# Patient Record
Sex: FEMALE | Race: BLACK OR AFRICAN AMERICAN | NOT HISPANIC OR LATINO | Employment: PART TIME | URBAN - METROPOLITAN AREA
[De-identification: names, ages, dates, MRNs, and addresses within clinical notes are randomized per-mention and may not be internally consistent; named-entity substitution may affect disease eponyms.]

---

## 2017-01-13 ENCOUNTER — HOSPITAL ENCOUNTER (EMERGENCY)
Facility: HOSPITAL | Age: 37
Discharge: HOME | End: 2017-01-13
Attending: EMERGENCY MEDICINE
Payer: MEDICAID

## 2017-01-13 VITALS
WEIGHT: 218 LBS | OXYGEN SATURATION: 98 % | RESPIRATION RATE: 16 BRPM | HEIGHT: 62 IN | HEART RATE: 78 BPM | DIASTOLIC BLOOD PRESSURE: 73 MMHG | BODY MASS INDEX: 40.12 KG/M2 | TEMPERATURE: 99 F | SYSTOLIC BLOOD PRESSURE: 115 MMHG

## 2017-01-13 DIAGNOSIS — S39.012A LUMBAR STRAIN, INITIAL ENCOUNTER: ICD-10-CM
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
## 2017-01-13 LAB
B-HCG UR QL: NEGATIVE
CTP QC/QA: YES

## 2017-01-13 PROCEDURE — 99283 EMERGENCY DEPT VISIT LOW MDM: CPT | Mod: 25

## 2017-01-13 PROCEDURE — 96372 THER/PROPH/DIAG INJ SC/IM: CPT

## 2017-01-13 PROCEDURE — 63600175 PHARM REV CODE 636 W HCPCS: Performed by: PHYSICIAN ASSISTANT

## 2017-01-13 RX ORDER — NAPROXEN 500 MG/1
500 TABLET ORAL 2 TIMES DAILY WITH MEALS
Qty: 30 TABLET | Refills: 0 | Status: SHIPPED | OUTPATIENT
Start: 2017-01-13 | End: 2017-03-04

## 2017-01-13 RX ORDER — KETOROLAC TROMETHAMINE 30 MG/ML
15 INJECTION, SOLUTION INTRAMUSCULAR; INTRAVENOUS
Status: COMPLETED | OUTPATIENT
Start: 2017-01-13 | End: 2017-01-13

## 2017-01-13 RX ORDER — METHOCARBAMOL 750 MG/1
1500 TABLET, FILM COATED ORAL 3 TIMES DAILY
Qty: 30 TABLET | Refills: 0 | Status: SHIPPED | OUTPATIENT
Start: 2017-01-13 | End: 2017-01-18

## 2017-01-13 RX ADMIN — KETOROLAC TROMETHAMINE 15 MG: 30 INJECTION, SOLUTION INTRAMUSCULAR at 02:01
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
## 2017-03-04 ENCOUNTER — HOSPITAL ENCOUNTER (EMERGENCY)
Facility: HOSPITAL | Age: 37
Discharge: HOME | End: 2017-03-04
Attending: EMERGENCY MEDICINE
Payer: MEDICAID

## 2017-03-04 VITALS
OXYGEN SATURATION: 99 % | RESPIRATION RATE: 18 BRPM | WEIGHT: 228 LBS | BODY MASS INDEX: 40.4 KG/M2 | HEIGHT: 63 IN | TEMPERATURE: 98 F | SYSTOLIC BLOOD PRESSURE: 123 MMHG | HEART RATE: 76 BPM | DIASTOLIC BLOOD PRESSURE: 79 MMHG

## 2017-03-04 DIAGNOSIS — M62.830 MUSCLE SPASM OF BACK: Primary | ICD-10-CM

## 2017-03-04 LAB
B-HCG UR QL: NEGATIVE
BILIRUB UR QL STRIP: NEGATIVE
CLARITY UR: CLEAR
COLOR UR: YELLOW
CTP QC/QA: YES
GLUCOSE UR QL STRIP: NEGATIVE
HGB UR QL STRIP: NEGATIVE
KETONES UR QL STRIP: NEGATIVE
LEUKOCYTE ESTERASE UR QL STRIP: NEGATIVE
NITRITE UR QL STRIP: NEGATIVE
PH UR STRIP: 7 [PH] (ref 5–8)
PROT UR QL STRIP: NEGATIVE
SP GR UR STRIP: 1.01 (ref 1–1.03)
URN SPEC COLLECT METH UR: NORMAL
UROBILINOGEN UR STRIP-ACNC: 1 EU/DL

## 2017-03-04 PROCEDURE — 96372 THER/PROPH/DIAG INJ SC/IM: CPT

## 2017-03-04 PROCEDURE — 81003 URINALYSIS AUTO W/O SCOPE: CPT

## 2017-03-04 PROCEDURE — 99283 EMERGENCY DEPT VISIT LOW MDM: CPT | Mod: 25

## 2017-03-04 PROCEDURE — 63600175 PHARM REV CODE 636 W HCPCS: Performed by: PHYSICIAN ASSISTANT

## 2017-03-04 PROCEDURE — 81025 URINE PREGNANCY TEST: CPT | Performed by: PHYSICIAN ASSISTANT

## 2017-03-04 RX ORDER — NAPROXEN 500 MG/1
500 TABLET ORAL 2 TIMES DAILY WITH MEALS
Qty: 20 TABLET | Refills: 0 | Status: ON HOLD | OUTPATIENT
Start: 2017-03-04 | End: 2020-01-10 | Stop reason: HOSPADM

## 2017-03-04 RX ORDER — CYCLOBENZAPRINE HCL 10 MG
10 TABLET ORAL 3 TIMES DAILY PRN
Qty: 15 TABLET | Refills: 0 | Status: SHIPPED | OUTPATIENT
Start: 2017-03-04 | End: 2017-03-09

## 2017-03-04 RX ORDER — ORPHENADRINE CITRATE 30 MG/ML
30 INJECTION INTRAMUSCULAR; INTRAVENOUS
Status: COMPLETED | OUTPATIENT
Start: 2017-03-04 | End: 2017-03-04

## 2017-03-04 RX ORDER — KETOROLAC TROMETHAMINE 30 MG/ML
30 INJECTION, SOLUTION INTRAMUSCULAR; INTRAVENOUS
Status: COMPLETED | OUTPATIENT
Start: 2017-03-04 | End: 2017-03-04

## 2017-03-04 RX ADMIN — KETOROLAC TROMETHAMINE 30 MG: 30 INJECTION, SOLUTION INTRAMUSCULAR at 02:03

## 2017-03-04 RX ADMIN — ORPHENADRINE CITRATE 30 MG: 30 INJECTION INTRAMUSCULAR; INTRAVENOUS at 02:03
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
## 2017-03-04 NOTE — DISCHARGE INSTRUCTIONS
Back Spasm (No Trauma)    Spasm of the back muscles can occur after a sudden forceful twisting or bending force (such as in a car accident), after a simple awkward movement, or after lifting something heavy with poor body positioning. In any case, muscle spasm adds to the pain. Sleeping in an awkward position or on a poor quality mattress can also cause this. Some people respond to emotional stress by tensing the muscles of their back.  Pain that continues may need further evaluation or other types of treatment such as physical therapy.  You don't always need X-rays for the initial evaluation of back pain, unless you had a physical injury such as from a car accident or fall. If your pain continues and doesn't respond to medical treatment, X-rays and other tests may then be done.   Home care  · As soon as possible, start sitting or walking again to avoid problems from prolonged bed rest (muscle weakness, worsening back stiffness and pain, blood clots in the legs).  · When in bed, try to find a position of comfort. A firm mattress is best. Try lying flat on your back with pillows under your knees. You can also try lying on your side with your knees bent up toward your chest and a pillow between your knees.  · Avoid prolonged sitting, long car rides, or travel. This puts more stress on the lower back than standing or walking.   · During the first 24 to 72 hours after an injury or flare-up, apply an ice pack to the painful area for 20 minutes, then remove it for 20 minutes. Do this over a period of 60 to 90 minutes or several times a day. This will reduce swelling and pain. Always wrap ice packs in a thin towel.  · You can start with ice, then switch to heat. Heat (hot shower, hot bath, or heating pad) reduces pain, and works well for muscle spasms. Apply heat to the painful area for 20 minutes, then remove it for 20 minutes. Do this over a period of 60 to 90 minutes or several times a day. Do not sleep on a heating  pad as it can burn or damage skin.  · Alternate ice and heat therapies.  · Be aware of safe lifting methods and do not lift anything over 15 pounds until all the pain is gone.  Gentle stretching will help your back heal faster. Do this simple routine 2 to 3 times a day until your back is feeling better.  · Lie on your back with your knees bent and both feet on the ground  · Slowly raise your left knee to your chest as you flatten your lower back against the floor. Hold for 20 to 30 seconds.  · Relax and repeat the exercise with your right knee.  · Do 2 to 3 of these exercises for each leg.  · Repeat, hugging both knees to your chest at the same time.  · Do not bounce, but use a gentle pull.  Medicines  Talk to your doctor before using medicine, especially if you have other medical problems or are taking other medicines.  You may use acetaminophen or ibuprofen to control pain, unless your healthcare provider prescribed another pain medicine. If you have a chronic condition such as diabetes, liver or kidney disease, stomach ulcer, or gastrointestinal bleeding, or are taking blood thinners, talk with your healthcare provider before taking any medicines.  Be careful if you are given prescription pain medicine, narcotics, or medicine for muscle spasm. They can cause drowsiness, affect your coordination, reflexes, or judgment. Do not drive or operate heavy machinery when taking these medicines. Take pain medicine only as prescribed by your healthcare provider.  Follow-up care  Follow up with your doctor, or as advised. Physical therapy or further tests may be needed.  If X-rays were taken, they may be reviewed by a radiologist. You will be notified of any new findings that may affect your care.  Call 911  Seek emergency medical care if any of these occur:  · Trouble breathing  · Confusion  · Drowsiness or trouble awakening  · Fainting or loss of consciousness  · Rapid or very slow heart rate  · Loss of bowel or bladder  control  When to seek medical advice  Call your healthcare provider right away if any of these occur:  · Pain becomes worse or spreads to your legs  · Weakness or numbness in one or both legs  · Numbness in the groin or genital area  · Unexplained fever over 100.4ºF (38.0ºC)  · Burning or pain when passing urine  Date Last Reviewed: 6/1/2016  © 1118-8984 [employer]. [address] [place] [address], [place] [id number]. All rights reserved. This information is not intended as a substitute for professional medical care. Always follow your healthcare professional's instructions.

## 2017-03-04 NOTE — ED NOTES
Pt reports to ed with c/o left lower back pain starting approximately last Thursday. Reports the pain as aching and chronic, rated 8/10, worsening when bending over. Took 2 Aleve around 0900 this morning. Denies any urinary symptoms. Reports numbness to bilateral legs with hx of neuropathy.

## 2017-03-04 NOTE — ED AVS SNAPSHOT
[facility]  [address]  [place] [address]               Caitie Wang   3/4/2017  2:22 PM   ED    Description:  Female : 1980   Department:  [facility]           Your Care was Coordinated By:     Provider Role From To    Ronnell Albrecht Jr., MD Attending Provider 17 2820 --    Vanessa Tinoco PA-C Physician Assistant 17 1452 --      Reason for Visit     Back Pain           Diagnoses this Visit        Comments    Muscle spasm of back    -  Primary       ED Disposition     None           To Do List           Follow-up Information     Follow up with Juana Vines MD.    Specialty:  Cardiology    Contact information:    [address]  [address]  [place] [address]  [phone]         These Medications        Disp Refills Start End    naproxen (NAPROSYN) 500 MG tablet 20 tablet 0 3/4/2017     Take 1 tablet (500 mg total) by mouth 2 (two) times daily with meals. - Oral    Pharmacy: [facility] [address] [place] [address] Ph #: [phone]       cyclobenzaprine (FLEXERIL) 10 MG tablet 15 tablet 0 3/4/2017 3/9/2017    Take 1 tablet (10 mg total) by mouth 3 (three) times daily as needed for Muscle spasms. - Oral    Pharmacy: [address] Ph #: [phone]         [facility] On Call     [facility] Nurse Care Line -  Assistance  Registered nurses in the [facility] provide clinical advisement, health education, appointment booking, and other advisory services.  Call for this free service at [phone].             Medications           Message regarding Medications     Verify the changes and/or additions to your medication regime listed below are the same as discussed with your clinician today.  If any of these changes or additions are incorrect, please notify your healthcare provider.        START taking these NEW  medications        Refills    cyclobenzaprine (FLEXERIL) 10 MG tablet 0    Sig: Take 1 tablet (10 mg total) by mouth 3 (three) times daily as needed for Muscle spasms.    Class: Print    Route: Oral      These medications were administered today        Dose Freq    ketorolac injection 30 mg 30 mg ED 1 Time    Sig: Inject 30 mg into the muscle ED 1 Time.    Class: Normal    Route: Intramuscular    Cosign for Ordering: Accepted by Ronnell Albrecht Jr., MD on 3/4/2017  2:54 PM    orphenadrine injection 30 mg 30 mg ED 1 Time    Sig: Inject 1 mL (30 mg total) into the muscle ED 1 Time.    Class: Normal    Route: Intramuscular    Cosign for Ordering: Accepted by Ronnell Albrecht Jr., MD on 3/4/2017  2:54 PM           Verify that the below list of medications is an accurate representation of the medications you are currently taking.  If none reported, the list may be blank. If incorrect, please contact your healthcare provider. Carry this list with you in case of emergency.           Current Medications     ALBUTEROL SULFATE (PROAIR HFA INHL) Inhale into the lungs.    cyclobenzaprine (FLEXERIL) 10 MG tablet Take 1 tablet (10 mg total) by mouth 3 (three) times daily as needed for Muscle spasms.    folic acid (FOLVITE) 1 MG tablet Take 1 mg by mouth once daily.    gabapentin (NEURONTIN) 300 MG capsule     hydrocodone-acetaminophen 7.5-325mg (NORCO) 7.5-325 mg per tablet     hydrOXYzine HCl (ATARAX) 25 MG tablet Take 1 tablet (25 mg total) by mouth every 6 (six) hours as needed for Anxiety.    ibuprofen (ADVIL,MOTRIN) 800 MG tablet Take 1 tablet (800 mg total) by mouth every 8 (eight) hours as needed for Pain.    imipramine (TOFRANIL) 25 MG tablet Take 25 mg by mouth every evening.    levothyroxine (SYNTHROID) 88 MCG tablet Take 300 mcg by mouth once daily.     linaclotide 290 mcg Cap Take 1 capsule (290 mcg total) by mouth once daily. Take 30 minutes before breakfast.    meclizine (ANTIVERT) 25 mg tablet Take 1 tablet (25 mg  "total) by mouth 3 (three) times daily as needed.    medroxyPROGESTERone (DEPO-PROVERA) 150 mg/mL Syrg     meloxicam (MOBIC) 7.5 MG tablet     metformin (GLUCOPHAGE) 500 MG tablet Take 500 mg by mouth daily with breakfast.    mometasone (NASONEX) 50 mcg/actuation nasal spray 2 sprays by Nasal route once daily.    naproxen (NAPROSYN) 500 MG tablet Take 1 tablet (500 mg total) by mouth 2 (two) times daily with meals.    pravastatin (PRAVACHOL) 40 MG tablet Take 40 mg by mouth once daily.    tramadol (ULTRAM) 50 mg tablet     VITAMIN D2 50,000 unit capsule            Clinical Reference Information           Your Vitals Were     BP Pulse Temp Resp Height Weight    134/70 86 97.9 °F (36.6 °C) 20 5' 3" (1.6 m) 103.4 kg (228 lb)    SpO2 BMI             99% 40.39 kg/m2         Allergies as of 3/4/2017     No Known Allergies      Immunizations Administered on Date of Encounter - 3/4/2017     None      ED Micro, Lab, POCT     Start Ordered       Status Ordering Provider    03/04/17 1433 03/04/17 1432  Urinalysis  STAT      Final result     03/04/17 1433 03/04/17 1432  POCT urine pregnancy  Once      Final result       ED Imaging Orders     None        Discharge Instructions         Back Spasm (No Trauma)    Spasm of the back muscles can occur after a sudden forceful twisting or bending force (such as in a car accident), after a simple awkward movement, or after lifting something heavy with poor body positioning. In any case, muscle spasm adds to the pain. Sleeping in an awkward position or on a poor quality mattress can also cause this. Some people respond to emotional stress by tensing the muscles of their back.  Pain that continues may need further evaluation or other types of treatment such as physical therapy.  You don't always need X-rays for the initial evaluation of back pain, unless you had a physical injury such as from a car accident or fall. If your pain continues and doesn't respond to medical treatment, X-rays and " other tests may then be done.   Home care  · As soon as possible, start sitting or walking again to avoid problems from prolonged bed rest (muscle weakness, worsening back stiffness and pain, blood clots in the legs).  · When in bed, try to find a position of comfort. A firm mattress is best. Try lying flat on your back with pillows under your knees. You can also try lying on your side with your knees bent up toward your chest and a pillow between your knees.  · Avoid prolonged sitting, long car rides, or travel. This puts more stress on the lower back than standing or walking.   · During the first 24 to 72 hours after an injury or flare-up, apply an ice pack to the painful area for 20 minutes, then remove it for 20 minutes. Do this over a period of 60 to 90 minutes or several times a day. This will reduce swelling and pain. Always wrap ice packs in a thin towel.  · You can start with ice, then switch to heat. Heat (hot shower, hot bath, or heating pad) reduces pain, and works well for muscle spasms. Apply heat to the painful area for 20 minutes, then remove it for 20 minutes. Do this over a period of 60 to 90 minutes or several times a day. Do not sleep on a heating pad as it can burn or damage skin.  · Alternate ice and heat therapies.  · Be aware of safe lifting methods and do not lift anything over 15 pounds until all the pain is gone.  Gentle stretching will help your back heal faster. Do this simple routine 2 to 3 times a day until your back is feeling better.  · Lie on your back with your knees bent and both feet on the ground  · Slowly raise your left knee to your chest as you flatten your lower back against the floor. Hold for 20 to 30 seconds.  · Relax and repeat the exercise with your right knee.  · Do 2 to 3 of these exercises for each leg.  · Repeat, hugging both knees to your chest at the same time.  · Do not bounce, but use a gentle pull.  Medicines  Talk to your doctor before using medicine,  especially if you have other medical problems or are taking other medicines.  You may use acetaminophen or ibuprofen to control pain, unless your healthcare provider prescribed another pain medicine. If you have a chronic condition such as diabetes, liver or kidney disease, stomach ulcer, or gastrointestinal bleeding, or are taking blood thinners, talk with your healthcare provider before taking any medicines.  Be careful if you are given prescription pain medicine, narcotics, or medicine for muscle spasm. They can cause drowsiness, affect your coordination, reflexes, or judgment. Do not drive or operate heavy machinery when taking these medicines. Take pain medicine only as prescribed by your healthcare provider.  Follow-up care  Follow up with your doctor, or as advised. Physical therapy or further tests may be needed.  If X-rays were taken, they may be reviewed by a radiologist. You will be notified of any new findings that may affect your care.  Call 911  Seek emergency medical care if any of these occur:  · Trouble breathing  · Confusion  · Drowsiness or trouble awakening  · Fainting or loss of consciousness  · Rapid or very slow heart rate  · Loss of bowel or bladder control  When to seek medical advice  Call your healthcare provider right away if any of these occur:  · Pain becomes worse or spreads to your legs  · Weakness or numbness in one or both legs  · Numbness in the groin or genital area  · Unexplained fever over 100.4ºF (38.0ºC)  · Burning or pain when passing urine  Date Last Reviewed: 6/1/2016  © 1467-7441 [employer] [address] [place] [address]. All rights reserved. This information is not intended as a substitute for professional medical care. Always follow your healthcare professional's instructions.          [facility] Sign-Up     Activating your [facility] account is as easy as 1-2-3!     1) Visit [email or web address], select Sign Up Now, enter this activation code and your  date of birth, then select Next.  [email or web address]  Expires: 4/18/2017  3:30 PM      2) Create a username and password to use when you visit [facility] in the future and select a security question in case you lose your password and select Next.    3) Enter your e-mail address and click Sign Up!    Additional Information  If you have questions, please e-mail [email or web address] or call [phone] to talk to our [facility] staff. Remember, [facility] is NOT to be used for urgent needs. For medical emergencies, dial 911.          [facility] complies with applicable Federal civil rights laws and does not discriminate on the basis of race, color, national origin, age, disability, or sex.        Language Assistance Services     ATTENTION: Language assistance services are available, free of charge. Please call [phone].      ATENCIÓN: Si habla tateañol, tiene a fragoso disposición servicios gratuitos de asistencia lingüística. Llame al [phone].     CHÚ Ý: N?u b?n nói Ti?ng Vi?t, có các d?ch v? h? tr? ngôn ng? mi?n phí dành cho b?n. G?i s? [phone].

## 2017-04-03 ENCOUNTER — HOSPITAL ENCOUNTER (OUTPATIENT)
Dept: RADIOLOGY | Facility: HOSPITAL | Age: 37
Discharge: HOME | End: 2017-04-03
Attending: INTERNAL MEDICINE
Payer: MEDICAID

## 2017-04-03 DIAGNOSIS — G89.29 CHRONIC LOW BACK PAIN WITH LEFT-SIDED SCIATICA: ICD-10-CM

## 2017-04-03 DIAGNOSIS — M54.42 CHRONIC LOW BACK PAIN WITH LEFT-SIDED SCIATICA: ICD-10-CM

## 2017-04-03 PROCEDURE — 73521 X-RAY EXAM HIPS BI 2 VIEWS: CPT | Mod: 26,,, | Performed by: RADIOLOGY

## 2017-04-03 PROCEDURE — 72148 MRI LUMBAR SPINE W/O DYE: CPT | Mod: 26,,, | Performed by: RADIOLOGY

## 2017-04-03 PROCEDURE — 72100 X-RAY EXAM L-S SPINE 2/3 VWS: CPT | Mod: TC

## 2017-04-03 PROCEDURE — 72148 MRI LUMBAR SPINE W/O DYE: CPT | Mod: TC

## 2017-04-03 PROCEDURE — 72100 X-RAY EXAM L-S SPINE 2/3 VWS: CPT | Mod: 26,,, | Performed by: RADIOLOGY

## 2017-04-03 PROCEDURE — 73521 X-RAY EXAM HIPS BI 2 VIEWS: CPT | Mod: TC

## 2017-04-04 ENCOUNTER — HOSPITAL ENCOUNTER (EMERGENCY)
Facility: HOSPITAL | Age: 37
Discharge: HOME | End: 2017-04-04
Attending: EMERGENCY MEDICINE
Payer: MEDICAID

## 2017-04-04 VITALS
DIASTOLIC BLOOD PRESSURE: 70 MMHG | RESPIRATION RATE: 18 BRPM | HEIGHT: 63 IN | OXYGEN SATURATION: 100 % | HEART RATE: 79 BPM | BODY MASS INDEX: 38.62 KG/M2 | TEMPERATURE: 98 F | WEIGHT: 218 LBS | SYSTOLIC BLOOD PRESSURE: 132 MMHG

## 2017-04-04 DIAGNOSIS — T78.40XA ALLERGIC REACTION, INITIAL ENCOUNTER: Primary | ICD-10-CM

## 2017-04-04 LAB
B-HCG UR QL: NEGATIVE
CTP QC/QA: YES
GLUCOSE SERPL-MCNC: 108 MG/DL (ref 70–110)
POCT GLUCOSE: 108 MG/DL (ref 70–110)

## 2017-04-04 PROCEDURE — 82962 GLUCOSE BLOOD TEST: CPT

## 2017-04-04 PROCEDURE — 99284 EMERGENCY DEPT VISIT MOD MDM: CPT | Mod: 25

## 2017-04-04 PROCEDURE — 63600175 PHARM REV CODE 636 W HCPCS: Performed by: EMERGENCY MEDICINE

## 2017-04-04 RX ORDER — PREDNISONE 20 MG/1
40 TABLET ORAL
Status: COMPLETED | OUTPATIENT
Start: 2017-04-04 | End: 2017-04-04

## 2017-04-04 RX ORDER — HYDROXYZINE PAMOATE 25 MG/1
25 CAPSULE ORAL EVERY 6 HOURS PRN
Qty: 14 CAPSULE | Refills: 0 | Status: ON HOLD | OUTPATIENT
Start: 2017-04-04 | End: 2020-01-10 | Stop reason: HOSPADM

## 2017-04-04 RX ORDER — PREDNISONE 50 MG/1
50 TABLET ORAL DAILY
Qty: 3 TABLET | Refills: 0 | Status: SHIPPED | OUTPATIENT
Start: 2017-04-05 | End: 2018-09-12

## 2017-04-04 RX ADMIN — PREDNISONE 40 MG: 20 TABLET ORAL at 03:04

## 2017-04-04 NOTE — ED AVS SNAPSHOT
[facility]  [address]  [place] [address]               Caitie Wang   2017  2:35 PM   ED    Description:  Female : 1980   Department:  [facility]           Your Care was Coordinated By:     Provider Role From To    Luis Chaidez MD Attending Provider 17 3287 --      Reason for Visit     Rash           Diagnoses this Visit        Comments    Allergic reaction, initial encounter    -  Primary       ED Disposition     None           To Do List           Follow-up Information     Follow up with Juana Vines MD In 1 day.    Specialty:  Cardiology    Why:  As scheduled    Contact information:    [address]  [address]  [place] [address]  [phone]         These Medications        Disp Refills Start End    predniSONE (DELTASONE) 50 MG Tab 3 tablet 0 2017     Take 1 tablet (50 mg total) by mouth once daily. - Oral    Pharmacy: [facility] [address] [place] [address] Ph #: [phone]       hydrOXYzine pamoate (VISTARIL) 25 MG Cap 14 capsule 0 2017     Take 1 capsule (25 mg total) by mouth every 6 (six) hours as needed (Itching). - Oral    Pharmacy: [facility] [address] [place] [address] Ph #: [phone]         [facility]     [facility] Care [facility] - 24/7 Assistance  Unless otherwise directed by your provider, please contact [facility], our nurse care line that is available for 24/7 assistance.     Registered nurses in the [facility] provide: appointment scheduling, clinical advisement, health education, and other advisory services.  Call: [phone] (toll free)               Medications           Message regarding Medications     Verify the changes and/or additions to your medication regime listed below are the same as discussed with your clinician today.  If any of these changes or additions are incorrect,  please notify your healthcare provider.        START taking these NEW medications        Refills    predniSONE (DELTASONE) 50 MG Tab 0    Starting on: 4/5/2017    Sig: Take 1 tablet (50 mg total) by mouth once daily.    Class: Print    Route: Oral    hydrOXYzine pamoate (VISTARIL) 25 MG Cap 0    Sig: Take 1 capsule (25 mg total) by mouth every 6 (six) hours as needed (Itching).    Class: Print    Route: Oral      These medications were administered today        Dose Freq    predniSONE tablet 40 mg 40 mg ED 1 Time    Sig: Take 2 tablets (40 mg total) by mouth ED 1 Time.    Class: Normal    Route: Oral           Verify that the below list of medications is an accurate representation of the medications you are currently taking.  If none reported, the list may be blank. If incorrect, please contact your healthcare provider. Carry this list with you in case of emergency.           Current Medications     ALBUTEROL SULFATE (PROAIR HFA INHL) Inhale into the lungs.    folic acid (FOLVITE) 1 MG tablet Take 1 mg by mouth once daily.    gabapentin (NEURONTIN) 300 MG capsule     hydrOXYzine HCl (ATARAX) 25 MG tablet Take 1 tablet (25 mg total) by mouth every 6 (six) hours as needed for Anxiety.    imipramine (TOFRANIL) 25 MG tablet Take 25 mg by mouth every evening.    levothyroxine (SYNTHROID) 88 MCG tablet Take 300 mcg by mouth once daily.     linaclotide 290 mcg Cap Take 1 capsule (290 mcg total) by mouth once daily. Take 30 minutes before breakfast.    metformin (GLUCOPHAGE) 500 MG tablet Take 500 mg by mouth daily with breakfast.    pravastatin (PRAVACHOL) 40 MG tablet Take 40 mg by mouth once daily.    tramadol (ULTRAM) 50 mg tablet     VITAMIN D2 50,000 unit capsule     hydrocodone-acetaminophen 7.5-325mg (NORCO) 7.5-325 mg per tablet     hydrOXYzine pamoate (VISTARIL) 25 MG Cap Take 1 capsule (25 mg total) by mouth every 6 (six) hours as needed (Itching).    ibuprofen (ADVIL,MOTRIN) 800 MG tablet Take 1 tablet (800 mg  "total) by mouth every 8 (eight) hours as needed for Pain.    meclizine (ANTIVERT) 25 mg tablet Take 1 tablet (25 mg total) by mouth 3 (three) times daily as needed.    medroxyPROGESTERone (DEPO-PROVERA) 150 mg/mL Syrg     meloxicam (MOBIC) 7.5 MG tablet     mometasone (NASONEX) 50 mcg/actuation nasal spray 2 sprays by Nasal route once daily.    naproxen (NAPROSYN) 500 MG tablet Take 1 tablet (500 mg total) by mouth 2 (two) times daily with meals.    predniSONE (DELTASONE) 50 MG Tab Starting on Apr 05, 2017. Take 1 tablet (50 mg total) by mouth once daily.    predniSONE tablet 40 mg Take 2 tablets (40 mg total) by mouth ED 1 Time.           Clinical Reference Information           Your Vitals Were     BP Pulse Temp Resp Height Weight    130/65 (BP Location: Left arm, Patient Position: Sitting) 80 98.3 °F (36.8 °C) (Oral) 16 5' 3" (1.6 m) 98.9 kg (218 lb)    SpO2 BMI             100% 38.62 kg/m2         Allergies as of 4/4/2017     No Known Allergies      Immunizations Administered on Date of Encounter - 4/4/2017     None      ED Micro, Lab, POCT     Start Ordered       Status Ordering Provider    04/04/17 0000 04/04/17 1335  POCT glucose     Comments:  This order was created through External Result Entry    Completed       ED Imaging Orders     None      Discharge References/Attachments     ALLERGIC REACTION, OTHER (LOCAL) (ENGLISH)      [facility] Sign-Up     Activating your [facility] account is as easy as 1-2-3!     1) Visit [email or web address], select Sign Up Now, enter this activation code and your date of birth, then select Next.  [email or web address]  Expires: 4/18/2017  4:30 PM      2) Create a username and password to use when you visit [facility] in the future and select a security question in case you lose your password and select Next.    3) Enter your e-mail address and click Sign Up!    Additional Information  If you have questions, please e-mail [email or web address] or call [phone] to talk to our [facility] " staff. Remember, [facility] is NOT to be used for urgent needs. For medical emergencies, dial 911.          [facility] complies with applicable Federal civil rights laws and does not discriminate on the basis of race, color, national origin, age, disability, or sex.        Language Assistance Services     ATTENTION: Language assistance services are available, free of charge. Please call [phone].      ATENCIÓN: Si habla español, tiene a fragoso disposición servicios gratuitos de asistencia lingüística. Llame al [phone].     CHÚ Ý: N?u b?n nói Ti?ng Vi?t, có các d?ch v? h? tr? ngôn ng? mi?n phí dành cho b?n. G?i s? [phone].

## 2017-04-04 NOTE — ED PROVIDER NOTES
Encounter Date: 4/4/2017       History     Chief Complaint   Patient presents with    Rash     red, itchy rash to right arm and also on right lower leg that began last pm.  Has taken multiple doses of benadryl and hydrocortisone cream without relief     Review of patient's allergies indicates:  No Known Allergies  HPI Comments: 37-year-old female presents to the emergency department complaining of an itchy, pruritic rash to her right upper extremity and right lower extremity.  Onset last night.  Patient has taken Benadryl and used hydrocortisone cream with only temporary, minimal relief.  Does not report any new medicines, does not report a new detergent.  Patient is in the room with friends who are also experiencing the same symptoms.  Patient does have follow-up with her primary care physician tomorrow, but reports the itching is incessant.  Denies any other symptoms.  Denies any headache, light headedness, dizziness, oropharyngeal swelling, difficult breathing, difficulty swallowing, chest pain, shorts breath, bowel pain, [place], diarrhea, dysuria, hematuria, fever, chills.    The history is provided by the patient.     Past Medical History:   Diagnosis Date    Anxiety     Asthma     Chronic pain     back; inflammatory    Depression     Diabetes mellitus, type 2     Endometriosis     Hyperlipidemia     Hypertension     MITZI (obstructive sleep apnea)     Thyroid disease     Vitamin D deficiency      Past Surgical History:   Procedure Laterality Date    endometriosis      THYROID SURGERY       Family History   Problem Relation Age of Onset    Hypertension Mother     Diabetes Maternal Grandmother     Hyperlipidemia Maternal Grandmother     Diabetes Maternal Grandfather     Hyperlipidemia Maternal Grandfather      Social History   Substance Use Topics    Smoking status: Never Smoker    Smokeless tobacco: Never Used    Alcohol use No     Review of Systems   Constitutional: Negative for chills,  fatigue and fever.   HENT: Negative for congestion, rhinorrhea, sore throat and voice change.    Eyes: Negative for photophobia, pain and redness.   Respiratory: Negative for cough, choking and shortness of breath.    Cardiovascular: Negative for chest pain and palpitations.   Gastrointestinal: Negative for abdominal pain, diarrhea, nausea and vomiting.   Genitourinary: Negative for dysuria, frequency and urgency.   Musculoskeletal: Negative for back pain, neck pain and neck stiffness.   Neurological: Negative for seizures, light-headedness, numbness and headaches.   All other systems reviewed and are negative.      Physical Exam   Initial Vitals   BP Pulse Resp Temp SpO2   04/04/17 1327 04/04/17 1327 04/04/17 1327 04/04/17 1327 04/04/17 1327   130/65 80 16 98.3 °F (36.8 °C) 100 %     Physical Exam    Nursing note and vitals reviewed.  Constitutional: She appears well-developed and well-nourished. No distress.   HENT:   Head: Normocephalic and atraumatic.   Mouth/Throat: Oropharynx is clear and moist.   Eyes: Conjunctivae and EOM are normal. Pupils are equal, round, and reactive to light.   Neck: Normal range of motion. Neck supple. No tracheal deviation present.   Cardiovascular: Normal rate, regular rhythm, normal heart sounds and intact distal pulses.   Pulmonary/Chest: Breath sounds normal. No respiratory distress. She has no wheezes. She has no rhonchi. She has no rales.   Abdominal: Soft. Bowel sounds are normal. She exhibits no distension. There is no tenderness. There is no rebound and no guarding.   Musculoskeletal: Normal range of motion. She exhibits no edema or tenderness.   Neurological: She is alert and oriented to person, place, and time. She has normal strength. No cranial nerve deficit or sensory deficit.   Skin: Rash noted. Rash is urticarial (Patches to the right upper extremity and right lower extremity).         ED Course   Procedures  Labs Reviewed - No data to display          Medical Decision  Making:   Initial Assessment:   37-year-old female presents the emergency department complaining of itching  Differential Diagnosis:   ALLERGIC reaction, angioedema, flexes, infection  ED Management:  Patient given steroids in the emergency department.  We will give prescription for Vistaril as well as prednisone, instructed to follow-up with her primary care physician tomorrow as scheduled, return with any new or worsening symptoms.  Patient expressed good understanding and is comfortable with discharge at this time.                   ED Course     Clinical Impression:   The encounter diagnosis was Allergic reaction, initial encounter.    Disposition:   Disposition: Discharged  Condition: Stable       Luis Chaidez MD  04/04/17 0559

## 2017-08-10 ENCOUNTER — HOSPITAL ENCOUNTER (EMERGENCY)
Facility: HOSPITAL | Age: 37
Discharge: HOME | End: 2017-08-10
Attending: EMERGENCY MEDICINE
Payer: MEDICAID

## 2017-08-10 VITALS
HEIGHT: 64 IN | DIASTOLIC BLOOD PRESSURE: 66 MMHG | RESPIRATION RATE: 14 BRPM | WEIGHT: 218 LBS | OXYGEN SATURATION: 100 % | HEART RATE: 72 BPM | BODY MASS INDEX: 37.22 KG/M2 | TEMPERATURE: 97 F | SYSTOLIC BLOOD PRESSURE: 115 MMHG

## 2017-08-10 DIAGNOSIS — R07.9 CHEST PAIN: ICD-10-CM

## 2017-08-10 DIAGNOSIS — M54.2 NECK PAIN: ICD-10-CM

## 2017-08-10 DIAGNOSIS — G89.18 POST-OPERATIVE PAIN: Primary | ICD-10-CM

## 2017-08-10 LAB
ALBUMIN SERPL BCP-MCNC: 3.7 G/DL
ALP SERPL-CCNC: 67 U/L
ALT SERPL W/O P-5'-P-CCNC: 21 U/L
ANION GAP SERPL CALC-SCNC: 8 MMOL/L
AST SERPL-CCNC: 19 U/L
B-HCG UR QL: NEGATIVE
BASOPHILS # BLD AUTO: 0.02 K/UL
BASOPHILS NFR BLD: 0.3 %
BILIRUB SERPL-MCNC: 0.3 MG/DL
BUN SERPL-MCNC: 8 MG/DL
CALCIUM SERPL-MCNC: 8.8 MG/DL
CHLORIDE SERPL-SCNC: 107 MMOL/L
CO2 SERPL-SCNC: 21 MMOL/L
CREAT SERPL-MCNC: 0.9 MG/DL
CTP QC/QA: YES
D DIMER PPP IA.FEU-MCNC: 1.84 MG/L FEU
DIFFERENTIAL METHOD: NORMAL
EOSINOPHIL # BLD AUTO: 0.1 K/UL
EOSINOPHIL NFR BLD: 1.9 %
ERYTHROCYTE [DISTWIDTH] IN BLOOD BY AUTOMATED COUNT: 13.3 %
EST. GFR  (AFRICAN AMERICAN): >60 ML/MIN/1.73 M^2
EST. GFR  (NON AFRICAN AMERICAN): >60 ML/MIN/1.73 M^2
GLUCOSE SERPL-MCNC: 80 MG/DL
GLUCOSE SERPL-MCNC: 83 MG/DL (ref 70–110)
HCT VFR BLD AUTO: 39 %
HGB BLD-MCNC: 12.8 G/DL
LYMPHOCYTES # BLD AUTO: 2.6 K/UL
LYMPHOCYTES NFR BLD: 36.8 %
MCH RBC QN AUTO: 30.3 PG
MCHC RBC AUTO-ENTMCNC: 32.8 G/DL
MCV RBC AUTO: 92 FL
MONOCYTES # BLD AUTO: 0.6 K/UL
MONOCYTES NFR BLD: 8 %
NEUTROPHILS # BLD AUTO: 3.7 K/UL
NEUTROPHILS NFR BLD: 53 %
PLATELET # BLD AUTO: 318 K/UL
PLATELET BLD QL SMEAR: NORMAL
PMV BLD AUTO: 9.7 FL
POTASSIUM SERPL-SCNC: 4.4 MMOL/L
PROT SERPL-MCNC: 7.7 G/DL
RBC # BLD AUTO: 4.23 M/UL
SODIUM SERPL-SCNC: 136 MMOL/L
TROPONIN I SERPL DL<=0.01 NG/ML-MCNC: <0.006 NG/ML
WBC # BLD AUTO: 7.01 K/UL

## 2017-08-10 PROCEDURE — 99285 EMERGENCY DEPT VISIT HI MDM: CPT | Mod: 25

## 2017-08-10 PROCEDURE — A9585 GADOBUTROL INJECTION: HCPCS | Performed by: EMERGENCY MEDICINE

## 2017-08-10 PROCEDURE — 84484 ASSAY OF TROPONIN QUANT: CPT

## 2017-08-10 PROCEDURE — 93005 ELECTROCARDIOGRAM TRACING: CPT

## 2017-08-10 PROCEDURE — 96374 THER/PROPH/DIAG INJ IV PUSH: CPT

## 2017-08-10 PROCEDURE — 81025 URINE PREGNANCY TEST: CPT | Performed by: EMERGENCY MEDICINE

## 2017-08-10 PROCEDURE — 96375 TX/PRO/DX INJ NEW DRUG ADDON: CPT

## 2017-08-10 PROCEDURE — 85379 FIBRIN DEGRADATION QUANT: CPT

## 2017-08-10 PROCEDURE — 25500020 PHARM REV CODE 255: Performed by: EMERGENCY MEDICINE

## 2017-08-10 PROCEDURE — 82962 GLUCOSE BLOOD TEST: CPT

## 2017-08-10 PROCEDURE — 80053 COMPREHEN METABOLIC PANEL: CPT

## 2017-08-10 PROCEDURE — 85025 COMPLETE CBC W/AUTO DIFF WBC: CPT

## 2017-08-10 PROCEDURE — 63600175 PHARM REV CODE 636 W HCPCS: Performed by: EMERGENCY MEDICINE

## 2017-08-10 RX ORDER — GADOBUTROL 604.72 MG/ML
10 INJECTION INTRAVENOUS
Status: COMPLETED | OUTPATIENT
Start: 2017-08-10 | End: 2017-08-10

## 2017-08-10 RX ORDER — LACTULOSE 10 G/15ML
20 SOLUTION ORAL EVERY 6 HOURS PRN
Qty: 240 ML | Refills: 2 | Status: SHIPPED | OUTPATIENT
Start: 2017-08-10 | End: 2017-08-20

## 2017-08-10 RX ORDER — ONDANSETRON 2 MG/ML
4 INJECTION INTRAMUSCULAR; INTRAVENOUS
Status: COMPLETED | OUTPATIENT
Start: 2017-08-10 | End: 2017-08-10

## 2017-08-10 RX ORDER — HYDROMORPHONE HYDROCHLORIDE 2 MG/ML
1 INJECTION, SOLUTION INTRAMUSCULAR; INTRAVENOUS; SUBCUTANEOUS
Status: COMPLETED | OUTPATIENT
Start: 2017-08-10 | End: 2017-08-10

## 2017-08-10 RX ORDER — OXYCODONE AND ACETAMINOPHEN 7.5; 325 MG/1; MG/1
1 TABLET ORAL EVERY 4 HOURS PRN
Qty: 20 TABLET | Refills: 0 | Status: SHIPPED | OUTPATIENT
Start: 2017-08-10 | End: 2018-09-12

## 2017-08-10 RX ADMIN — HYDROMORPHONE HYDROCHLORIDE 1 MG: 2 INJECTION INTRAMUSCULAR; INTRAVENOUS; SUBCUTANEOUS at 10:08

## 2017-08-10 RX ADMIN — GADOBUTROL 10 ML: 604.72 INJECTION INTRAVENOUS at 01:08

## 2017-08-10 RX ADMIN — ONDANSETRON 4 MG: 2 INJECTION INTRAMUSCULAR; INTRAVENOUS at 10:08

## 2017-08-10 NOTE — ED PROVIDER NOTES
Encounter Date: 8/10/2017       History     Chief Complaint   Patient presents with    Post-op Problem     on July 31 had surgery at [facility] on her neck and having post op pain to left and right upper back that radiates down right arm     Patient is a 37-year-old female who had surgery at [facility] 10 days ago for a cervical disc herniation.  She complains of increasing pain to the back of her neck extending on to her upper back.  She also complains of numbness to her right arm which has developed since the surgery.  She denies fever or chills.  No shortness of breath.  She has a follow-up appointment in 5 days.      The history is provided by the patient.     Review of patient's allergies indicates:  No Known Allergies  Past Medical History:   Diagnosis Date    Anxiety     Asthma     Chronic pain     back; inflammatory    Depression     Diabetes mellitus, type 2     Endometriosis     Hyperlipidemia     Hypertension     MITZI (obstructive sleep apnea)     Thyroid disease     Vitamin D deficiency      Past Surgical History:   Procedure Laterality Date    CERVICAL SPINE SURGERY      endometriosis      THYROID SURGERY       Family History   Problem Relation Age of Onset    Hypertension Mother     Diabetes Maternal Grandmother     Hyperlipidemia Maternal Grandmother     Diabetes Maternal Grandfather     Hyperlipidemia Maternal Grandfather      Social History   Substance Use Topics    Smoking status: Never Smoker    Smokeless tobacco: Never Used    Alcohol use No     Review of Systems   Constitutional: Negative for chills and fever.   Respiratory: Positive for cough. Negative for shortness of breath.    Gastrointestinal: Negative for abdominal pain, nausea and vomiting.   Musculoskeletal: Positive for back pain and neck pain.   Neurological: Positive for numbness.       Physical Exam     Initial Vitals [08/10/17 0851]   BP Pulse Resp Temp SpO2   138/89 86 20 99 °F (37.2 °C) 98 %       MAP       105.33         Physical Exam    Nursing note and vitals reviewed.  Constitutional: She appears distressed.   HENT:   Head: Normocephalic and atraumatic.   Eyes: EOM are normal. Pupils are equal, round, and reactive to light.   Neck:   Decreased range of motion of the neck due to pain.  Mild tenderness of the lower neck extending to the right upper back.   Cardiovascular: Normal rate, regular rhythm and normal heart sounds.   Pulmonary/Chest: Breath sounds normal.   Abdominal: Soft. There is no tenderness.   Musculoskeletal: Normal range of motion.   Calves without swelling or tenderness bilaterally.   Neurological: She is alert and oriented to person, place, and time. No sensory deficit.   There is a slight decrease in the right hand  compared to the left.   Skin: Skin is warm and dry.   Psychiatric: Her behavior is normal. Thought content normal.         ED Course   Procedures  Labs Reviewed   COMPREHENSIVE METABOLIC PANEL - Abnormal; Notable for the following:        Result Value    CO2 21 (*)     All other components within normal limits   D DIMER, QUANTITATIVE - Abnormal; Notable for the following:     D-Dimer 1.84 (*)     All other components within normal limits   CBC W/ AUTO DIFFERENTIAL   TROPONIN I   POCT URINE PREGNANCY     EKG Readings: (Independently Interpreted)   Rhythm: Normal Sinus Rhythm. Heart Rate: 83. ST Segments: Normal ST Segments.     Imaging Results          NM Lung Ventilation Perfusion Imaging (Final result)  Result time 08/10/17 15:47:55    Final result by Henri Johnson MD (08/10/17 15:47:55)                 Impression:      Low probability of pulmonary embolism (less than 20  %).        Electronically signed by: TEMO JOHNSON MD  Date:     08/10/17  Time:    15:47              Narrative:    VENTILATION/PERFUSION (VQ) SCAN:    TECHNIQUE: The perfusion scan was performed after the ventilation scan.   Through a vein in the patient's upper extremity, Tc-99m-MAA  5 mCi were administered IV and perfusion images were acquired. Ventilation images were acquired following inhalation of 15 mCi xenon-133.  For the ventilation portion, full breath, equilibrium, and washout phases in posterior planar projection obtained.  For the perfusion portion, planar images were acquired with anterior, posterior, bilateral lateral, BHANDARI, RPO, NIGHAT, and LPO projections.    COMPARISON:  Chest radiograph, the same date.  Normal.  Her    FINDINGS:    There are no significant ventilation-perfusion mismatches.  Ventilation study normal, all phases.                             MRI Cervical Spine W WO Cont (Final result)  Result time 08/10/17 13:42:03   Procedure changed from MRI Cervical Spine With Contrast     Final result by Blaise Rendon MD (08/10/17 13:42:03)                 Impression:      1.  Status post ACDF at C6-C7.  Multilevel degenerative changes as detailed above.      Electronically signed by: BLAISE RENDON MD  Date:     08/10/17  Time:    13:42              Narrative:    Cervical spine MRI    Technique: MRI of cervical spine was performed without and with 10 mL gadavist contrast and the following sequences were obtained: Localizer; sagittal T1, T2 CUBE, and STIR; axial T2 and gradient; post contrast axial and sagittal T1.    Comparison: Not available.    Findings:    Status post ACDF at C6-C7.  Artifact related to hardware obscures adjacent tissues.    Vertebral body height and alignment are maintained. Facets are aligned.  Bone marrow signal is normal.     Visualized posterior fossa structures and cervical cord are unremarkable.    Limited evaluation of the neck soft tissues is unremarkable.    C2-3: No spinal canal stenosis or neuroforaminal narrowing.    C3-4: Mild spinal canal stenosis on congenital basis.    C4-5: Posterior disc osteophyte complex along with bilateral uncovertebral arthrosis result in moderate spinal canal stenosis and moderate bilateral neuroforaminal  narrowing.    C5-6: Mild spinal canal stenosis on congenital basis.    C6-7: Status post ACDF.  Hardware obscures spinal canal.  Probable mild left neuroforaminal narrowing due to uncovertebral arthrosis.    C7-T1: No spinal canal stenosis or neuroforaminal narrowing.                             X-Ray Chest 1 View (Final result)  Result time 08/10/17 11:41:22    Final result by Rober Rendon MD (08/10/17 11:41:22)                 Impression:     No acute cardiopulmonary process.      Electronically signed by: ROBER RENDON MD  Date:     08/10/17  Time:    11:41              Narrative:    Portable AP chest x-ray    Comparison: 05/16/16    Findings:  There is no consolidation, effusion, or pneumothorax.  Cardiomediastinal silhouette is unremarkable.                                 Medical Decision Making:   ED Management:  37-year-old female with neck pain and right arm symptoms after cervical surgery.  MRI shows no acute abnormalities.  VQ scan shows very low probability for PE.  Lab work is unremarkable.  Patient says she is almost run out of her pain medicine and I will write her for more Percocet.  She also needs something for constipation and I'll write her a prescription for lactulose.  She is scheduled to follow-up at [facility] in 5 days.      Additional MDM:     Well's Criteria Score:  -Clinical symptoms of DVT (leg swelling, pain with palpation) = 0.0  -Other diagnosis less likely than pulmonary embolism =            0.0  -Heart Rate >100 =   0.0  -Immobilization (= or > than 3 days) or surgery in the previous 4 weeks = 1.5  -Previous DVT/PE = 0.0  -Hemoptysis =          0.0  -Malignancy =           0.0  Well's Probability Score =    1.5                      ED Course     Clinical Impression:   The primary encounter diagnosis was Post-operative pain. Diagnoses of Chest pain and Neck pain were also pertinent to this visit.                           Pedro Hernandez MD  08/10/17 4212

## 2017-08-11 LAB — POCT GLUCOSE: 83 MG/DL (ref 70–110)

## 2017-09-25 ENCOUNTER — HOSPITAL ENCOUNTER (EMERGENCY)
Facility: HOSPITAL | Age: 37
Discharge: HOME | End: 2017-09-25
Attending: EMERGENCY MEDICINE
Payer: MEDICAID

## 2017-09-25 VITALS
DIASTOLIC BLOOD PRESSURE: 72 MMHG | BODY MASS INDEX: 40.4 KG/M2 | WEIGHT: 228 LBS | OXYGEN SATURATION: 100 % | RESPIRATION RATE: 18 BRPM | HEART RATE: 82 BPM | SYSTOLIC BLOOD PRESSURE: 120 MMHG | HEIGHT: 63 IN | TEMPERATURE: 99 F

## 2017-09-25 DIAGNOSIS — R09.81 NASAL CONGESTION: ICD-10-CM

## 2017-09-25 DIAGNOSIS — R59.9 SWOLLEN LYMPH NODES: Primary | ICD-10-CM

## 2017-09-25 LAB
B-HCG UR QL: NEGATIVE
CTP QC/QA: YES

## 2017-09-25 PROCEDURE — 99283 EMERGENCY DEPT VISIT LOW MDM: CPT | Mod: 25

## 2017-09-25 PROCEDURE — 81025 URINE PREGNANCY TEST: CPT | Performed by: PHYSICIAN ASSISTANT

## 2017-09-25 PROCEDURE — 25000003 PHARM REV CODE 250: Performed by: PHYSICIAN ASSISTANT

## 2017-09-25 RX ORDER — CETIRIZINE HYDROCHLORIDE 10 MG/1
10 TABLET ORAL DAILY
Qty: 30 TABLET | Refills: 0 | Status: SHIPPED | OUTPATIENT
Start: 2017-09-25 | End: 2023-06-30

## 2017-09-25 RX ORDER — IBUPROFEN 400 MG/1
800 TABLET ORAL
Status: COMPLETED | OUTPATIENT
Start: 2017-09-25 | End: 2017-09-25

## 2017-09-25 RX ADMIN — IBUPROFEN 800 MG: 400 TABLET, FILM COATED ORAL at 02:09

## 2017-09-25 NOTE — ED PROVIDER NOTES
Encounter Date: 9/25/2017       History     Chief Complaint   Patient presents with    Generalized Body Aches     c/o generalized body aches and neck soreness x 3days     Caitie Wang, a 37 y.o. female that presents to the ED for generalized body aches and left-sided jaw pain ×3 days.  She states that she had a surgery to her cervical spine 2 months ago and is concerned that the pain she is having is because of this surgery.  She denies any trauma to this site.  She does attest to chills and subjective fever.  Denies any difficulty swallowing, pain to teeth or ears.   No treatments tried.      Currently on depo and has not had a menstrual cycle in 19 years.        The history is provided by the patient.     Review of patient's allergies indicates:  No Known Allergies  Past Medical History:   Diagnosis Date    Anxiety     Asthma     Chronic pain     back; inflammatory    Depression     Diabetes mellitus, type 2     Endometriosis     Hyperlipidemia     Hypertension     MITZI (obstructive sleep apnea)     Thyroid disease     Vitamin D deficiency      Past Surgical History:   Procedure Laterality Date    CERVICAL SPINE SURGERY      endometriosis      THYROID SURGERY       Family History   Problem Relation Age of Onset    Hypertension Mother     Diabetes Maternal Grandmother     Hyperlipidemia Maternal Grandmother     Diabetes Maternal Grandfather     Hyperlipidemia Maternal Grandfather      Social History   Substance Use Topics    Smoking status: Never Smoker    Smokeless tobacco: Never Used    Alcohol use No     Review of Systems   Constitutional: Positive for chills and fever (subjective ).   HENT: Positive for congestion. Negative for sore throat and trouble swallowing.    Respiratory: Negative for cough and shortness of breath.    Cardiovascular: Negative for chest pain.   Gastrointestinal: Negative for abdominal pain, nausea and vomiting.   Musculoskeletal: Positive for myalgias.    Allergic/Immunologic: Negative for immunocompromised state.   Hematological: Negative for adenopathy.   Psychiatric/Behavioral: Negative for agitation.   All other systems reviewed and are negative.      Physical Exam     Initial Vitals [09/25/17 1255]   BP Pulse Resp Temp SpO2   (!) 140/86 86 16 97.7 °F (36.5 °C) 99 %      MAP       104         Physical Exam    Nursing note and vitals reviewed.  Constitutional: She appears well-developed and well-nourished. No distress.   HENT:   Head: Normocephalic and atraumatic.   Right Ear: Hearing, tympanic membrane, external ear and ear canal normal.   Left Ear: Hearing, tympanic membrane, external ear and ear canal normal.   Nose: Nose normal.   Mouth/Throat: Uvula is midline and oropharynx is clear and moist. Dental caries present. No dental abscesses or uvula swelling.   Overall poor dentition, no evidence of fluctuant gums or tooth abscess formation.     Eyes: Conjunctivae and EOM are normal.   Neck: Normal range of motion. No tracheal deviation present.   Cardiovascular: Normal rate and regular rhythm.   Pulmonary/Chest: Breath sounds normal. No respiratory distress.   Abdominal: Soft. Bowel sounds are normal. She exhibits no distension. There is no tenderness. There is no rebound and no guarding.   Musculoskeletal: Normal range of motion. She exhibits no tenderness.   Lymphadenopathy:     She has cervical adenopathy.        Right cervical: No superficial cervical, no deep cervical and no posterior cervical adenopathy present.       Left cervical: Superficial cervical adenopathy present. No deep cervical and no posterior cervical adenopathy present.   Neurological: She is alert and oriented to person, place, and time. She has normal strength.   Skin: Skin is warm and dry. No rash noted. No erythema.   Psychiatric: She has a normal mood and affect. Thought content normal.         ED Course   Procedures  Labs Reviewed   POCT URINE PREGNANCY             Medical Decision  Making:   Initial Assessment:   Generalized body aches and L sided mild adenopathy  Differential Diagnosis:   URI, sinus congestion, dental jos, viral syndrome   ED Management:   presents to ED and NAD, afebrile, and nontoxic.  She has mild superficial cervical lymphadenopathy to the left side.  No evidence of fluctuance gums or dental abscess formation.  Symptoms seem as consistent with viral URI.  Patient was instructed to take an antihistamine, Sudafed and Tylenol or Motrin as needed.  Instructed to follow-up with PCP for further evaluation.  Strict return precautions given and patient verbalized understanding.    RX: zyrtec                Attending Attestation:     Physician Attestation Statement for NP/PA:   I have conducted a face to face encounter with this patient in addition to the NP/PA, due to NP/PA Request    Other NP/PA Attestation Additions:    History of Present Illness: Left jaw pain    Medical Decision Making: Swollen submandibular gland on left on exam.  Has nasal congesiton and sinus pressure.  I suspect this is related.  No signs of ear/thraot/dental infection.                 ED Course      Clinical Impression:   The primary encounter diagnosis was Swollen lymph nodes. A diagnosis of Nasal congestion was also pertinent to this visit.                           Vanessa Tinoco PA-C  09/25/17 1362       Alana Vale MD  09/29/17 [id number]

## 2017-09-25 NOTE — DISCHARGE INSTRUCTIONS
Take motrin, Zyrtec and Sudafed.  Nasal congestion.  Follow-up with her PCP for further evaluation of the generalized body aches.

## 2017-11-10 ENCOUNTER — HOSPITAL ENCOUNTER (EMERGENCY)
Facility: HOSPITAL | Age: 37
Discharge: HOME | End: 2017-11-10
Attending: EMERGENCY MEDICINE
Payer: MEDICAID

## 2017-11-10 VITALS
DIASTOLIC BLOOD PRESSURE: 96 MMHG | OXYGEN SATURATION: 99 % | HEIGHT: 63 IN | TEMPERATURE: 98 F | HEART RATE: 83 BPM | RESPIRATION RATE: 18 BRPM | WEIGHT: 235 LBS | SYSTOLIC BLOOD PRESSURE: 144 MMHG | BODY MASS INDEX: 41.64 KG/M2

## 2017-11-10 DIAGNOSIS — R23.8 SKIN BREAKDOWN: Primary | ICD-10-CM

## 2017-11-10 LAB — POCT GLUCOSE: 81 MG/DL (ref 70–110)

## 2017-11-10 PROCEDURE — 82962 GLUCOSE BLOOD TEST: CPT

## 2017-11-10 PROCEDURE — 99283 EMERGENCY DEPT VISIT LOW MDM: CPT | Mod: 25

## 2017-11-10 NOTE — ED PROVIDER NOTES
Encounter Date: 11/10/2017       History     Chief Complaint   Patient presents with    Wound Infection     pt has abcess to rt inner gluteal, sent here by Dr Bui. denies fever, able to sit, pain with standing. symptoms for 1 week.     37-year-old female presents with pain in her right buttock cheek.  She reports the pain is worse with walking.  It is been present for the last week.  It is not associated with any painful bowel movements.  It is not associated with any drainage or fever.  She was seen by her primary care physician earlier and sent to the emergency department for reevaluation and possible incision and drainage.      The history is provided by the patient.     Review of patient's allergies indicates:  No Known Allergies  Past Medical History:   Diagnosis Date    Anxiety     Asthma     Chronic pain     back; inflammatory    Depression     Diabetes mellitus, type 2     Endometriosis     Hyperlipidemia     Hypertension     MITZI (obstructive sleep apnea)     Thyroid disease     Vitamin D deficiency      Past Surgical History:   Procedure Laterality Date    CERVICAL SPINE SURGERY      endometriosis      THYROID SURGERY       Family History   Problem Relation Age of Onset    Hypertension Mother     Diabetes Maternal Grandmother     Hyperlipidemia Maternal Grandmother     Diabetes Maternal Grandfather     Hyperlipidemia Maternal Grandfather      Social History   Substance Use Topics    Smoking status: Never Smoker    Smokeless tobacco: Never Used    Alcohol use No     Review of Systems   Constitutional: Negative for fever.   Skin: Positive for wound.   All other systems reviewed and are negative.      Physical Exam     Initial Vitals [11/10/17 1420]   BP Pulse Resp Temp SpO2   (!) 144/96 83 18 98.2 °F (36.8 °C) 99 %      MAP       112         Physical Exam    Vitals reviewed.  Constitutional: Vital signs are normal. She appears well-developed and well-nourished. She is Obese .   HENT:    Head: Normocephalic and atraumatic.   Mouth/Throat: Oropharynx is clear and moist.   Eyes: Conjunctivae and EOM are normal. Pupils are equal, round, and reactive to light.   Neck: Trachea normal and normal range of motion. Neck supple.   Cardiovascular: Normal rate, regular rhythm, normal heart sounds and normal pulses.   Abdominal: Soft. Normal appearance and bowel sounds are normal. There is no tenderness.   Genitourinary: Rectum normal. Rectal exam shows no fissure, no mass and no tenderness.   Genitourinary Comments: Right gluteus with 2x2 area of skin excoriation   No induration, no fluctuance, no surrounding tenderness or erythema    Musculoskeletal: Normal range of motion.   Neurological: She is alert and oriented to person, place, and time.   Skin: Skin is warm and dry.         ED Course   Procedures  Labs Reviewed   POCT GLUCOSE   POCT GLUCOSE MONITORING CONTINUOUS             Medical Decision Making:   History:   Old Medical Records: I decided to obtain old medical records.  Initial Assessment:   Evaluation of skin breakdown.  Physical exam is not consistent with abscess.  Exam more consistent with pressure breakdown likely secondary to patient's size, some excoriation, possible yeast infection.  Recommend barrier Cream.  Patient was provided with barrier cream and discharged in good                   ED Course      Clinical Impression:   The encounter diagnosis was Skin breakdown.    Disposition:   Disposition: Discharged  Condition: Stable                        Saw Geller MD  11/10/17 5403

## 2017-11-10 NOTE — ED TRIAGE NOTES
Pt reports possible abscess to R medial buttock for the last 2 days, sent from PCP office for I&D.

## 2017-12-27 ENCOUNTER — HOSPITAL ENCOUNTER (EMERGENCY)
Facility: HOSPITAL | Age: 37
Discharge: HOME | End: 2017-12-27
Attending: EMERGENCY MEDICINE
Payer: MEDICAID

## 2017-12-27 VITALS
BODY MASS INDEX: 41.64 KG/M2 | RESPIRATION RATE: 20 BRPM | WEIGHT: 235 LBS | OXYGEN SATURATION: 100 % | HEIGHT: 63 IN | TEMPERATURE: 99 F | SYSTOLIC BLOOD PRESSURE: 121 MMHG | DIASTOLIC BLOOD PRESSURE: 74 MMHG | HEART RATE: 90 BPM

## 2017-12-27 DIAGNOSIS — Z76.0 MEDICATION REFILL: ICD-10-CM

## 2017-12-27 DIAGNOSIS — R42 VERTIGO: Primary | ICD-10-CM

## 2017-12-27 LAB
ALBUMIN SERPL BCP-MCNC: 3.7 G/DL
ALP SERPL-CCNC: 69 U/L
ALT SERPL W/O P-5'-P-CCNC: 14 U/L
ANION GAP SERPL CALC-SCNC: 8 MMOL/L
AST SERPL-CCNC: 14 U/L
B-HCG UR QL: NEGATIVE
BASOPHILS # BLD AUTO: 0.02 K/UL
BASOPHILS NFR BLD: 0.3 %
BILIRUB SERPL-MCNC: 0.3 MG/DL
BUN SERPL-MCNC: 9 MG/DL
CALCIUM SERPL-MCNC: 9.5 MG/DL
CHLORIDE SERPL-SCNC: 109 MMOL/L
CO2 SERPL-SCNC: 23 MMOL/L
CREAT SERPL-MCNC: 0.8 MG/DL
CTP QC/QA: YES
DIFFERENTIAL METHOD: NORMAL
EOSINOPHIL # BLD AUTO: 0.1 K/UL
EOSINOPHIL NFR BLD: 1.7 %
ERYTHROCYTE [DISTWIDTH] IN BLOOD BY AUTOMATED COUNT: 13 %
EST. GFR  (AFRICAN AMERICAN): >60 ML/MIN/1.73 M^2
EST. GFR  (NON AFRICAN AMERICAN): >60 ML/MIN/1.73 M^2
GLUCOSE SERPL-MCNC: 89 MG/DL
HCT VFR BLD AUTO: 38.7 %
HGB BLD-MCNC: 12.5 G/DL
IMM GRANULOCYTES # BLD AUTO: 0.03 K/UL
IMM GRANULOCYTES NFR BLD AUTO: 0.4 %
LYMPHOCYTES # BLD AUTO: 2.8 K/UL
LYMPHOCYTES NFR BLD: 36.2 %
MCH RBC QN AUTO: 30 PG
MCHC RBC AUTO-ENTMCNC: 32.3 G/DL
MCV RBC AUTO: 93 FL
MONOCYTES # BLD AUTO: 0.7 K/UL
MONOCYTES NFR BLD: 9.1 %
NEUTROPHILS # BLD AUTO: 4 K/UL
NEUTROPHILS NFR BLD: 52.3 %
NRBC BLD-RTO: 0 /100 WBC
PLATELET # BLD AUTO: 286 K/UL
PMV BLD AUTO: 9.7 FL
POCT GLUCOSE: 110 MG/DL (ref 70–110)
POTASSIUM SERPL-SCNC: 3.7 MMOL/L
PROT SERPL-MCNC: 7.9 G/DL
RBC # BLD AUTO: 4.16 M/UL
SODIUM SERPL-SCNC: 140 MMOL/L
WBC # BLD AUTO: 7.59 K/UL

## 2017-12-27 PROCEDURE — 96360 HYDRATION IV INFUSION INIT: CPT

## 2017-12-27 PROCEDURE — 82962 GLUCOSE BLOOD TEST: CPT

## 2017-12-27 PROCEDURE — 93010 ELECTROCARDIOGRAM REPORT: CPT | Mod: ,,, | Performed by: INTERNAL MEDICINE

## 2017-12-27 PROCEDURE — 99284 EMERGENCY DEPT VISIT MOD MDM: CPT | Mod: 25

## 2017-12-27 PROCEDURE — 93005 ELECTROCARDIOGRAM TRACING: CPT

## 2017-12-27 PROCEDURE — 99284 EMERGENCY DEPT VISIT MOD MDM: CPT | Mod: ,,, | Performed by: EMERGENCY MEDICINE

## 2017-12-27 PROCEDURE — 80053 COMPREHEN METABOLIC PANEL: CPT

## 2017-12-27 PROCEDURE — 81025 URINE PREGNANCY TEST: CPT | Performed by: EMERGENCY MEDICINE

## 2017-12-27 PROCEDURE — 25000003 PHARM REV CODE 250: Performed by: EMERGENCY MEDICINE

## 2017-12-27 PROCEDURE — 85025 COMPLETE CBC W/AUTO DIFF WBC: CPT

## 2017-12-27 RX ORDER — HYDROXYZINE PAMOATE 25 MG/1
25 CAPSULE ORAL
Status: CANCELLED | OUTPATIENT
Start: 2017-12-27 | End: 2017-12-27

## 2017-12-27 RX ORDER — METFORMIN HYDROCHLORIDE 500 MG/1
500 TABLET ORAL
Qty: 30 TABLET | Refills: 11 | Status: SHIPPED | OUTPATIENT
Start: 2017-12-27 | End: 2024-02-19

## 2017-12-27 RX ORDER — LEVOTHYROXINE SODIUM 300 UG/1
0.3 TABLET ORAL
Qty: 30 TABLET | Refills: 11 | Status: SHIPPED | OUTPATIENT
Start: 2017-12-27 | End: 2019-02-18

## 2017-12-27 RX ORDER — HYDROXYZINE PAMOATE 25 MG/1
25 CAPSULE ORAL EVERY 8 HOURS PRN
Qty: 30 CAPSULE | Refills: 6 | Status: SHIPPED | OUTPATIENT
Start: 2017-12-27 | End: 2017-12-27

## 2017-12-27 RX ORDER — GABAPENTIN 300 MG/1
300 CAPSULE ORAL 3 TIMES DAILY
Qty: 90 CAPSULE | Refills: 11 | Status: SHIPPED | OUTPATIENT
Start: 2017-12-27 | End: 2017-12-27

## 2017-12-27 RX ORDER — MECLIZINE HCL 12.5 MG 12.5 MG/1
25 TABLET ORAL 3 TIMES DAILY PRN
Qty: 30 TABLET | Refills: 6 | Status: ON HOLD | OUTPATIENT
Start: 2017-12-27 | End: 2020-01-10 | Stop reason: HOSPADM

## 2017-12-27 RX ORDER — MECLIZINE HCL 12.5 MG 12.5 MG/1
50 TABLET ORAL
Status: CANCELLED | OUTPATIENT
Start: 2017-12-27 | End: 2017-12-27

## 2017-12-27 RX ORDER — LEVOTHYROXINE SODIUM 300 UG/1
0.3 TABLET ORAL
Qty: 30 TABLET | Refills: 11 | Status: SHIPPED | OUTPATIENT
Start: 2017-12-27 | End: 2017-12-27

## 2017-12-27 RX ORDER — MECLIZINE HCL 12.5 MG 12.5 MG/1
25 TABLET ORAL 3 TIMES DAILY PRN
Qty: 30 TABLET | Refills: 6 | Status: SHIPPED | OUTPATIENT
Start: 2017-12-27 | End: 2017-12-27

## 2017-12-27 RX ORDER — HYDROXYZINE PAMOATE 25 MG/1
25 CAPSULE ORAL EVERY 8 HOURS PRN
Qty: 30 CAPSULE | Refills: 6 | Status: SHIPPED | OUTPATIENT
Start: 2017-12-27 | End: 2018-09-12

## 2017-12-27 RX ORDER — METFORMIN HYDROCHLORIDE 500 MG/1
500 TABLET ORAL
Qty: 30 TABLET | Refills: 11 | Status: SHIPPED | OUTPATIENT
Start: 2017-12-27 | End: 2017-12-27

## 2017-12-27 RX ORDER — GABAPENTIN 300 MG/1
300 CAPSULE ORAL 3 TIMES DAILY
Qty: 90 CAPSULE | Refills: 11 | Status: SHIPPED | OUTPATIENT
Start: 2017-12-27 | End: 2023-06-30

## 2017-12-27 RX ADMIN — SODIUM CHLORIDE 1000 ML: 0.9 INJECTION, SOLUTION INTRAVENOUS at 09:12
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
## 2017-12-27 NOTE — ED TRIAGE NOTES
Presents to ER due to getting dizzy at work this am.  States that she ate breakfast and was feeling ok then all of a sudden the dizziness came over her to the point she thought she was going to faint.    GENERAL: The patient is well-developed and well-nourished in no apparent distress. Alert and oriented x4.                                                HEENT: Head is normocephalic and atraumatic. Extraocular muscles are intact. Pupils are equal, round, and reactive to light and accommodation. Nares appeared normal. Mouth is well hydrated and without lesions. Mucous membranes are moist. Posterior pharynx clear of any exudate or lesions.    NECK: Supple. No carotid bruits. No lymphadenopathy or thyromegaly.    LUNGS: Clear to auscultation.    HEART: Regular rate and rhythm without murmur.     ABDOMEN: Soft, nontender, and nondistended. Positive bowel sounds. No hepatosplenomegaly was noted.     EXTREMITIES: Without any cyanosis, clubbing, rash, lesions or edema.     NEUROLOGIC: Cranial nerves II through XII are grossly intact.     PSYCHIATRIC: Flat affect, but denies suicidal or homicidal ideations.    SKIN: No ulceration or induration present.
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
## 2018-02-21 ENCOUNTER — HOSPITAL ENCOUNTER (EMERGENCY)
Facility: HOSPITAL | Age: 38
Discharge: HOME | End: 2018-02-21
Attending: EMERGENCY MEDICINE
Payer: MEDICAID

## 2018-02-21 VITALS
HEIGHT: 63 IN | TEMPERATURE: 98 F | HEART RATE: 83 BPM | BODY MASS INDEX: 38.62 KG/M2 | DIASTOLIC BLOOD PRESSURE: 90 MMHG | SYSTOLIC BLOOD PRESSURE: 135 MMHG | RESPIRATION RATE: 18 BRPM | WEIGHT: 218 LBS | OXYGEN SATURATION: 100 %

## 2018-02-21 DIAGNOSIS — M54.32 BILATERAL SCIATICA: Primary | ICD-10-CM

## 2018-02-21 DIAGNOSIS — M54.31 BILATERAL SCIATICA: Primary | ICD-10-CM

## 2018-02-21 DIAGNOSIS — G62.9 NEUROPATHY: ICD-10-CM

## 2018-02-21 PROCEDURE — 99283 EMERGENCY DEPT VISIT LOW MDM: CPT | Mod: 25

## 2018-02-21 PROCEDURE — 96372 THER/PROPH/DIAG INJ SC/IM: CPT

## 2018-02-21 PROCEDURE — 63600175 PHARM REV CODE 636 W HCPCS: Performed by: EMERGENCY MEDICINE

## 2018-02-21 RX ORDER — ORPHENADRINE CITRATE 30 MG/ML
60 INJECTION INTRAMUSCULAR; INTRAVENOUS
Status: COMPLETED | OUTPATIENT
Start: 2018-02-21 | End: 2018-02-21

## 2018-02-21 RX ORDER — KETOROLAC TROMETHAMINE 30 MG/ML
30 INJECTION, SOLUTION INTRAMUSCULAR; INTRAVENOUS
Status: COMPLETED | OUTPATIENT
Start: 2018-02-21 | End: 2018-02-21

## 2018-02-21 RX ORDER — METHOCARBAMOL 750 MG/1
1500 TABLET, FILM COATED ORAL 3 TIMES DAILY
Qty: 30 TABLET | Refills: 0 | Status: SHIPPED | OUTPATIENT
Start: 2018-02-21 | End: 2018-02-26

## 2018-02-21 RX ORDER — IBUPROFEN 600 MG/1
600 TABLET ORAL EVERY 6 HOURS PRN
Qty: 30 TABLET | Refills: 0 | Status: SHIPPED | OUTPATIENT
Start: 2018-02-21 | End: 2018-03-24 | Stop reason: DRUGHIGH

## 2018-02-21 RX ADMIN — KETOROLAC TROMETHAMINE 30 MG: 30 INJECTION, SOLUTION INTRAMUSCULAR at 02:02

## 2018-02-21 RX ADMIN — ORPHENADRINE CITRATE 60 MG: 30 INJECTION INTRAMUSCULAR; INTRAVENOUS at 02:02

## 2018-02-21 NOTE — ED NOTES
Patient identifiers verified and correct for Caitie Wang.  Pt sitting up on edge of bed, AAO x's 3, family at bedside. Pt stated that she came to the ER with c/o lower back pain that radiates down both her legs. Pt reports a hx of sciatic nerve pain, stated that she told her PCP and was told to come to the ER. Pt says she took her last prescription Toradol yesterday.   LOC: The patient is awake, alert and aware of environment with an appropriate affect, the patient is oriented x 3 and speaking appropriately.  APPEARANCE: Patient resting comfortably and in no acute distress, patient is clean and well groomed, patient's clothing is properly fastened.  SKIN: The skin is warm and dry, color consistent with ethnicity, patient has normal skin turgor and moist mucus membranes, skin intact, no breakdown or bruising noted.  MUSCULOSKELETAL: Patient moving all extremities spontaneously, no obvious swelling or deformities noted.  RESPIRATORY: Airway is open and patent, respirations are spontaneous, patient has a normal effort and rate, no accessory muscle use noted, bilateral breath sounds clear.  CARDIAC: Patient has a normal rate and regular rhythm, no periphreal edema noted, capillary refill < 3 seconds.  ABDOMEN: Soft and non tender to palpation, no distention noted, normoactive bowel sounds present in all four quadrants.  NEUROLOGIC: eyes open spontaneously, behavior appropriate to situation, follows commands, facial expression symmetrical, bilateral hand grasp equal and even, purposeful motor response noted, normal sensation in all extremities when touched with a finger.

## 2018-02-21 NOTE — ED PROVIDER NOTES
Encounter Date: 2/21/2018    SCRIBE #1 NOTE: I, Darshan Abreu, am scribing for, and in the presence of,  Dr. Vale. I have scribed the entire note.       History     Chief Complaint   Patient presents with    Back Pain     back pain that radiates to bilateral legs x days.       Time seen by provider: 2:18 PM    This is a 37 y.o. female who presents with complaint of sciatic nerve pain beginning in the lower back.  The pain radiates down the patient's legs.  The pain is constant but increases when standing.  The patient also reports tingling in her feet.  There is no apparent trauma or swelling to the back or legs.Patient has been taking Tramadol for pain.  This patient has had a history of similar symptoms.  The patient reports that she is in control of any bowel movements and urination.  She was referred to the ER by her PCP due to the worsening of the pain recently.  The patient has a history of HTN,HLD, diabetes mellitus, and asthma.  This patient is not a smoker, and does not consume alcohol.        The history is provided by the patient.     Review of patient's allergies indicates:  No Known Allergies  Past Medical History:   Diagnosis Date    Anxiety     Asthma     Chronic pain     back; inflammatory    Depression     Diabetes mellitus, type 2     Endometriosis     Hyperlipidemia     Hypertension     MITZI (obstructive sleep apnea)     Thyroid disease     Vitamin D deficiency      Past Surgical History:   Procedure Laterality Date    CERVICAL SPINE SURGERY      endometriosis      THYROID SURGERY       Family History   Problem Relation Age of Onset    Hypertension Mother     Diabetes Maternal Grandmother     Hyperlipidemia Maternal Grandmother     Diabetes Maternal Grandfather     Hyperlipidemia Maternal Grandfather      Social History   Substance Use Topics    Smoking status: Never Smoker    Smokeless tobacco: Never Used    Alcohol use No     Review of Systems   Musculoskeletal: Positive for  back pain (sciatic).   All other systems reviewed and are negative.      Physical Exam     Initial Vitals [02/21/18 1336]   BP Pulse Resp Temp SpO2   (!) 168/92 85 18 97.8 °F (36.6 °C) 100 %      MAP       117.33         Physical Exam    Nursing note and vitals reviewed.  Constitutional: She appears well-developed and well-nourished. She is not diaphoretic. No distress.   HENT:   Head: Normocephalic and atraumatic.   Right Ear: External ear normal.   Left Ear: External ear normal.   Eyes: Conjunctivae and EOM are normal. Pupils are equal, round, and reactive to light.   Neck: Normal range of motion. Neck supple.   Cardiovascular: Normal rate, regular rhythm and normal heart sounds. Exam reveals no gallop and no friction rub.    No murmur heard.  Pulmonary/Chest: Breath sounds normal. She has no wheezes. She has no rhonchi. She has no rales.   Abdominal: Soft. Bowel sounds are normal. There is no tenderness. There is no rebound and no guarding.   Musculoskeletal: Normal range of motion. She exhibits no edema or tenderness.   No swelling or tenderness, no deformities     Lymphadenopathy:     She has no cervical adenopathy.   Neurological: She is alert and oriented to person, place, and time. She has normal strength.   Skin: Skin is warm and dry. No rash noted. No erythema.         ED Course   Procedures  Labs Reviewed - No data to display          Medical Decision Making:   ED Management:  37F with lower back pain with radiation in both legs.  Neuro intact, no recent injuries.  Hx of same.  Supportive care.                      Clinical Impression:     1. Bilateral sciatica    2. Neuropathy        Disposition:   Disposition: Discharged  Condition: Stable    I, Dr. Alana Vale, personally performed the services described in this documentation.   All medical record entries made by the scribe were at my direction and in my presence.   I have reviewed the chart and agree that the record is accurate and complete.   Alana  MD Akanksha.  1:11 PM 03/09/2018                  Alana Vale MD  03/09/18 1316

## 2018-02-21 NOTE — DISCHARGE INSTRUCTIONS
Take medications as directed.  Wear supportive shoes - see your orthotic person if your diabetic shoes are not comfortable.  Or wear a very supportive running shoe.  Wear compression stockings in your appropriate size.  See your doctor if you are not better with this treatment.

## 2018-03-24 ENCOUNTER — HOSPITAL ENCOUNTER (EMERGENCY)
Facility: HOSPITAL | Age: 38
Discharge: HOME | End: 2018-03-24
Attending: EMERGENCY MEDICINE
Payer: MEDICAID

## 2018-03-24 VITALS
TEMPERATURE: 99 F | WEIGHT: 228 LBS | RESPIRATION RATE: 18 BRPM | DIASTOLIC BLOOD PRESSURE: 78 MMHG | HEIGHT: 64 IN | OXYGEN SATURATION: 100 % | BODY MASS INDEX: 38.93 KG/M2 | SYSTOLIC BLOOD PRESSURE: 135 MMHG | HEART RATE: 84 BPM

## 2018-03-24 DIAGNOSIS — S92.535A CLOSED NONDISPLACED FRACTURE OF DISTAL PHALANX OF LESSER TOE OF LEFT FOOT, INITIAL ENCOUNTER: Primary | ICD-10-CM

## 2018-03-24 DIAGNOSIS — E11.9 DM2 (DIABETES MELLITUS, TYPE 2): ICD-10-CM

## 2018-03-24 DIAGNOSIS — I10 HTN (HYPERTENSION): ICD-10-CM

## 2018-03-24 LAB
B-HCG UR QL: NEGATIVE
CTP QC/QA: YES

## 2018-03-24 PROCEDURE — 96372 THER/PROPH/DIAG INJ SC/IM: CPT

## 2018-03-24 PROCEDURE — 63600175 PHARM REV CODE 636 W HCPCS: Performed by: ANESTHESIOLOGY

## 2018-03-24 PROCEDURE — 99283 EMERGENCY DEPT VISIT LOW MDM: CPT | Mod: ,,, | Performed by: EMERGENCY MEDICINE

## 2018-03-24 PROCEDURE — 81025 URINE PREGNANCY TEST: CPT | Performed by: EMERGENCY MEDICINE

## 2018-03-24 PROCEDURE — 99284 EMERGENCY DEPT VISIT MOD MDM: CPT | Mod: 25

## 2018-03-24 RX ORDER — IBUPROFEN 800 MG/1
800 TABLET ORAL EVERY 6 HOURS PRN
Qty: 20 TABLET | Refills: 0 | Status: ON HOLD | OUTPATIENT
Start: 2018-03-24 | End: 2020-01-10 | Stop reason: HOSPADM

## 2018-03-24 RX ORDER — KETOROLAC TROMETHAMINE 30 MG/ML
30 INJECTION, SOLUTION INTRAMUSCULAR; INTRAVENOUS
Status: COMPLETED | OUTPATIENT
Start: 2018-03-24 | End: 2018-03-24

## 2018-03-24 RX ADMIN — KETOROLAC TROMETHAMINE 30 MG: 30 INJECTION, SOLUTION INTRAMUSCULAR at 10:03

## 2018-03-24 NOTE — ED PROVIDER NOTES
Encounter Date: 3/24/2018       History     Chief Complaint   Patient presents with    Toe Injury     L little toe injury     Pt is a 37 yo F with PMH significant for anxiety, asthma, chronic pain and neuropathy associated with DM 2, HTN, HLD, hypothyroidism who presents after stubbing her left pinky toe this AM. She states that she stubbed her toe and felt immediate, sharp pain and could not bear weight or put her shoe on. She denies any numbness or tingling to the foot at this time.          Review of patient's allergies indicates:  No Known Allergies  Past Medical History:   Diagnosis Date    Anxiety     Asthma     Chronic pain     back; inflammatory    Depression     Diabetes mellitus, type 2     Endometriosis     Hyperlipidemia     Hypertension     MITZI (obstructive sleep apnea)     Thyroid disease     Vitamin D deficiency      Past Surgical History:   Procedure Laterality Date    CERVICAL SPINE SURGERY      endometriosis      THYROID SURGERY       Family History   Problem Relation Age of Onset    Hypertension Mother     Diabetes Maternal Grandmother     Hyperlipidemia Maternal Grandmother     Diabetes Maternal Grandfather     Hyperlipidemia Maternal Grandfather      Social History   Substance Use Topics    Smoking status: Never Smoker    Smokeless tobacco: Never Used    Alcohol use No     Review of Systems   Constitutional: Negative.    HENT: Negative.    Eyes: Negative.    Respiratory: Negative.    Cardiovascular: Negative.    Gastrointestinal: Negative.    Endocrine: Negative.    Genitourinary: Negative.    Musculoskeletal:        Pain and swelling to left pinky toe   Skin: Negative.    Allergic/Immunologic: Negative.    Neurological: Negative.    Hematological: Negative.    Psychiatric/Behavioral: Negative.    All other systems reviewed and are negative.      Physical Exam     Initial Vitals [03/24/18 0948]   BP Pulse Resp Temp SpO2   (!) 175/116 91 16 98.7 °F (37.1 °C) 99 %      MAP        135.67         Physical Exam    Nursing note and vitals reviewed.  Constitutional: She appears well-developed and well-nourished. She is not diaphoretic. She appears distressed.   HENT:   Head: Normocephalic and atraumatic.   Eyes: EOM are normal. Pupils are equal, round, and reactive to light.   Neck: Normal range of motion. Neck supple.   Cardiovascular: Normal rate, regular rhythm and normal heart sounds.   Pulmonary/Chest: Breath sounds normal.   Abdominal: Soft. Bowel sounds are normal.   Musculoskeletal:        Feet:    Severe pain with palpation and movement of the toe   Neurological: She is alert and oriented to person, place, and time. She has normal strength.   Skin: Skin is warm and dry.   Psychiatric: She has a normal mood and affect.         ED Course   Procedures  Labs Reviewed   POCT URINE PREGNANCY - Normal        Imaging Results          X-Ray Toe 2 or More Views Left (Final result)  Result time 03/24/18 11:37:57    Final result by Michael Marie Jr., MD (03/24/18 11:37:57)                 Impression:      Fracture base of the distal phalanx which communicates with the IP joint little toe.      Electronically signed by: Michael Marie MD  Date:    03/24/2018  Time:    11:37             Narrative:    EXAMINATION:  XR TOE 2 OR MORE VIEWS LEFT    CLINICAL HISTORY:  left pinky toe injury;    TECHNIQUE:  Three views of the left toes were performed    COMPARISON:  None.    FINDINGS:  There is some irregularity at the base of the distal phalanx of the little toe at the IP joint concerning for fracture.  Proximal phalanx is intact.                                 Medical Decision Making:   Initial Assessment:   Pt is a 37 yo F with multiple co-morbidities presenting with left toe pain after injury  Differential Diagnosis:   Dislocation vs sprain  ED Management:  Urine pregnancy test, plain films of left pinky toe and IM toradol.  Jeannette Montoya, PGY-4  10:19 AM    Toe does appear broken on plain film.  Pt given Rx for ibuprofen 800 mg as well as fitted for boot.  Jeannette Montoya, PGY-4  11:58 AM                Attending Attestation:   Physician Attestation Statement for Resident:  As the supervising MD   Physician Attestation Statement: I have personally seen and examined this patient.   I agree with the above history. -:   As the supervising MD I agree with the above PE.    As the supervising MD I agree with the above treatment, course, plan, and disposition.   -: Closed distal phalanx fracture. Pt placed in ortho shoe.  Ibuprofen for pain control.    I have reviewed and agree with the residents interpretation of the following: x-rays.                       Clinical Impression:   The encounter diagnosis was Closed nondisplaced fracture of distal phalanx of lesser toe of left foot, initial encounter.    Disposition:   Disposition: Discharged  Condition: Stable                        Jeannette Montoya MD  Resident  03/24/18 [id number]       Reta Poe MD  03/24/18 5938

## 2018-03-24 NOTE — ED TRIAGE NOTES
Stubbed her pinky toe on left foot about 20 minutes ago.  Having severe pain, cant put a shoe on. Swelling to the toe, no discoloration or obvious deformity.

## 2018-04-05 DIAGNOSIS — Z12.31 SCREENING MAMMOGRAM, ENCOUNTER FOR: Primary | ICD-10-CM

## 2018-04-13 ENCOUNTER — HOSPITAL ENCOUNTER (OUTPATIENT)
Dept: RADIOLOGY | Facility: HOSPITAL | Age: 38
Discharge: HOME | End: 2018-04-13
Attending: INTERNAL MEDICINE
Payer: MEDICAID

## 2018-04-13 DIAGNOSIS — Z12.31 SCREENING MAMMOGRAM, ENCOUNTER FOR: ICD-10-CM

## 2018-04-13 PROCEDURE — 77067 SCR MAMMO BI INCL CAD: CPT | Mod: 26,,, | Performed by: RADIOLOGY

## 2018-04-13 PROCEDURE — 77067 SCR MAMMO BI INCL CAD: CPT | Mod: TC

## 2018-05-01 ENCOUNTER — HOSPITAL ENCOUNTER (EMERGENCY)
Facility: HOSPITAL | Age: 38
Discharge: HOME | End: 2018-05-01
Attending: EMERGENCY MEDICINE
Payer: MEDICAID

## 2018-05-01 VITALS
HEIGHT: 61 IN | WEIGHT: 228 LBS | HEART RATE: 79 BPM | TEMPERATURE: 99 F | BODY MASS INDEX: 43.05 KG/M2 | OXYGEN SATURATION: 98 % | RESPIRATION RATE: 18 BRPM | DIASTOLIC BLOOD PRESSURE: 81 MMHG | SYSTOLIC BLOOD PRESSURE: 150 MMHG

## 2018-05-01 DIAGNOSIS — R51.9 NONINTRACTABLE HEADACHE, UNSPECIFIED CHRONICITY PATTERN, UNSPECIFIED HEADACHE TYPE: Primary | ICD-10-CM

## 2018-05-01 LAB
B-HCG UR QL: NEGATIVE
CTP QC/QA: YES
POCT GLUCOSE: 79 MG/DL (ref 70–110)

## 2018-05-01 PROCEDURE — 82962 GLUCOSE BLOOD TEST: CPT

## 2018-05-01 PROCEDURE — 99284 EMERGENCY DEPT VISIT MOD MDM: CPT | Mod: 25

## 2018-05-01 PROCEDURE — 96374 THER/PROPH/DIAG INJ IV PUSH: CPT

## 2018-05-01 PROCEDURE — 63600175 PHARM REV CODE 636 W HCPCS: Performed by: PHYSICIAN ASSISTANT

## 2018-05-01 PROCEDURE — 81025 URINE PREGNANCY TEST: CPT | Performed by: EMERGENCY MEDICINE

## 2018-05-01 PROCEDURE — 96375 TX/PRO/DX INJ NEW DRUG ADDON: CPT

## 2018-05-01 RX ORDER — DIPHENHYDRAMINE HYDROCHLORIDE 50 MG/ML
25 INJECTION INTRAMUSCULAR; INTRAVENOUS
Status: COMPLETED | OUTPATIENT
Start: 2018-05-01 | End: 2018-05-01

## 2018-05-01 RX ORDER — KETOROLAC TROMETHAMINE 30 MG/ML
15 INJECTION, SOLUTION INTRAMUSCULAR; INTRAVENOUS
Status: COMPLETED | OUTPATIENT
Start: 2018-05-01 | End: 2018-05-01

## 2018-05-01 RX ORDER — PROCHLORPERAZINE EDISYLATE 5 MG/ML
10 INJECTION INTRAMUSCULAR; INTRAVENOUS
Status: COMPLETED | OUTPATIENT
Start: 2018-05-01 | End: 2018-05-01

## 2018-05-01 RX ORDER — BUTALBITAL, ACETAMINOPHEN AND CAFFEINE 50; 325; 40 MG/1; MG/1; MG/1
1 TABLET ORAL EVERY 4 HOURS PRN
Qty: 10 TABLET | Refills: 0 | Status: SHIPPED | OUTPATIENT
Start: 2018-05-01 | End: 2018-05-31

## 2018-05-01 RX ADMIN — DIPHENHYDRAMINE HYDROCHLORIDE 25 MG: 50 INJECTION, SOLUTION INTRAMUSCULAR; INTRAVENOUS at 12:05

## 2018-05-01 RX ADMIN — KETOROLAC TROMETHAMINE 15 MG: 30 INJECTION, SOLUTION INTRAMUSCULAR at 12:05

## 2018-05-01 RX ADMIN — PROCHLORPERAZINE EDISYLATE 10 MG: 5 INJECTION INTRAMUSCULAR; INTRAVENOUS at 12:05

## 2018-05-01 NOTE — ED PROVIDER NOTES
Encounter Date: 5/1/2018       History     Chief Complaint   Patient presents with    Headache     c/o right sided headache x1 week. States she has been taking excedrin migraine and it has been helping the pain, but it stopped helping last night.      Caitie Wang, a 38 y.o. female that presents to the ED for evaluation of headache x 1 week that's waxed and waned in intensity.  Patient states she has a PMH of migraines for which she usually takes Excedrin migraine with improvement.  She states that the headache is frontal in nature and throbbing.  She has associated photophobia and phonophobia.  She denies any nausea or vomiting.  Her headache today is consistent with her previous headaches.  She denies any dizziness, unilateral numbness or weakness or visual changes.           The history is provided by the patient.     Review of patient's allergies indicates:  No Known Allergies  Past Medical History:   Diagnosis Date    Anxiety     Asthma     Chronic pain     back; inflammatory    Depression     Diabetes mellitus, type 2     Endometriosis     Hyperlipidemia     Hypertension     MITZI (obstructive sleep apnea)     Thyroid disease     Vitamin D deficiency      Past Surgical History:   Procedure Laterality Date    CERVICAL SPINE SURGERY      endometriosis      THYROID SURGERY       Family History   Problem Relation Age of Onset    Hypertension Mother     Diabetes Maternal Grandmother     Hyperlipidemia Maternal Grandmother     Diabetes Maternal Grandfather     Hyperlipidemia Maternal Grandfather     Breast cancer Maternal Aunt      Social History   Substance Use Topics    Smoking status: Never Smoker    Smokeless tobacco: Never Used    Alcohol use No     Review of Systems   Constitutional: Negative for fever.   Respiratory: Negative for shortness of breath.    Cardiovascular: Negative for chest pain.   Gastrointestinal: Negative for abdominal pain, nausea and vomiting.   Skin: Negative for  color change and rash.   Allergic/Immunologic: Negative for immunocompromised state.   Neurological: Positive for headaches. Negative for speech difficulty.   Psychiatric/Behavioral: Negative for agitation and confusion.   All other systems reviewed and are negative.      Physical Exam     Initial Vitals [05/01/18 1137]   BP Pulse Resp Temp SpO2   129/88 89 18 98.9 °F (37.2 °C) 97 %      MAP       101.67         Physical Exam    Nursing note and vitals reviewed.  Constitutional: She appears well-developed and well-nourished. She is not diaphoretic. She is Obese . No distress.   HENT:   Head: Normocephalic and atraumatic.   Right Ear: External ear normal.   Left Ear: External ear normal.   Nose: Nose normal.   Mouth/Throat: Oropharynx is clear and moist.   Eyes: Conjunctivae and EOM are normal.   Neck: Normal range of motion.   Cardiovascular: Normal rate, regular rhythm and normal heart sounds. Exam reveals no gallop.    Pulmonary/Chest: Breath sounds normal. No respiratory distress. She has no wheezes. She has no rhonchi. She has no rales.   Abdominal: Soft. Normal appearance and bowel sounds are normal. She exhibits no distension. There is tenderness. There is no rigidity, no rebound, no guarding, no CVA tenderness, no tenderness at McBurney's point and negative Mclean's sign.       Mild TTP over left lateral ribs with no crepitus, ecchymosis or erythema noted.     Musculoskeletal: Normal range of motion.   Neurological: She is alert and oriented to person, place, and time. She has normal strength. No cranial nerve deficit or sensory deficit. Coordination and gait normal. GCS motor subscore is 2.   Skin: Skin is warm and dry. Capillary refill takes less than 2 seconds. No rash noted. No erythema.   Psychiatric: She has a normal mood and affect. Thought content normal.         ED Course   Procedures  Labs Reviewed   POCT URINE PREGNANCY   POCT GLUCOSE             Medical Decision Making:   Initial Assessment:  "  Headache x 1 week  Differential Diagnosis:   Tension headache, migraine,  cluster headache, sinus headache  ED Management:  Feel the patient's headache is benign.  The headache completely resolved with Toradol, benadryl and compazine.  No neck stiffness, vision changes, fever, rash, meningismus/neck stiffness to suggest pseudotumor cerebri or meningitis.  No pain over temporal arteries or vision changes/loss to suggest temporal arteritis.  No "thunderclap onset" or neck stiffness to suggest spontaneous SAH/ICH.  No lancinated pain to eyes with tearing to suggest cluster headache.  No sinus pressure or nasal congestion to suggest sinus headache.  Patient's headache is not in a "band like" distrubution to suggest tension headache.  CT head shows no signs of mass, pseudotumor, infarct, or ICH  RX:     Patient will follow up with Dr Bui's next week and will discuss referral to Neurology.  Precautions on when to return to the ED given.  Patient verbalizes understanding and agrees with current treatment plan.  Case discussed with supervising physician who agrees with plan.                    Attending Attestation:     Physician Attestation Statement for NP/PA:   I have conducted a face to face encounter with this patient in addition to the NP/PA, due to NP/PA Request    Other NP/PA Attestation Additions:      Medical Decision Making: Patient with HA similar to previous HA,   VSS, NAD, nontoxic appearing.  No focal neurological deficits.  HA improved in ED and patient stable for discharge.                       Clinical Impression:   The encounter diagnosis was Nonintractable headache, unspecified chronicity pattern, unspecified headache type.                           Vanessa Tinoco PA-C  05/01/18 1408       Jeannette Santoro MD  05/01/18 3569    "

## 2018-05-01 NOTE — ED NOTES
Intermittent, right sided headache x 1 week with light sensitivity and nausea.  Pt with hx of migraines and takes excedrin migraine at home but excedrin stopped working last pm.  Denies falling or injury

## 2018-05-16 ENCOUNTER — HOSPITAL ENCOUNTER (OUTPATIENT)
Dept: RADIOLOGY | Facility: HOSPITAL | Age: 38
Discharge: HOME | End: 2018-05-16
Attending: INTERNAL MEDICINE
Payer: MEDICAID

## 2018-05-16 DIAGNOSIS — R60.0 EDEMA EXTREMITIES: Primary | ICD-10-CM

## 2018-05-16 DIAGNOSIS — R60.0 EDEMA EXTREMITIES: ICD-10-CM

## 2018-05-16 PROCEDURE — 93970 EXTREMITY STUDY: CPT | Mod: TC

## 2018-05-16 PROCEDURE — 93970 EXTREMITY STUDY: CPT | Mod: 26,,, | Performed by: RADIOLOGY

## 2018-08-16 ENCOUNTER — HOSPITAL ENCOUNTER (EMERGENCY)
Facility: HOSPITAL | Age: 38
Discharge: HOME | End: 2018-08-16
Attending: EMERGENCY MEDICINE
Payer: MEDICAID

## 2018-08-16 VITALS
WEIGHT: 228 LBS | HEART RATE: 78 BPM | TEMPERATURE: 97 F | RESPIRATION RATE: 16 BRPM | DIASTOLIC BLOOD PRESSURE: 93 MMHG | SYSTOLIC BLOOD PRESSURE: 137 MMHG | OXYGEN SATURATION: 98 % | HEIGHT: 63 IN | BODY MASS INDEX: 40.4 KG/M2

## 2018-08-16 DIAGNOSIS — A08.4 VIRAL GASTROENTERITIS: Primary | ICD-10-CM

## 2018-08-16 DIAGNOSIS — R10.9 ABDOMINAL PAIN: ICD-10-CM

## 2018-08-16 DIAGNOSIS — S83.90XA SPRAIN OF KNEE, UNSPECIFIED LATERALITY, UNSPECIFIED LIGAMENT, INITIAL ENCOUNTER: ICD-10-CM

## 2018-08-16 DIAGNOSIS — K29.70 GASTRITIS, PRESENCE OF BLEEDING UNSPECIFIED, UNSPECIFIED CHRONICITY, UNSPECIFIED GASTRITIS TYPE: ICD-10-CM

## 2018-08-16 LAB
ALBUMIN SERPL BCP-MCNC: 3.8 G/DL
ALP SERPL-CCNC: 69 U/L
ALT SERPL W/O P-5'-P-CCNC: 13 U/L
ANION GAP SERPL CALC-SCNC: 11 MMOL/L
AST SERPL-CCNC: 19 U/L
B-HCG UR QL: NEGATIVE
BACTERIA #/AREA URNS AUTO: NORMAL /HPF
BASOPHILS # BLD AUTO: 0.05 K/UL
BASOPHILS NFR BLD: 0.7 %
BILIRUB SERPL-MCNC: 0.4 MG/DL
BILIRUB UR QL STRIP: NEGATIVE
BUN SERPL-MCNC: 9 MG/DL
CALCIUM SERPL-MCNC: 9.4 MG/DL
CHLORIDE SERPL-SCNC: 111 MMOL/L
CLARITY UR REFRACT.AUTO: ABNORMAL
CO2 SERPL-SCNC: 18 MMOL/L
COLOR UR AUTO: YELLOW
CREAT SERPL-MCNC: 1.1 MG/DL
CTP QC/QA: YES
DIFFERENTIAL METHOD: ABNORMAL
EOSINOPHIL # BLD AUTO: 0.1 K/UL
EOSINOPHIL NFR BLD: 1.8 %
ERYTHROCYTE [DISTWIDTH] IN BLOOD BY AUTOMATED COUNT: 13.2 %
EST. GFR  (AFRICAN AMERICAN): >60 ML/MIN/1.73 M^2
EST. GFR  (NON AFRICAN AMERICAN): >60 ML/MIN/1.73 M^2
FLUAV AG SPEC QL IA: NEGATIVE
FLUBV AG SPEC QL IA: NEGATIVE
GLUCOSE SERPL-MCNC: 91 MG/DL
GLUCOSE UR QL STRIP: NEGATIVE
HCT VFR BLD AUTO: 39.4 %
HGB BLD-MCNC: 12.3 G/DL
HGB UR QL STRIP: ABNORMAL
IMM GRANULOCYTES # BLD AUTO: 0.03 K/UL
IMM GRANULOCYTES NFR BLD AUTO: 0.4 %
KETONES UR QL STRIP: NEGATIVE
LEUKOCYTE ESTERASE UR QL STRIP: NEGATIVE
LIPASE SERPL-CCNC: 34 U/L
LYMPHOCYTES # BLD AUTO: 2.7 K/UL
LYMPHOCYTES NFR BLD: 36.7 %
MCH RBC QN AUTO: 30.1 PG
MCHC RBC AUTO-ENTMCNC: 31.2 G/DL
MCV RBC AUTO: 97 FL
MICROSCOPIC COMMENT: NORMAL
MONOCYTES # BLD AUTO: 0.6 K/UL
MONOCYTES NFR BLD: 8.2 %
NEUTROPHILS # BLD AUTO: 3.8 K/UL
NEUTROPHILS NFR BLD: 52.2 %
NITRITE UR QL STRIP: NEGATIVE
NRBC BLD-RTO: 0 /100 WBC
PH UR STRIP: 5 [PH] (ref 5–8)
PLATELET # BLD AUTO: 249 K/UL
PMV BLD AUTO: 9.8 FL
POTASSIUM SERPL-SCNC: 4.1 MMOL/L
PROT SERPL-MCNC: 7.6 G/DL
PROT UR QL STRIP: NEGATIVE
RBC # BLD AUTO: 4.08 M/UL
RBC #/AREA URNS AUTO: 2 /HPF (ref 0–4)
SODIUM SERPL-SCNC: 140 MMOL/L
SP GR UR STRIP: 1.02 (ref 1–1.03)
SPECIMEN SOURCE: NORMAL
SQUAMOUS #/AREA URNS AUTO: 9 /HPF
URN SPEC COLLECT METH UR: ABNORMAL
UROBILINOGEN UR STRIP-ACNC: NEGATIVE EU/DL
WBC # BLD AUTO: 7.28 K/UL
WBC #/AREA URNS AUTO: 1 /HPF (ref 0–5)

## 2018-08-16 PROCEDURE — 81025 URINE PREGNANCY TEST: CPT | Performed by: EMERGENCY MEDICINE

## 2018-08-16 PROCEDURE — 81001 URINALYSIS AUTO W/SCOPE: CPT

## 2018-08-16 PROCEDURE — 63600175 PHARM REV CODE 636 W HCPCS: Performed by: EMERGENCY MEDICINE

## 2018-08-16 PROCEDURE — 85025 COMPLETE CBC W/AUTO DIFF WBC: CPT

## 2018-08-16 PROCEDURE — 93005 ELECTROCARDIOGRAM TRACING: CPT

## 2018-08-16 PROCEDURE — 93010 ELECTROCARDIOGRAM REPORT: CPT | Mod: ,,, | Performed by: INTERNAL MEDICINE

## 2018-08-16 PROCEDURE — 87400 INFLUENZA A/B EACH AG IA: CPT

## 2018-08-16 PROCEDURE — 99284 EMERGENCY DEPT VISIT MOD MDM: CPT | Mod: 25

## 2018-08-16 PROCEDURE — 25000003 PHARM REV CODE 250: Performed by: EMERGENCY MEDICINE

## 2018-08-16 PROCEDURE — 80053 COMPREHEN METABOLIC PANEL: CPT

## 2018-08-16 PROCEDURE — 99284 EMERGENCY DEPT VISIT MOD MDM: CPT | Mod: ,,, | Performed by: EMERGENCY MEDICINE

## 2018-08-16 PROCEDURE — 96374 THER/PROPH/DIAG INJ IV PUSH: CPT

## 2018-08-16 PROCEDURE — 83690 ASSAY OF LIPASE: CPT

## 2018-08-16 RX ORDER — METHOCARBAMOL 500 MG/1
1000 TABLET, FILM COATED ORAL
Status: COMPLETED | OUTPATIENT
Start: 2018-08-16 | End: 2018-08-16

## 2018-08-16 RX ORDER — ACETAMINOPHEN 325 MG/1
650 TABLET ORAL
Status: COMPLETED | OUTPATIENT
Start: 2018-08-16 | End: 2018-08-16

## 2018-08-16 RX ORDER — ONDANSETRON 2 MG/ML
8 INJECTION INTRAMUSCULAR; INTRAVENOUS
Status: COMPLETED | OUTPATIENT
Start: 2018-08-16 | End: 2018-08-16

## 2018-08-16 RX ORDER — METHOCARBAMOL 500 MG/1
1000 TABLET, FILM COATED ORAL 3 TIMES DAILY
Qty: 30 TABLET | Refills: 0 | Status: SHIPPED | OUTPATIENT
Start: 2018-08-16 | End: 2018-08-21

## 2018-08-16 RX ADMIN — ONDANSETRON 8 MG: 2 INJECTION INTRAMUSCULAR; INTRAVENOUS at 10:08

## 2018-08-16 RX ADMIN — METHOCARBAMOL 1000 MG: 500 TABLET ORAL at 10:08

## 2018-08-16 RX ADMIN — ALUMINUM HYDROXIDE, MAGNESIUM HYDROXIDE, AND SIMETHICONE 50 ML: 200; 200; 20 SUSPENSION ORAL at 10:08

## 2018-08-16 RX ADMIN — ACETAMINOPHEN 650 MG: 325 TABLET, FILM COATED ORAL at 10:08

## 2018-08-16 NOTE — ED PROVIDER NOTES
"Encounter Date: 8/16/2018    SCRIBE #1 NOTE: I, Leida Davey, am scribing for, and in the presence of,  Dr. Navarro . I have scribed the entire note.       History     Chief Complaint   Patient presents with    Abdominal Pain     nvd    Knee Pain     r knee keeps popping and gave out    Foot Problem     r foot keeps getting sore     Time patient was seen by the provider: 9:23 AM      The patient is a 38 y.o. female with co-morbidities including: DM2, HLD, HTN, who presents to the ED with a complaint of abdominal pain for x2 days. The patient describes pain as cramping sensations associated with nausea, vomiting, and diarrhea. She had two episodes of vomiting today and constant episodes of watery stool diarrhea. Pt notes of chills, and generalized body aches. She states of some relief after having episode of diarrhea or vomiting. Patient also complains of right knee pain, she states her knee "popped out" twice while walking. Denies any recent injury. She also complains of right foot peeling with dry skin and sensitive. Denies fever, congestion, chest pain. Patient is diabetic, taking Metformin, she states she does not check her sugar regularly.         The history is provided by the patient and medical records.     Review of patient's allergies indicates:  No Known Allergies  Past Medical History:   Diagnosis Date    Anxiety     Asthma     Chronic pain     back; inflammatory    Depression     Diabetes mellitus, type 2     Endometriosis     Hyperlipidemia     Hypertension     MITZI (obstructive sleep apnea)     Thyroid disease     Vitamin D deficiency      Past Surgical History:   Procedure Laterality Date    CERVICAL SPINE SURGERY      endometriosis      THYROID SURGERY       Family History   Problem Relation Age of Onset    Hypertension Mother     Diabetes Maternal Grandmother     Hyperlipidemia Maternal Grandmother     Diabetes Maternal Grandfather     Hyperlipidemia Maternal Grandfather  "    Breast cancer Maternal Aunt      Social History     Tobacco Use    Smoking status: Never Smoker    Smokeless tobacco: Never Used   Substance Use Topics    Alcohol use: No    Drug use: No     Review of Systems   Constitutional: Positive for chills. Negative for fever.        (+) Generalized body aches   HENT: Negative for congestion.    Respiratory: Negative for shortness of breath.    Cardiovascular: Negative for chest pain.   Gastrointestinal: Positive for abdominal pain, diarrhea, nausea and vomiting.   Genitourinary: Negative for dysuria.   Musculoskeletal:        (+) Right knee pain   Skin:        (+) Right foot peeling, dry   Neurological: Negative for weakness.   Hematological: Does not bruise/bleed easily.       Physical Exam     Initial Vitals [08/16/18 0911]   BP Pulse Resp Temp SpO2   138/89 87 18 98.4 °F (36.9 °C) 98 %      MAP       --         Physical Exam    Nursing note and vitals reviewed.  Constitutional: She appears well-developed and well-nourished. No distress.   HENT:   Head: Normocephalic and atraumatic.   Eyes: EOM are normal. Pupils are equal, round, and reactive to light.   Neck: Normal range of motion. Neck supple.   Cardiovascular: Normal rate, regular rhythm, normal heart sounds and intact distal pulses. Exam reveals no gallop and no friction rub.    No murmur heard.  Pulses:       Dorsalis pedis pulses are 2+ on the right side, and 2+ on the left side.   Pulmonary/Chest: Breath sounds normal. No respiratory distress. She has no wheezes.   Abdominal: Soft. Bowel sounds are normal. She exhibits no mass. There is tenderness (mild) in the epigastric area. There is no rigidity, no rebound and no guarding.   Musculoskeletal: Normal range of motion.   Bilateral plantar feet with dry skin, no wounds noted, sensation is intact.   Right knee with no swelling. Minimal to generalized tenderness to palpation. Normal ROM, no signs of effusion or affect of the right knee.   Right calf non  tender, no swelling. Normal gait.    Neurological: She is alert and oriented to person, place, and time. She has normal strength. Gait normal.   Psychiatric: She has a normal mood and affect.         ED Course   Procedures  Labs Reviewed   CBC W/ AUTO DIFFERENTIAL - Abnormal; Notable for the following components:       Result Value    MCHC 31.2 (*)     All other components within normal limits   COMPREHENSIVE METABOLIC PANEL - Abnormal; Notable for the following components:    Chloride 111 (*)     CO2 18 (*)     All other components within normal limits   URINALYSIS, REFLEX TO URINE CULTURE - Abnormal; Notable for the following components:    Appearance, UA Hazy (*)     Occult Blood UA 1+ (*)     All other components within normal limits    Narrative:     Preferred Collection Type->Urine, Clean Catch   LIPASE   INFLUENZA A AND B ANTIGEN   URINALYSIS MICROSCOPIC    Narrative:     Preferred Collection Type->Urine, Clean Catch   POCT URINE PREGNANCY     EKG Readings: (Independently Interpreted)   Initial Reading: No STEMI. Rhythm: Normal Sinus Rhythm. Heart Rate: 75.   Similar to previous EKG dated December 27, 2017.       Imaging Results    None          Medical Decision Making:   History:   Old Medical Records: I decided to obtain old medical records.  Differential Diagnosis:   Viral syndrome vs. influenza vs. DKA vs. gastritis vs. pancreatitis vs. electrolyte derangement.   Independently Interpreted Test(s):   I have ordered and independently interpreted EKG Reading(s) - see prior notes  Clinical Tests:   Lab Tests: Ordered and Reviewed  Medical Tests: Ordered and Reviewed  ED Management:  Will not get XR of right knee since there are no signs of trauma. Symptoms of knee noted to be mild sprain or arthritis. Will give GI cocktail and do labs, EKG, and reassess.       11:35 AM  Patient improved. Labs are unremarkable. Negative for influenza. Symptoms likely due to viral gastroenteritis and gastritis. Will discharge  with gastritis regimen and Robaxin for symptomatic treatment for body aches and knee sprain. Patient able to ambulate in ED without difficulty.             Scribe Attestation:   Scribe #1: I performed the above scribed service and the documentation accurately describes the services I performed. I attest to the accuracy of the note.               Clinical Impression:   The primary encounter diagnosis was Viral gastroenteritis. Diagnoses of Abdominal pain, Gastritis, presence of bleeding unspecified, unspecified chronicity, unspecified gastritis type, and Sprain of knee, unspecified laterality, unspecified ligament, initial encounter were also pertinent to this visit.      Disposition:   Disposition: Discharged  Condition: Stable     I, Dr. Kenyatta Navarro, personally performed the services described in this documentation. All medical record entries made by the scribe were at my direction and in my presence.  I have reviewed the chart and agree that the record reflects my personal performance and is accurate and complete. Kenyatta Navarro MD.  12:43 PM 08/16/2018                   Kenyatta Navarro MD  08/16/18 1245

## 2018-08-16 NOTE — ED NOTES
Pt identifiers Caitie Wang were checked and correct  LOC: The patient is awake, alert, aware of environment with an appropriate affect. Oriented x3, speaking appropriately  APPEARANCE: Pt resting comfortably, in no acute distress, pt is clean and well groomed, clothing properly fastened  SKIN: Skin warm, dry and intact, normal skin turgor, moist mucus membranes, bilateral feel peeling.   RESPIRATORY: Airway is open and patent, respirations are spontaneous, even and unlabored, normal effort and rate  CARDIAC: Normal rate and rhythm, no peripheral edema noted, capillary refill < 3 seconds, bilateral radial pulses 2+  ABDOMEN: Soft, non tender, non distended. Bowel sounds present x 4 quadrants. Pt c/o diffuse abdominal pain. N/v/d.   NEUROLOGIC: PERRLA, facial expression is symmetrical, patient moving all extremities spontaneously, normal sensation in all extremities when touched with a finger.  Follows all commands appropriately  MUSCULOSKELETAL: No obvious deformities. Pt c/o right knee pain.

## 2018-08-16 NOTE — ED NOTES
Pt presented to the ED via pov. Pt c/o abdominal pain, nausea, vomiting, diarrhea x 2 days. Pt c/o right knee pain, pt states her ankle keeps popping. Pt alert. Pt denies injury. Pt states she is DM. Pt states her feet are peeling and are very sensitive.

## 2018-09-12 ENCOUNTER — HOSPITAL ENCOUNTER (EMERGENCY)
Facility: HOSPITAL | Age: 38
Discharge: HOME | End: 2018-09-12
Attending: EMERGENCY MEDICINE
Payer: MEDICAID

## 2018-09-12 VITALS
HEART RATE: 78 BPM | SYSTOLIC BLOOD PRESSURE: 131 MMHG | WEIGHT: 228 LBS | TEMPERATURE: 98 F | BODY MASS INDEX: 40.4 KG/M2 | DIASTOLIC BLOOD PRESSURE: 81 MMHG | RESPIRATION RATE: 16 BRPM | HEIGHT: 63 IN | OXYGEN SATURATION: 97 %

## 2018-09-12 DIAGNOSIS — R07.9 CHEST PAIN, UNSPECIFIED TYPE: Primary | ICD-10-CM

## 2018-09-12 DIAGNOSIS — D72.829 LEUKOCYTOSIS, UNSPECIFIED TYPE: ICD-10-CM

## 2018-09-12 DIAGNOSIS — E87.6 HYPOKALEMIA: ICD-10-CM

## 2018-09-12 LAB
ALBUMIN SERPL BCP-MCNC: 4.2 G/DL
ALP SERPL-CCNC: 73 U/L
ALT SERPL W/O P-5'-P-CCNC: 13 U/L
ANION GAP SERPL CALC-SCNC: 9 MMOL/L
AST SERPL-CCNC: 12 U/L
B-HCG UR QL: NEGATIVE
BACTERIA #/AREA URNS AUTO: NORMAL /HPF
BASOPHILS # BLD AUTO: 0.03 K/UL
BASOPHILS NFR BLD: 0.2 %
BILIRUB SERPL-MCNC: 0.7 MG/DL
BILIRUB UR QL STRIP: NEGATIVE
BNP SERPL-MCNC: <10 PG/ML
BUN SERPL-MCNC: 10 MG/DL
CALCIUM SERPL-MCNC: 9.7 MG/DL
CHLORIDE SERPL-SCNC: 107 MMOL/L
CLARITY UR REFRACT.AUTO: ABNORMAL
CO2 SERPL-SCNC: 24 MMOL/L
COLOR UR AUTO: YELLOW
CREAT SERPL-MCNC: 0.9 MG/DL
CTP QC/QA: YES
DIFFERENTIAL METHOD: ABNORMAL
EOSINOPHIL # BLD AUTO: 0 K/UL
EOSINOPHIL NFR BLD: 0.2 %
ERYTHROCYTE [DISTWIDTH] IN BLOOD BY AUTOMATED COUNT: 13.8 %
EST. GFR  (AFRICAN AMERICAN): >60 ML/MIN/1.73 M^2
EST. GFR  (NON AFRICAN AMERICAN): >60 ML/MIN/1.73 M^2
GLUCOSE SERPL-MCNC: 76 MG/DL
GLUCOSE UR QL STRIP: NEGATIVE
HCT VFR BLD AUTO: 40.1 %
HGB BLD-MCNC: 13.1 G/DL
HGB UR QL STRIP: NEGATIVE
HYALINE CASTS UR QL AUTO: 0 /LPF
IMM GRANULOCYTES # BLD AUTO: 0.25 K/UL
IMM GRANULOCYTES NFR BLD AUTO: 1.3 %
KETONES UR QL STRIP: NEGATIVE
LEUKOCYTE ESTERASE UR QL STRIP: NEGATIVE
LYMPHOCYTES # BLD AUTO: 4.9 K/UL
LYMPHOCYTES NFR BLD: 25.4 %
MCH RBC QN AUTO: 30.8 PG
MCHC RBC AUTO-ENTMCNC: 32.7 G/DL
MCV RBC AUTO: 94 FL
MICROSCOPIC COMMENT: NORMAL
MONOCYTES # BLD AUTO: 1.8 K/UL
MONOCYTES NFR BLD: 9.1 %
NEUTROPHILS # BLD AUTO: 12.4 K/UL
NEUTROPHILS NFR BLD: 63.8 %
NITRITE UR QL STRIP: NEGATIVE
NRBC BLD-RTO: 0 /100 WBC
PH UR STRIP: 5 [PH] (ref 5–8)
PLATELET # BLD AUTO: 308 K/UL
PMV BLD AUTO: 10.6 FL
POTASSIUM SERPL-SCNC: 3.3 MMOL/L
PROT SERPL-MCNC: 8.2 G/DL
PROT UR QL STRIP: ABNORMAL
RBC # BLD AUTO: 4.26 M/UL
RBC #/AREA URNS AUTO: 1 /HPF (ref 0–4)
SODIUM SERPL-SCNC: 140 MMOL/L
SP GR UR STRIP: 1.03 (ref 1–1.03)
SQUAMOUS #/AREA URNS AUTO: 9 /HPF
TROPONIN I SERPL DL<=0.01 NG/ML-MCNC: <0.006 NG/ML
TROPONIN I SERPL DL<=0.01 NG/ML-MCNC: <0.006 NG/ML
URN SPEC COLLECT METH UR: ABNORMAL
UROBILINOGEN UR STRIP-ACNC: ABNORMAL EU/DL
WBC # BLD AUTO: 19.4 K/UL
WBC #/AREA URNS AUTO: 1 /HPF (ref 0–5)

## 2018-09-12 PROCEDURE — 99284 EMERGENCY DEPT VISIT MOD MDM: CPT | Mod: 25

## 2018-09-12 PROCEDURE — 81025 URINE PREGNANCY TEST: CPT | Performed by: EMERGENCY MEDICINE

## 2018-09-12 PROCEDURE — 80053 COMPREHEN METABOLIC PANEL: CPT

## 2018-09-12 PROCEDURE — 84484 ASSAY OF TROPONIN QUANT: CPT | Mod: 91

## 2018-09-12 PROCEDURE — 85025 COMPLETE CBC W/AUTO DIFF WBC: CPT

## 2018-09-12 PROCEDURE — 81001 URINALYSIS AUTO W/SCOPE: CPT

## 2018-09-12 PROCEDURE — 93010 ELECTROCARDIOGRAM REPORT: CPT | Mod: ,,, | Performed by: INTERNAL MEDICINE

## 2018-09-12 PROCEDURE — 83880 ASSAY OF NATRIURETIC PEPTIDE: CPT

## 2018-09-12 PROCEDURE — 93005 ELECTROCARDIOGRAM TRACING: CPT

## 2018-09-12 PROCEDURE — 99285 EMERGENCY DEPT VISIT HI MDM: CPT | Mod: ,,, | Performed by: EMERGENCY MEDICINE

## 2018-09-12 PROCEDURE — 25000003 PHARM REV CODE 250: Performed by: EMERGENCY MEDICINE

## 2018-09-12 RX ORDER — ASPIRIN 325 MG
325 TABLET ORAL
Status: COMPLETED | OUTPATIENT
Start: 2018-09-12 | End: 2018-09-12

## 2018-09-12 RX ORDER — NAPROXEN 500 MG/1
500 TABLET ORAL
Status: COMPLETED | OUTPATIENT
Start: 2018-09-12 | End: 2018-09-12

## 2018-09-12 RX ORDER — HYDROXYZINE PAMOATE 25 MG/1
25 CAPSULE ORAL
Status: COMPLETED | OUTPATIENT
Start: 2018-09-12 | End: 2018-09-12

## 2018-09-12 RX ORDER — POTASSIUM CHLORIDE 20 MEQ/1
40 TABLET, EXTENDED RELEASE ORAL
Status: COMPLETED | OUTPATIENT
Start: 2018-09-12 | End: 2018-09-12

## 2018-09-12 RX ADMIN — ASPIRIN 325 MG ORAL TABLET 325 MG: 325 PILL ORAL at 02:09

## 2018-09-12 RX ADMIN — POTASSIUM CHLORIDE 40 MEQ: 1500 TABLET, EXTENDED RELEASE ORAL at 03:09

## 2018-09-12 RX ADMIN — HYDROXYZINE PAMOATE 25 MG: 25 CAPSULE ORAL at 02:09

## 2018-09-12 RX ADMIN — NAPROXEN 500 MG: 500 TABLET ORAL at 02:09

## 2018-09-12 NOTE — ED NOTES
LOC: The patient is awake, alert, and oriented to place, time, situation. Affect is tearful.  Speech is appropriate and clear.  STates she has a lot going on with her son. SHe hasnt sleep in nights because her chest hurts when she tries to sleep.     APPEARANCE: Patient resting on exam chair,  in no acute distress.  Patient is clean and well groomed.    SKIN: The skin is warm and dry; color consistent with ethnicity.  Patient has normal skin turgor and moist mucus membranes.  Skin intact; no breakdown or bruising noted.     MUSCULOSKELETAL: Patient moving upper and lower extremities without difficulty.  Denies weakness.     RESPIRATORY: Airway is open and patent. Respirations spontaneous, even, easy, and non-labored.  Patient has a normal effort and rate.  No accessory muscle use noted. Denies cough.     CARDIAC:  Normal rhythm and rate noted.  No peripheral edema noted. Complaints of chest pain.      ABDOMEN: Soft and non tender to palpation.  No distention noted.     NEUROLOGIC: Eyes open spontaneously.  Behavior appropriate to situation.  Follows commands; facial expression symmetrical.  Purposeful motor response noted; normal sensation in all extremities.

## 2018-09-12 NOTE — ED PROVIDER NOTES
Encounter Date: 9/12/2018    SCRIBE #1 NOTE: I, Patricio Pace, am scribing for, and in the presence of,  Dr. Gan. I have scribed the entire note.       History     Chief Complaint   Patient presents with    Chest Pain     Pt c/o chest pain x 3 days.      38 y.o. W with PMHx of HLD, HTN, DM, MITZI, Anxiety, depression presents to the ED with a chief complaint of chest pain. Pain has been present 3 days. Pt reports she has been having stress lately and believes it is the cause of her CP. She states she hasn't slept in 2 days due to pain. Currently it is 8/10. Pain is in the right side of chest and radiates down the right side of her arm. Pt reports today around 10am she had an anxiety attack where she couldn't breathe. This was due to an issue with her son. She takes hydroxyzine for anxiety. Non smoker. No family history of early CAD. No cocaine use.            Review of patient's allergies indicates:  No Known Allergies  Past Medical History:   Diagnosis Date    Anxiety     Asthma     Chronic pain     back; inflammatory    Depression     Diabetes mellitus, type 2     Endometriosis     Hyperlipidemia     Hypertension     MITZI (obstructive sleep apnea)     Thyroid disease     Vitamin D deficiency      Past Surgical History:   Procedure Laterality Date    CERVICAL SPINE SURGERY      endometriosis      THYROID SURGERY       Family History   Problem Relation Age of Onset    Hypertension Mother     Diabetes Maternal Grandmother     Hyperlipidemia Maternal Grandmother     Diabetes Maternal Grandfather     Hyperlipidemia Maternal Grandfather     Breast cancer Maternal Aunt      Social History     Tobacco Use    Smoking status: Never Smoker    Smokeless tobacco: Never Used   Substance Use Topics    Alcohol use: No    Drug use: No     Review of Systems   Constitutional: Negative for chills and fever.   HENT: Negative for congestion.    Eyes: Negative for discharge.   Respiratory: Negative for cough  and shortness of breath.    Cardiovascular: Positive for chest pain. Negative for palpitations and leg swelling.   Gastrointestinal: Negative for abdominal pain, diarrhea, nausea and vomiting.   Endocrine: Negative for polyuria.   Genitourinary: Negative for dysuria and hematuria.   Musculoskeletal: Negative for back pain.   Skin: Negative for wound.   Allergic/Immunologic: Negative for immunocompromised state.   Neurological: Negative for headaches.   Hematological: Does not bruise/bleed easily.   Psychiatric/Behavioral: Negative for confusion.       Physical Exam     Initial Vitals [09/12/18 1121]   BP Pulse Resp Temp SpO2   130/80 92 16 97.5 °F (36.4 °C) 100 %      MAP       --         Physical Exam    Nursing note and vitals reviewed.  Constitutional: She appears well-developed and well-nourished. She is not diaphoretic. No distress.   HENT:   Head: Normocephalic and atraumatic.   Eyes: EOM are normal. Pupils are equal, round, and reactive to light. Right eye exhibits no discharge. Left eye exhibits no discharge. No scleral icterus.   Neck: Normal range of motion. Neck supple. No JVD present.   Cardiovascular: Normal rate, regular rhythm, normal heart sounds and intact distal pulses. Exam reveals no gallop and no friction rub.    No murmur heard.  Pulmonary/Chest: Breath sounds normal. No respiratory distress. She has no wheezes. She has no rhonchi. She has no rales. She exhibits tenderness.   Right sided and central chest tenderness to palpation.    Abdominal: Soft. Bowel sounds are normal. She exhibits no distension and no mass. There is no tenderness. There is no rebound and no guarding.   Musculoskeletal: Normal range of motion. She exhibits no edema or tenderness.   Lymphadenopathy:     She has no cervical adenopathy.   Neurological: She is alert and oriented to person, place, and time. She has normal strength. No sensory deficit.   Skin: Skin is warm and dry. Capillary refill takes less than 2 seconds.    Psychiatric: She has a normal mood and affect.         ED Course   Procedures  Labs Reviewed   CBC W/ AUTO DIFFERENTIAL - Abnormal; Notable for the following components:       Result Value    WBC 19.40 (*)     Immature Granulocytes 1.3 (*)     Gran # (ANC) 12.4 (*)     Immature Grans (Abs) 0.25 (*)     Lymph # 4.9 (*)     Mono # 1.8 (*)     All other components within normal limits    Narrative:     ONE LAVENDER SHARED   URINALYSIS, REFLEX TO URINE CULTURE - Abnormal; Notable for the following components:    Appearance, UA Hazy (*)     Protein, UA 1+ (*)     Urobilinogen, UA 4.0-6.0 (*)     All other components within normal limits    Narrative:     Preferred Collection Type->Urine, Clean Catch  cup   COMPREHENSIVE METABOLIC PANEL - Abnormal; Notable for the following components:    Potassium 3.3 (*)     All other components within normal limits   POCT URINE PREGNANCY - Normal   TROPONIN I    Narrative:     ONE LAVENDER SHARED   B-TYPE NATRIURETIC PEPTIDE    Narrative:     ONE LAVENDER SHARED   URINALYSIS MICROSCOPIC    Narrative:     Preferred Collection Type->Urine, Clean Catch  cup   TROPONIN I     EKG Readings: (Independently Interpreted)   Rhythm: Normal Sinus Rhythm. Heart Rate: 88. ST Segments: Normal ST Segments. T Waves: Normal. Axis: Normal.       Imaging Results          X-Ray Chest PA And Lateral (Final result)  Result time 09/12/18 14:01:03    Final result by Aden Taylor MD (09/12/18 14:01:03)                 Impression:      .  See above      Electronically signed by: Aden Taylor MD  Date:    09/12/2018  Time:    14:01             Narrative:    EXAMINATION:  XR CHEST PA AND LATERAL    CLINICAL HISTORY:  Chest Pain;    TECHNIQUE:  PA and lateral views of the chest were performed.    COMPARISON:  08/10/2017    FINDINGS:  Cardiac size is normal.  Lungs are clear.  No infiltrate is seen.  Patient has had previous cervical fusion.                                 Medical Decision Making:    History:   Old Medical Records: I decided to obtain old medical records.  Initial Assessment:   38 y.o. woman with history of HTN, HLD, DM, MITZI,  Asthma, and depression presents with chest pain. This patient's differential diagnosis includes, but is not limited to: acute coronary syndrome, pneumothorax, pleurisy, pericarditis, asthma exacerbation, pneumonia, lung abscess, costochondritis, pulmonary embolus, trauma, rib fractures, muscular pain, diaphragmatic irritation.    Will administer aspirin, NSAID, home dose of vistaril. Will obtain labs and CXR.     I doubt PE and patient is PERC neg. I doubt aortic pathology given reassuring appearance and hemodynamics.    Reassessment:  Serum labs reviewed. Troponin within normal limits. BNP less than 10. UA clear. CBC with leukocytosis of 19.4, however I see no apparent etiology to this - possibly ?demarginalization. CXR within normal limits. Mild hypokalemia of 3.3, will replace by mouth. On repeat assesment she reports feeling improved. Will await second trop. If neg, will be discharged for outpatient follow up.    2nd trop neg.    Additional MDM:   PERC Rule:   Age is greater than or equal to 50 = 0.0  Heart Rate is greater than or equal to 100 = 0.0  SaO2 on room air < 95% = 0.0  Unilateral leg swelling = 0.0  Hemoptysis = 0.0  Recent surgery or trauma = 0.0  Prior PE or DVT =  0.0  Hormone use = 0.00  PERC Score = 0  Heart Score:    History:          Slightly suspicious.  ECG:             Normal  Age:               Less than 45 years  Risk factors: >= 3 risk factors or history of atherosclerotic disease  Troponin:       Less than or equal to normal limit  Final Score: 2                       Clinical Impression:   The primary encounter diagnosis was Chest pain, unspecified type. Diagnoses of Hypokalemia and Leukocytosis, unspecified type were also pertinent to this visit.                             Cesar Gan MD  09/14/18 [id number]

## 2018-09-12 NOTE — ED TRIAGE NOTES
Comes to the ED with c/o chest pain, States she going thru a lot with her son.  Had an anxiety attack at work, difficulty breathing like someone was choking her.

## 2018-11-28 ENCOUNTER — HOSPITAL ENCOUNTER (EMERGENCY)
Facility: HOSPITAL | Age: 38
Discharge: HOME | End: 2018-11-29
Attending: EMERGENCY MEDICINE
Payer: MEDICAID

## 2018-11-28 DIAGNOSIS — K64.5 THROMBOSED EXTERNAL HEMORRHOID: Primary | ICD-10-CM

## 2018-11-28 PROCEDURE — 99284 EMERGENCY DEPT VISIT MOD MDM: CPT | Mod: ,,, | Performed by: PHYSICIAN ASSISTANT

## 2018-11-28 PROCEDURE — 99283 EMERGENCY DEPT VISIT LOW MDM: CPT

## 2018-11-28 RX ORDER — HYDROCORTISONE ACETATE 25 MG/1
25 SUPPOSITORY RECTAL 2 TIMES DAILY PRN
Qty: 12 SUPPOSITORY | Refills: 0 | Status: SHIPPED | OUTPATIENT
Start: 2018-11-28 | End: 2018-12-08

## 2018-11-29 VITALS
HEART RATE: 89 BPM | SYSTOLIC BLOOD PRESSURE: 144 MMHG | TEMPERATURE: 99 F | OXYGEN SATURATION: 99 % | HEIGHT: 63 IN | DIASTOLIC BLOOD PRESSURE: 88 MMHG | BODY MASS INDEX: 40.4 KG/M2 | RESPIRATION RATE: 17 BRPM | WEIGHT: 228 LBS

## 2018-11-29 NOTE — ED NOTES
"Pt states "I was constipated for a bit so I thought that was why my butt is sore but then I noticed a boil in the middle of my butt"  Pt reports she noticed abscess today. Pt denies drainage, fever, N/V.     Patient identifiers verified and correct for Caitie Wang.    LOC: The patient is awake, alert and aware of environment with an appropriate affect, the patient is oriented x 3 and speaking appropriately.  APPEARANCE: Patient resting comfortably and in no acute distress, patient is clean and well groomed, patient's clothing is properly fastened.  SKIN: The skin is warm and dry, color consistent with ethnicity, patient has normal skin turgor and moist mucus membranes, skin intact, no breakdown or bruising noted.  MUSCULOSKELETAL: Patient moving all extremities spontaneously, no obvious swelling or deformities noted.  RESPIRATORY: Airway is open and patent, respirations are spontaneous, patient has a normal effort and rate, no accessory muscle use noted  CARDIAC: Patient has a normal rate and regular rhythm, no periphreal edema noted, capillary refill < 3 seconds.  ABDOMEN: Soft and non tender to palpation, no distention noted, normoactive bowel sounds present in all four quadrants.  NEUROLOGIC: PERRL, 3mm bilaterally, eyes open spontaneously, behavior appropriate to situation, follows commands, facial expression symmetrical, bilateral hand grasp equal and even, purposeful motor response noted, normal sensation in all extremities when touched with a finger.  "

## 2018-11-29 NOTE — ED PROVIDER NOTES
"Encounter Date: 11/28/2018       History     Chief Complaint   Patient presents with    Recurrent Skin Infections     Pt reports boil "right between butt" that was noticed today.     The patient presents to the ER for an emergent evaluation due to acute rectal pain. She reports having a painful "knot" on her rectum. She states that the onset was gradual. She states that she first noticed it 2 days ago. She states that the degree of pain is moderate. She states that the pain course is constant. She denies any rectal bleeding. She denies any pre-arrival treatment. She states that defecation exacerbates the pain.           Review of patient's allergies indicates:  No Known Allergies  Past Medical History:   Diagnosis Date    Anxiety     Asthma     Chronic pain     back; inflammatory    Depression     Diabetes mellitus, type 2     Endometriosis     Hyperlipidemia     Hypertension     MITZI (obstructive sleep apnea)     Thyroid disease     Vitamin D deficiency      Past Surgical History:   Procedure Laterality Date    CERVICAL SPINE SURGERY      endometriosis      THYROID SURGERY       Family History   Problem Relation Age of Onset    Hypertension Mother     Diabetes Maternal Grandmother     Hyperlipidemia Maternal Grandmother     Diabetes Maternal Grandfather     Hyperlipidemia Maternal Grandfather     Breast cancer Maternal Aunt      Social History     Tobacco Use    Smoking status: Never Smoker    Smokeless tobacco: Never Used   Substance Use Topics    Alcohol use: No    Drug use: No     Review of Systems   Constitutional: Negative for chills and fever.   Respiratory: Negative for shortness of breath.    Cardiovascular: Negative for chest pain.   Gastrointestinal: Positive for rectal pain. Negative for abdominal distention, abdominal pain, blood in stool, constipation, diarrhea, nausea and vomiting.   Genitourinary: Negative for dysuria, menstrual problem and pelvic pain.   Musculoskeletal: " Negative for arthralgias and myalgias.   Skin: Negative for rash.   Neurological: Negative for dizziness, syncope, weakness, light-headedness and headaches.   Psychiatric/Behavioral: Negative for confusion.       Physical Exam     Initial Vitals [11/28/18 2150]   BP Pulse Resp Temp SpO2   (!) 132/95 93 14 98.5 °F (36.9 °C) 97 %      MAP       --         Physical Exam    Nursing note and vitals reviewed.  Constitutional: She appears well-developed and well-nourished.   HENT:   Mouth/Throat: Oropharynx is clear and moist.   Eyes: Conjunctivae are normal.   Cardiovascular: Normal rate.   Pulmonary/Chest: No respiratory distress.   Abdominal: Soft. There is no tenderness.   Genitourinary:   Genitourinary Comments: HARESH: Female ER Tech present throughout entire exam as a chaperone. There is a small thrombosed external hemorrhoid that measures approximately 3 cm in diameter. No bleeding. No abscess. No internal hemorrhoid. No impaction.     Musculoskeletal: Normal range of motion.   Neurological: She is alert and oriented to person, place, and time.   Skin: Skin is warm and dry.   Psychiatric: She has a normal mood and affect.         ED Course   Procedures  Labs Reviewed - No data to display       Imaging Results    None          Medical Decision Making:   Initial Assessment:   Acute rectal pain and swelling   Differential Diagnosis:   Hemorrhoid, abscess, impaction, fissure, FB, etc   ED Management:  Rx for khanh-sol provided   Advised close follow up with PCP   Gave general surgery clinic info   Advised prompt return to the ER if worse.   Other:   I have discussed this case with another health care provider.       <> Summary of the Discussion: I discussed the case in detail with the ER attending physician                       Clinical Impression:   The encounter diagnosis was Thrombosed external hemorrhoid.      Disposition:   Disposition: Discharged  Condition: Stable                        Elliot Bella  SHAHLA  11/29/18 0113

## 2019-02-08 DIAGNOSIS — M25.561 PAIN IN BOTH KNEES: ICD-10-CM

## 2019-02-08 DIAGNOSIS — M25.562 BILATERAL KNEE PAIN: Primary | ICD-10-CM

## 2019-02-08 DIAGNOSIS — M25.562 PAIN IN BOTH KNEES: ICD-10-CM

## 2019-02-08 DIAGNOSIS — M25.561 BILATERAL KNEE PAIN: Primary | ICD-10-CM

## 2019-02-18 ENCOUNTER — HOSPITAL ENCOUNTER (EMERGENCY)
Facility: HOSPITAL | Age: 39
Discharge: HOME | End: 2019-02-18
Attending: EMERGENCY MEDICINE
Payer: MEDICAID

## 2019-02-18 VITALS
WEIGHT: 228 LBS | BODY MASS INDEX: 40.4 KG/M2 | SYSTOLIC BLOOD PRESSURE: 131 MMHG | DIASTOLIC BLOOD PRESSURE: 78 MMHG | TEMPERATURE: 98 F | HEART RATE: 79 BPM | HEIGHT: 63 IN | OXYGEN SATURATION: 98 % | RESPIRATION RATE: 16 BRPM

## 2019-02-18 DIAGNOSIS — J10.1 INFLUENZA A: Primary | ICD-10-CM

## 2019-02-18 DIAGNOSIS — R05.9 COUGH: ICD-10-CM

## 2019-02-18 LAB
B-HCG UR QL: NEGATIVE
CTP QC/QA: YES
CTP QC/QA: YES
POC MOLECULAR INFLUENZA A AGN: POSITIVE
POC MOLECULAR INFLUENZA B AGN: NEGATIVE

## 2019-02-18 PROCEDURE — 87502 INFLUENZA DNA AMP PROBE: CPT

## 2019-02-18 PROCEDURE — 81025 URINE PREGNANCY TEST: CPT | Performed by: PHYSICIAN ASSISTANT

## 2019-02-18 PROCEDURE — 25000003 PHARM REV CODE 250: Performed by: PHYSICIAN ASSISTANT

## 2019-02-18 PROCEDURE — 99284 EMERGENCY DEPT VISIT MOD MDM: CPT | Mod: ,,, | Performed by: PHYSICIAN ASSISTANT

## 2019-02-18 PROCEDURE — 99284 EMERGENCY DEPT VISIT MOD MDM: CPT | Mod: 25

## 2019-02-18 PROCEDURE — 99284 PR EMERGENCY DEPT VISIT,LEVEL IV: ICD-10-PCS | Mod: ,,, | Performed by: PHYSICIAN ASSISTANT

## 2019-02-18 RX ORDER — ONDANSETRON 4 MG/1
4 TABLET, ORALLY DISINTEGRATING ORAL
Status: COMPLETED | OUTPATIENT
Start: 2019-02-18 | End: 2019-02-18

## 2019-02-18 RX ORDER — GUAIFENESIN 1200 MG/1
1 TABLET, EXTENDED RELEASE ORAL 2 TIMES DAILY PRN
Qty: 20 TABLET | Refills: 0 | Status: SHIPPED | OUTPATIENT
Start: 2019-02-18 | End: 2019-02-28

## 2019-02-18 RX ORDER — BENZONATATE 100 MG/1
100 CAPSULE ORAL 3 TIMES DAILY PRN
Qty: 20 CAPSULE | Refills: 0 | Status: SHIPPED | OUTPATIENT
Start: 2019-02-18 | End: 2019-02-28

## 2019-02-18 RX ORDER — OSELTAMIVIR PHOSPHATE 75 MG/1
75 CAPSULE ORAL 2 TIMES DAILY
Qty: 10 CAPSULE | Refills: 0 | Status: SHIPPED | OUTPATIENT
Start: 2019-02-18 | End: 2019-02-23

## 2019-02-18 RX ORDER — NAPROXEN 500 MG/1
500 TABLET ORAL
Status: COMPLETED | OUTPATIENT
Start: 2019-02-18 | End: 2019-02-18

## 2019-02-18 RX ORDER — DOXYCYCLINE 100 MG/1
100 CAPSULE ORAL 2 TIMES DAILY
Status: ON HOLD | COMMUNITY
End: 2020-01-10 | Stop reason: HOSPADM

## 2019-02-18 RX ADMIN — ONDANSETRON 4 MG: 4 TABLET, ORALLY DISINTEGRATING ORAL at 08:02

## 2019-02-18 RX ADMIN — NAPROXEN 500 MG: 500 TABLET ORAL at 08:02

## 2019-02-19 NOTE — ED NOTES
Patient reports sore throat starting Saturday morning with nausea and dizziness. Patient taking robitussin. Reports body aches. Did not take flu shot this year. Patient having chills.

## 2019-02-19 NOTE — ED PROVIDER NOTES
Encounter Date: 2/18/2019       History     Chief Complaint   Patient presents with    URI     bodyaches, sore throat     This is a 38 year old female with a PMH of HTN, DM, thyroid disease, asthma who presents to the ED with a chief complaint of flu like symptoms. Patient reports a 2 day history of subjective fever, chills, nasal congestion sore throat and body aches. She endorses nonproductive cough. Patient has attempted treatment with OTC medications without relief. Denies headache, neck pain/stiffness, chest pain, SOB, vomiting, abdominal pain, dysuria or diarrhea. She recently switched statins 2 weeks ago. She is a nonsmoker.          Review of patient's allergies indicates:  No Known Allergies  Past Medical History:   Diagnosis Date    Anxiety     Asthma     Chronic pain     back; inflammatory    Depression     Diabetes mellitus, type 2     Endometriosis     Hyperlipidemia     Hypertension     MITZI (obstructive sleep apnea)     Thyroid disease     Vitamin D deficiency      Past Surgical History:   Procedure Laterality Date    CERVICAL SPINE SURGERY      endometriosis      THYROID SURGERY       Family History   Problem Relation Age of Onset    Hypertension Mother     Diabetes Maternal Grandmother     Hyperlipidemia Maternal Grandmother     Diabetes Maternal Grandfather     Hyperlipidemia Maternal Grandfather     Breast cancer Maternal Aunt      Social History     Tobacco Use    Smoking status: Never Smoker    Smokeless tobacco: Never Used   Substance Use Topics    Alcohol use: No    Drug use: No     Review of Systems   Constitutional: Positive for chills and fever.   HENT: Positive for congestion, postnasal drip and sore throat.    Respiratory: Positive for cough. Negative for shortness of breath.    Cardiovascular: Negative for chest pain.   Gastrointestinal: Positive for nausea. Negative for diarrhea and vomiting.   Genitourinary: Negative for dysuria.   Musculoskeletal: Positive for  myalgias. Negative for back pain.   Skin: Negative for rash.   Neurological: Negative for weakness.   Hematological: Does not bruise/bleed easily.       Physical Exam     Initial Vitals [02/18/19 1840]   BP Pulse Resp Temp SpO2   (!) 139/102 87 18 99.5 °F (37.5 °C) 98 %      MAP       --         Physical Exam    Constitutional: She appears well-developed and well-nourished. No distress.   HENT:   Head: Atraumatic.   Right Ear: Ear canal normal. Tympanic membrane is injected. A middle ear effusion is present.   Left Ear: Ear canal normal. Tympanic membrane is injected.   Nose: Rhinorrhea present.   Mouth/Throat: Uvula is midline. No trismus in the jaw. Posterior oropharyngeal erythema present. No oropharyngeal exudate or posterior oropharyngeal edema.   Eyes: Conjunctivae and EOM are normal. Pupils are equal, round, and reactive to light.   Cardiovascular: Normal rate, regular rhythm and normal heart sounds.   Pulmonary/Chest: Breath sounds normal. No respiratory distress. She has no wheezes. She has no rhonchi. She has no rales.   Abdominal: Soft. Bowel sounds are normal. There is no tenderness. There is no rebound and no guarding.   Neurological: She is alert and oriented to person, place, and time.   Skin: Skin is warm and dry. No rash noted.         ED Course   Procedures  Labs Reviewed   POCT INFLUENZA A/B MOLECULAR - Abnormal; Notable for the following components:       Result Value    POC Molecular Influenza A Ag Positive (*)     All other components within normal limits   POCT URINE PREGNANCY          Imaging Results          X-Ray Chest PA And Lateral (Final result)  Result time 02/18/19 20:39:59    Final result by Sameer Che MD (02/18/19 20:39:59)                 Impression:      No detrimental change or radiographic acute intrathoracic process seen. Specifically, no focal consolidation.      Electronically signed by: Sameer Che MD  Date:    02/18/2019  Time:    20:39             Narrative:     EXAMINATION:  XR CHEST PA AND LATERAL    CLINICAL HISTORY:  Cough    TECHNIQUE:  PA and lateral views of the chest were performed.    COMPARISON:  Chest radiograph 09/12/2018    FINDINGS:  No detrimental change.The lungs are clear, with normal appearance of pulmonary vasculature and no pleural effusion or pneumothorax.    The cardiac silhouette is normal in size. The hilar and mediastinal contours are unremarkable.    Lower cervical prior fusion again noted.  No acute osseous process seen.                                       APC / Resident Notes:   38 year old female presenting with UTI symptoms x 2 days.  On exam she is afebrile and nontoxic. She has elevated /102, endorses taking OTC decongestants. Has not taken her home BP meds due to nausea. Lungs are clear.    DDx includes but is not limited to influenza, URI, pneumonia.    Influenza A positive  CXR with no acute consolidation or infiltrate    Discussed findings with patient. Recommend supportive care for symptom relief, taking care to avoid decongestants given elevated blood pressure. Will prescribe Tamiflu.  Patient verbalized understanding and agrees the plan course of treatment.  She is stable for discharge.                 Clinical Impression:   The primary encounter diagnosis was Influenza A. A diagnosis of Cough was also pertinent to this visit.      Disposition:   Disposition: Discharged  Condition: Stable                        Sherri Michaels PA-C  02/18/19 2059

## 2019-03-13 DIAGNOSIS — R10.2 PELVIC PAIN IN FEMALE: Primary | ICD-10-CM

## 2019-03-21 ENCOUNTER — HOSPITAL ENCOUNTER (OUTPATIENT)
Dept: RADIOLOGY | Facility: HOSPITAL | Age: 39
Discharge: HOME | End: 2019-03-21
Attending: INTERNAL MEDICINE
Payer: MEDICAID

## 2019-03-21 DIAGNOSIS — R10.2 PELVIC PAIN IN FEMALE: ICD-10-CM

## 2019-03-21 PROCEDURE — 76856 US EXAM PELVIC COMPLETE: CPT | Mod: 26,,, | Performed by: RADIOLOGY

## 2019-03-21 PROCEDURE — 76856 US PELVIS COMP WITH TRANSVAG NON-OB (XPD): ICD-10-PCS | Mod: 26,,, | Performed by: RADIOLOGY

## 2019-03-21 PROCEDURE — 76830 US PELVIS COMP WITH TRANSVAG NON-OB (XPD): ICD-10-PCS | Mod: 26,,, | Performed by: RADIOLOGY

## 2019-03-21 PROCEDURE — 76830 TRANSVAGINAL US NON-OB: CPT | Mod: 26,,, | Performed by: RADIOLOGY

## 2019-03-21 PROCEDURE — 76830 TRANSVAGINAL US NON-OB: CPT | Mod: TC

## 2019-04-02 ENCOUNTER — CLINICAL SUPPORT (OUTPATIENT)
Dept: REHABILITATION | Facility: HOSPITAL | Age: 39
End: 2019-04-02
Payer: MEDICAID

## 2019-04-02 DIAGNOSIS — M25.561 CHRONIC PAIN OF RIGHT KNEE: Primary | ICD-10-CM

## 2019-04-02 DIAGNOSIS — G89.29 CHRONIC PAIN OF RIGHT KNEE: Primary | ICD-10-CM

## 2019-04-02 PROCEDURE — 97110 THERAPEUTIC EXERCISES: CPT

## 2019-04-02 PROCEDURE — 97161 PT EVAL LOW COMPLEX 20 MIN: CPT

## 2019-04-02 NOTE — PLAN OF CARE
Physical Therapy Initial Evaluation     Name: Caitie DIANA [facility] Number: [id number]    Caitie DIANA is a 39 y.o. female evaluated on 04/02/2019.     Diagnosis:   Encounter Diagnosis   Name Primary?    Chronic pain of right knee Yes     Physician: [place], *  Treatment Orders: PT Eval and Treat    Past Medical History:   Diagnosis Date    Anxiety     Asthma     Chronic pain     back; inflammatory    Depression     Diabetes mellitus, type 2     Endometriosis     Hyperlipidemia     Hypertension     MITZI (obstructive sleep apnea)     Thyroid disease     Vitamin D deficiency      Current Outpatient Medications   Medication Sig    ALBUTEROL SULFATE (PROAIR HFA INHL) Inhale into the lungs.    cetirizine (ZYRTEC) 10 MG tablet Take 1 tablet (10 mg total) by mouth once daily.    doxycycline (MONODOX) 100 MG capsule Take 100 mg by mouth 2 (two) times daily.    folic acid (FOLVITE) 1 MG tablet Take 1 mg by mouth once daily.    gabapentin (NEURONTIN) 300 MG capsule Take 1 capsule (300 mg total) by mouth 3 (three) times daily.    hydrOXYzine HCl (ATARAX) 25 MG tablet Take 1 tablet (25 mg total) by mouth every 6 (six) hours as needed for Anxiety.    hydrOXYzine pamoate (VISTARIL) 25 MG Cap Take 1 capsule (25 mg total) by mouth every 6 (six) hours as needed (Itching).    ibuprofen (ADVIL,MOTRIN) 800 MG tablet Take 1 tablet (800 mg total) by mouth every 6 (six) hours as needed for Pain.    imipramine (TOFRANIL) 25 MG tablet Take 25 mg by mouth every evening.    levothyroxine (SYNTHROID) 300 MCG Tab Take 1 tablet (300 mcg total) by mouth before breakfast.    linaclotide 290 mcg Cap Take 1 capsule (290 mcg total) by mouth once daily. Take 30 minutes before breakfast.    meclizine (ANTIVERT) 12.5 mg tablet Take 2 tablets (25 mg total) by mouth 3 (three) times daily as needed for Dizziness.    medroxyPROGESTERone (DEPO-PROVERA) 150 mg/mL Syrg      metFORMIN (GLUCOPHAGE) 500 MG tablet Take 1 tablet (500 mg total) by mouth daily with breakfast.    mometasone (NASONEX) 50 mcg/actuation nasal spray 2 sprays by Nasal route once daily.    naproxen (NAPROSYN) 500 MG tablet Take 1 tablet (500 mg total) by mouth 2 (two) times daily with meals.    pravastatin (PRAVACHOL) 40 MG tablet Take 40 mg by mouth once daily.    ranitidine (ZANTAC) 150 MG capsule Take 1 capsule (150 mg total) by mouth once daily.    tramadol (ULTRAM) 50 mg tablet     VITAMIN D2 50,000 unit capsule      No current facility-administered medications for this visit.      Review of patient's allergies indicates:  No Known Allergies  Precautions: Fall    Time In: 4:35  Time Out: 5:20    Evaluation Date: 4/2/19  Visit # authorized: 1/20  Authorization period: 6/14/19  Plan of care Expiration: 5/14/19  MD referral:   M25.561,M25.562 (ICD-10-CM) - Bilateral knee pain   M25.561,M25.562 (ICD-10-CM) - Pain in both knees         Subjective     Patient reports her R knee has been popping frequently, increasing over the last few weeks. She had two falls due to popping and giving out and pain. She almost had another fall yesterday going up steps while carrying grandchild. She lives upstairs and has a lot of pain going up stairs. Pt going to chiropractor 1x/week and they suspected meniscus injury. Pt got injection a month ago with short term relief but pain has returned. She tried using stepper which hurts. Pt works at [place] and has to be on feet all day.  Pain with walking, stairs, standing, squatting. She needs to stop when performing household chores due to pain. No trouble driving.   Pt s/p cervical disc surgery (ACDF C6-7) in July with relief, has B sciatica and L peripheral neuropathy 2/2 DM type 2. Pt rides bike every now and then for physical activity.   Diagnostic Imaging: n/a   Onset: insidious and gradual  Popping/clicking: yes  Lower extremity gives way: yes  Locking episodes: yes    Pain  Scale: Caitie DIANA rates pain on a scale of 0-10 to be 5 currently; 10 at worst; 5 at best .  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, airport, lifting, squatting  Patient Goals: walking, household chores   PMH: anxiety, depression, diabetes, high cholesterol, vertigo, asthma      Objective     Observation: genu valgum, ant pelvic tilt, increased lordosis      Palpation: medial  joint line tenderness, tender at MCL, fibular head     Range of Motion: Knee (degrees)   Left Right   Flexion: 100 100 *   Extension -3 0      Strength: Knee   Left Right   Quadriceps 5/5 5/5   Hamstrings 4+/5 4+/5       Strength: Hip    Left Right   Iliopsoas 4+/5 4+/5   PGM 4+/5 4+/5   IR 4+/5 4/5   ER 4+/5 4/5   Ext 4+/5 4+/5       Special Tests: Knee   Left Right   Ant. Drawer - instability and - pain - instability and - pain   Post. Drawer - instability and - pain - instability and - pain   Lachman's - instability and - pain - instability and - pain   Valgus at 0 Deg - instability and - pain - instability and - pain   Valgus at 30 Deg - instability and - pain - instability and - pain   Varus at 0 Deg - instability and - pain - instability and - pain   Varus at 30 Deg - instability and - pain - instability and - pain   Frank's - instability and - pain + instability and + pain     Meniscus composite score:   History of catching or locking reported by the patient: +  Joint line tenderness: +  Pain with forced hyperextension (modified bounce home test): -  Pain with maximal passive knee flexion: +  Pain or audible click with Frank's maneuver: +   (Score: 4/5: 96.1% specificity )    Joint Mobility: hypomobile patella mobility    Flexibility: HS 65 deg B    Edema: unable to assess 2/2 tight jeans, no overt differences L-R    Gait: Felipe ambulated with none.  Level of Assistance: independent  Patient displays  decreased step length, B ER, decreased TKE.   Balance: Maintains SLS 18 seconds on R LE and 14 seconds on L LE with good balance strategies.  Functional Tests: squat: knees significantly over toes  Picking object off floor: Pt bends R knee, L LE straight, flexes trunk to pick object off floor    TREATMENT     PT Evaluation Completed? Yes  Discussed Plan of Care with patient: Yes    Caitie DIANA received 10 minutes of therapeutic exercise including:   Quad set  HS set  Bridge  Clam   SLR    Written Home Exercises Provided: HEP2GO code: [id number] (see handout)   Caitie DIANA demonstrated good understanding of the education provided. Patient demonstrated good return demonstration of skill of exercises.    ASSESSMENT     History  Co-morbidities and personal factors that may impact the plan of care Examination  Body Structures and Functions, activity limitations and participation restrictions that may impact the plan of care Clinical Presentation   Decision Making/ Complexity Score   Co-morbidities:   anxiety, depression, diabetes, high BMI and asthma            Personal Factors:   no deficits Body Regions: lower extremities    Body Systems: ROM  strength  balance  gait  motor control      Activity limitations: walking  stairs      Participation Restrictions: community ambulation, ADLs       stable and uncomplicated   low     Patient is a 39 y.o. female referred to outpatient physical therapy who presents with a PT diagnosis of chronic R knee pain with possible meniscal pathology demonstrating joint dysfunction, LE weakness, impaired balance and gait, and functional limitation as described below. Level of complexity is low;  based on patient's past medical history including the below co-morbidities and personal factors; functional limitations, and clinical presentation directly impacting his/her plan of care. Pt demonstrates good rehab potential. Pt will benefit from physcial therapy services in order to maximize pain free  functional mobility. The following goals were discussed with the patient and patient is in agreement with them as to be addressed in the treatment plan. Pt was given a HEP consisting of quad sets, HS sets, bridges, clams, SLR. Pt verbally understood the instructions as they were given and demonstrated proper form and technique during therapy. Pt was advised to perform these exercises free of pain, and to stop performing them if pain occurs.     Medical necessity is demonstrated by the following IMPAIRMENTS/PROBLEMS:  1. Increased Pain  2. Decreased Segmental Mobility & Decreased ROM  3. Decreased Core & BLE strength  4. Decreased Flexibility BLE  5. Decreased Tolerance to Functional Activities    Pt's spiritual, cultural and educational needs considered and pt agreeable to plan of care and goals as stated below:     Anticipated Barriers for physical therapy: none    Short Term GOALS: 3 weeks. Pt agrees with goals set.  1. Patient demonstrates independence with HEP.   2. Patient demonstrates independence with Postural Awareness.   3. Patient demonstrates independence with body mechanics.     Long Term GOALS: 6 weeks. Pt agrees with goals set.  1. Patient demonstrates increased Knee ROM by 5 deg to improve tolerance to functional activities pain free.   2. Patient demonstrates increased strength BLE's by 1 MMT grade or greater to improve tolerance to functional activities pain free.   3. Patient demonstrates improved overall function per FOTO Knee Survey to 41% Limitation or less.     Functional Limitations Reports - G Codes  Category: mobility  Tool: FOTO Knee Survey  Score: 56% Limitation   TEST SCORE  Modifier  Impairment Limitation Restriction    80/80  CH  0 % impaired, limited or restricted   64-79/80  CI  @ least 1% but less than 20% impaired, limited or restricted   49-63/80  CJ  @ least 20%<40% impaired, limited or restricted   33-48/80  CK  @ least 40%<60% impaired, limited or restricted   17-32/80  CL  @  least 60% <80% impaired, limited or restricted   1-16/80  CM  @ least 80%<100% impaired limited or restricted   0/80  CN  100% impaired, limited or restricted     Current/  CK = 56% Limitation  Goal/ : CK = 41% Limitation    PLAN     Outpatient physical therapy 1-3 times weekly to include: pt ed, hep, therapeutic exercises, neuromuscular re-education/ balance exercises, joint mobilizations, aquatic therapy and modalities prn. Cont PT for  6 weeks. Pt may be seen by PTA as part of the rehabilitation team.     Therapist: Jeannette Santos, PT, DPT  4/2/2019

## 2019-04-02 NOTE — PROGRESS NOTES
Physical Therapy Initial Evaluation     Name: Caitie DIANA [facility] Number: [id number]    Caitie DIANA is a 39 y.o. female evaluated on 04/02/2019.     Diagnosis:   Encounter Diagnosis   Name Primary?    Chronic pain of right knee Yes     Physician: Juana Viens, *  Treatment Orders: PT Eval and Treat    Past Medical History:   Diagnosis Date    Anxiety     Asthma     Chronic pain     back; inflammatory    Depression     Diabetes mellitus, type 2     Endometriosis     Hyperlipidemia     Hypertension     MITZI (obstructive sleep apnea)     Thyroid disease     Vitamin D deficiency      Current Outpatient Medications   Medication Sig    ALBUTEROL SULFATE (PROAIR HFA INHL) Inhale into the lungs.    cetirizine (ZYRTEC) 10 MG tablet Take 1 tablet (10 mg total) by mouth once daily.    doxycycline (MONODOX) 100 MG capsule Take 100 mg by mouth 2 (two) times daily.    folic acid (FOLVITE) 1 MG tablet Take 1 mg by mouth once daily.    gabapentin (NEURONTIN) 300 MG capsule Take 1 capsule (300 mg total) by mouth 3 (three) times daily.    hydrOXYzine HCl (ATARAX) 25 MG tablet Take 1 tablet (25 mg total) by mouth every 6 (six) hours as needed for Anxiety.    hydrOXYzine pamoate (VISTARIL) 25 MG Cap Take 1 capsule (25 mg total) by mouth every 6 (six) hours as needed (Itching).    ibuprofen (ADVIL,MOTRIN) 800 MG tablet Take 1 tablet (800 mg total) by mouth every 6 (six) hours as needed for Pain.    imipramine (TOFRANIL) 25 MG tablet Take 25 mg by mouth every evening.    levothyroxine (SYNTHROID) 300 MCG Tab Take 1 tablet (300 mcg total) by mouth before breakfast.    linaclotide 290 mcg Cap Take 1 capsule (290 mcg total) by mouth once daily. Take 30 minutes before breakfast.    meclizine (ANTIVERT) 12.5 mg tablet Take 2 tablets (25 mg total) by mouth 3 (three) times daily as needed for Dizziness.    medroxyPROGESTERone (DEPO-PROVERA) 150 mg/mL Syrg      metFORMIN (GLUCOPHAGE) 500 MG tablet Take 1 tablet (500 mg total) by mouth daily with breakfast.    mometasone (NASONEX) 50 mcg/actuation nasal spray 2 sprays by Nasal route once daily.    naproxen (NAPROSYN) 500 MG tablet Take 1 tablet (500 mg total) by mouth 2 (two) times daily with meals.    pravastatin (PRAVACHOL) 40 MG tablet Take 40 mg by mouth once daily.    ranitidine (ZANTAC) 150 MG capsule Take 1 capsule (150 mg total) by mouth once daily.    tramadol (ULTRAM) 50 mg tablet     VITAMIN D2 50,000 unit capsule      No current facility-administered medications for this visit.      Review of patient's allergies indicates:  No Known Allergies  Precautions: Fall    Time In: 4:35  Time Out: 5:20    Evaluation Date: 4/2/19  Visit # authorized: 1/20  Authorization period: 6/14/19  Plan of care Expiration: 5/14/19  MD referral:   M25.561,M25.562 (ICD-10-CM) - Bilateral knee pain   M25.561,M25.562 (ICD-10-CM) - Pain in both knees         Subjective     Patient reports her R knee has been popping frequently, increasing over the last few weeks. She had two falls due to popping and giving out and pain. She almost had another fall yesterday going up steps while carrying grandchild. She lives upstairs and has a lot of pain going up stairs. Pt going to chiropractor 1x/week and they suspected meniscus injury. Pt got injection a month ago with short term relief but pain has returned. She tried using stepper which hurts. Pt works at [place] and has to be on feet all day.  Pain with walking, stairs, standing, squatting. She needs to stop when performing household chores due to pain. No trouble driving.   Pt s/p cervical disc surgery (ACDF C6-7) in July with relief, has B sciatica and L peripheral neuropathy 2/2 DM type 2. Pt rides bike every now and then for physical activity.   Diagnostic Imaging: n/a   Onset: insidious and gradual  Popping/clicking: yes  Lower extremity gives way: yes  Locking episodes: yes    Pain  Scale: Caitie DIANA rates pain on a scale of 0-10 to be 5 currently; 10 at worst; 5 at best .  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, airport, lifting, squatting  Patient Goals: walking, household chores   PMH: anxiety, depression, diabetes, high cholesterol, vertigo, asthma      Objective     Observation: genu valgum, ant pelvic tilt, increased lordosis      Palpation: medial  joint line tenderness, tender at MCL, fibular head     Range of Motion: Knee (degrees)   Left Right   Flexion: 100 100 *   Extension -3 0      Strength: Knee   Left Right   Quadriceps 5/5 5/5   Hamstrings 4+/5 4+/5       Strength: Hip    Left Right   Iliopsoas 4+/5 4+/5   PGM 4+/5 4+/5   IR 4+/5 4/5   ER 4+/5 4/5   Ext 4+/5 4+/5       Special Tests: Knee   Left Right   Ant. Drawer - instability and - pain - instability and - pain   Post. Drawer - instability and - pain - instability and - pain   Lachman's - instability and - pain - instability and - pain   Valgus at 0 Deg - instability and - pain - instability and - pain   Valgus at 30 Deg - instability and - pain - instability and - pain   Varus at 0 Deg - instability and - pain - instability and - pain   Varus at 30 Deg - instability and - pain - instability and - pain   Frank's - instability and - pain + instability and + pain     Meniscus composite score:   History of catching or locking reported by the patient: +  Joint line tenderness: +  Pain with forced hyperextension (modified bounce home test): -  Pain with maximal passive knee flexion: +  Pain or audible click with Frank's maneuver: +   (Score: 4/5: 96.1% specificity )    Joint Mobility: hypomobile patella mobility    Flexibility: HS 65 deg B    Edema: unable to assess 2/2 tight jeans, no overt differences L-R    Gait: Felipe ambulated with none.  Level of Assistance: independent  Patient displays  decreased step length, B ER, decreased TKE.   Balance: Maintains SLS 18 seconds on R LE and 14 seconds on L LE with good balance strategies.  Functional Tests: squat: knees significantly over toes  Picking object off floor: Pt bends R knee, L LE straight, flexes trunk to pick object off floor    TREATMENT     PT Evaluation Completed? Yes  Discussed Plan of Care with patient: Yes    Caitie DIANA received 10 minutes of therapeutic exercise including:   Quad set  HS set  Bridge  Clam   SLR    Written Home Exercises Provided: HEP2GO code: [id number] (see handout)   Caitie DIANA demonstrated good understanding of the education provided. Patient demonstrated good return demonstration of skill of exercises.    ASSESSMENT     History  Co-morbidities and personal factors that may impact the plan of care Examination  Body Structures and Functions, activity limitations and participation restrictions that may impact the plan of care Clinical Presentation   Decision Making/ Complexity Score   Co-morbidities:   anxiety, depression, diabetes, high BMI and asthma            Personal Factors:   no deficits Body Regions: lower extremities    Body Systems: ROM  strength  balance  gait  motor control      Activity limitations: walking  stairs      Participation Restrictions: community ambulation, ADLs       stable and uncomplicated   low     Patient is a 39 y.o. female referred to outpatient physical therapy who presents with a PT diagnosis of chronic R knee pain with possible meniscal pathology demonstrating joint dysfunction, LE weakness, impaired balance and gait, and functional limitation as described below. Level of complexity is low;  based on patient's past medical history including the below co-morbidities and personal factors; functional limitations, and clinical presentation directly impacting his/her plan of care. Pt demonstrates good rehab potential. Pt will benefit from physcial therapy services in order to maximize pain free  functional mobility. The following goals were discussed with the patient and patient is in agreement with them as to be addressed in the treatment plan. Pt was given a HEP consisting of quad sets, HS sets, bridges, clams, SLR. Pt verbally understood the instructions as they were given and demonstrated proper form and technique during therapy. Pt was advised to perform these exercises free of pain, and to stop performing them if pain occurs.     Medical necessity is demonstrated by the following IMPAIRMENTS/PROBLEMS:  1. Increased Pain  2. Decreased Segmental Mobility & Decreased ROM  3. Decreased Core & BLE strength  4. Decreased Flexibility BLE  5. Decreased Tolerance to Functional Activities    Pt's spiritual, cultural and educational needs considered and pt agreeable to plan of care and goals as stated below:     Anticipated Barriers for physical therapy: none    Short Term GOALS: 3 weeks. Pt agrees with goals set.  1. Patient demonstrates independence with HEP.   2. Patient demonstrates independence with Postural Awareness.   3. Patient demonstrates independence with body mechanics.     Long Term GOALS: 6 weeks. Pt agrees with goals set.  1. Patient demonstrates increased Knee ROM by 5 deg to improve tolerance to functional activities pain free.   2. Patient demonstrates increased strength BLE's by 1 MMT grade or greater to improve tolerance to functional activities pain free.   3. Patient demonstrates improved overall function per FOTO Knee Survey to 41% Limitation or less.     Functional Limitations Reports - G Codes  Category: mobility  Tool: FOTO Knee Survey  Score: 56% Limitation   TEST SCORE  Modifier  Impairment Limitation Restriction    80/80  CH  0 % impaired, limited or restricted   64-79/80  CI  @ least 1% but less than 20% impaired, limited or restricted   49-63/80  CJ  @ least 20%<40% impaired, limited or restricted   33-48/80  CK  @ least 40%<60% impaired, limited or restricted   17-32/80  CL  @  least 60% <80% impaired, limited or restricted   1-16/80  CM  @ least 80%<100% impaired limited or restricted   0/80  CN  100% impaired, limited or restricted     Current/  CK = 56% Limitation  Goal/ : CK = 41% Limitation    PLAN     Outpatient physical therapy 1-3 times weekly to include: pt ed, hep, therapeutic exercises, neuromuscular re-education/ balance exercises, joint mobilizations, aquatic therapy and modalities prn. Cont PT for  6 weeks. Pt may be seen by PTA as part of the rehabilitation team.     Therapist: Jeanntete Santos, PT, DPT  4/2/2019

## 2019-04-26 ENCOUNTER — HOSPITAL ENCOUNTER (EMERGENCY)
Facility: HOSPITAL | Age: 39
Discharge: HOME | End: 2019-04-26
Attending: EMERGENCY MEDICINE
Payer: MEDICAID

## 2019-04-26 VITALS
TEMPERATURE: 99 F | OXYGEN SATURATION: 98 % | BODY MASS INDEX: 40.4 KG/M2 | SYSTOLIC BLOOD PRESSURE: 136 MMHG | HEART RATE: 88 BPM | RESPIRATION RATE: 18 BRPM | WEIGHT: 228 LBS | DIASTOLIC BLOOD PRESSURE: 73 MMHG | HEIGHT: 63 IN

## 2019-04-26 DIAGNOSIS — E03.9 HYPOTHYROIDISM, UNSPECIFIED TYPE: ICD-10-CM

## 2019-04-26 DIAGNOSIS — R53.83 FATIGUE, UNSPECIFIED TYPE: Primary | ICD-10-CM

## 2019-04-26 LAB
ANION GAP SERPL CALC-SCNC: 12 MMOL/L (ref 8–16)
B-HCG UR QL: NEGATIVE
BASOPHILS # BLD AUTO: 0.03 K/UL (ref 0–0.2)
BASOPHILS NFR BLD: 0.4 % (ref 0–1.9)
BUN SERPL-MCNC: 11 MG/DL (ref 6–20)
CALCIUM SERPL-MCNC: 9.8 MG/DL (ref 8.7–10.5)
CHLORIDE SERPL-SCNC: 108 MMOL/L (ref 95–110)
CO2 SERPL-SCNC: 21 MMOL/L (ref 23–29)
CREAT SERPL-MCNC: 0.9 MG/DL (ref 0.5–1.4)
CTP QC/QA: YES
DIFFERENTIAL METHOD: NORMAL
EOSINOPHIL # BLD AUTO: 0.1 K/UL (ref 0–0.5)
EOSINOPHIL NFR BLD: 1.6 % (ref 0–8)
ERYTHROCYTE [DISTWIDTH] IN BLOOD BY AUTOMATED COUNT: 13.4 % (ref 11.5–14.5)
EST. GFR  (AFRICAN AMERICAN): >60 ML/MIN/1.73 M^2
EST. GFR  (NON AFRICAN AMERICAN): >60 ML/MIN/1.73 M^2
GLUCOSE SERPL-MCNC: 88 MG/DL (ref 70–110)
HCT VFR BLD AUTO: 40.3 % (ref 37–48.5)
HGB BLD-MCNC: 12.9 G/DL (ref 12–16)
IMM GRANULOCYTES # BLD AUTO: 0.02 K/UL (ref 0–0.04)
IMM GRANULOCYTES NFR BLD AUTO: 0.3 % (ref 0–0.5)
LYMPHOCYTES # BLD AUTO: 3 K/UL (ref 1–4.8)
LYMPHOCYTES NFR BLD: 37.5 % (ref 18–48)
MCH RBC QN AUTO: 30.3 PG (ref 27–31)
MCHC RBC AUTO-ENTMCNC: 32 G/DL (ref 32–36)
MCV RBC AUTO: 95 FL (ref 82–98)
MONOCYTES # BLD AUTO: 0.6 K/UL (ref 0.3–1)
MONOCYTES NFR BLD: 8 % (ref 4–15)
NEUTROPHILS # BLD AUTO: 4.2 K/UL (ref 1.8–7.7)
NEUTROPHILS NFR BLD: 52.2 % (ref 38–73)
NRBC BLD-RTO: 0 /100 WBC
PLATELET # BLD AUTO: 313 K/UL (ref 150–350)
PMV BLD AUTO: 9.8 FL (ref 9.2–12.9)
POCT GLUCOSE: 97 MG/DL (ref 70–110)
POTASSIUM SERPL-SCNC: 4.1 MMOL/L (ref 3.5–5.1)
RBC # BLD AUTO: 4.26 M/UL (ref 4–5.4)
SODIUM SERPL-SCNC: 141 MMOL/L (ref 136–145)
T4 FREE SERPL-MCNC: 0.56 NG/DL (ref 0.71–1.51)
TSH SERPL DL<=0.005 MIU/L-ACNC: 27.51 UIU/ML (ref 0.4–4)
WBC # BLD AUTO: 7.97 K/UL (ref 3.9–12.7)

## 2019-04-26 PROCEDURE — 99284 PR EMERGENCY DEPT VISIT,LEVEL IV: ICD-10-PCS | Mod: ,,, | Performed by: PHYSICIAN ASSISTANT

## 2019-04-26 PROCEDURE — 80048 BASIC METABOLIC PNL TOTAL CA: CPT

## 2019-04-26 PROCEDURE — 93005 ELECTROCARDIOGRAM TRACING: CPT

## 2019-04-26 PROCEDURE — 96360 HYDRATION IV INFUSION INIT: CPT

## 2019-04-26 PROCEDURE — 82962 GLUCOSE BLOOD TEST: CPT

## 2019-04-26 PROCEDURE — 84439 ASSAY OF FREE THYROXINE: CPT

## 2019-04-26 PROCEDURE — 99284 EMERGENCY DEPT VISIT MOD MDM: CPT | Mod: ,,, | Performed by: PHYSICIAN ASSISTANT

## 2019-04-26 PROCEDURE — 93010 ELECTROCARDIOGRAM REPORT: CPT | Mod: ,,, | Performed by: INTERNAL MEDICINE

## 2019-04-26 PROCEDURE — 25000003 PHARM REV CODE 250: Performed by: PHYSICIAN ASSISTANT

## 2019-04-26 PROCEDURE — 84443 ASSAY THYROID STIM HORMONE: CPT

## 2019-04-26 PROCEDURE — 93010 EKG 12-LEAD: ICD-10-PCS | Mod: ,,, | Performed by: INTERNAL MEDICINE

## 2019-04-26 PROCEDURE — 81025 URINE PREGNANCY TEST: CPT | Performed by: PHYSICIAN ASSISTANT

## 2019-04-26 PROCEDURE — 85025 COMPLETE CBC W/AUTO DIFF WBC: CPT

## 2019-04-26 PROCEDURE — 99284 EMERGENCY DEPT VISIT MOD MDM: CPT | Mod: 25

## 2019-04-26 PROCEDURE — 96361 HYDRATE IV INFUSION ADD-ON: CPT

## 2019-04-26 RX ORDER — LEVOTHYROXINE SODIUM 150 UG/1
150 TABLET ORAL DAILY
Qty: 30 TABLET | Refills: 0 | Status: SHIPPED | OUTPATIENT
Start: 2019-04-26 | End: 2024-02-19

## 2019-04-26 RX ADMIN — SODIUM CHLORIDE 1000 ML: 0.9 INJECTION, SOLUTION INTRAVENOUS at 01:04

## 2019-04-26 NOTE — ED NOTES
Pt denies any chest pain, denies shortness of breath, states that she is a diabetic and has not been checking her glucose daily

## 2019-04-26 NOTE — ED PROVIDER NOTES
Encounter Date: 4/26/2019       History     Chief Complaint   Patient presents with    Headache     states feels tired in the morning even after full night of rest/ since Wednesday     39-year-old  female with history of hypothyroidism, hyperlipidemia, hypertension, asthma, diabetes, anxiety presents to the ED complaining of generalized fatigue for the past 3 days.  She reports an intermittent headache that has been relieved with over-the-counter Excedrin, she denies any current headache. She endorses lightheadedness, subjective fever, shortness of breath that is relieved with her at-home inhaler, diarrhea, fatigue.  She tried to get appointment with her PCP but they do not have any available appointments.  She denies any recent changes to her medications.  She denies fever, chills, chest pain, dysuria, URI symptoms, nausea, changes in vision. Denies tobacco use.     The history is provided by the patient.     Review of patient's allergies indicates:  No Known Allergies  Past Medical History:   Diagnosis Date    Anxiety     Asthma     Chronic pain     back; inflammatory    Depression     Diabetes mellitus, type 2     Endometriosis     Hyperlipidemia     Hypertension     MITZI (obstructive sleep apnea)     Thyroid disease     Vitamin D deficiency      Past Surgical History:   Procedure Laterality Date    CERVICAL SPINE SURGERY      endometriosis      THYROID SURGERY       Family History   Problem Relation Age of Onset    Hypertension Mother     Diabetes Maternal Grandmother     Hyperlipidemia Maternal Grandmother     Diabetes Maternal Grandfather     Hyperlipidemia Maternal Grandfather     Breast cancer Maternal Aunt      Social History     Tobacco Use    Smoking status: Never Smoker    Smokeless tobacco: Never Used   Substance Use Topics    Alcohol use: No    Drug use: No     Review of Systems   Constitutional: Positive for fatigue and fever (subjective). Negative for chills.    HENT: Negative for congestion, rhinorrhea and sore throat.    Eyes: Negative for photophobia and visual disturbance.   Respiratory: Positive for shortness of breath (resolved with inhaler).    Cardiovascular: Negative for chest pain.   Gastrointestinal: Positive for diarrhea. Negative for abdominal pain, constipation, nausea and vomiting.   Genitourinary: Negative for dysuria and hematuria.   Musculoskeletal: Negative for back pain, neck pain and neck stiffness.   Skin: Negative for rash and wound.   Neurological: Positive for light-headedness and headaches. Negative for dizziness, syncope, weakness and numbness.   Psychiatric/Behavioral: Negative for confusion.       Physical Exam     Initial Vitals [04/26/19 1119]   BP Pulse Resp Temp SpO2   (!) 142/90 96 16 99 °F (37.2 °C) 100 %      MAP       --         Physical Exam    Nursing note and vitals reviewed.  Constitutional: She appears well-developed and well-nourished. She is not diaphoretic. No distress.   HENT:   Head: Normocephalic and atraumatic.   Neck: Normal range of motion. Neck supple.   Cardiovascular: Normal rate, regular rhythm and normal heart sounds. Exam reveals no gallop and no friction rub.    No murmur heard.  Pulmonary/Chest: Breath sounds normal. She has no wheezes. She has no rhonchi. She has no rales.   Abdominal: Soft. Bowel sounds are normal. There is no tenderness. There is no rebound and no guarding.   Musculoskeletal: Normal range of motion.   Neurological: She is alert and oriented to person, place, and time.   Skin: Skin is warm and dry. No rash noted. No erythema.   Psychiatric: She has a normal mood and affect.         ED Course   Procedures  Labs Reviewed   BASIC METABOLIC PANEL - Abnormal; Notable for the following components:       Result Value    CO2 21 (*)     All other components within normal limits   TSH - Abnormal; Notable for the following components:    TSH 27.510 (*)     All other components within normal limits   T4,  FREE - Abnormal; Notable for the following components:    Free T4 0.56 (*)     All other components within normal limits   CBC W/ AUTO DIFFERENTIAL   POCT URINE PREGNANCY   POCT GLUCOSE        ECG Results          EKG 12-lead (Final result)  Result time 04/26/19 15:18:54    Final result by Interface, Lab In [facility] (04/26/19 15:18:54)                 Narrative:    Test Reason : R50.9    Vent. Rate : 090 BPM     Atrial Rate : 090 BPM     P-R Int : 146 ms          QRS Dur : 084 ms      QT Int : 334 ms       P-R-T Axes : 044 056 033 degrees     QTc Int : 408 ms    Normal sinus rhythm  Nonspecific T wave abnormality  Abnormal ECG  When compared with ECG of 12-SEP-2018 11:25,  No significant change was found  Confirmed by Winston Wynne MD ([id number]) on 4/26/2019 3:18:43 PM    Referred By: KENYA   SELF           Confirmed By:Winston Wynne MD                            Imaging Results    None          Medical Decision Making:   History:   Old Medical Records: I decided to obtain old medical records.  Clinical Tests:   Lab Tests: Ordered and Reviewed  Medical Tests: Ordered and Reviewed  Other:   I have discussed this case with another health care provider.       <> Summary of the Discussion: endocrinology       APC / Resident Notes:    39-year-old  female with history of hypothyroidism, hyperlipidemia, hypertension, asthma, diabetes, anxiety presents to the ED complaining of generalized fatigue for the past 3 days.  Vital signs stable. Regular rate and rhythm.  Lungs are clear.  Abdomen is soft and nontender. She is not orthostatic.  Differential diagnosis includes but is not limited to pregnancy, anemia, electrolyte abnormality, acute kidney injury, hypothyroidism, hypoglycemia.  Will obtain labs, give IV fluids.    No leukocytosis or anemia. CMP unremarkable. UPT negative. Glucose 97. TSH very high and Free T4 low.    Patient reports she is taking 125mcg synthroid daily. Spoke with endocrinology -  recommend increasing to 150mcg daily. Repeat levels in 4-6 weeks.    I do not feel the patient is any further labs or imaging at this time.  Stable for discharge.    She was discharged with a prescription for Synthroid 150 mcg.  Patient counseled on proper way to take Synthroid and she expressed understanding.  She will follow up with her PCP.  All of the patient's questions were answered.  I reviewed the patient's chart and labs and discussed the case with my supervising physician.            Attending Attestation:     Physician Attestation Statement for NP/PA:   I discussed this assessment and plan of this patient with the NP/PA, but I did not personally examine the patient. The face to face encounter was performed by the NP/PA.                     Clinical Impression:       ICD-10-CM ICD-9-CM   1. Fatigue, unspecified type R53.83 780.79   2. Hypothyroidism, unspecified type E03.9 244.9         Disposition:   Disposition: Discharged  Condition: Stable                        Amanda Lyman PA-C  04/26/19 2012

## 2019-04-26 NOTE — DISCHARGE INSTRUCTIONS
Increase synthroid to 150mcg once a day. Make sure to take in the morning on an empty stomach 30 minutes before eating or any other medications.

## 2019-04-26 NOTE — ED NOTES
LOC: The patient is awake, alert and aware of environment with an appropriate affect, the patient is oriented x 3 and speaking appropriately.  APPEARANCE: Patient resting comfortably and in no acute distress, patient is clean and well groomed, patient's clothing is properly fastened.  SKIN: The skin is warm and dry, color consistent with ethnicity, patient has normal skin turgor and moist mucus membranes, skin intact, no breakdown or bruising noted.  MUSCULOSKELETAL: Patient moving all extremities spontaneously, no obvious swelling or deformities noted.  RESPIRATORY: Airway is open and patent, respirations are spontaneous, patient has a normal effort and rate, no accessory muscle use noted  ABDOMEN: Soft and non tender to palpation, no distention noted  NEUROLOGIC:  facial expression is symmetrical, patient moving all extremities spontaneously, normal sensation in all extremities when touched with a finger.  Follows all commands appropriately.    Patient states since Wednesday she has been getting dizzy and lightheaded every morning, patient states she also has been having headaches and body feels weak, patient states when this happens her body gets very hot and she begins sweating, no other symptoms or complains at this time, will continue to monitor

## 2020-01-08 ENCOUNTER — HOSPITAL ENCOUNTER (OUTPATIENT)
Facility: HOSPITAL | Age: 40
Discharge: HOME | End: 2020-01-10
Attending: EMERGENCY MEDICINE | Admitting: SURGERY
Payer: COMMERCIAL

## 2020-01-08 DIAGNOSIS — R10.13 EPIGASTRIC PAIN: ICD-10-CM

## 2020-01-08 DIAGNOSIS — R74.01 TRANSAMINITIS: Primary | ICD-10-CM

## 2020-01-08 LAB
ALBUMIN SERPL BCP-MCNC: 4 G/DL (ref 3.5–5.2)
ALP SERPL-CCNC: 153 U/L (ref 55–135)
ALT SERPL W/O P-5'-P-CCNC: 829 U/L (ref 10–44)
ANION GAP SERPL CALC-SCNC: 9 MMOL/L (ref 8–16)
AST SERPL-CCNC: 806 U/L (ref 10–40)
B-HCG UR QL: NEGATIVE
BACTERIA #/AREA URNS AUTO: ABNORMAL /HPF
BASOPHILS # BLD AUTO: 0.03 K/UL (ref 0–0.2)
BASOPHILS NFR BLD: 0.5 % (ref 0–1.9)
BILIRUB SERPL-MCNC: 0.6 MG/DL (ref 0.1–1)
BILIRUB UR QL STRIP: NEGATIVE
BUN SERPL-MCNC: 8 MG/DL (ref 6–20)
CALCIUM SERPL-MCNC: 9.6 MG/DL (ref 8.7–10.5)
CHLORIDE SERPL-SCNC: 107 MMOL/L (ref 95–110)
CLARITY UR REFRACT.AUTO: ABNORMAL
CO2 SERPL-SCNC: 23 MMOL/L (ref 23–29)
COLOR UR AUTO: ABNORMAL
CREAT SERPL-MCNC: 1.1 MG/DL (ref 0.5–1.4)
CTP QC/QA: YES
DIFFERENTIAL METHOD: ABNORMAL
EOSINOPHIL # BLD AUTO: 0.1 K/UL (ref 0–0.5)
EOSINOPHIL NFR BLD: 2 % (ref 0–8)
ERYTHROCYTE [DISTWIDTH] IN BLOOD BY AUTOMATED COUNT: 13.2 % (ref 11.5–14.5)
EST. GFR  (AFRICAN AMERICAN): >60 ML/MIN/1.73 M^2
EST. GFR  (NON AFRICAN AMERICAN): >60 ML/MIN/1.73 M^2
GLUCOSE SERPL-MCNC: 70 MG/DL (ref 70–110)
GLUCOSE UR QL STRIP: NEGATIVE
HCT VFR BLD AUTO: 41.8 % (ref 37–48.5)
HGB BLD-MCNC: 13.3 G/DL (ref 12–16)
HGB UR QL STRIP: NEGATIVE
HYALINE CASTS UR QL AUTO: 6 /LPF
IMM GRANULOCYTES # BLD AUTO: 0.03 K/UL (ref 0–0.04)
IMM GRANULOCYTES NFR BLD AUTO: 0.5 % (ref 0–0.5)
KETONES UR QL STRIP: NEGATIVE
LEUKOCYTE ESTERASE UR QL STRIP: NEGATIVE
LIPASE SERPL-CCNC: 32 U/L (ref 4–60)
LYMPHOCYTES # BLD AUTO: 2 K/UL (ref 1–4.8)
LYMPHOCYTES NFR BLD: 31.1 % (ref 18–48)
MCH RBC QN AUTO: 30.4 PG (ref 27–31)
MCHC RBC AUTO-ENTMCNC: 31.8 G/DL (ref 32–36)
MCV RBC AUTO: 96 FL (ref 82–98)
MICROSCOPIC COMMENT: ABNORMAL
MONOCYTES # BLD AUTO: 0.6 K/UL (ref 0.3–1)
MONOCYTES NFR BLD: 9.1 % (ref 4–15)
NEUTROPHILS # BLD AUTO: 3.7 K/UL (ref 1.8–7.7)
NEUTROPHILS NFR BLD: 56.8 % (ref 38–73)
NITRITE UR QL STRIP: NEGATIVE
NRBC BLD-RTO: 0 /100 WBC
PH UR STRIP: 5 [PH] (ref 5–8)
PLATELET # BLD AUTO: 312 K/UL (ref 150–350)
PLATELET BLD QL SMEAR: ABNORMAL
PMV BLD AUTO: 10.3 FL (ref 9.2–12.9)
POCT GLUCOSE: 83 MG/DL (ref 70–110)
POTASSIUM SERPL-SCNC: 4.4 MMOL/L (ref 3.5–5.1)
PROT SERPL-MCNC: 8.2 G/DL (ref 6–8.4)
PROT UR QL STRIP: ABNORMAL
RBC # BLD AUTO: 4.37 M/UL (ref 4–5.4)
RBC #/AREA URNS AUTO: 2 /HPF (ref 0–4)
SODIUM SERPL-SCNC: 139 MMOL/L (ref 136–145)
SP GR UR STRIP: >=1.03 (ref 1–1.03)
SQUAMOUS #/AREA URNS AUTO: 43 /HPF
URN SPEC COLLECT METH UR: ABNORMAL
WBC # BLD AUTO: 6.47 K/UL (ref 3.9–12.7)
WBC #/AREA URNS AUTO: 3 /HPF (ref 0–5)

## 2020-01-08 PROCEDURE — 25000003 PHARM REV CODE 250: Performed by: EMERGENCY MEDICINE

## 2020-01-08 PROCEDURE — 85025 COMPLETE CBC W/AUTO DIFF WBC: CPT

## 2020-01-08 PROCEDURE — 80053 COMPREHEN METABOLIC PANEL: CPT

## 2020-01-08 PROCEDURE — 99285 EMERGENCY DEPT VISIT HI MDM: CPT | Mod: ,,, | Performed by: EMERGENCY MEDICINE

## 2020-01-08 PROCEDURE — 81025 URINE PREGNANCY TEST: CPT | Performed by: GENERAL PRACTICE

## 2020-01-08 PROCEDURE — 93010 ELECTROCARDIOGRAM REPORT: CPT | Mod: ,,, | Performed by: INTERNAL MEDICINE

## 2020-01-08 PROCEDURE — 63600175 PHARM REV CODE 636 W HCPCS: Performed by: GENERAL PRACTICE

## 2020-01-08 PROCEDURE — 93010 EKG 12-LEAD: ICD-10-PCS | Mod: ,,, | Performed by: INTERNAL MEDICINE

## 2020-01-08 PROCEDURE — 96365 THER/PROPH/DIAG IV INF INIT: CPT | Mod: 59

## 2020-01-08 PROCEDURE — 99285 PR EMERGENCY DEPT VISIT,LEVEL V: ICD-10-PCS | Mod: ,,, | Performed by: EMERGENCY MEDICINE

## 2020-01-08 PROCEDURE — 96361 HYDRATE IV INFUSION ADD-ON: CPT | Performed by: EMERGENCY MEDICINE

## 2020-01-08 PROCEDURE — 99220 PR INITIAL OBSERVATION CARE,LEVL III: ICD-10-PCS | Mod: ,,, | Performed by: SURGERY

## 2020-01-08 PROCEDURE — 81001 URINALYSIS AUTO W/SCOPE: CPT

## 2020-01-08 PROCEDURE — G0378 HOSPITAL OBSERVATION PER HR: HCPCS

## 2020-01-08 PROCEDURE — 96375 TX/PRO/DX INJ NEW DRUG ADDON: CPT | Mod: 59 | Performed by: EMERGENCY MEDICINE

## 2020-01-08 PROCEDURE — 99285 EMERGENCY DEPT VISIT HI MDM: CPT | Mod: 25

## 2020-01-08 PROCEDURE — 99220 PR INITIAL OBSERVATION CARE,LEVL III: CPT | Mod: ,,, | Performed by: SURGERY

## 2020-01-08 PROCEDURE — 63600175 PHARM REV CODE 636 W HCPCS: Performed by: EMERGENCY MEDICINE

## 2020-01-08 PROCEDURE — 83690 ASSAY OF LIPASE: CPT

## 2020-01-08 PROCEDURE — 93005 ELECTROCARDIOGRAM TRACING: CPT

## 2020-01-08 RX ORDER — MORPHINE SULFATE 4 MG/ML
4 INJECTION, SOLUTION INTRAMUSCULAR; INTRAVENOUS
Status: DISPENSED | OUTPATIENT
Start: 2020-01-08 | End: 2020-01-09

## 2020-01-08 RX ORDER — ONDANSETRON 4 MG/1
4 TABLET, ORALLY DISINTEGRATING ORAL
Status: COMPLETED | OUTPATIENT
Start: 2020-01-08 | End: 2020-01-08

## 2020-01-08 RX ORDER — MORPHINE SULFATE 4 MG/ML
4 INJECTION, SOLUTION INTRAMUSCULAR; INTRAVENOUS
Status: DISCONTINUED | OUTPATIENT
Start: 2020-01-08 | End: 2020-01-09

## 2020-01-08 RX ORDER — ACETAMINOPHEN 325 MG/1
650 TABLET ORAL
Status: COMPLETED | OUTPATIENT
Start: 2020-01-08 | End: 2020-01-08

## 2020-01-08 RX ORDER — MORPHINE SULFATE 4 MG/ML
4 INJECTION, SOLUTION INTRAMUSCULAR; INTRAVENOUS
Status: COMPLETED | OUTPATIENT
Start: 2020-01-08 | End: 2020-01-08

## 2020-01-08 RX ORDER — SODIUM CHLORIDE 9 MG/ML
INJECTION, SOLUTION INTRAVENOUS CONTINUOUS
Status: DISCONTINUED | OUTPATIENT
Start: 2020-01-08 | End: 2020-01-10 | Stop reason: HOSPADM

## 2020-01-08 RX ORDER — SODIUM CHLORIDE 0.9 % (FLUSH) 0.9 %
10 SYRINGE (ML) INJECTION
Status: DISCONTINUED | OUTPATIENT
Start: 2020-01-08 | End: 2020-01-10 | Stop reason: HOSPADM

## 2020-01-08 RX ORDER — PRAVASTATIN SODIUM 40 MG/1
40 TABLET ORAL DAILY
Status: DISCONTINUED | OUTPATIENT
Start: 2020-01-09 | End: 2020-01-09

## 2020-01-08 RX ORDER — MORPHINE SULFATE 2 MG/ML
2 INJECTION, SOLUTION INTRAMUSCULAR; INTRAVENOUS
Status: DISCONTINUED | OUTPATIENT
Start: 2020-01-08 | End: 2020-01-10 | Stop reason: HOSPADM

## 2020-01-08 RX ORDER — LIDOCAINE HYDROCHLORIDE 10 MG/ML
1 INJECTION, SOLUTION EPIDURAL; INFILTRATION; INTRACAUDAL; PERINEURAL ONCE
Status: DISCONTINUED | OUTPATIENT
Start: 2020-01-08 | End: 2020-01-10 | Stop reason: HOSPADM

## 2020-01-08 RX ORDER — ONDANSETRON 2 MG/ML
4 INJECTION INTRAMUSCULAR; INTRAVENOUS EVERY 6 HOURS PRN
Status: DISCONTINUED | OUTPATIENT
Start: 2020-01-08 | End: 2020-01-10 | Stop reason: HOSPADM

## 2020-01-08 RX ADMIN — SODIUM CHLORIDE: 0.9 INJECTION, SOLUTION INTRAVENOUS at 09:01

## 2020-01-08 RX ADMIN — SODIUM CHLORIDE: 0.9 INJECTION, SOLUTION INTRAVENOUS at 06:01

## 2020-01-08 RX ADMIN — PIPERACILLIN AND TAZOBACTAM 4.5 G: 4; .5 INJECTION, POWDER, LYOPHILIZED, FOR SOLUTION INTRAVENOUS; PARENTERAL at 04:01

## 2020-01-08 RX ADMIN — MORPHINE SULFATE 4 MG: 4 INJECTION, SOLUTION INTRAMUSCULAR; INTRAVENOUS at 08:01

## 2020-01-08 RX ADMIN — ONDANSETRON 4 MG: 4 TABLET, ORALLY DISINTEGRATING ORAL at 01:01

## 2020-01-08 RX ADMIN — ACETAMINOPHEN 650 MG: 325 TABLET ORAL at 01:01

## 2020-01-08 RX ADMIN — ALUMINUM HYDROXIDE, MAGNESIUM HYDROXIDE, AND SIMETHICONE 50 ML: 200; 200; 20 SUSPENSION ORAL at 01:01

## 2020-01-08 NOTE — PROVIDER PROGRESS NOTES - EMERGENCY DEPT.
Encounter Date: 1/8/2020    ED Physician Progress Notes           ED Attending Sign-out Progress Note:  Patient signed out to me at shift change by Dr. Holloway to f/u general surgery disposition and overall disposition. Expectation is that general surgery would admit given elevated LFTs and US concerning for acute cholecystitis.    Discussed with general surgery who admitted pt.

## 2020-01-08 NOTE — CONSULTS
"[facility]  General Surgery  Consult Note    Inpatient consult to General Surgery  Consult performed by: Gavin Hamilton MD  Consult ordered by: Suzi Holloway MD  Reason for consult: "cholecystitis"        Subjective:     Chief Complaint: Abdominal pain    History of Present Illness:   Patient is a 39-year-old female with Hx of HTN, HLD, NIDDM2, hypothyroidism, MITZI who presented to the ED with complaint of abdominal pain. Patient reports that she had mid-epigastric abdominal pain after eating a hot sausage sandwich. Also had nausea, non-bloody and non-bilious emesis, and watery diarrhea. No similar prior events. No right upper quadrant abdominal pain. Vitals stable on arrival. Afebrile. No leukocytosis - WBC 6.5. No left shift - Gran 56.8%. No acidosis. TBili normal. Transaminitis - , . US abdomen obtained showing gallstones. No wall thickening or hyperemia. No pericholecystic fluid. Common bile duct 3 mm. Reported possible contraction of the gallbladder and concern for acute cholecystitis.    Prior abdominal surgeries include laparoscopic exploration for endometriosis.  No antiplatelet or anticoagulant agents.  No MI or stroke.      No current facility-administered medications on file prior to encounter.      Current Outpatient Medications on File Prior to Encounter   Medication Sig    ALBUTEROL SULFATE (PROAIR HFA INHL) Inhale into the lungs.    cetirizine (ZYRTEC) 10 MG tablet Take 1 tablet (10 mg total) by mouth once daily.    doxycycline (MONODOX) 100 MG capsule Take 100 mg by mouth 2 (two) times daily.    folic acid (FOLVITE) 1 MG tablet Take 1 mg by mouth once daily.    gabapentin (NEURONTIN) 300 MG capsule Take 1 capsule (300 mg total) by mouth 3 (three) times daily.    hydrOXYzine HCl (ATARAX) 25 MG tablet Take 1 tablet (25 mg total) by mouth every 6 (six) hours as needed for Anxiety.    hydrOXYzine pamoate (VISTARIL) 25 MG Cap Take 1 capsule (25 mg total) by " mouth every 6 (six) hours as needed (Itching).    ibuprofen (ADVIL,MOTRIN) 800 MG tablet Take 1 tablet (800 mg total) by mouth every 6 (six) hours as needed for Pain.    imipramine (TOFRANIL) 25 MG tablet Take 25 mg by mouth every evening.    levothyroxine (SYNTHROID) 150 MCG tablet Take 1 tablet (150 mcg total) by mouth once daily.    linaclotide 290 mcg Cap Take 1 capsule (290 mcg total) by mouth once daily. Take 30 minutes before breakfast.    meclizine (ANTIVERT) 12.5 mg tablet Take 2 tablets (25 mg total) by mouth 3 (three) times daily as needed for Dizziness.    medroxyPROGESTERone (DEPO-PROVERA) 150 mg/mL Syrg     metFORMIN (GLUCOPHAGE) 500 MG tablet Take 1 tablet (500 mg total) by mouth daily with breakfast.    mometasone (NASONEX) 50 mcg/actuation nasal spray 2 sprays by Nasal route once daily.    naproxen (NAPROSYN) 500 MG tablet Take 1 tablet (500 mg total) by mouth 2 (two) times daily with meals.    pravastatin (PRAVACHOL) 40 MG tablet Take 40 mg by mouth once daily.    ranitidine (ZANTAC) 150 MG capsule Take 1 capsule (150 mg total) by mouth once daily.    tramadol (ULTRAM) 50 mg tablet     VITAMIN D2 50,000 unit capsule      Review of patient's allergies indicates:  No Known Allergies    Past Medical History:   Diagnosis Date    Anxiety     Asthma     Chronic pain     back; inflammatory    Depression     Diabetes mellitus, type 2     Endometriosis     Hyperlipidemia     Hypertension     MITZI (obstructive sleep apnea)     Thyroid disease     Vitamin D deficiency      Past Surgical History:   Procedure Laterality Date    CERVICAL SPINE SURGERY      endometriosis      THYROID SURGERY       Family History     Problem Relation (Age of Onset)    Breast cancer Maternal Aunt    Diabetes Maternal Grandmother, Maternal Grandfather    Hyperlipidemia Maternal Grandmother, Maternal Grandfather    Hypertension Mother        Tobacco Use    Smoking status: Never Smoker    Smokeless tobacco:  Never Used   Substance and Sexual Activity    Alcohol use: No    Drug use: No    Sexual activity: Yes     Partners: Male     Birth control/protection: Injection     Review of Systems   Constitutional: Positive for activity change. Negative for chills, fatigue and fever.   HENT: Negative for congestion, rhinorrhea and sore throat.    Eyes: Negative for visual disturbance.   Respiratory: Negative for cough, shortness of breath and wheezing.    Cardiovascular: Negative for chest pain, palpitations and leg swelling.   Gastrointestinal: Positive for abdominal pain, diarrhea, nausea and vomiting. Negative for abdominal distention and constipation.   Genitourinary: Negative for dysuria, frequency and hematuria.   Musculoskeletal: Negative for arthralgias and myalgias.   Skin: Negative for color change, rash and wound.   Neurological: Negative for syncope, weakness and numbness.   Hematological: Does not bruise/bleed easily.   Psychiatric/Behavioral: Negative for behavioral problems and confusion.        Objective:     Vital Signs (Most Recent):  Temp: 99.2 °F (37.3 °C) (01/08/20 1647)  Pulse: 90 (01/08/20 1647)  Resp: 20 (01/08/20 1647)  BP: 137/87 (01/08/20 1647)  SpO2: 99 % (01/08/20 1647) Vital Signs (24h Range):  Temp:  [98.1 °F (36.7 °C)-99.2 °F (37.3 °C)] 99.2 °F (37.3 °C)  Pulse:  [90-99] 90  Resp:  [17-20] 20  SpO2:  [99 %-100 %] 99 %  BP: (123-137)/(65-87) 137/87     Weight: 101.6 kg (224 lb)  Body mass index is 40.97 kg/m².    No intake or output data in the 24 hours ending 01/08/20 1656    Physical Exam   Constitutional: She is oriented to person, place, and time. She appears well-developed and well-nourished. No distress.   HENT:   Head: Normocephalic and atraumatic.   Eyes: EOM are normal.   Neck: Normal range of motion.   Cardiovascular: Normal rate, regular rhythm and intact distal pulses.   Pulmonary/Chest: Effort normal. No respiratory distress. She has no wheezes.   Abdominal:  "  Obese  Soft  Non-distended  Moderate tenderness to palpation in mid-epigastrium  Non-tender elsewhere  No rebound  No guarding  No peritoneal signs  Mclean's (-)  Prior incisions well healed   Musculoskeletal: She exhibits no edema or deformity.   Neurological: She is alert and oriented to person, place, and time.   Skin: Skin is warm and dry. No rash noted. She is not diaphoretic. No erythema.   Psychiatric: She has a normal mood and affect. Her behavior is normal.   Nursing note and vitals reviewed.      Significant Labs:  CBC:   Recent Labs   Lab 01/08/20  1328   WBC 6.47   RBC 4.37   HGB 13.3   HCT 41.8      MCV 96   MCH 30.4   MCHC 31.8*     CMP:   Recent Labs   Lab 01/08/20  1328   GLU 70   CALCIUM 9.6   ALBUMIN 4.0   PROT 8.2      K 4.4   CO2 23      BUN 8   CREATININE 1.1   ALKPHOS 153*   *   *   BILITOT 0.6       Significant Diagnostics:  US Abdomen (1/8/2020): Reviewed.  "The gallbladder is contracted with multiple mobile gallstones within its lumen resulting in wall echo shadow sign and obscuration of the dependent portions of the gallbladder wall. The visualized portions of the gallbladder wall are normal in diameter measuring 3 mm. No gallbladder wall hypervascularity or pericholecystic fluid. Positive Mclean sign. No evidence of choledocholithiasis. The common bile duct measures 3 mm in diameter."      Assessment/Plan:     38 y/o with suspected symptomatic cholelithiasis vs chronic cholecystitis    - Relevant notes, imaging, and laboratory studies reviewed  - Patient would benefit from HIDA scan to delineate the above given equivocal imaging findings  - Unable to perform HIDA scan in the ED  - Admit to General Surgery for observation and HIDA scan in the morning  - Maintenance IVF  - Home medications  - Hold Abx  - Further management pending results of the above      Gavin Hamilton MD  Surgery Resident, PGY-V  Pager: [phone]  1/8/2020 4:56 PM  "

## 2020-01-08 NOTE — ED PROVIDER NOTES
SCRIBE #1 NOTE: I, April Schmid, am scribing for, and in the presence of,  Suzi Holloway MD. I have scribed the entire note.       CC: Abdominal Pain (yesterday ate hot sausage sandwich, abd pain since. states abd is hard. + Nausea )      History provided by:   Patient     HPI: Caitie Wang is a 39 y.o. female who presents to the ED complaining of abdominal pain with associated diarrhea that began yesterday morning after eating a hot sausage sandwich.    Pt states approximately 10 episodes of diarrhea since onset yesterday. She reports every time she lays back or tilts her head back, she endorses nausea. Denies vomiting, fever, blood in stool, constipation, or chills. Denies similar symptoms in the past. Denies smoking or ETOH use. Patient reports taking GasX without relief of symptoms.         Past Medical History:   Diagnosis Date    Anxiety     Asthma     Chronic pain     back; inflammatory    Depression     Diabetes mellitus, type 2     Endometriosis     Hyperlipidemia     Hypertension     MITZI (obstructive sleep apnea)     Thyroid disease     Vitamin D deficiency      Past Surgical History:   Procedure Laterality Date    CERVICAL SPINE SURGERY      endometriosis      THYROID SURGERY       Family History   Problem Relation Age of Onset    Hypertension Mother     Diabetes Maternal Grandmother     Hyperlipidemia Maternal Grandmother     Diabetes Maternal Grandfather     Hyperlipidemia Maternal Grandfather     Breast cancer Maternal Aunt      No current facility-administered medications on file prior to encounter.      Current Outpatient Medications on File Prior to Encounter   Medication Sig Dispense Refill    ALBUTEROL SULFATE (PROAIR HFA INHL) Inhale into the lungs.      cetirizine (ZYRTEC) 10 MG tablet Take 1 tablet (10 mg total) by mouth once daily. 30 tablet 0    doxycycline (MONODOX) 100 MG capsule Take 100 mg by mouth 2 (two) times daily.      folic acid (FOLVITE) 1 MG tablet  Take 1 mg by mouth once daily.      gabapentin (NEURONTIN) 300 MG capsule Take 1 capsule (300 mg total) by mouth 3 (three) times daily. 90 capsule 11    hydrOXYzine HCl (ATARAX) 25 MG tablet Take 1 tablet (25 mg total) by mouth every 6 (six) hours as needed for Anxiety. 12 tablet 0    hydrOXYzine pamoate (VISTARIL) 25 MG Cap Take 1 capsule (25 mg total) by mouth every 6 (six) hours as needed (Itching). 14 capsule 0    ibuprofen (ADVIL,MOTRIN) 800 MG tablet Take 1 tablet (800 mg total) by mouth every 6 (six) hours as needed for Pain. 20 tablet 0    imipramine (TOFRANIL) 25 MG tablet Take 25 mg by mouth every evening.      levothyroxine (SYNTHROID) 150 MCG tablet Take 1 tablet (150 mcg total) by mouth once daily. 30 tablet 0    linaclotide 290 mcg Cap Take 1 capsule (290 mcg total) by mouth once daily. Take 30 minutes before breakfast. 30 capsule 0    meclizine (ANTIVERT) 12.5 mg tablet Take 2 tablets (25 mg total) by mouth 3 (three) times daily as needed for Dizziness. 30 tablet 6    medroxyPROGESTERone (DEPO-PROVERA) 150 mg/mL Syrg       metFORMIN (GLUCOPHAGE) 500 MG tablet Take 1 tablet (500 mg total) by mouth daily with breakfast. 30 tablet 11    mometasone (NASONEX) 50 mcg/actuation nasal spray 2 sprays by Nasal route once daily. 17 g 0    naproxen (NAPROSYN) 500 MG tablet Take 1 tablet (500 mg total) by mouth 2 (two) times daily with meals. 20 tablet 0    pravastatin (PRAVACHOL) 40 MG tablet Take 40 mg by mouth once daily.      ranitidine (ZANTAC) 150 MG capsule Take 1 capsule (150 mg total) by mouth once daily. 30 capsule 0    tramadol (ULTRAM) 50 mg tablet       VITAMIN D2 50,000 unit capsule        Patient has no known allergies.  Social History     Socioeconomic History    Marital status: Single     Spouse name: Not on file    Number of children: Not on file    Years of education: Not on file    Highest education level: Not on file   Occupational History    Not on file   Social Needs     Financial resource strain: Not on file    Food insecurity:     Worry: Not on file     Inability: Not on file    Transportation needs:     Medical: Not on file     Non-medical: Not on file   Tobacco Use    Smoking status: Never Smoker    Smokeless tobacco: Never Used   Substance and Sexual Activity    Alcohol use: No    Drug use: No    Sexual activity: Yes     Partners: Male     Birth control/protection: Injection   Lifestyle    Physical activity:     Days per week: Not on file     Minutes per session: Not on file    Stress: Not on file   Relationships    Social connections:     Talks on phone: Not on file     Gets together: Not on file     Attends Restorationism service: Not on file     Active member of club or organization: Not on file     Attends meetings of clubs or organizations: Not on file     Relationship status: Not on file   Other Topics Concern    Not on file   Social History Narrative    Not on file       ROS:     Constitutional : neg for fever, neg for weakness  HENT neg for head injury, neg for sore throat  Eyes: neg for visual changes, neg for eye pain  Resp neg for SOB, neg for cough  Cardiac  neg for chest pain, neg for palpitations  GI pos for abd pain, pos for nausea, neg for vomiting Pos for diarrhea   neg for urinary changes  Neuro neg for focal weakness or numbness  Skin neg for skin rash  MSK: neg for joint pain, neg for joint swelling  ALL: Patient has no known allergies.    PHYSICAL EXAM:  Vitals:    01/08/20 1647   BP: 137/87   Pulse: 90   Resp: 20   Temp: 99.2 °F (37.3 °C)         PHYSICAL EXAM:     general: comfortable, in no acute distress, pleasant, well nourished  VS: triage VS reviewed  HENT: NC/AT  Eyes: PERRL, EOMI  CV: RRR, no  murmurs, no rubs, no gallops, no LE edema  Resp: comfortable breathing, speaks in full sentences, CTAB, no wheezing, no crackles, no ronchi  ABD:  soft, ND, + normal BS, +Epigastic pain  Renal: No CVAT  Neuro: AAO x 3, 5/5 muscle strength in upper  and lower extremities, sensation grossly intact, face symmetric, speech normal  MSK: no deformity, no edema            DATA & INTERVENTIONS:    LABS reviewed:  Labs Reviewed   COMPREHENSIVE METABOLIC PANEL - Abnormal; Notable for the following components:       Result Value    Alkaline Phosphatase 153 (*)      (*)      (*)     All other components within normal limits   CBC W/ AUTO DIFFERENTIAL - Abnormal; Notable for the following components:    Mean Corpuscular Hemoglobin Conc 31.8 (*)     All other components within normal limits   URINALYSIS, REFLEX TO URINE CULTURE - Abnormal; Notable for the following components:    Appearance, UA Cloudy (*)     Specific Gravity, UA >=1.030 (*)     Protein, UA 1+ (*)     All other components within normal limits    Narrative:     Preferred Collection Type->Urine, Clean Catch   URINALYSIS MICROSCOPIC - Abnormal; Notable for the following components:    Bacteria Many (*)     Hyaline Casts, UA 6 (*)     All other components within normal limits    Narrative:     Preferred Collection Type->Urine, Clean Catch   LIPASE   POCT URINE PREGNANCY       RADIOLOGY reviewed:  Imaging Results           US Abdomen Limited (Gallbladder) (Final result)  Result time 01/08/20 16:18:54    Final result by Mango High MD (01/08/20 16:18:54)                 Impression:      Findings concerning for acute cholecystitis.    This report was flagged in Epic as abnormal.    Significant Alert:    The significant finding above was relayed by radiology resident Dr. Farrar on behalf of Dr. High by telephone to the ordering provider, Dr. Holloway On 1/8/2020 at 16:13.    Electronically signed by resident: Ruben Farrar  Date:    01/08/2020  Time:    16:01    Electronically signed by: Mango High MD  Date:    01/08/2020  Time:    16:18             Narrative:    EXAMINATION:  US ABDOMEN LIMITED    CLINICAL HISTORY:  Epigastric pain    TECHNIQUE:  Limited ultrasound of the right upper  quadrant of the abdomen (including pancreas, liver, gallbladder, common bile duct, and right kidney) was performed.    COMPARISON:  No relevant prior exams available.    FINDINGS:  The gallbladder is contracted with multiple mobile gallstones within its lumen resulting in wall echo shadow sign and obscuration of the dependent portions of the gallbladder wall.  The visualized portions of the gallbladder wall are normal in diameter measuring 3 mm.  No gallbladder wall hypervascularity or pericholecystic fluid.  Positive Mclean sign.  No evidence of choledocholithiasis.  The common bile duct measures 3 mm in diameter.    The pancreas is obscured by overlying bowel gas.    There is no free fluid within the visualized abdomen.    The right kidney is unremarkable with no evidence of hydronephrosis.                                MEDICATIONS/FLUIDS:  Medications   pravastatin tablet 40 mg (has no administration in time range)   levothyroxine tablet 150 mcg (has no administration in time range)   lidocaine (PF) 10 mg/ml (1%) injection 10 mg (has no administration in time range)   sodium chloride 0.9% flush 10 mL (has no administration in time range)   0.9%  NaCl infusion ( Intravenous New Bag 1/8/20 1807)   ondansetron injection 4 mg (has no administration in time range)   morphine injection 2 mg (has no administration in time range)   morphine injection 4 mg (has no administration in time range)   GI cocktail (mylanta 30 mL, lidocaine 2 % viscous 10 mL, dicyclomine 10 mL) 50 mL (50 mLs Oral Given 1/8/20 1356)   acetaminophen tablet 650 mg (650 mg Oral Given 1/8/20 1355)   ondansetron disintegrating tablet 4 mg (4 mg Oral Given 1/8/20 1355)   piperacillin-tazobactam 4.5 g in sodium chloride 0.9% 100 mL IVPB (ready to mix system) (0 g Intravenous Stopped 1/8/20 1808)         MDM:  Caitie Wang is a 39 y.o. year old female who presents to the ED complaining of epigastric pain since last morning, + nausea no vomiting, +  diarrhea    DDX includes but not limited to: biliary colic vs peptic ulcer vs reflux     Labs ordered and reviewed:   ua neg for blood/nitrites/leuks  cmp , , alk phos 153  Wbc wnl  Hg/plat wnl  Lipase wnl  Medication given in the ED: GI cocktail, tylenol, and Zofran.     US GB w/ cholecystitis, zosyn IV, gen surg consult    EKG (independantly reviewed): ventricular rate of 94, normal sinus, no ischemic changes.     Case discussed with the consultant: gen surg    Patient was signed-out to Dr. Austin  at the change of shift with plan for: acute cholecystitis, gen surg consulted, recs pending      IMPRESSION:  1.) acute cholecystitis      Dispo: pending    Critical Care Time: N/A       Suzi Holloway MD  01/08/20 4641

## 2020-01-09 PROBLEM — R74.01 TRANSAMINITIS: Status: ACTIVE | Noted: 2020-01-09

## 2020-01-09 LAB
ALBUMIN SERPL BCP-MCNC: 3.2 G/DL (ref 3.5–5.2)
ALP SERPL-CCNC: 123 U/L (ref 55–135)
ALT SERPL W/O P-5'-P-CCNC: 496 U/L (ref 10–44)
AMPHET+METHAMPHET UR QL: NEGATIVE
ANION GAP SERPL CALC-SCNC: 6 MMOL/L (ref 8–16)
ANION GAP SERPL CALC-SCNC: 6 MMOL/L (ref 8–16)
APAP SERPL-MCNC: <3 UG/ML (ref 10–20)
AST SERPL-CCNC: 223 U/L (ref 10–40)
BARBITURATES UR QL SCN>200 NG/ML: NEGATIVE
BASOPHILS # BLD AUTO: 0.02 K/UL (ref 0–0.2)
BASOPHILS NFR BLD: 0.3 % (ref 0–1.9)
BENZODIAZ UR QL SCN>200 NG/ML: NEGATIVE
BILIRUB SERPL-MCNC: 0.6 MG/DL (ref 0.1–1)
BUN SERPL-MCNC: 7 MG/DL (ref 6–20)
BUN SERPL-MCNC: 8 MG/DL (ref 6–20)
BZE UR QL SCN: NEGATIVE
CALCIUM SERPL-MCNC: 8.7 MG/DL (ref 8.7–10.5)
CALCIUM SERPL-MCNC: 8.9 MG/DL (ref 8.7–10.5)
CANNABINOIDS UR QL SCN: NEGATIVE
CHLORIDE SERPL-SCNC: 108 MMOL/L (ref 95–110)
CHLORIDE SERPL-SCNC: 109 MMOL/L (ref 95–110)
CO2 SERPL-SCNC: 23 MMOL/L (ref 23–29)
CO2 SERPL-SCNC: 26 MMOL/L (ref 23–29)
CREAT SERPL-MCNC: 0.8 MG/DL (ref 0.5–1.4)
CREAT SERPL-MCNC: 0.8 MG/DL (ref 0.5–1.4)
CREAT UR-MCNC: 69 MG/DL (ref 15–325)
DIFFERENTIAL METHOD: ABNORMAL
EOSINOPHIL # BLD AUTO: 0.2 K/UL (ref 0–0.5)
EOSINOPHIL NFR BLD: 3.8 % (ref 0–8)
ERYTHROCYTE [DISTWIDTH] IN BLOOD BY AUTOMATED COUNT: 13.5 % (ref 11.5–14.5)
EST. GFR  (AFRICAN AMERICAN): >60 ML/MIN/1.73 M^2
EST. GFR  (AFRICAN AMERICAN): >60 ML/MIN/1.73 M^2
EST. GFR  (NON AFRICAN AMERICAN): >60 ML/MIN/1.73 M^2
EST. GFR  (NON AFRICAN AMERICAN): >60 ML/MIN/1.73 M^2
ETHANOL SERPL-MCNC: <10 MG/DL
ETHANOL UR-MCNC: <10 MG/DL
GLUCOSE SERPL-MCNC: 80 MG/DL (ref 70–110)
GLUCOSE SERPL-MCNC: 89 MG/DL (ref 70–110)
HAV IGM SERPL QL IA: NEGATIVE
HBV CORE IGM SERPL QL IA: NEGATIVE
HBV SURFACE AG SERPL QL IA: NEGATIVE
HCT VFR BLD AUTO: 39.9 % (ref 37–48.5)
HCV AB SERPL QL IA: NEGATIVE
HGB BLD-MCNC: 12 G/DL (ref 12–16)
IMM GRANULOCYTES # BLD AUTO: 0.02 K/UL (ref 0–0.04)
IMM GRANULOCYTES NFR BLD AUTO: 0.3 % (ref 0–0.5)
LYMPHOCYTES # BLD AUTO: 1.7 K/UL (ref 1–4.8)
LYMPHOCYTES NFR BLD: 29 % (ref 18–48)
MAGNESIUM SERPL-MCNC: 2.4 MG/DL (ref 1.6–2.6)
MCH RBC QN AUTO: 29.6 PG (ref 27–31)
MCHC RBC AUTO-ENTMCNC: 30.1 G/DL (ref 32–36)
MCV RBC AUTO: 99 FL (ref 82–98)
METHADONE UR QL SCN>300 NG/ML: NEGATIVE
MONOCYTES # BLD AUTO: 0.6 K/UL (ref 0.3–1)
MONOCYTES NFR BLD: 9.8 % (ref 4–15)
NEUTROPHILS # BLD AUTO: 3.4 K/UL (ref 1.8–7.7)
NEUTROPHILS NFR BLD: 56.8 % (ref 38–73)
NRBC BLD-RTO: 0 /100 WBC
OPIATES UR QL SCN: NORMAL
PCP UR QL SCN>25 NG/ML: NEGATIVE
PHOSPHATE SERPL-MCNC: 3.6 MG/DL (ref 2.7–4.5)
PLATELET # BLD AUTO: 307 K/UL (ref 150–350)
PMV BLD AUTO: 9.9 FL (ref 9.2–12.9)
POCT GLUCOSE: 104 MG/DL (ref 70–110)
POCT GLUCOSE: 115 MG/DL (ref 70–110)
POCT GLUCOSE: 133 MG/DL (ref 70–110)
POCT GLUCOSE: 62 MG/DL (ref 70–110)
POCT GLUCOSE: 68 MG/DL (ref 70–110)
POCT GLUCOSE: 79 MG/DL (ref 70–110)
POTASSIUM SERPL-SCNC: 4 MMOL/L (ref 3.5–5.1)
POTASSIUM SERPL-SCNC: 4.6 MMOL/L (ref 3.5–5.1)
PROT SERPL-MCNC: 6.5 G/DL (ref 6–8.4)
RBC # BLD AUTO: 4.05 M/UL (ref 4–5.4)
SODIUM SERPL-SCNC: 138 MMOL/L (ref 136–145)
SODIUM SERPL-SCNC: 140 MMOL/L (ref 136–145)
TOXICOLOGY INFORMATION: NORMAL
WBC # BLD AUTO: 5.99 K/UL (ref 3.9–12.7)

## 2020-01-09 PROCEDURE — 63600175 PHARM REV CODE 636 W HCPCS: Performed by: SURGERY

## 2020-01-09 PROCEDURE — 80074 ACUTE HEPATITIS PANEL: CPT

## 2020-01-09 PROCEDURE — 84100 ASSAY OF PHOSPHORUS: CPT

## 2020-01-09 PROCEDURE — 96376 TX/PRO/DX INJ SAME DRUG ADON: CPT | Performed by: EMERGENCY MEDICINE

## 2020-01-09 PROCEDURE — 99220 PR INITIAL OBSERVATION CARE,LEVL III: ICD-10-PCS | Mod: ,,, | Performed by: HOSPITALIST

## 2020-01-09 PROCEDURE — 99204 OFFICE O/P NEW MOD 45 MIN: CPT | Mod: ,,, | Performed by: INTERNAL MEDICINE

## 2020-01-09 PROCEDURE — 36415 COLL VENOUS BLD VENIPUNCTURE: CPT

## 2020-01-09 PROCEDURE — 96361 HYDRATE IV INFUSION ADD-ON: CPT | Performed by: EMERGENCY MEDICINE

## 2020-01-09 PROCEDURE — 85025 COMPLETE CBC W/AUTO DIFF WBC: CPT

## 2020-01-09 PROCEDURE — 83735 ASSAY OF MAGNESIUM: CPT

## 2020-01-09 PROCEDURE — 80307 DRUG TEST PRSMV CHEM ANLYZR: CPT

## 2020-01-09 PROCEDURE — 80053 COMPREHEN METABOLIC PANEL: CPT

## 2020-01-09 PROCEDURE — 25000003 PHARM REV CODE 250: Performed by: GENERAL PRACTICE

## 2020-01-09 PROCEDURE — 80329 ANALGESICS NON-OPIOID 1 OR 2: CPT

## 2020-01-09 PROCEDURE — 63600175 PHARM REV CODE 636 W HCPCS: Performed by: GENERAL PRACTICE

## 2020-01-09 PROCEDURE — 25000003 PHARM REV CODE 250: Performed by: STUDENT IN AN ORGANIZED HEALTH CARE EDUCATION/TRAINING PROGRAM

## 2020-01-09 PROCEDURE — 80048 BASIC METABOLIC PNL TOTAL CA: CPT

## 2020-01-09 PROCEDURE — 99204 PR OFFICE/OUTPT VISIT, NEW, LEVL IV, 45-59 MIN: ICD-10-PCS | Mod: ,,, | Performed by: INTERNAL MEDICINE

## 2020-01-09 PROCEDURE — G0378 HOSPITAL OBSERVATION PER HR: HCPCS

## 2020-01-09 PROCEDURE — 80320 DRUG SCREEN QUANTALCOHOLS: CPT

## 2020-01-09 PROCEDURE — 99220 PR INITIAL OBSERVATION CARE,LEVL III: CPT | Mod: ,,, | Performed by: HOSPITALIST

## 2020-01-09 RX ORDER — GLUCAGON 1 MG
1 KIT INJECTION
Status: DISCONTINUED | OUTPATIENT
Start: 2020-01-09 | End: 2020-01-09

## 2020-01-09 RX ORDER — MORPHINE SULFATE 2 MG/ML
4 INJECTION, SOLUTION INTRAMUSCULAR; INTRAVENOUS
Status: DISCONTINUED | OUTPATIENT
Start: 2020-01-09 | End: 2020-01-10 | Stop reason: HOSPADM

## 2020-01-09 RX ADMIN — LEVOTHYROXINE SODIUM 150 MCG: 100 TABLET ORAL at 05:01

## 2020-01-09 RX ADMIN — DEXTROSE 125 ML: 10 SOLUTION INTRAVENOUS at 02:01

## 2020-01-09 RX ADMIN — MORPHINE SULFATE 4 MG: 2 INJECTION, SOLUTION INTRAMUSCULAR; INTRAVENOUS at 03:01

## 2020-01-09 RX ADMIN — ONDANSETRON 4 MG: 2 INJECTION INTRAMUSCULAR; INTRAVENOUS at 10:01

## 2020-01-09 RX ADMIN — ONDANSETRON 4 MG: 2 INJECTION INTRAMUSCULAR; INTRAVENOUS at 03:01

## 2020-01-09 RX ADMIN — DEXTROSE 125 ML: 10 SOLUTION INTRAVENOUS at 09:01

## 2020-01-09 RX ADMIN — MORPHINE SULFATE 4 MG: 2 INJECTION, SOLUTION INTRAMUSCULAR; INTRAVENOUS at 10:01

## 2020-01-09 RX ADMIN — SODIUM CHLORIDE: 0.9 INJECTION, SOLUTION INTRAVENOUS at 02:01

## 2020-01-09 NOTE — NURSING
Pt denies dizziness; ambulated to toilet without difficulty; recheck blood sugar is 62 mg/dl; pt asymptomatic.

## 2020-01-09 NOTE — ED NOTES
Report called to Gabriela on Obs 3rd and accepted. Patient transport will take her from Nuclear med to her room.

## 2020-01-09 NOTE — CONSULTS
"[facility] Hospital Medicine  Consult Note    Patient Name: Caitie Wang  MRN: [id number]  Admission Date: 1/8/2020  Hospital Length of Stay: 0 days  Attending Physician: Aden Wayne MD   Primary Care Provider: Juana Vines MD     [facility] Medicine Team: Networked reference to record PCT  Ruddy Dempsey MD      Patient information was obtained from patient and ER records.     Inpatient consult to Hospital Medicine-General  Consult performed by: Ruddy Dempsey MD  Consult ordered by: Danica Hernandez MD        Subjective:     Principal Problem: <principal problem not specified>    Chief Complaint:   Chief Complaint   Patient presents with    Abdominal Pain     yesterday ate hot sausage sandwich, abd pain since. states abd is hard. + Nausea         HPI: Ms. Wang is a 40 yo female with PMHx significant for HTN, HLD, NIDDM2, hypothyroidism, MITZI who was initially admitted to general surgery service for suspicion for acute cholecystitis, subsquent HIDA scan negative and now medicine is being consulted for further management.        Patient presented to the ED with RUQ abdominal pain which started after eating a "hot sausage sandwich." on the morning of 1/7/20. Associated with nausea, non-bloody & non-bilious vomiting and watery diarrhea. The following day (1/8/20) patient presented to the ED for worsening pain. In the ED, Afebrile. No leukocytosis - WBC 6.5. No left shift - Gran 56.8%. No acidosis. TBili normal. Transaminitis - , . US abdomen obtained showing gallstones. No wall thickening or hyperemia. No pericholecystic fluid. Common bile duct 3 mm. Reported possible contraction of the gallbladder and concern for acute cholecystitis. Denies fevers, chills, weight loss, bleeding, dysuria, jaundice, icterus, SOB, cough, chest pain    She was admitted to the general surgery service and subsequent HIDA scan (1/9/20) did not show evidence of acute cholecysts. " Now medicine is being consulted for further management.     Denies similar symptoms in the past.  Denies smoking or ETOH use, IV drug use.  Denies new tattoos, new sexual partners.       Past Medical History:   Diagnosis Date    Anxiety     Asthma     Chronic pain     back; inflammatory    Depression     Diabetes mellitus, type 2     Endometriosis     Hyperlipidemia     Hypertension     MITZI (obstructive sleep apnea)     Thyroid disease     Vitamin D deficiency        Past Surgical History:   Procedure Laterality Date    CERVICAL SPINE SURGERY      endometriosis      THYROID SURGERY         Review of patient's allergies indicates:   Allergen Reactions    Cheese Itching and Rash       No current facility-administered medications on file prior to encounter.      Current Outpatient Medications on File Prior to Encounter   Medication Sig    ALBUTEROL SULFATE (PROAIR HFA INHL) Inhale into the lungs.    cetirizine (ZYRTEC) 10 MG tablet Take 1 tablet (10 mg total) by mouth once daily.    doxycycline (MONODOX) 100 MG capsule Take 100 mg by mouth 2 (two) times daily.    folic acid (FOLVITE) 1 MG tablet Take 1 mg by mouth once daily.    gabapentin (NEURONTIN) 300 MG capsule Take 1 capsule (300 mg total) by mouth 3 (three) times daily.    hydrOXYzine HCl (ATARAX) 25 MG tablet Take 1 tablet (25 mg total) by mouth every 6 (six) hours as needed for Anxiety.    hydrOXYzine pamoate (VISTARIL) 25 MG Cap Take 1 capsule (25 mg total) by mouth every 6 (six) hours as needed (Itching).    ibuprofen (ADVIL,MOTRIN) 800 MG tablet Take 1 tablet (800 mg total) by mouth every 6 (six) hours as needed for Pain.    imipramine (TOFRANIL) 25 MG tablet Take 25 mg by mouth every evening.    levothyroxine (SYNTHROID) 150 MCG tablet Take 1 tablet (150 mcg total) by mouth once daily.    linaclotide 290 mcg Cap Take 1 capsule (290 mcg total) by mouth once daily. Take 30 minutes before breakfast.    meclizine (ANTIVERT) 12.5 mg  tablet Take 2 tablets (25 mg total) by mouth 3 (three) times daily as needed for Dizziness.    medroxyPROGESTERone (DEPO-PROVERA) 150 mg/mL Syrg     metFORMIN (GLUCOPHAGE) 500 MG tablet Take 1 tablet (500 mg total) by mouth daily with breakfast.    mometasone (NASONEX) 50 mcg/actuation nasal spray 2 sprays by Nasal route once daily.    naproxen (NAPROSYN) 500 MG tablet Take 1 tablet (500 mg total) by mouth 2 (two) times daily with meals.    pravastatin (PRAVACHOL) 40 MG tablet Take 40 mg by mouth once daily.    ranitidine (ZANTAC) 150 MG capsule Take 1 capsule (150 mg total) by mouth once daily.    tramadol (ULTRAM) 50 mg tablet     VITAMIN D2 50,000 unit capsule      Family History     Problem Relation (Age of Onset)    Breast cancer Maternal Aunt    Diabetes Maternal Grandmother, Maternal Grandfather    Hyperlipidemia Maternal Grandmother, Maternal Grandfather    Hypertension Mother        Tobacco Use    Smoking status: Never Smoker    Smokeless tobacco: Never Used   Substance and Sexual Activity    Alcohol use: No    Drug use: No    Sexual activity: Yes     Partners: Male     Birth control/protection: Injection     Review of Systems   Constitutional: Positive for activity change and appetite change. Negative for chills, diaphoresis and fever.   HENT: Negative for trouble swallowing and voice change.    Eyes: Negative for visual disturbance.   Respiratory: Negative for cough, chest tightness, shortness of breath and wheezing.    Cardiovascular: Negative for chest pain, palpitations and leg swelling.   Gastrointestinal: Positive for abdominal pain, diarrhea, nausea and vomiting. Negative for abdominal distention and blood in stool.   Endocrine: Negative for polyuria.   Genitourinary: Negative for dysuria, hematuria and urgency.   Musculoskeletal: Negative for back pain, neck pain and neck stiffness.   Skin: Negative for color change and rash.   Neurological: Negative for dizziness, tremors, syncope,  weakness and light-headedness.   Psychiatric/Behavioral: Negative for dysphoric mood.     Objective:     Vital Signs (Most Recent):  Temp: 97.7 °F (36.5 °C) (01/09/20 1112)  Pulse: 84 (01/09/20 1112)  Resp: 16 (01/09/20 1112)  BP: 127/82 (01/09/20 1112)  SpO2: 97 % (01/09/20 1112) Vital Signs (24h Range):  Temp:  [97.4 °F (36.3 °C)-99.2 °F (37.3 °C)] 97.7 °F (36.5 °C)  Pulse:  [84-90] 84  Resp:  [14-20] 16  SpO2:  [96 %-99 %] 97 %  BP: (115-137)/(72-87) 127/82     Weight: 88.1 kg (194 lb 3.6 oz)  Body mass index is 35.52 kg/m².    Physical Exam   Constitutional: She is oriented to person, place, and time. She appears well-developed and well-nourished. No distress.   HENT:   Head: Normocephalic and atraumatic.   Nose: Nose normal.   Mouth/Throat: No oropharyngeal exudate.   Eyes: Pupils are equal, round, and reactive to light. Conjunctivae and EOM are normal. No scleral icterus.   Neck: Normal range of motion. Neck supple.   Cardiovascular: Normal rate, regular rhythm and normal heart sounds.   Pulmonary/Chest: Effort normal and breath sounds normal. No respiratory distress. She has no wheezes. She has no rales. She exhibits no tenderness.   Abdominal: Soft. Bowel sounds are normal. There is tenderness in the right upper quadrant and epigastric area. There is no rebound and no guarding.   Musculoskeletal: Normal range of motion. She exhibits no edema, tenderness or deformity.   Neurological: She is alert and oriented to person, place, and time.   Skin: Skin is warm and dry. Capillary refill takes less than 2 seconds. She is not diaphoretic.   Psychiatric: She has a normal mood and affect. Her behavior is normal.         CRANIAL NERVES     CN III, IV, VI   Pupils are equal, round, and reactive to light.  Extraocular motions are normal.        Significant Labs: All pertinent labs within the past 24 hours have been reviewed.    Significant Imaging: I have reviewed all pertinent imaging results/findings within the past  24 hours.    Assessment/Plan:     Transaminitis  38yo female who presented to epigastric/RUQ pain, abdominal US suspicious for acute cholecystitis subsquent HIDA scan negative, elevated AST/ALT and mildy elevated ALP now being transferred to medicine team for further management.    Minimum tylenol use (2 pills)  Denies Etoh, NSAIDs, recent antibiotics, food or travel out of the ordinary, personal or family history of autoimmune disease    - US abdomen limited on admit wshowed no evidence of choledocholithiasis.  The common bile duct measures 3 mm in diameter. Gallstones present.  - HIDA with patent cystic and bile ducts  - LFTs resolving, t.demetrio wnl     Patient does have an underlying pain disorder, which could potentially be excerbated if she transiantely passed a stone. However, her LFTs are quickly improving.    PLAN  - supportive care    - control pain, avoid tylenol   - continue fluids  - advance diet as tolerated  - tylenol level  - urine drug screen  - acute hep panel  - stool studies    Medicine will take over as primary 1/10/20 @ 7am.     VTE Risk Mitigation (From admission, onward)         Ordered     Place sequential compression device  Until discontinued      01/08/20 1717     IP VTE HIGH RISK PATIENT  Once      01/08/20 1717                    Thank you for your consult. I will sign off. Please contact us if you have any additional questions.    Ruddy Dempsey MD  Department of Hospital Medicine   [facility]                    01/09/2020                             STAFF PHYSICIAN NOTE                                   Attending Attestation for Rounds with Resident  I have reviewed and concur with the resident's history, physical, assessment, and plan.  I have personally interviewed and examined the patient at bedside and agree with the resident's findings.                                     Kori Morley MD  Senior Hospitalist  [id number], [phone]

## 2020-01-09 NOTE — SUBJECTIVE & OBJECTIVE
Interval History: No acute events overnight. Still having epigastric pain. No nausea or vomiting. Pain did not get worse with HIDA scan.    Medications:  Continuous Infusions:   sodium chloride 0.9% 125 mL/hr at 01/08/20 2111     Scheduled Meds:   levothyroxine  150 mcg Oral Daily    lidocaine (PF) 10 mg/ml (1%)  1 mL Other Once    morphine  4 mg Intravenous ED 1 Time    pravastatin  40 mg Oral Daily     PRN Meds:morphine, morphine, ondansetron, sodium chloride 0.9%     Review of patient's allergies indicates:   Allergen Reactions    Cheese Itching and Rash     Objective:     Vital Signs (Most Recent):  Temp: 97.4 °F (36.3 °C) (01/09/20 0534)  Pulse: 84 (01/09/20 0534)  Resp: 18 (01/09/20 0534)  BP: 128/79 (01/09/20 0534)  SpO2: 97 % (01/09/20 0534) Vital Signs (24h Range):  Temp:  [97.4 °F (36.3 °C)-99.2 °F (37.3 °C)] 97.4 °F (36.3 °C)  Pulse:  [84-99] 84  Resp:  [16-20] 18  SpO2:  [96 %-100 %] 97 %  BP: (115-137)/(65-87) 128/79     Weight: 88.1 kg (194 lb 3.6 oz)  Body mass index is 35.52 kg/m².    Intake/Output - Last 3 Shifts       01/07 0700 - 01/08 0659 01/08 0700 - 01/09 0659 01/09 0700 - 01/10 0659    Urine (mL/kg/hr)  200     Total Output  200     Net  -200            Urine Occurrence  1 x           Physical Exam   Constitutional: She is oriented to person, place, and time. She appears well-developed and well-nourished. No distress.   Cardiovascular: Normal rate and regular rhythm.   Pulmonary/Chest: Effort normal. No respiratory distress.   Abdominal: Soft. She exhibits no distension. There is tenderness (TTP in epigastrum ). There is no rebound and no guarding.   Neurological: She is alert and oriented to person, place, and time.   Skin: Skin is warm and dry.       Significant Labs:  CBC:   Recent Labs   Lab 01/09/20  0519   WBC 5.99   RBC 4.05   HGB 12.0   HCT 39.9      MCV 99*   MCH 29.6   MCHC 30.1*     CMP:   Recent Labs   Lab 01/08/20  1328 01/09/20  0519   GLU 70 89   CALCIUM 9.6 8.9    ALBUMIN 4.0  --    PROT 8.2  --     140   K 4.4 4.6   CO2 23 26    108   BUN 8 8   CREATININE 1.1 0.8   ALKPHOS 153*  --    *  --    *  --    BILITOT 0.6  --        Significant Diagnostics:  HIDA:  EXAMINATION:  NM HEPATOBILIARY(HIDA) WITH PHARM AND EF    CLINICAL HISTORY:  RUQ pain, cholecystitis suspected;    TECHNIQUE:  Following the IV administration of 5.4 mCi of Tc-99m-mebrofenin, sequential dynamic anterior images of the abdomen were obtained for 60 minutes followed by a right lateral view at 60 minutes.    Subsequently, the patient received an additional injection of 4 mg of morphine IV, and additional images of the abdomen were acquired for 30 minutes.    COMPARISON:  Abdominal ultrasound 01/08/2020.    FINDINGS:  The early images demonstrate homogeneous uptake of the radiopharmaceutical by the liver with no focal hepatic abnormalities.    Normal activity is present in the common bile duct and small bowel at 6 minutes post injection.  The gallbladder was not definitively seen on initial 60 minute imaging.    Following the administration of morphine, there is accumulation of radiotracer at the inferior aspect of the liver likely within the gallbladder.      Impression       The cystic and common mary ducts are patent.    Electronically signed by resident: Yohan Mendez MD  Date: 01/08/2020  Time: 20:57    Electronically signed by: Raul Dorado MD  Date: 01/08/2020  Time: 21:42

## 2020-01-09 NOTE — HPI
"            01/10/202    Admit Date: 1/8/2020             LOS: 0 days     SUBJECTIVE:     Follow-up For: <principal problem not specified>    HPI/Interval history (See H&P for complete P,F,SHx) :   This is a 39 y.o. female.       38 yo female with PMHx significant for HTN, HLD, NIDDM2, hypothyroidism, MITZI who was initially admitted to general surgery service for suspicion for acute cholecystitis, subsquent HIDA scan negative and now medicine is being consulted for further management.         Patient presented to the ED with RUQ abdominal pain which started after eating a "hot sausage sandwich." on the morning of 1/7/20. Associated with nausea, non-bloody & non-bilious vomiting and watery diarrhea. The following day (1/8/20) patient presented to the ED for worsening pain. In the ED, Afebrile. No leukocytosis - WBC 6.5. No left shift - Gran 56.8%. No acidosis. TBili normal. Transaminitis - , . US abdomen obtained showing gallstones. No wall thickening or hyperemia. No pericholecystic fluid. Common bile duct 3 mm. Reported possible contraction of the gallbladder and concern for acute cholecystitis. Denies fevers, chills, weight loss, bleeding, dysuria, jaundice, icterus, SOB, cough, chest pain     She was admitted to the general surgery service and subsequent HIDA scan (1/9/20) did not show evidence of acute cholecysts. Now medicine is being consulted for further management.      Denies similar symptoms in the past.  Denies smoking or ETOH use, IV drug use.  Denies new tattoos, new sexual partners.     1/10 - pain greatly improved.  Hepatology saw pt and thinks she passed a gallstone.  I agree.  Will have pt f/u w General surgery and hepatology.     Noted polypharmacy and recduced unneeded meds that could interfere w Bowel function.  Would recommend gradual reduction of other meds and psychosocial care for pain management.  May benefit from pain specialist     F/u PCP. Hepatology and gen surgery "     Review of Systems:  Constitutional-  Negative for Fever,chills,  Weakness  Neuro-  Negative for Confusion, focal weakness  CardioVascular-  Negative for Chest Pain, Palpitations  Resp-  Negative for Cough, SOB  GI-  Negative for Nausea, Vomiting, Diarrhea  Extremities-  Negative for Pain, Swelling  % Meals-  eating  # BM-   +  Urine output:  good  Ambulation:  In room, independent    I & O (Last 24H):    Intake/Output Summary (Last 24 hours) at 1/10/2020 0850  Last data filed at 1/9/2020 1000  Gross per 24 hour   Intake --   Output 500 ml   Net -500 ml       Subjective pain scale: 0  Observed (smile) scale:0    OBJECTIVE:       Vitals:    01/09/20 1934 01/09/20 2356 01/10/20 0326 01/10/20 0731   BP: 127/62 123/69 113/60 119/67   BP Location: Right arm Right arm Right arm    Patient Position: Lying Lying Lying Lying   Pulse: 92 93 88 94   Resp: 14 16 14 16   Temp: 98 °F (36.7 °C) 98.5 °F (36.9 °C) 98.3 °F (36.8 °C) 98.6 °F (37 °C)   TempSrc: Oral Oral Oral Oral   SpO2: 99% 100% 95% 98%   Weight:       Height:           Physical Exam:  General:  Well developed, well nourished in NAD  Mental status-  oriented time 3, linear thought process, + focused attention, alert and awake  HEENT:  Conjunctiva clear; Oropharynx clear, moist membranes  Neck:  No JVD noted, Supple, full range of motion  CardioVascular-  Normal rate,  S1, S2 with no murmurs,gallops,rubs  Respiratory-  Lungs CTA Bilaterally, Unlabored, good effort  Abdomen-  Nontender, no rebound, no guarding,  nondistended, +  BS, soft, no hepatosplenomegaly  Extrem-  No cyanosis, clubbing, edema.  Skin-   No rashes, lesions, ulcers, no third spacing dependent edema    Continuous Infusions:   sodium chloride 0.9% 125 mL/hr at 01/09/20 1428     Scheduled Meds:   levothyroxine  150 mcg Oral Daily    lidocaine (PF) 10 mg/ml (1%)  1 mL Other Once     PRN Meds:Dextrose 10% Bolus, morphine, morphine, ondansetron, sodium chloride 0.9%    Chemistry:  Recent Labs      01/09/20  0519 01/09/20  0807 01/10/20  0648    138 138   K 4.6 4.0 4.0    109 109   CO2 26 23 23   BUN 8 7 4*   CREATININE 0.8 0.8 0.8   CALCIUM 8.9 8.7 9.1     Recent Labs     01/08/20  1328 01/09/20  0807 01/10/20  0648   * 223* 83*   * 496* 345*   ALKPHOS 153* 123 116   ALBUMIN 4.0 3.2* 3.3*   PROT 8.2 6.5 6.8   BILITOT 0.6 0.6 0.3         CBC:  Recent Labs   Lab 01/08/20  1328 01/09/20  0519 01/10/20  0648   WBC 6.47 5.99 7.21   HGB 13.3 12.0 12.4   HCT 41.8 39.9 40.6    307 320   MONO 9.1  0.6 9.8  0.6 7.5  0.5         ASSESSMENT/PLAN:   Transaminitis  40yo female who presented to epigastric/RUQ pain, abdominal US suspicious for acute cholecystitis subsquent HIDA scan negative, elevated AST/ALT and mildy elevated ALP now being transferred to medicine team for further management.     Minimum tylenol use (2 pills)  Denies Etoh, NSAIDs, recent antibiotics, food or travel out of the ordinary, personal or family history of autoimmune disease     - US abdomen limited on admit wshowed no evidence of choledocholithiasis.  The common bile duct measures 3 mm in diameter. Gallstones present.  - HIDA with patent cystic and bile ducts  - LFTs resolving, t.demetrio wnl      Patient does have an underlying pain disorder, which could potentially be excerbated if she transiantely passed a stone. However, her LFTs are quickly improving.    Per hepatology:      Patient admitted to 2 pills of Tylenol otherwise denied Etoh, NSAIDs, recent antibiotics, food or travel out of the ordinary, personal or family history of autoimmune disease, she does not take her prescribed Pravastatin due to insurance issues, she denied any significant sick contacts, she is in a monogamous relationship with  with no know history of STIs.  - CMP 01/09/2019 with significant decrease in transaminases and ALP now wnl. Tbili continues to remain wnl.  - Etiology of transaminitis likely a gallstone that has passed given  rapid improvement in labs    Irritable Bowel syndrome  Chronic pain    PLAN  - supportive care .  Consider sending patient to a functional restoration program, therapy program,  or assess for psychosocial care. Would not treat chronic pain with opioids, as it will negatively affect both brain and bowel function.   - advanced diet as tolerated  - tylenol level - neg  - urine drug screen - neg  - acute hep panel - heg  - stool studies - not able to collect       Kori Morley MD  Senior Hospitalist  [facility] [address], [phone]

## 2020-01-09 NOTE — NURSING
Spoke with Dr Arreguin about pt dizziness and BS=79; vitals stable documented in epic; just recheck blood sugar in an hour; if still low do an orthostatic

## 2020-01-09 NOTE — ASSESSMENT & PLAN NOTE
Patient is a 38yo female presenting with acute epigastric and RUQ pain on 01/08/2019, concern for acute cholecystitis. Placed in Obs on general surgery service. HIDA negative for acute pathology.  Hepatology consulted due to elevated transaminases and mildly elevated ALP.  - US abdomen limited on admit wshowed no evidence of choledocholithiasis.  The common bile duct measures 3 mm in diameter. Gallstones present.  - HIDA with patent cystic and bile ducts  - Patient admitted to 2 pills of Tylenol otherwise denied Etoh, NSAIDs, recent antibiotics, food or travel out of the ordinary, personal or family history of autoimmune disease, she does not take her prescribed Pravastatin due to insurance issues, she denied any significant sick contacts, she is in a monogamous relationship with  with no know history of STIs.  - CMP 01/09/2019 with significant decrease in transaminases and ALP now wnl. Tbili continues to remain wnl.  - Etiology of transaminitis likely a gallstone that has passed given rapid improvement in labs    Recommendations/Plan:  - Agree with acute hep panel  - Though EKG wnl on admit, would recommend obtaining Troponin to r/o cardiac event  - Continue supportive care  - Avoid hepatoxics   - Monitor LFTs

## 2020-01-09 NOTE — SUBJECTIVE & OBJECTIVE
Past Medical History:   Diagnosis Date    Anxiety     Asthma     Chronic pain     back; inflammatory    Depression     Diabetes mellitus, type 2     Endometriosis     Hyperlipidemia     Hypertension     MITZI (obstructive sleep apnea)     Thyroid disease     Vitamin D deficiency        Past Surgical History:   Procedure Laterality Date    CERVICAL SPINE SURGERY      endometriosis      THYROID SURGERY         Review of patient's allergies indicates:   Allergen Reactions    Cheese Itching and Rash       No current facility-administered medications on file prior to encounter.      Current Outpatient Medications on File Prior to Encounter   Medication Sig    ALBUTEROL SULFATE (PROAIR HFA INHL) Inhale into the lungs.    cetirizine (ZYRTEC) 10 MG tablet Take 1 tablet (10 mg total) by mouth once daily.    doxycycline (MONODOX) 100 MG capsule Take 100 mg by mouth 2 (two) times daily.    folic acid (FOLVITE) 1 MG tablet Take 1 mg by mouth once daily.    gabapentin (NEURONTIN) 300 MG capsule Take 1 capsule (300 mg total) by mouth 3 (three) times daily.    hydrOXYzine HCl (ATARAX) 25 MG tablet Take 1 tablet (25 mg total) by mouth every 6 (six) hours as needed for Anxiety.    hydrOXYzine pamoate (VISTARIL) 25 MG Cap Take 1 capsule (25 mg total) by mouth every 6 (six) hours as needed (Itching).    ibuprofen (ADVIL,MOTRIN) 800 MG tablet Take 1 tablet (800 mg total) by mouth every 6 (six) hours as needed for Pain.    imipramine (TOFRANIL) 25 MG tablet Take 25 mg by mouth every evening.    levothyroxine (SYNTHROID) 150 MCG tablet Take 1 tablet (150 mcg total) by mouth once daily.    linaclotide 290 mcg Cap Take 1 capsule (290 mcg total) by mouth once daily. Take 30 minutes before breakfast.    meclizine (ANTIVERT) 12.5 mg tablet Take 2 tablets (25 mg total) by mouth 3 (three) times daily as needed for Dizziness.    medroxyPROGESTERone (DEPO-PROVERA) 150 mg/mL Syrg     metFORMIN (GLUCOPHAGE) 500 MG tablet  Take 1 tablet (500 mg total) by mouth daily with breakfast.    mometasone (NASONEX) 50 mcg/actuation nasal spray 2 sprays by Nasal route once daily.    naproxen (NAPROSYN) 500 MG tablet Take 1 tablet (500 mg total) by mouth 2 (two) times daily with meals.    pravastatin (PRAVACHOL) 40 MG tablet Take 40 mg by mouth once daily.    ranitidine (ZANTAC) 150 MG capsule Take 1 capsule (150 mg total) by mouth once daily.    tramadol (ULTRAM) 50 mg tablet     VITAMIN D2 50,000 unit capsule      Family History     Problem Relation (Age of Onset)    Breast cancer Maternal Aunt    Diabetes Maternal Grandmother, Maternal Grandfather    Hyperlipidemia Maternal Grandmother, Maternal Grandfather    Hypertension Mother        Tobacco Use    Smoking status: Never Smoker    Smokeless tobacco: Never Used   Substance and Sexual Activity    Alcohol use: No    Drug use: No    Sexual activity: Yes     Partners: Male     Birth control/protection: Injection     Review of Systems   Constitutional: Positive for activity change and appetite change. Negative for chills, diaphoresis and fever.   HENT: Negative for trouble swallowing and voice change.    Eyes: Negative for visual disturbance.   Respiratory: Negative for cough, chest tightness, shortness of breath and wheezing.    Cardiovascular: Negative for chest pain, palpitations and leg swelling.   Gastrointestinal: Positive for abdominal pain, diarrhea, nausea and vomiting. Negative for abdominal distention and blood in stool.   Endocrine: Negative for polyuria.   Genitourinary: Negative for dysuria, hematuria and urgency.   Musculoskeletal: Negative for back pain, neck pain and neck stiffness.   Skin: Negative for color change and rash.   Neurological: Negative for dizziness, tremors, syncope, weakness and light-headedness.   Psychiatric/Behavioral: Negative for dysphoric mood.     Objective:     Vital Signs (Most Recent):  Temp: 97.7 °F (36.5 °C) (01/09/20 1112)  Pulse: 84  (01/09/20 1112)  Resp: 16 (01/09/20 1112)  BP: 127/82 (01/09/20 1112)  SpO2: 97 % (01/09/20 1112) Vital Signs (24h Range):  Temp:  [97.4 °F (36.3 °C)-99.2 °F (37.3 °C)] 97.7 °F (36.5 °C)  Pulse:  [84-90] 84  Resp:  [14-20] 16  SpO2:  [96 %-99 %] 97 %  BP: (115-137)/(72-87) 127/82     Weight: 88.1 kg (194 lb 3.6 oz)  Body mass index is 35.52 kg/m².    Physical Exam   Constitutional: She is oriented to person, place, and time. She appears well-developed and well-nourished. No distress.   HENT:   Head: Normocephalic and atraumatic.   Nose: Nose normal.   Mouth/Throat: No oropharyngeal exudate.   Eyes: Pupils are equal, round, and reactive to light. Conjunctivae and EOM are normal. No scleral icterus.   Neck: Normal range of motion. Neck supple.   Cardiovascular: Normal rate, regular rhythm and normal heart sounds.   Pulmonary/Chest: Effort normal and breath sounds normal. No respiratory distress. She has no wheezes. She has no rales. She exhibits no tenderness.   Abdominal: Soft. Bowel sounds are normal. There is tenderness in the right upper quadrant and epigastric area. There is no rebound and no guarding.   Musculoskeletal: Normal range of motion. She exhibits no edema, tenderness or deformity.   Neurological: She is alert and oriented to person, place, and time.   Skin: Skin is warm and dry. Capillary refill takes less than 2 seconds. She is not diaphoretic.   Psychiatric: She has a normal mood and affect. Her behavior is normal.         CRANIAL NERVES     CN III, IV, VI   Pupils are equal, round, and reactive to light.  Extraocular motions are normal.        Significant Labs: All pertinent labs within the past 24 hours have been reviewed.    Significant Imaging: I have reviewed all pertinent imaging results/findings within the past 24 hours.

## 2020-01-09 NOTE — CONSULTS
"[facility]  Hepatology  Consult Note    Patient Name: Caitie Wang  MRN: [id number]  Admission Date: 1/8/2020  Hospital Length of Stay: 0 days  Attending Provider: Aden Wayne MD   Primary Care Physician: Juana Vines MD  Principal Problem:<principal problem not specified>    Inpatient consult to Hepatology  Consult performed by: Michael Roberts MD  Consult ordered by: Daniac Hernandez MD        Subjective:     Transplant status: No    HPI:  Patient is a 38yo female with PMHx significant for HTN, HLD, t2DM, hypothyroidism, MITZI who presented to the ER with abdominal pain located in the epigastric region with radiation to RUQ following eating a "hot sausage sandwich." She had associated nausea, non bloody non bilious vomiting and few episodes of watery diarrhea. She was unable to tolerate PO. She presented to ER due to worsening symptoms on the second day following onset of pain. She denid any fevers, chills, weight loss, bleeding, dysuria, jaundice, icterus, SOB, cough, chest pain. US in ER was concerning for acute cholecystitis. General surgery was consulted and patient was placed in to delineate whether cholecystitis vs symptomatic gallstones with HIDA scan. Hepatology consulted for elevated transaminases. On admit, , Tbili wnl at 0.6, , .  Patient admitted to only taking 2 x Tylenol pills. Denied Etoh or drugs.     Review of Systems   Constitutional: Positive for activity change and appetite change. Negative for chills, diaphoresis and fever.   HENT: Negative for trouble swallowing and voice change.    Eyes: Negative for visual disturbance.   Respiratory: Negative for cough, chest tightness, shortness of breath and wheezing.    Cardiovascular: Negative for chest pain, palpitations and leg swelling.   Gastrointestinal: Positive for abdominal pain, diarrhea, nausea and vomiting.   Endocrine: Negative for polyuria.   Genitourinary: Negative for dysuria, " hematuria and urgency.   Musculoskeletal: Negative for back pain, neck pain and neck stiffness.   Skin: Negative for color change and rash.   Neurological: Negative for dizziness, tremors, syncope, weakness and light-headedness.   Psychiatric/Behavioral: Negative for dysphoric mood.       Past Medical History:   Diagnosis Date    Anxiety     Asthma     Chronic pain     back; inflammatory    Depression     Diabetes mellitus, type 2     Endometriosis     Hyperlipidemia     Hypertension     MITZI (obstructive sleep apnea)     Thyroid disease     Vitamin D deficiency        Past Surgical History:   Procedure Laterality Date    CERVICAL SPINE SURGERY      endometriosis      THYROID SURGERY         Family history of liver disease: No    Review of patient's allergies indicates:   Allergen Reactions    Cheese Itching and Rash       Tobacco Use    Smoking status: Never Smoker    Smokeless tobacco: Never Used   Substance and Sexual Activity    Alcohol use: No    Drug use: No    Sexual activity: Yes     Partners: Male     Birth control/protection: Injection       Medications Prior to Admission   Medication Sig Dispense Refill Last Dose    ALBUTEROL SULFATE (PROAIR HFA INHL) Inhale into the lungs.   Unknown    cetirizine (ZYRTEC) 10 MG tablet Take 1 tablet (10 mg total) by mouth once daily. 30 tablet 0 Unknown    doxycycline (MONODOX) 100 MG capsule Take 100 mg by mouth 2 (two) times daily.   2/18/2019    folic acid (FOLVITE) 1 MG tablet Take 1 mg by mouth once daily.   Unknown    gabapentin (NEURONTIN) 300 MG capsule Take 1 capsule (300 mg total) by mouth 3 (three) times daily. 90 capsule 11 Past Week    hydrOXYzine HCl (ATARAX) 25 MG tablet Take 1 tablet (25 mg total) by mouth every 6 (six) hours as needed for Anxiety. 12 tablet 0 Unknown    hydrOXYzine pamoate (VISTARIL) 25 MG Cap Take 1 capsule (25 mg total) by mouth every 6 (six) hours as needed (Itching). 14 capsule 0 9/11/2018    ibuprofen  (ADVIL,MOTRIN) 800 MG tablet Take 1 tablet (800 mg total) by mouth every 6 (six) hours as needed for Pain. 20 tablet 0 Past Week    imipramine (TOFRANIL) 25 MG tablet Take 25 mg by mouth every evening.   Unknown    levothyroxine (SYNTHROID) 150 MCG tablet Take 1 tablet (150 mcg total) by mouth once daily. 30 tablet 0     linaclotide 290 mcg Cap Take 1 capsule (290 mcg total) by mouth once daily. Take 30 minutes before breakfast. 30 capsule 0 Unknown    meclizine (ANTIVERT) 12.5 mg tablet Take 2 tablets (25 mg total) by mouth 3 (three) times daily as needed for Dizziness. 30 tablet 6 Unknown    medroxyPROGESTERone (DEPO-PROVERA) 150 mg/mL Syrg    More than a month    metFORMIN (GLUCOPHAGE) 500 MG tablet Take 1 tablet (500 mg total) by mouth daily with breakfast. 30 tablet 11 Past Week    mometasone (NASONEX) 50 mcg/actuation nasal spray 2 sprays by Nasal route once daily. 17 g 0 Unknown    naproxen (NAPROSYN) 500 MG tablet Take 1 tablet (500 mg total) by mouth 2 (two) times daily with meals. 20 tablet 0 Unknown    pravastatin (PRAVACHOL) 40 MG tablet Take 40 mg by mouth once daily.   Unknown    ranitidine (ZANTAC) 150 MG capsule Take 1 capsule (150 mg total) by mouth once daily. 30 capsule 0 Unknown    tramadol (ULTRAM) 50 mg tablet    Unknown    VITAMIN D2 50,000 unit capsule    Past Week       Objective:     Vital Signs (Most Recent):  Temp: 98.2 °F (36.8 °C) (01/09/20 0729)  Pulse: 85 (01/09/20 0729)  Resp: 14 (01/09/20 0729)  BP: 115/74 (01/09/20 0729)  SpO2: 96 % (01/09/20 0729) Vital Signs (24h Range):  Temp:  [97.4 °F (36.3 °C)-99.2 °F (37.3 °C)] 98.2 °F (36.8 °C)  Pulse:  [84-99] 85  Resp:  [14-20] 14  SpO2:  [96 %-100 %] 96 %  BP: (115-137)/(65-87) 115/74     Weight: 88.1 kg (194 lb 3.6 oz) (01/08/20 2320)  Body mass index is 35.52 kg/m².    Physical Exam   Constitutional: She is oriented to person, place, and time. She appears well-developed and well-nourished. No distress.   HENT:   Head:  Normocephalic and atraumatic.   Nose: Nose normal.   Mouth/Throat: No oropharyngeal exudate.   Eyes: Pupils are equal, round, and reactive to light. Conjunctivae and EOM are normal. No scleral icterus.   Neck: Normal range of motion. Neck supple.   Cardiovascular: Normal rate, regular rhythm and normal heart sounds.   Pulmonary/Chest: Effort normal and breath sounds normal. No respiratory distress. She has no wheezes. She has no rales. She exhibits no tenderness.   Abdominal: Soft. Bowel sounds are normal. There is tenderness. There is no rebound and no guarding.   Morbidly obese  TTP in epigastric and RUQ  No peritoneal signs  Difficult to palpate liver given body habitus  Well healed scars   Musculoskeletal: Normal range of motion. She exhibits no edema, tenderness or deformity.   Neurological: She is alert and oriented to person, place, and time.   Skin: Skin is warm and dry. Capillary refill takes less than 2 seconds. She is not diaphoretic.   Psychiatric: She has a normal mood and affect. Her behavior is normal.       Computed MELD-Na score unavailable. Necessary lab results were not found in the last year.  Computed MELD score unavailable. Necessary lab results were not found in the last year.    Recent Results (from the past 24 hour(s))   Comprehensive metabolic panel    Collection Time: 01/08/20  1:28 PM   Result Value Ref Range    Sodium 139 136 - 145 mmol/L    Potassium 4.4 3.5 - 5.1 mmol/L    Chloride 107 95 - 110 mmol/L    CO2 23 23 - 29 mmol/L    Glucose 70 70 - 110 mg/dL    BUN, Bld 8 6 - 20 mg/dL    Creatinine 1.1 0.5 - 1.4 mg/dL    Calcium 9.6 8.7 - 10.5 mg/dL    Total Protein 8.2 6.0 - 8.4 g/dL    Albumin 4.0 3.5 - 5.2 g/dL    Total Bilirubin 0.6 0.1 - 1.0 mg/dL    Alkaline Phosphatase 153 (H) 55 - 135 U/L     (H) 10 - 40 U/L     (H) 10 - 44 U/L    Anion Gap 9 8 - 16 mmol/L    eGFR if African American >60.0 >60 mL/min/1.73 m^2    eGFR if non African American >60.0 >60 mL/min/1.73 m^2    CBC auto differential    Collection Time: 01/08/20  1:28 PM   Result Value Ref Range    WBC 6.47 3.90 - 12.70 K/uL    RBC 4.37 4.00 - 5.40 M/uL    Hemoglobin 13.3 12.0 - 16.0 g/dL    Hematocrit 41.8 37.0 - 48.5 %    Mean Corpuscular Volume 96 82 - 98 fL    Mean Corpuscular Hemoglobin 30.4 27.0 - 31.0 pg    Mean Corpuscular Hemoglobin Conc 31.8 (L) 32.0 - 36.0 g/dL    RDW 13.2 11.5 - 14.5 %    Platelets 312 150 - 350 K/uL    MPV 10.3 9.2 - 12.9 fL    Immature Granulocytes 0.5 0.0 - 0.5 %    Gran # (ANC) 3.7 1.8 - 7.7 K/uL    Immature Grans (Abs) 0.03 0.00 - 0.04 K/uL    Lymph # 2.0 1.0 - 4.8 K/uL    Mono # 0.6 0.3 - 1.0 K/uL    Eos # 0.1 0.0 - 0.5 K/uL    Baso # 0.03 0.00 - 0.20 K/uL    nRBC 0 0 /100 WBC    Gran% 56.8 38.0 - 73.0 %    Lymph% 31.1 18.0 - 48.0 %    Mono% 9.1 4.0 - 15.0 %    Eosinophil% 2.0 0.0 - 8.0 %    Basophil% 0.5 0.0 - 1.9 %    Platelet Estimate Appears normal     Differential Method Automated    Lipase    Collection Time: 01/08/20  1:28 PM   Result Value Ref Range    Lipase 32 4 - 60 U/L   Urinalysis, Reflex to Urine Culture Urine, Clean Catch    Collection Time: 01/08/20  1:35 PM   Result Value Ref Range    Specimen UA Urine, Clean Catch     Color, UA Lorri Yellow, Straw, Lorri    Appearance, UA Cloudy (A) Clear    pH, UA 5.0 5.0 - 8.0    Specific Gravity, UA >=1.030 (A) 1.005 - 1.030    Protein, UA 1+ (A) Negative    Glucose, UA Negative Negative    Ketones, UA Negative Negative    Bilirubin (UA) Negative Negative    Occult Blood UA Negative Negative    Nitrite, UA Negative Negative    Leukocytes, UA Negative Negative   Urinalysis Microscopic    Collection Time: 01/08/20  1:35 PM   Result Value Ref Range    RBC, UA 2 0 - 4 /hpf    WBC, UA 3 0 - 5 /hpf    Bacteria Many (A) None-Occ /hpf    Squam Epithel, UA 43 /hpf    Hyaline Casts, UA 6 (A) 0-1/lpf /lpf    Microscopic Comment SEE COMMENT    POCT urine pregnancy    Collection Time: 01/08/20  6:26 PM   Result Value Ref Range    POC Preg Test,  Ur Negative Negative     Acceptable Yes    POCT glucose    Collection Time: 01/08/20  9:41 PM   Result Value Ref Range    POCT Glucose 83 70 - 110 mg/dL   CBC auto differential    Collection Time: 01/09/20  5:19 AM   Result Value Ref Range    WBC 5.99 3.90 - 12.70 K/uL    RBC 4.05 4.00 - 5.40 M/uL    Hemoglobin 12.0 12.0 - 16.0 g/dL    Hematocrit 39.9 37.0 - 48.5 %    Mean Corpuscular Volume 99 (H) 82 - 98 fL    Mean Corpuscular Hemoglobin 29.6 27.0 - 31.0 pg    Mean Corpuscular Hemoglobin Conc 30.1 (L) 32.0 - 36.0 g/dL    RDW 13.5 11.5 - 14.5 %    Platelets 307 150 - 350 K/uL    MPV 9.9 9.2 - 12.9 fL    Immature Granulocytes 0.3 0.0 - 0.5 %    Gran # (ANC) 3.4 1.8 - 7.7 K/uL    Immature Grans (Abs) 0.02 0.00 - 0.04 K/uL    Lymph # 1.7 1.0 - 4.8 K/uL    Mono # 0.6 0.3 - 1.0 K/uL    Eos # 0.2 0.0 - 0.5 K/uL    Baso # 0.02 0.00 - 0.20 K/uL    nRBC 0 0 /100 WBC    Gran% 56.8 38.0 - 73.0 %    Lymph% 29.0 18.0 - 48.0 %    Mono% 9.8 4.0 - 15.0 %    Eosinophil% 3.8 0.0 - 8.0 %    Basophil% 0.3 0.0 - 1.9 %    Differential Method Automated    Basic metabolic panel    Collection Time: 01/09/20  5:19 AM   Result Value Ref Range    Sodium 140 136 - 145 mmol/L    Potassium 4.6 3.5 - 5.1 mmol/L    Chloride 108 95 - 110 mmol/L    CO2 26 23 - 29 mmol/L    Glucose 89 70 - 110 mg/dL    BUN, Bld 8 6 - 20 mg/dL    Creatinine 0.8 0.5 - 1.4 mg/dL    Calcium 8.9 8.7 - 10.5 mg/dL    Anion Gap 6 (L) 8 - 16 mmol/L    eGFR if African American >60.0 >60 mL/min/1.73 m^2    eGFR if non African American >60.0 >60 mL/min/1.73 m^2   Magnesium    Collection Time: 01/09/20  5:19 AM   Result Value Ref Range    Magnesium 2.4 1.6 - 2.6 mg/dL   Phosphorus    Collection Time: 01/09/20  5:19 AM   Result Value Ref Range    Phosphorus 3.6 2.7 - 4.5 mg/dL   POCT glucose    Collection Time: 01/09/20  7:34 AM   Result Value Ref Range    POCT Glucose 68 (L) 70 - 110 mg/dL   Comprehensive metabolic panel    Collection Time: 01/09/20  8:07 AM    Result Value Ref Range    Sodium 138 136 - 145 mmol/L    Potassium 4.0 3.5 - 5.1 mmol/L    Chloride 109 95 - 110 mmol/L    CO2 23 23 - 29 mmol/L    Glucose 80 70 - 110 mg/dL    BUN, Bld 7 6 - 20 mg/dL    Creatinine 0.8 0.5 - 1.4 mg/dL    Calcium 8.7 8.7 - 10.5 mg/dL    Total Protein 6.5 6.0 - 8.4 g/dL    Albumin 3.2 (L) 3.5 - 5.2 g/dL    Total Bilirubin 0.6 0.1 - 1.0 mg/dL    Alkaline Phosphatase 123 55 - 135 U/L     (H) 10 - 40 U/L     (H) 10 - 44 U/L    Anion Gap 6 (L) 8 - 16 mmol/L    eGFR if African American >60.0 >60 mL/min/1.73 m^2    eGFR if non African American >60.0 >60 mL/min/1.73 m^2   POCT glucose    Collection Time: 01/09/20  9:59 AM   Result Value Ref Range    POCT Glucose 133 (H) 70 - 110 mg/dL     Imaging Results          NM Hepatobiliary Scan W Pharm Intervention (Final result)  Result time 01/08/20 21:42:55   Procedure changed from NM Hepatobiliary Scan (HIDA)     Final result by Raul Dorado MD (01/08/20 21:42:55)                 Impression:      The cystic and common mary ducts are patent.    Electronically signed by resident: Yohan Mendez MD  Date:    01/08/2020  Time:    20:57    Electronically signed by: Raul Dorado MD  Date:    01/08/2020  Time:    21:42             Narrative:    EXAMINATION:  NM HEPATOBILIARY(HIDA) WITH PHARM AND EF    CLINICAL HISTORY:  RUQ pain, cholecystitis suspected;    TECHNIQUE:  Following the IV administration of 5.4 mCi of [id number]-mebrofenin, sequential dynamic anterior images of the abdomen were obtained for 60 minutes followed by a right lateral view at 60 minutes.    Subsequently, the patient received an additional injection of 4 mg of morphine IV, and additional images of the abdomen were acquired for 30 minutes.    COMPARISON:  Abdominal ultrasound 01/08/2020.    FINDINGS:  The early images demonstrate homogeneous uptake of the radiopharmaceutical by the liver with no focal hepatic abnormalities.    Normal activity is present in the  common bile duct and small bowel at 6 minutes post injection.  The gallbladder was not definitively seen on initial 60 minute imaging.    Following the administration of morphine, there is accumulation of radiotracer at the inferior aspect of the liver likely within the gallbladder.                                US Abdomen Limited (Gallbladder) (Final result)  Result time 01/08/20 16:18:54    Final result by Mango High MD (01/08/20 16:18:54)                 Impression:      Findings concerning for acute cholecystitis.    This report was flagged in Epic as abnormal.    Significant Alert:    The significant finding above was relayed by radiology resident Dr. Farrar on behalf of Dr. High by telephone to the ordering provider, Dr. Holloway On 1/8/2020 at 16:13.    Electronically signed by resident: Ruben Farrar  Date:    01/08/2020  Time:    16:01    Electronically signed by: Mango High MD  Date:    01/08/2020  Time:    16:18             Narrative:    EXAMINATION:  US ABDOMEN LIMITED    CLINICAL HISTORY:  Epigastric pain    TECHNIQUE:  Limited ultrasound of the right upper quadrant of the abdomen (including pancreas, liver, gallbladder, common bile duct, and right kidney) was performed.    COMPARISON:  No relevant prior exams available.    FINDINGS:  The gallbladder is contracted with multiple mobile gallstones within its lumen resulting in wall echo shadow sign and obscuration of the dependent portions of the gallbladder wall.  The visualized portions of the gallbladder wall are normal in diameter measuring 3 mm.  No gallbladder wall hypervascularity or pericholecystic fluid.  Positive Mclean sign.  No evidence of choledocholithiasis.  The common bile duct measures 3 mm in diameter.    The pancreas is obscured by overlying bowel gas.    There is no free fluid within the visualized abdomen.    The right kidney is unremarkable with no evidence of hydronephrosis.                                   Assessment/Plan:     Transaminitis  Patient is a 40yo female presenting with acute epigastric and RUQ pain on 01/08/2019, concern for acute cholecystitis. Placed in Obs on general surgery service. HIDA negative for acute pathology.  Hepatology consulted due to elevated transaminases and mildly elevated ALP.  - US abdomen limited on admit wshowed no evidence of choledocholithiasis.  The common bile duct measures 3 mm in diameter. Gallstones present.  - HIDA with patent cystic and bile ducts  - Patient admitted to 2 pills of Tylenol otherwise denied Etoh, NSAIDs, recent antibiotics, food or travel out of the ordinary, personal or family history of autoimmune disease, she does not take her prescribed Pravastatin due to insurance issues, she denied any significant sick contacts, she is in a monogamous relationship with  with no know history of STIs.  - CMP 01/09/2019 with significant decrease in transaminases and ALP now wnl. Tbili continues to remain wnl.  - Etiology of transaminitis likely a gallstone that has passed given rapid improvement in labs    Recommendations/Plan:  - Agree with acute hep panel  - Though EKG wnl on admit, would recommend obtaining Troponin to r/o cardiac event  - Continue supportive care  - Avoid hepatoxics   - Monitor LFTs    Thank you for your consult. I will follow-up with patient. Please contact us if you have any additional questions.    Michael Roberts MD PGY-II  Hepatology  [facility]

## 2020-01-09 NOTE — ASSESSMENT & PLAN NOTE
38yo female who presented to epigastric/RUQ pain, abdominal US suspicious for acute cholecystitis subsquent HIDA scan negative, elevated AST/ALT and mildy elevated ALP now being transferred to medicine team for further management.    Minimum tylenol use (2 pills)  Denies Etoh, NSAIDs, recent antibiotics, food or travel out of the ordinary, personal or family history of autoimmune disease    - US abdomen limited on admit wshowed no evidence of choledocholithiasis.  The common bile duct measures 3 mm in diameter. Gallstones present.  - HIDA with patent cystic and bile ducts  - LFTs resolving, t.demetrio wnl     Patient does have an underlying pain disorder, which could potentially be excerbated if she transiantely passed a stone. However, her LFTs are quickly improving.    PLAN  - supportive care    - control pain, avoid tylenol   - continue fluids  - advance diet as tolerated  - tylenol level  - urine drug screen  - acute hep panel  - stool studies    Medicine will take over as primary 1/10/20 @ 7am.

## 2020-01-09 NOTE — ASSESSMENT & PLAN NOTE
40 yo female with epigastric pain and transaminitis. HIDA negative for acute pathology.     Plan:  - Continue NPO, MIVF  - Will order CMP and acute hepatitis panel  - GI consult for possible scope  - OOB/ambulate

## 2020-01-09 NOTE — SUBJECTIVE & OBJECTIVE
Review of Systems   Constitutional: Positive for activity change and appetite change. Negative for chills, diaphoresis and fever.   HENT: Negative for trouble swallowing and voice change.    Eyes: Negative for visual disturbance.   Respiratory: Negative for cough, chest tightness, shortness of breath and wheezing.    Cardiovascular: Negative for chest pain, palpitations and leg swelling.   Gastrointestinal: Positive for abdominal pain, diarrhea, nausea and vomiting.   Endocrine: Negative for polyuria.   Genitourinary: Negative for dysuria, hematuria and urgency.   Musculoskeletal: Negative for back pain, neck pain and neck stiffness.   Skin: Negative for color change and rash.   Neurological: Negative for dizziness, tremors, syncope, weakness and light-headedness.   Psychiatric/Behavioral: Negative for dysphoric mood.       Past Medical History:   Diagnosis Date    Anxiety     Asthma     Chronic pain     back; inflammatory    Depression     Diabetes mellitus, type 2     Endometriosis     Hyperlipidemia     Hypertension     MITZI (obstructive sleep apnea)     Thyroid disease     Vitamin D deficiency        Past Surgical History:   Procedure Laterality Date    CERVICAL SPINE SURGERY      endometriosis      THYROID SURGERY         Family history of liver disease: No    Review of patient's allergies indicates:   Allergen Reactions    Cheese Itching and Rash       Tobacco Use    Smoking status: Never Smoker    Smokeless tobacco: Never Used   Substance and Sexual Activity    Alcohol use: No    Drug use: No    Sexual activity: Yes     Partners: Male     Birth control/protection: Injection       Medications Prior to Admission   Medication Sig Dispense Refill Last Dose    ALBUTEROL SULFATE (PROAIR HFA INHL) Inhale into the lungs.   Unknown    cetirizine (ZYRTEC) 10 MG tablet Take 1 tablet (10 mg total) by mouth once daily. 30 tablet 0 Unknown    doxycycline (MONODOX) 100 MG capsule Take 100 mg by mouth 2  (two) times daily.   2/18/2019    folic acid (FOLVITE) 1 MG tablet Take 1 mg by mouth once daily.   Unknown    gabapentin (NEURONTIN) 300 MG capsule Take 1 capsule (300 mg total) by mouth 3 (three) times daily. 90 capsule 11 Past Week    hydrOXYzine HCl (ATARAX) 25 MG tablet Take 1 tablet (25 mg total) by mouth every 6 (six) hours as needed for Anxiety. 12 tablet 0 Unknown    hydrOXYzine pamoate (VISTARIL) 25 MG Cap Take 1 capsule (25 mg total) by mouth every 6 (six) hours as needed (Itching). 14 capsule 0 9/11/2018    ibuprofen (ADVIL,MOTRIN) 800 MG tablet Take 1 tablet (800 mg total) by mouth every 6 (six) hours as needed for Pain. 20 tablet 0 Past Week    imipramine (TOFRANIL) 25 MG tablet Take 25 mg by mouth every evening.   Unknown    levothyroxine (SYNTHROID) 150 MCG tablet Take 1 tablet (150 mcg total) by mouth once daily. 30 tablet 0     linaclotide 290 mcg Cap Take 1 capsule (290 mcg total) by mouth once daily. Take 30 minutes before breakfast. 30 capsule 0 Unknown    meclizine (ANTIVERT) 12.5 mg tablet Take 2 tablets (25 mg total) by mouth 3 (three) times daily as needed for Dizziness. 30 tablet 6 Unknown    medroxyPROGESTERone (DEPO-PROVERA) 150 mg/mL Syrg    More than a month    metFORMIN (GLUCOPHAGE) 500 MG tablet Take 1 tablet (500 mg total) by mouth daily with breakfast. 30 tablet 11 Past Week    mometasone (NASONEX) 50 mcg/actuation nasal spray 2 sprays by Nasal route once daily. 17 g 0 Unknown    naproxen (NAPROSYN) 500 MG tablet Take 1 tablet (500 mg total) by mouth 2 (two) times daily with meals. 20 tablet 0 Unknown    pravastatin (PRAVACHOL) 40 MG tablet Take 40 mg by mouth once daily.   Unknown    ranitidine (ZANTAC) 150 MG capsule Take 1 capsule (150 mg total) by mouth once daily. 30 capsule 0 Unknown    tramadol (ULTRAM) 50 mg tablet    Unknown    VITAMIN D2 50,000 unit capsule    Past Week       Objective:     Vital Signs (Most Recent):  Temp: 98.2 °F (36.8 °C) (01/09/20  0729)  Pulse: 85 (01/09/20 0729)  Resp: 14 (01/09/20 0729)  BP: 115/74 (01/09/20 0729)  SpO2: 96 % (01/09/20 0729) Vital Signs (24h Range):  Temp:  [97.4 °F (36.3 °C)-99.2 °F (37.3 °C)] 98.2 °F (36.8 °C)  Pulse:  [84-99] 85  Resp:  [14-20] 14  SpO2:  [96 %-100 %] 96 %  BP: (115-137)/(65-87) 115/74     Weight: 88.1 kg (194 lb 3.6 oz) (01/08/20 2320)  Body mass index is 35.52 kg/m².    Physical Exam   Constitutional: She is oriented to person, place, and time. She appears well-developed and well-nourished. No distress.   HENT:   Head: Normocephalic and atraumatic.   Nose: Nose normal.   Mouth/Throat: No oropharyngeal exudate.   Eyes: Pupils are equal, round, and reactive to light. Conjunctivae and EOM are normal. No scleral icterus.   Neck: Normal range of motion. Neck supple.   Cardiovascular: Normal rate, regular rhythm and normal heart sounds.   Pulmonary/Chest: Effort normal and breath sounds normal. No respiratory distress. She has no wheezes. She has no rales. She exhibits no tenderness.   Abdominal: Soft. Bowel sounds are normal. There is tenderness. There is no rebound and no guarding.   Morbidly obese  TTP in epigastric and RUQ  No peritoneal signs  Difficult to palpate liver given body habitus  Well healed scars   Musculoskeletal: Normal range of motion. She exhibits no edema, tenderness or deformity.   Neurological: She is alert and oriented to person, place, and time.   Skin: Skin is warm and dry. Capillary refill takes less than 2 seconds. She is not diaphoretic.   Psychiatric: She has a normal mood and affect. Her behavior is normal.       Computed MELD-Na score unavailable. Necessary lab results were not found in the last year.  Computed MELD score unavailable. Necessary lab results were not found in the last year.    Recent Results (from the past 24 hour(s))   Comprehensive metabolic panel    Collection Time: 01/08/20  1:28 PM   Result Value Ref Range    Sodium 139 136 - 145 mmol/L    Potassium 4.4 3.5  - 5.1 mmol/L    Chloride 107 95 - 110 mmol/L    CO2 23 23 - 29 mmol/L    Glucose 70 70 - 110 mg/dL    BUN, Bld 8 6 - 20 mg/dL    Creatinine 1.1 0.5 - 1.4 mg/dL    Calcium 9.6 8.7 - 10.5 mg/dL    Total Protein 8.2 6.0 - 8.4 g/dL    Albumin 4.0 3.5 - 5.2 g/dL    Total Bilirubin 0.6 0.1 - 1.0 mg/dL    Alkaline Phosphatase 153 (H) 55 - 135 U/L     (H) 10 - 40 U/L     (H) 10 - 44 U/L    Anion Gap 9 8 - 16 mmol/L    eGFR if African American >60.0 >60 mL/min/1.73 m^2    eGFR if non African American >60.0 >60 mL/min/1.73 m^2   CBC auto differential    Collection Time: 01/08/20  1:28 PM   Result Value Ref Range    WBC 6.47 3.90 - 12.70 K/uL    RBC 4.37 4.00 - 5.40 M/uL    Hemoglobin 13.3 12.0 - 16.0 g/dL    Hematocrit 41.8 37.0 - 48.5 %    Mean Corpuscular Volume 96 82 - 98 fL    Mean Corpuscular Hemoglobin 30.4 27.0 - 31.0 pg    Mean Corpuscular Hemoglobin Conc 31.8 (L) 32.0 - 36.0 g/dL    RDW 13.2 11.5 - 14.5 %    Platelets 312 150 - 350 K/uL    MPV 10.3 9.2 - 12.9 fL    Immature Granulocytes 0.5 0.0 - 0.5 %    Gran # (ANC) 3.7 1.8 - 7.7 K/uL    Immature Grans (Abs) 0.03 0.00 - 0.04 K/uL    Lymph # 2.0 1.0 - 4.8 K/uL    Mono # 0.6 0.3 - 1.0 K/uL    Eos # 0.1 0.0 - 0.5 K/uL    Baso # 0.03 0.00 - 0.20 K/uL    nRBC 0 0 /100 WBC    Gran% 56.8 38.0 - 73.0 %    Lymph% 31.1 18.0 - 48.0 %    Mono% 9.1 4.0 - 15.0 %    Eosinophil% 2.0 0.0 - 8.0 %    Basophil% 0.5 0.0 - 1.9 %    Platelet Estimate Appears normal     Differential Method Automated    Lipase    Collection Time: 01/08/20  1:28 PM   Result Value Ref Range    Lipase 32 4 - 60 U/L   Urinalysis, Reflex to Urine Culture Urine, Clean Catch    Collection Time: 01/08/20  1:35 PM   Result Value Ref Range    Specimen UA Urine, Clean Catch     Color, UA Lorri Yellow, Straw, Lorri    Appearance, UA Cloudy (A) Clear    pH, UA 5.0 5.0 - 8.0    Specific Gravity, UA >=1.030 (A) 1.005 - 1.030    Protein, UA 1+ (A) Negative    Glucose, UA Negative Negative    Ketones, UA  Negative Negative    Bilirubin (UA) Negative Negative    Occult Blood UA Negative Negative    Nitrite, UA Negative Negative    Leukocytes, UA Negative Negative   Urinalysis Microscopic    Collection Time: 01/08/20  1:35 PM   Result Value Ref Range    RBC, UA 2 0 - 4 /hpf    WBC, UA 3 0 - 5 /hpf    Bacteria Many (A) None-Occ /hpf    Squam Epithel, UA 43 /hpf    Hyaline Casts, UA 6 (A) 0-1/lpf /lpf    Microscopic Comment SEE COMMENT    POCT urine pregnancy    Collection Time: 01/08/20  6:26 PM   Result Value Ref Range    POC Preg Test, Ur Negative Negative     Acceptable Yes    POCT glucose    Collection Time: 01/08/20  9:41 PM   Result Value Ref Range    POCT Glucose 83 70 - 110 mg/dL   CBC auto differential    Collection Time: 01/09/20  5:19 AM   Result Value Ref Range    WBC 5.99 3.90 - 12.70 K/uL    RBC 4.05 4.00 - 5.40 M/uL    Hemoglobin 12.0 12.0 - 16.0 g/dL    Hematocrit 39.9 37.0 - 48.5 %    Mean Corpuscular Volume 99 (H) 82 - 98 fL    Mean Corpuscular Hemoglobin 29.6 27.0 - 31.0 pg    Mean Corpuscular Hemoglobin Conc 30.1 (L) 32.0 - 36.0 g/dL    RDW 13.5 11.5 - 14.5 %    Platelets 307 150 - 350 K/uL    MPV 9.9 9.2 - 12.9 fL    Immature Granulocytes 0.3 0.0 - 0.5 %    Gran # (ANC) 3.4 1.8 - 7.7 K/uL    Immature Grans (Abs) 0.02 0.00 - 0.04 K/uL    Lymph # 1.7 1.0 - 4.8 K/uL    Mono # 0.6 0.3 - 1.0 K/uL    Eos # 0.2 0.0 - 0.5 K/uL    Baso # 0.02 0.00 - 0.20 K/uL    nRBC 0 0 /100 WBC    Gran% 56.8 38.0 - 73.0 %    Lymph% 29.0 18.0 - 48.0 %    Mono% 9.8 4.0 - 15.0 %    Eosinophil% 3.8 0.0 - 8.0 %    Basophil% 0.3 0.0 - 1.9 %    Differential Method Automated    Basic metabolic panel    Collection Time: 01/09/20  5:19 AM   Result Value Ref Range    Sodium 140 136 - 145 mmol/L    Potassium 4.6 3.5 - 5.1 mmol/L    Chloride 108 95 - 110 mmol/L    CO2 26 23 - 29 mmol/L    Glucose 89 70 - 110 mg/dL    BUN, Bld 8 6 - 20 mg/dL    Creatinine 0.8 0.5 - 1.4 mg/dL    Calcium 8.9 8.7 - 10.5 mg/dL    Anion Gap 6  (L) 8 - 16 mmol/L    eGFR if African American >60.0 >60 mL/min/1.73 m^2    eGFR if non African American >60.0 >60 mL/min/1.73 m^2   Magnesium    Collection Time: 01/09/20  5:19 AM   Result Value Ref Range    Magnesium 2.4 1.6 - 2.6 mg/dL   Phosphorus    Collection Time: 01/09/20  5:19 AM   Result Value Ref Range    Phosphorus 3.6 2.7 - 4.5 mg/dL   POCT glucose    Collection Time: 01/09/20  7:34 AM   Result Value Ref Range    POCT Glucose 68 (L) 70 - 110 mg/dL   Comprehensive metabolic panel    Collection Time: 01/09/20  8:07 AM   Result Value Ref Range    Sodium 138 136 - 145 mmol/L    Potassium 4.0 3.5 - 5.1 mmol/L    Chloride 109 95 - 110 mmol/L    CO2 23 23 - 29 mmol/L    Glucose 80 70 - 110 mg/dL    BUN, Bld 7 6 - 20 mg/dL    Creatinine 0.8 0.5 - 1.4 mg/dL    Calcium 8.7 8.7 - 10.5 mg/dL    Total Protein 6.5 6.0 - 8.4 g/dL    Albumin 3.2 (L) 3.5 - 5.2 g/dL    Total Bilirubin 0.6 0.1 - 1.0 mg/dL    Alkaline Phosphatase 123 55 - 135 U/L     (H) 10 - 40 U/L     (H) 10 - 44 U/L    Anion Gap 6 (L) 8 - 16 mmol/L    eGFR if African American >60.0 >60 mL/min/1.73 m^2    eGFR if non African American >60.0 >60 mL/min/1.73 m^2   POCT glucose    Collection Time: 01/09/20  9:59 AM   Result Value Ref Range    POCT Glucose 133 (H) 70 - 110 mg/dL     Imaging Results          NM Hepatobiliary Scan W Pharm Intervention (Final result)  Result time 01/08/20 21:42:55   Procedure changed from NM Hepatobiliary Scan (HIDA)     Final result by Raul Dorado MD (01/08/20 21:42:55)                 Impression:      The cystic and common mary ducts are patent.    Electronically signed by resident: Yohan Mendez MD  Date:    01/08/2020  Time:    20:57    Electronically signed by: Raul Dorado MD  Date:    01/08/2020  Time:    21:42             Narrative:    EXAMINATION:  NM HEPATOBILIARY(HIDA) WITH PHARM AND EF    CLINICAL HISTORY:  RUQ pain, cholecystitis suspected;    TECHNIQUE:  Following the IV administration of 5.4  mCi of Tc-99m-mebrofenin, sequential dynamic anterior images of the abdomen were obtained for 60 minutes followed by a right lateral view at 60 minutes.    Subsequently, the patient received an additional injection of 4 mg of morphine IV, and additional images of the abdomen were acquired for 30 minutes.    COMPARISON:  Abdominal ultrasound 01/08/2020.    FINDINGS:  The early images demonstrate homogeneous uptake of the radiopharmaceutical by the liver with no focal hepatic abnormalities.    Normal activity is present in the common bile duct and small bowel at 6 minutes post injection.  The gallbladder was not definitively seen on initial 60 minute imaging.    Following the administration of morphine, there is accumulation of radiotracer at the inferior aspect of the liver likely within the gallbladder.                                US Abdomen Limited (Gallbladder) (Final result)  Result time 01/08/20 16:18:54    Final result by Mango High MD (01/08/20 16:18:54)                 Impression:      Findings concerning for acute cholecystitis.    This report was flagged in Epic as abnormal.    Significant Alert:    The significant finding above was relayed by radiology resident Dr. Farrar on behalf of Dr. High by telephone to the ordering provider, Dr. Holloway On 1/8/2020 at 16:13.    Electronically signed by resident: Ruben Farrar  Date:    01/08/2020  Time:    16:01    Electronically signed by: Mango High MD  Date:    01/08/2020  Time:    16:18             Narrative:    EXAMINATION:  US ABDOMEN LIMITED    CLINICAL HISTORY:  Epigastric pain    TECHNIQUE:  Limited ultrasound of the right upper quadrant of the abdomen (including pancreas, liver, gallbladder, common bile duct, and right kidney) was performed.    COMPARISON:  No relevant prior exams available.    FINDINGS:  The gallbladder is contracted with multiple mobile gallstones within its lumen resulting in wall echo shadow sign and obscuration of  the dependent portions of the gallbladder wall.  The visualized portions of the gallbladder wall are normal in diameter measuring 3 mm.  No gallbladder wall hypervascularity or pericholecystic fluid.  Positive Mclean sign.  No evidence of choledocholithiasis.  The common bile duct measures 3 mm in diameter.    The pancreas is obscured by overlying bowel gas.    There is no free fluid within the visualized abdomen.    The right kidney is unremarkable with no evidence of hydronephrosis.

## 2020-01-09 NOTE — HPI
"Patient is a 40yo female with PMHx significant for HTN, HLD, t2DM, hypothyroidism, MITZI who presented to the ER with abdominal pain located in the epigastric region with radiation to RUQ following eating a "hot sausage sandwich." She had associated nausea, non bloody non bilious vomiting and few episodes of watery diarrhea. She was unable to tolerate PO. She presented to ER due to worsening symptoms on the second day following onset of pain. She denid any fevers, chills, weight loss, bleeding, dysuria, jaundice, icterus, SOB, cough, chest pain. US in ER was concerning for acute cholecystitis. General surgery was consulted and patient was placed in to delineate whether cholecystitis vs symptomatic gallstones with HIDA scan. Hepatology consulted for elevated transaminases. On admit, , Tbili wnl at 0.6, , .  Patient admitted to only taking 2 x Tylenol pills. Denied Etoh or drugs.   "

## 2020-01-09 NOTE — NURSING
Pt c/o of being dizzy and nauseated; administer Zofran 4mg IV and recheck pt blood sugar dropped back down to 79 mg/dl; CC=701/82

## 2020-01-09 NOTE — PLAN OF CARE
Plan of care reviewed with patient. Vital signs are within normal limits, and she has not experienced a change in LOC. Thus far in my shift, she has not complained of any abdominal pain, nor has she had any episodes of diarrhea or nausea/vomiting. At the time of this writing, no fall with injury has occurred. Bed down in lowest position, call light within reach, side rails upx2.

## 2020-01-09 NOTE — PLAN OF CARE
CM met with patient at the bedside to discuss discharge planning assessment. Pt lives with family and has transportation at discharge. Pt verified PCP and Pharmacy. CM will continue to follow for discharge needs.        01/09/20 0917   Discharge Assessment   Assessment Type Discharge Planning Assessment   Confirmed/corrected address and phone number on facesheet? Yes   Assessment information obtained from? Patient   Expected Length of Stay (days) 1   Communicated expected length of stay with patient/caregiver yes   Prior to hospitilization cognitive status: Alert/Oriented   Prior to hospitalization functional status: Independent   Current cognitive status: Alert/Oriented   Current Functional Status: Independent   Lives With spouse   Able to Return to Prior Arrangements yes   Is patient able to care for self after discharge? Yes   Who are your caregiver(s) and their phone number(s)? Dayanna Turner- mother- [phone]   Patient's perception of discharge disposition home or selfcare   Readmission Within the Last 30 Days no previous admission in last 30 days   Patient currently being followed by outpatient case management? No   Patient currently receives any other outside agency services? No   Equipment Currently Used at Home glucometer   Do you have any problems affording any of your prescribed medications? No   Is the patient taking medications as prescribed? yes   Does the patient have transportation home? Yes   Transportation Anticipated family or friend will provide   Does the patient receive services at the [facility]? No   Discharge Plan A Home with family   Discharge Plan B Home with family   DME Needed Upon Discharge  none   Patient/Family in Agreement with Plan yes

## 2020-01-09 NOTE — PROGRESS NOTES
[facility]  General Surgery  Progress Note    Subjective:     History of Present Illness:  No notes on file    Post-Op Info:  * No surgery found *         Interval History: No acute events overnight. Still having epigastric pain. No nausea or vomiting. Pain did not get worse with HIDA scan.    Medications:  Continuous Infusions:   sodium chloride 0.9% 125 mL/hr at 01/08/20 2111     Scheduled Meds:   levothyroxine  150 mcg Oral Daily    lidocaine (PF) 10 mg/ml (1%)  1 mL Other Once    morphine  4 mg Intravenous ED 1 Time    pravastatin  40 mg Oral Daily     PRN Meds:morphine, morphine, ondansetron, sodium chloride 0.9%     Review of patient's allergies indicates:   Allergen Reactions    Cheese Itching and Rash     Objective:     Vital Signs (Most Recent):  Temp: 97.4 °F (36.3 °C) (01/09/20 0534)  Pulse: 84 (01/09/20 0534)  Resp: 18 (01/09/20 0534)  BP: 128/79 (01/09/20 0534)  SpO2: 97 % (01/09/20 0534) Vital Signs (24h Range):  Temp:  [97.4 °F (36.3 °C)-99.2 °F (37.3 °C)] 97.4 °F (36.3 °C)  Pulse:  [84-99] 84  Resp:  [16-20] 18  SpO2:  [96 %-100 %] 97 %  BP: (115-137)/(65-87) 128/79     Weight: 88.1 kg (194 lb 3.6 oz)  Body mass index is 35.52 kg/m².    Intake/Output - Last 3 Shifts       01/07 0700 - 01/08 0659 01/08 0700 - 01/09 0659 01/09 0700 - 01/10 0659    Urine (mL/kg/hr)  200     Total Output  200     Net  -200            Urine Occurrence  1 x           Physical Exam   Constitutional: She is oriented to person, place, and time. She appears well-developed and well-nourished. No distress.   Cardiovascular: Normal rate and regular rhythm.   Pulmonary/Chest: Effort normal. No respiratory distress.   Abdominal: Soft. She exhibits no distension. There is tenderness (TTP in epigastrum ). There is no rebound and no guarding.   Neurological: She is alert and oriented to person, place, and time.   Skin: Skin is warm and dry.       Significant Labs:  CBC:   Recent Labs   Lab 01/09/20  0519    WBC 5.99   RBC 4.05   HGB 12.0   HCT 39.9      MCV 99*   MCH 29.6   MCHC 30.1*     CMP:   Recent Labs   Lab 01/08/20  1328 01/09/20  0519   GLU 70 89   CALCIUM 9.6 8.9   ALBUMIN 4.0  --    PROT 8.2  --     140   K 4.4 4.6   CO2 23 26    108   BUN 8 8   CREATININE 1.1 0.8   ALKPHOS 153*  --    *  --    *  --    BILITOT 0.6  --        Significant Diagnostics:  HIDA:  EXAMINATION:  NM HEPATOBILIARY(HIDA) WITH PHARM AND EF    CLINICAL HISTORY:  RUQ pain, cholecystitis suspected;    TECHNIQUE:  Following the IV administration of 5.4 mCi of Tc-99m-mebrofenin, sequential dynamic anterior images of the abdomen were obtained for 60 minutes followed by a right lateral view at 60 minutes.    Subsequently, the patient received an additional injection of 4 mg of morphine IV, and additional images of the abdomen were acquired for 30 minutes.    COMPARISON:  Abdominal ultrasound 01/08/2020.    FINDINGS:  The early images demonstrate homogeneous uptake of the radiopharmaceutical by the liver with no focal hepatic abnormalities.    Normal activity is present in the common bile duct and small bowel at 6 minutes post injection.  The gallbladder was not definitively seen on initial 60 minute imaging.    Following the administration of morphine, there is accumulation of radiotracer at the inferior aspect of the liver likely within the gallbladder.      Impression       The cystic and common mary ducts are patent.    Electronically signed by resident: Yohan Mendez MD  Date: 01/08/2020  Time: 20:57    Electronically signed by: Raul Dorado MD  Date: 01/08/2020  Time: 21:42         Assessment/Plan:     Epigastric pain  40 yo female with epigastric pain and transaminitis. HIDA negative for acute pathology.     Plan:  - Continue NPO, MIVF  - Will order CMP and acute hepatitis panel  - Discussed with GI for possible scope, however, they feel this is more a hepatology issue. Will call hepatology  - Will  stop statins  - OOB/ambulate        Danica Hernandez MD  General Surgery  [facility]

## 2020-01-10 VITALS
BODY MASS INDEX: 35.75 KG/M2 | HEIGHT: 62 IN | SYSTOLIC BLOOD PRESSURE: 127 MMHG | DIASTOLIC BLOOD PRESSURE: 83 MMHG | WEIGHT: 194.25 LBS | OXYGEN SATURATION: 100 % | RESPIRATION RATE: 14 BRPM | HEART RATE: 97 BPM | TEMPERATURE: 99 F

## 2020-01-10 LAB
ALBUMIN SERPL BCP-MCNC: 3.3 G/DL (ref 3.5–5.2)
ALP SERPL-CCNC: 116 U/L (ref 55–135)
ALT SERPL W/O P-5'-P-CCNC: 345 U/L (ref 10–44)
AMPHETAMINES SERPL QL: NEGATIVE
ANION GAP SERPL CALC-SCNC: 6 MMOL/L (ref 8–16)
AST SERPL-CCNC: 83 U/L (ref 10–40)
BARBITURATES SERPL QL SCN: NEGATIVE
BASOPHILS # BLD AUTO: 0.02 K/UL (ref 0–0.2)
BASOPHILS NFR BLD: 0.3 % (ref 0–1.9)
BENZODIAZ SERPL QL SCN: NEGATIVE
BILIRUB SERPL-MCNC: 0.3 MG/DL (ref 0.1–1)
BUN SERPL-MCNC: 4 MG/DL (ref 6–20)
BZE SERPL QL: NEGATIVE
CALCIUM SERPL-MCNC: 9.1 MG/DL (ref 8.7–10.5)
CARBOXYTHC SERPL QL SCN: NEGATIVE
CHLORIDE SERPL-SCNC: 109 MMOL/L (ref 95–110)
CO2 SERPL-SCNC: 23 MMOL/L (ref 23–29)
CREAT SERPL-MCNC: 0.8 MG/DL (ref 0.5–1.4)
DIFFERENTIAL METHOD: ABNORMAL
EOSINOPHIL # BLD AUTO: 0.3 K/UL (ref 0–0.5)
EOSINOPHIL NFR BLD: 4.2 % (ref 0–8)
ERYTHROCYTE [DISTWIDTH] IN BLOOD BY AUTOMATED COUNT: 13.3 % (ref 11.5–14.5)
EST. GFR  (AFRICAN AMERICAN): >60 ML/MIN/1.73 M^2
EST. GFR  (NON AFRICAN AMERICAN): >60 ML/MIN/1.73 M^2
ETHANOL SERPL QL SCN: NEGATIVE
GLUCOSE SERPL-MCNC: 129 MG/DL (ref 70–110)
HCT VFR BLD AUTO: 40.6 % (ref 37–48.5)
HGB BLD-MCNC: 12.4 G/DL (ref 12–16)
IMM GRANULOCYTES # BLD AUTO: 0.05 K/UL (ref 0–0.04)
IMM GRANULOCYTES NFR BLD AUTO: 0.7 % (ref 0–0.5)
LYMPHOCYTES # BLD AUTO: 2.3 K/UL (ref 1–4.8)
LYMPHOCYTES NFR BLD: 31.3 % (ref 18–48)
MCH RBC QN AUTO: 29.8 PG (ref 27–31)
MCHC RBC AUTO-ENTMCNC: 30.5 G/DL (ref 32–36)
MCV RBC AUTO: 98 FL (ref 82–98)
METHADONE SERPL QL SCN: NEGATIVE
MONOCYTES # BLD AUTO: 0.5 K/UL (ref 0.3–1)
MONOCYTES NFR BLD: 7.5 % (ref 4–15)
NEUTROPHILS # BLD AUTO: 4 K/UL (ref 1.8–7.7)
NEUTROPHILS NFR BLD: 56 % (ref 38–73)
NRBC BLD-RTO: 0 /100 WBC
OPIATES SERPL QL SCN: NEGATIVE
PCP SERPL QL SCN: NEGATIVE
PLATELET # BLD AUTO: 320 K/UL (ref 150–350)
PMV BLD AUTO: 10 FL (ref 9.2–12.9)
POCT GLUCOSE: 130 MG/DL (ref 70–110)
POCT GLUCOSE: 90 MG/DL (ref 70–110)
POTASSIUM SERPL-SCNC: 4 MMOL/L (ref 3.5–5.1)
PROPOXYPH SERPL QL: NEGATIVE
PROT SERPL-MCNC: 6.8 G/DL (ref 6–8.4)
RBC # BLD AUTO: 4.16 M/UL (ref 4–5.4)
SODIUM SERPL-SCNC: 138 MMOL/L (ref 136–145)
WBC # BLD AUTO: 7.21 K/UL (ref 3.9–12.7)

## 2020-01-10 PROCEDURE — 96361 HYDRATE IV INFUSION ADD-ON: CPT | Performed by: EMERGENCY MEDICINE

## 2020-01-10 PROCEDURE — G0378 HOSPITAL OBSERVATION PER HR: HCPCS

## 2020-01-10 PROCEDURE — 63600175 PHARM REV CODE 636 W HCPCS: Performed by: GENERAL PRACTICE

## 2020-01-10 PROCEDURE — 99217 PR OBSERVATION CARE DISCHARGE: CPT | Mod: ,,, | Performed by: HOSPITALIST

## 2020-01-10 PROCEDURE — 85025 COMPLETE CBC W/AUTO DIFF WBC: CPT

## 2020-01-10 PROCEDURE — 80053 COMPREHEN METABOLIC PANEL: CPT

## 2020-01-10 PROCEDURE — 25000003 PHARM REV CODE 250: Performed by: GENERAL PRACTICE

## 2020-01-10 PROCEDURE — 36415 COLL VENOUS BLD VENIPUNCTURE: CPT

## 2020-01-10 PROCEDURE — 99217 PR OBSERVATION CARE DISCHARGE: ICD-10-PCS | Mod: ,,, | Performed by: HOSPITALIST

## 2020-01-10 RX ADMIN — SODIUM CHLORIDE: 0.9 INJECTION, SOLUTION INTRAVENOUS at 09:01

## 2020-01-10 RX ADMIN — LEVOTHYROXINE SODIUM 150 MCG: 100 TABLET ORAL at 06:01

## 2020-01-10 NOTE — PLAN OF CARE
01/10/20 1505   Final Note   Assessment Type Final Discharge Note   Anticipated Discharge Disposition Home   What phone number can be called within the next 1-3 days to see how you are doing after discharge? [phone]   Discharge plans and expectations educations in teach back method with documentation complete? Yes   Right Care Referral Info   Post Acute Recommendation No Care

## 2020-01-10 NOTE — PLAN OF CARE
Pt is a/ox4, up ad jonathan, VSS. She denies pain, n/v, dizziness, and sob. Fall risks and precautions, as well as pain management were discussed, and any questions addressed. Pt remains free from falls and injury. Personal belongings and call light are within reach. Will continue to monitor.

## 2020-01-10 NOTE — TREATMENT PLAN
Patient seen and examined this morning.     Remained hemodynamically stable, afebrile, sating well on room air.  Pain subjectively improved  TTP in epigastric and RUQ much improved from previous day on PEx.    Review of labs reveal ongoing rapid decrease in transaminases.    Continue to believe elevated transaminases were from a transiently obstructing gallstone.    No further evaluation required from Hepatology standpoint.  Hepatology will sign off.  Please call if any questions.    Michael Roberts MD PGY-II  Hepatology

## 2020-01-10 NOTE — NURSING
Pt verbalized understanding to discharge instruction; removed PIV and given return back to work note. Pt walked out with family.

## 2020-01-10 NOTE — PLAN OF CARE
01/10/2020      Caitie Wang  [id number] [address] within [facility] [place] [address]          Hospital Medicine Dept.  [facility] [address] [facility] [address] [phone] after hours  [phone] fax     This above patient was hospitalized 1//8/2020 through 1//  Please excuse from work through 1/12.20.  She may return to work  Monday, 1/13/20.      She will need follow up appointments in the future.       __________      ________________  Kori Morley MD  01/10/2020

## 2020-01-10 NOTE — DISCHARGE SUMMARY
"[facility] Hospital Medicine  Discharge Summary      Patient Name: Caitie Wang  MRN: [id number]  Admission Date: 1/8/2020  Hospital Length of Stay: 0 days  Discharge Date and Time: No discharge date for patient encounter.  Attending Physician: Kori Morley MD   Discharging Provider: Kori Morley MD  Primary Care Provider: Juana Vines MD  [facility] Medicine Team: [facility] HOSP MED N Kori Morley MD    HPI:               01/10/202    Admit Date: 1/8/2020             LOS: 0 days     SUBJECTIVE:       HPI/Interval history (See H&P for complete P,F,SHx) :   This is a 39 y.o. female.       38 yo female with PMHx significant for HTN, HLD, NIDDM2, hypothyroidism, MITZI who was initially admitted to general surgery service for suspicion for acute cholecystitis, subsquent HIDA scan negative and now medicine is being consulted for further management.         Patient presented to the ED with RUQ abdominal pain which started after eating a "hot sausage sandwich." on the morning of 1/7/20. Associated with nausea, non-bloody & non-bilious vomiting and watery diarrhea. The following day (1/8/20) patient presented to the ED for worsening pain. In the ED, Afebrile. No leukocytosis - WBC 6.5. No left shift - Gran 56.8%. No acidosis. TBili normal. Transaminitis - , . US abdomen obtained showing gallstones. No wall thickening or hyperemia. No pericholecystic fluid. Common bile duct 3 mm. Reported possible contraction of the gallbladder and concern for acute cholecystitis. Denies fevers, chills, weight loss, bleeding, dysuria, jaundice, icterus, SOB, cough, chest pain     She was admitted to the general surgery service and subsequent HIDA scan (1/9/20) did not show evidence of acute cholecysts. Now medicine is being consulted for further management.      Denies similar symptoms in the past.  Denies smoking or ETOH use, IV drug use.  Denies new tattoos, new sexual partners.     1/10 - pain " greatly improved.  Hepatology saw pt and thinks she passed a gallstone.  I agree.  Will have pt f/u w General surgery and hepatology.     Noted polypharmacy and recduced unneeded meds that could interfere w Bowel function.  Would recommend gradual reduction of other meds and psychosocial care for pain management.  May benefit from pain specialist     F/u PCP. Hepatology and gen surgery     Review of Systems:  Constitutional-  Negative for Fever,chills,  Weakness  Neuro-  Negative for Confusion, focal weakness  CardioVascular-  Negative for Chest Pain, Palpitations  Resp-  Negative for Cough, SOB  GI-  Negative for Nausea, Vomiting, Diarrhea  Extremities-  Negative for Pain, Swelling  % Meals-  eating  # BM-   +  Urine output:  good  Ambulation:  In room, independent    I & O (Last 24H):    Intake/Output Summary (Last 24 hours) at 1/10/2020 0850  Last data filed at 1/9/2020 1000  Gross per 24 hour   Intake --   Output 500 ml   Net -500 ml       Subjective pain scale: 0  Observed (smile) scale:0    OBJECTIVE:       Vitals:    01/09/20 1934 01/09/20 2356 01/10/20 0326 01/10/20 0731   BP: 127/62 123/69 113/60 119/67   BP Location: Right arm Right arm Right arm    Patient Position: Lying Lying Lying Lying   Pulse: 92 93 88 94   Resp: 14 16 14 16   Temp: 98 °F (36.7 °C) 98.5 °F (36.9 °C) 98.3 °F (36.8 °C) 98.6 °F (37 °C)   TempSrc: Oral Oral Oral Oral   SpO2: 99% 100% 95% 98%   Weight:       Height:           Physical Exam:  General:  Well developed, well nourished in NAD  Mental status-  oriented time 3, linear thought process, + focused attention, alert and awake  HEENT:  Conjunctiva clear; Oropharynx clear, moist membranes  Neck:  No JVD noted, Supple, full range of motion  CardioVascular-  Normal rate,  S1, S2 with no murmurs,gallops,rubs  Respiratory-  Lungs CTA Bilaterally, Unlabored, good effort  Abdomen-  Nontender, no rebound, no guarding,  nondistended, +  BS, soft, no hepatosplenomegaly  Extrem-  No cyanosis,  clubbing, edema.  Skin-   No rashes, lesions, ulcers, no third spacing dependent edema    Continuous Infusions:   sodium chloride 0.9% 125 mL/hr at 01/09/20 1428     Scheduled Meds:   levothyroxine  150 mcg Oral Daily    lidocaine (PF) 10 mg/ml (1%)  1 mL Other Once     PRN Meds:Dextrose 10% Bolus, morphine, morphine, ondansetron, sodium chloride 0.9%    Chemistry:  Recent Labs     01/09/20  0519 01/09/20  0807 01/10/20  0648    138 138   K 4.6 4.0 4.0    109 109   CO2 26 23 23   BUN 8 7 4*   CREATININE 0.8 0.8 0.8   CALCIUM 8.9 8.7 9.1     Recent Labs     01/08/20  1328 01/09/20  0807 01/10/20  0648   * 223* 83*   * 496* 345*   ALKPHOS 153* 123 116   ALBUMIN 4.0 3.2* 3.3*   PROT 8.2 6.5 6.8   BILITOT 0.6 0.6 0.3         CBC:  Recent Labs   Lab 01/08/20  1328 01/09/20  0519 01/10/20  0648   WBC 6.47 5.99 7.21   HGB 13.3 12.0 12.4   HCT 41.8 39.9 40.6    307 320   MONO 9.1  0.6 9.8  0.6 7.5  0.5         ASSESSMENT/PLAN:   Transaminitis  40yo female who presented to epigastric/RUQ pain, abdominal US suspicious for acute cholecystitis subsquent HIDA scan negative, elevated AST/ALT and mildy elevated ALP now being transferred to medicine team for further management.     Minimum tylenol use (2 pills)  Denies Etoh, NSAIDs, recent antibiotics, food or travel out of the ordinary, personal or family history of autoimmune disease     - US abdomen limited on admit wshowed no evidence of choledocholithiasis.  The common bile duct measures 3 mm in diameter. Gallstones present.  - HIDA with patent cystic and bile ducts  - LFTs resolving, t.demetrio wnl      Patient does have an underlying pain disorder, which could potentially be excerbated if she transiantely passed a stone. However, her LFTs are quickly improving.    Per hepatology:      Patient admitted to 2 pills of Tylenol otherwise denied Etoh, NSAIDs, recent antibiotics, food or travel out of the ordinary, personal or family history of  autoimmune disease, she does not take her prescribed Pravastatin due to insurance issues, she denied any significant sick contacts, she is in a monogamous relationship with  with no know history of STIs.  - CMP 01/09/2019 with significant decrease in transaminases and ALP now wnl. Tbili continues to remain wnl.  - Etiology of transaminitis likely a gallstone that has passed given rapid improvement in labs    Irritable Bowel syndrome  Chronic pain    PLAN  - supportive care .  Consider sending patient to a functional restoration program, therapy program,  or assess for psychosocial care. Would not treat chronic pain with opioids, as it will negatively affect both brain and bowel function.   - advanced diet as tolerated  - tylenol level - neg  - urine drug screen - neg  - acute hep panel - heg  - stool studies - not able to collect       Kori Morley MD  Senior Hospitalist  [facility]  [id number], [phone]        * No surgery found *      Hospital Course:   No notes on file     Consults:   Consults (From admission, onward)        Status Ordering Provider     Inpatient consult to General surgery  Once     Provider:  (Not yet assigned)    Completed RAFFAELE JAIN     Inpatient consult to Hepatology  Once     Provider:  (Not yet assigned)    Completed ONEL GERARDO     Inpatient consult to Hospital Medicine-General  Once     Provider:  (Not yet assigned)    Completed ONEL GERARDO          No new Assessment & Plan notes have been filed under this hospital service since the last note was generated.  Service: Hospital Medicine    Final Active Diagnoses:    Diagnosis Date Noted POA    PRINCIPAL PROBLEM:  Epigastric pain [R10.13] 01/08/2020 Yes    Transaminitis [R74.0] 01/09/2020 Yes      Problems Resolved During this Admission:       Discharged Condition: good    Disposition: Home or Self Care    Follow Up:    Patient Instructions:   No discharge procedures on file.    Significant  Diagnostic Studies: see above    Pending Diagnostic Studies:     Procedure Component Value Units Date/Time    Drugs of Abuse Screen, Blood [[id number]] Collected:  01/09/20 1119    Order Status:  Sent Lab Status:  In process Updated:  01/09/20 1124    Specimen:  Blood          Medications:  Reconciled Home Medications:      Medication List      CONTINUE taking these medications    cetirizine 10 MG tablet  Commonly known as:  ZYRTEC  Take 1 tablet (10 mg total) by mouth once daily.     folic acid 1 MG tablet  Commonly known as:  FOLVITE  Take 1 mg by mouth once daily.     gabapentin 300 MG capsule  Commonly known as:  NEURONTIN  Take 1 capsule (300 mg total) by mouth 3 (three) times daily.     imipramine 25 MG tablet  Commonly known as:  TOFRANIL  Take 25 mg by mouth every evening.     levothyroxine 150 MCG tablet  Commonly known as:  SYNTHROID  Take 1 tablet (150 mcg total) by mouth once daily.     medroxyPROGESTERone 150 mg/mL Syrg  Commonly known as:  DEPO-PROVERA     metFORMIN 500 MG tablet  Commonly known as:  GLUCOPHAGE  Take 1 tablet (500 mg total) by mouth daily with breakfast.     mometasone 50 mcg/actuation nasal spray  Commonly known as:  NASONEX  2 sprays by Nasal route once daily.     pravastatin 40 MG tablet  Commonly known as:  PRAVACHOL  Take 40 mg by mouth once daily.     PROAIR HFA INHL  Inhale into the lungs.     ranitidine 150 MG capsule  Commonly known as:  ZANTAC  Take 1 capsule (150 mg total) by mouth once daily.     Vitamin D2 50,000 unit Cap  Generic drug:  ergocalciferol        STOP taking these medications    doxycycline 100 MG capsule  Commonly known as:  MONODOX     hydrOXYzine HCl 25 MG tablet  Commonly known as:  ATARAX     hydrOXYzine pamoate 25 MG Cap  Commonly known as:  VISTARIL     ibuprofen 800 MG tablet  Commonly known as:  ADVIL,MOTRIN     linaCLOtide 290 mcg Cap capsule  Commonly known as:  LINZESS     meclizine 12.5 mg tablet  Commonly known as:  ANTIVERT     naproxen 500 MG  tablet  Commonly known as:  NAPROSYN     traMADol 50 mg tablet  Commonly known as:  ULTRAM            Indwelling Lines/Drains at time of discharge:   Lines/Drains/Airways     None                 Time spent on the discharge of patient: 35 minutes  Patient was seen and examined on the date of discharge and determined to be suitable for discharge.         Kori Morley MD  Department of Hospital Medicine  [facility]

## 2020-03-19 ENCOUNTER — HOSPITAL ENCOUNTER (EMERGENCY)
Facility: HOSPITAL | Age: 40
Discharge: HOME | End: 2020-03-19
Attending: EMERGENCY MEDICINE
Payer: COMMERCIAL

## 2020-03-19 ENCOUNTER — TELEPHONE (OUTPATIENT)
Dept: INTERNAL MEDICINE | Facility: CLINIC | Age: 40
End: 2020-03-19

## 2020-03-19 ENCOUNTER — OFFICE VISIT (OUTPATIENT)
Dept: INTERNAL MEDICINE | Facility: CLINIC | Age: 40
End: 2020-03-19
Payer: COMMERCIAL

## 2020-03-19 VITALS
TEMPERATURE: 99 F | SYSTOLIC BLOOD PRESSURE: 129 MMHG | HEART RATE: 89 BPM | OXYGEN SATURATION: 100 % | RESPIRATION RATE: 17 BRPM | HEIGHT: 63 IN | DIASTOLIC BLOOD PRESSURE: 92 MMHG | BODY MASS INDEX: 43.23 KG/M2 | WEIGHT: 244 LBS

## 2020-03-19 VITALS
WEIGHT: 244.25 LBS | BODY MASS INDEX: 44.68 KG/M2 | DIASTOLIC BLOOD PRESSURE: 84 MMHG | OXYGEN SATURATION: 100 % | SYSTOLIC BLOOD PRESSURE: 122 MMHG | HEART RATE: 94 BPM

## 2020-03-19 DIAGNOSIS — T17.208A FOREIGN BODY IN THROAT, INITIAL ENCOUNTER: Primary | ICD-10-CM

## 2020-03-19 DIAGNOSIS — R09.A0 SENSATION OF FOREIGN BODY: ICD-10-CM

## 2020-03-19 DIAGNOSIS — T17.208A FOREIGN BODY OF TONSIL, INITIAL ENCOUNTER: ICD-10-CM

## 2020-03-19 DIAGNOSIS — J02.9 SORE THROAT: Primary | ICD-10-CM

## 2020-03-19 PROCEDURE — 3079F PR MOST RECENT DIASTOLIC BLOOD PRESSURE 80-89 MM HG: ICD-10-PCS | Mod: CPTII,S$GLB,, | Performed by: PHYSICIAN ASSISTANT

## 2020-03-19 PROCEDURE — 3074F SYST BP LT 130 MM HG: CPT | Mod: CPTII,S$GLB,, | Performed by: PHYSICIAN ASSISTANT

## 2020-03-19 PROCEDURE — 25000003 PHARM REV CODE 250: Performed by: EMERGENCY MEDICINE

## 2020-03-19 PROCEDURE — 99203 OFFICE O/P NEW LOW 30 MIN: CPT | Mod: S$GLB,,, | Performed by: PHYSICIAN ASSISTANT

## 2020-03-19 PROCEDURE — 42809 REMOVE PHARYNX FOREIGN BODY: CPT

## 2020-03-19 PROCEDURE — 3074F PR MOST RECENT SYSTOLIC BLOOD PRESSURE < 130 MM HG: ICD-10-PCS | Mod: CPTII,S$GLB,, | Performed by: PHYSICIAN ASSISTANT

## 2020-03-19 PROCEDURE — 99203 PR OFFICE/OUTPT VISIT, NEW, LEVL III, 30-44 MIN: ICD-10-PCS | Mod: S$GLB,,, | Performed by: PHYSICIAN ASSISTANT

## 2020-03-19 PROCEDURE — 99999 PR PBB SHADOW E&M-EST. PATIENT-LVL III: ICD-10-PCS | Mod: PBBFAC,,, | Performed by: PHYSICIAN ASSISTANT

## 2020-03-19 PROCEDURE — 3008F BODY MASS INDEX DOCD: CPT | Mod: CPTII,S$GLB,, | Performed by: PHYSICIAN ASSISTANT

## 2020-03-19 PROCEDURE — 3008F PR BODY MASS INDEX (BMI) DOCUMENTED: ICD-10-PCS | Mod: CPTII,S$GLB,, | Performed by: PHYSICIAN ASSISTANT

## 2020-03-19 PROCEDURE — 99284 EMERGENCY DEPT VISIT MOD MDM: CPT | Mod: ,,, | Performed by: EMERGENCY MEDICINE

## 2020-03-19 PROCEDURE — 99999 PR PBB SHADOW E&M-EST. PATIENT-LVL III: CPT | Mod: PBBFAC,,, | Performed by: PHYSICIAN ASSISTANT

## 2020-03-19 PROCEDURE — 99283 EMERGENCY DEPT VISIT LOW MDM: CPT | Mod: 25

## 2020-03-19 PROCEDURE — 3079F DIAST BP 80-89 MM HG: CPT | Mod: CPTII,S$GLB,, | Performed by: PHYSICIAN ASSISTANT

## 2020-03-19 PROCEDURE — 99284 PR EMERGENCY DEPT VISIT,LEVEL IV: ICD-10-PCS | Mod: ,,, | Performed by: EMERGENCY MEDICINE

## 2020-03-19 RX ORDER — LIDOCAINE HYDROCHLORIDE 20 MG/ML
5 SOLUTION OROPHARYNGEAL
Status: DISCONTINUED | OUTPATIENT
Start: 2020-03-19 | End: 2020-03-19

## 2020-03-19 RX ADMIN — TOPICAL ANESTHETIC: 200 SPRAY DENTAL; PERIODONTAL at 10:03

## 2020-03-19 NOTE — ED TRIAGE NOTES
"Caitie Wang, a 40 y.o. female presents to the ED w/ complaint of a hair on her tonsil. Pt was sent here from [facility] for ENT referral     Triage note:  Chief Complaint   Patient presents with    Sore Throat     x three weeks. Reports eating crawfish yesterday and a "hair" is stuck on her tonsil.      Review of patient's allergies indicates:   Allergen Reactions    Cheese Itching and Rash     Past Medical History:   Diagnosis Date    Anxiety     Asthma     Chronic pain     back; inflammatory    Depression     Diabetes mellitus, type 2     Endometriosis     Hyperlipidemia     Hypertension     MITZI (obstructive sleep apnea)     Thyroid disease     Vitamin D deficiency        "

## 2020-03-19 NOTE — PROGRESS NOTES
Subjective:       Patient ID: Caitie Wang is a 40 y.o. female.    Chief Complaint: Sore Throat (x3 days )    HPI     Established pt of Juana Vines MD (new to me)      C/o sore throat, ate crawfish yesterday, she feels a FB sensation to her right throat. Feels like a piece of the shell from crawfish is stuck in her throat. She tried warm salt gargles, eating bread without much improvement.     Past Medical History:   Diagnosis Date    Anxiety     Asthma     Chronic pain     back; inflammatory    Depression     Diabetes mellitus, type 2     Endometriosis     Hyperlipidemia     Hypertension     MITZI (obstructive sleep apnea)     Thyroid disease     Vitamin D deficiency      Social History     Tobacco Use    Smoking status: Never Smoker    Smokeless tobacco: Never Used   Substance Use Topics    Alcohol use: No    Drug use: No     Review of patient's allergies indicates:   Allergen Reactions    Cheese Itching and Rash           Review of Systems   Constitutional: Negative for chills, fatigue and fever.   HENT: Positive for sore throat. Negative for congestion and trouble swallowing.    Respiratory: Negative for cough, shortness of breath and stridor.    Gastrointestinal: Negative for diarrhea, nausea and vomiting.   Skin: Negative for rash.           Objective: /84   Pulse 94   Wt 110.8 kg (244 lb 4.3 oz)   SpO2 100%   BMI 44.68 kg/m²         Physical Exam   Constitutional: She appears well-developed and well-nourished. No distress.   HENT:   Head: Normocephalic and atraumatic.   Mouth/Throat: Posterior oropharyngeal erythema present. Tonsils are 1+ on the right. Tonsils are 1+ on the left. Tonsillar exudate: foreign body noted to right tonsil, appears to be clear plastic.   Cardiovascular: Normal rate and regular rhythm. Exam reveals no friction rub.   No murmur heard.  Pulmonary/Chest: Effort normal and breath sounds normal. She has no wheezes. She has no rales.   Abdominal: Soft.  Bowel sounds are normal. There is no tenderness.   Neurological: She is alert.   Skin: Skin is warm and dry. No rash noted.   Psychiatric: She has a normal mood and affect.   Vitals reviewed.      Assessment:       1. Sore throat    2. Foreign body of tonsil, initial encounter        Plan:         Caitie DIANA was seen today for sore throat.    Diagnoses and all orders for this visit:    Sore throat  -     Ambulatory referral/consult to ENT; Future    Foreign body of tonsil, initial encounter  -     Ambulatory referral/consult to ENT; Future    Attempt to removed FB in clinic unsuccessful  I was able to get pt appt with ENT today at 9:30AM for further eval.     Génesis Howe PA-C

## 2020-03-19 NOTE — ED PROVIDER NOTES
"Encounter Date: 3/19/2020    SCRIBE #1 NOTE: I, April Schmid, am scribing for, and in the presence of,  Miky Brandon MD. I have scribed the following portions of the note - Other sections scribed: HPI ROS PE .       History     Chief Complaint   Patient presents with    Sore Throat     x three weeks. Reports eating crawfish yesterday and a "hair" is stuck on her tonsil.      Time patient was seen by the provider: 10:01 AM      The patient is a 40 y.o. female with co-morbidities including: hypertension, thyroid disease, asthma, who presents to the ED with a complaint of foreign body sensation in her throat. She reports she was eating crawfish last night when she had gotten a crawfish "hair" stuck in her throat. Patient had tried eating bread and gargling water without relief of symptoms. She reports she was seen at the [facility] center this [facility] and was told they were able to visualize the "hair" but was unable to remove it. Patient had tried to see ENT. However, they are not accepting new patients and referred her to the ED. Denies cough, shortness of breath, fever, or chills.     The history is provided by the patient.     Review of patient's allergies indicates:   Allergen Reactions    Cheese Itching and Rash     Past Medical History:   Diagnosis Date    Anxiety     Asthma     Chronic pain     back; inflammatory    Depression     Diabetes mellitus, type 2     Endometriosis     Hyperlipidemia     Hypertension     MITZI (obstructive sleep apnea)     Thyroid disease     Vitamin D deficiency      Past Surgical History:   Procedure Laterality Date    CERVICAL SPINE SURGERY      endometriosis      THYROID SURGERY       Family History   Problem Relation Age of Onset    Hypertension Mother     Diabetes Maternal Grandmother     Hyperlipidemia Maternal Grandmother     Diabetes Maternal Grandfather     Hyperlipidemia Maternal Grandfather     Breast cancer Maternal Aunt      Social History "     Tobacco Use    Smoking status: Never Smoker    Smokeless tobacco: Never Used   Substance Use Topics    Alcohol use: No    Drug use: No     Review of Systems  CONST: No fever, chills, weight change, or fatigue.  HEENT: No headache, blurry vision/change in vision, ear pain, eye pain, otorrhea, rhinorrhea, tooth pain, swelling, or voice changes. +Foreign body sensation in throat.   NECK: No pain, masses, trauma, or redness.  HEART: No pain, palpitations, or diaphoresis.  LUNG: No SOB, cough, orthopnea, SOSA or other complaints.  ABDOMEN: No pain, nausea, vomiting, diarrhea, constipation, or flank pain.  : No discharge, dysuria, lesions, rashes, masses, sores.  EXTREMITIES: FROM with No swelling, redness, injuries/trauma, lesions, sores, weakness, numbness, or tingling.  NEURO: No dizziness, weakness, fatigue, tremors, headache, change in vision or disturbances of balance or coordination.  SKIN: No lesions, rashes, trauma or other complaints.    Physical Exam     Initial Vitals [03/19/20 0950]   BP Pulse Resp Temp SpO2   (!) 129/92 89 17 98.5 °F (36.9 °C) 100 %      MAP       --         Physical Exam  PHYSICAL EXAM:  GENERAL: Calm; Cooperative; Well-appearing and Non-Toxic; Well-Nourished; NAD.  HEENT: AT/NC; + Right tonsil: there is a 2 mm torn-like brown in color structure, no trismus, able to extend her tongue pass [place] boarder, grade 1 bilateral tonsils with no exudates, no peritonsillar asymmetry, uvula midline, no muffled voice, speaking full sentences with no slurring of speech or drooling/inability to tolerate oral secretions.  NECK: Supple, FROM with no crepitus and no tenderness to palpation with trachea midline.   HEART: Regular rate and rhythm, no M/G/T.  LUNGS: No Tachypnea, No Retractions, and CTA B/L with no W/R/R.  EXTREMITIES: FROM.   SKIN: Warm, Dry, No Skin Tears or Rashes.  VASCULAR: 2+ pulses Prox/Dist & Symmetrical with No delay.  NEUROLOGIC: AAOx3;  Answering Questions  Appropriately.    ED Course   Foreign Body  Date/Time: 3/19/2020 11:04 AM  Performed by: Miky Brandon MD  Authorized by: Miky Brandon MD   Body area: throat    Anesthesia:  Local anesthetic: Benzocaine spray.  Removal mechanism: forceps  Objects recovered: 2-3mm torn like herb  Post-procedure assessment: foreign body removed  Patient tolerance: Patient tolerated the procedure well with no immediate complications      Labs Reviewed - No data to display       Imaging Results    None          Medical Decision Making:   History:   Old Medical Records: I decided to obtain old medical records.  Initial Assessment:   Afebrile, well appearing, hemodynamically stable female with no airway or respiratory instability here with possible foreign body vs atypical exudate to right tonsil. Will topical with HurriCaine spray vs viscous lidocaine and attempt to suction in attempt to improve her foreign body sensation.   ____________________  Galo Brandon MD, [facility]  Emergency Medicine Staff  10:08 AM 3/19/2020    F/U: gargling with HurriCaine spray and was able to remove a 2-3 mm torn like herb with resolution of all of her symptoms. Continued with no airway instability. Will send her home.   ____________________  Galo Brandon MD, [facility]  Emergency Medicine Staff  11:18 AM 3/19/2020              Scribe Attestation:   Scribe #1: I performed the above scribed service and the documentation accurately describes the services I performed. I attest to the accuracy of the note.    STAFF ATTENDING PHYSICIAN NOTE:  I provided and agree with the documentation provided by MADELIN on Caitie Wang.  ____________________  Galo Brandon MD, [facility]  Emergency Medicine Staff  11:18 AM 3/19/2020                          Clinical Impression:       ICD-10-CM ICD-9-CM   1. Foreign body in throat, initial encounter T17.208A 933.0     E915   2. Sensation of foreign body R68.89 780.99         Disposition:   Disposition: Discharged  Condition:  Stable     ED Disposition Condition    Discharge Stable        ED Prescriptions     None        Follow-up Information     Follow up With Specialties Details Why Contact Info    Juana Vines MD Cardiology Schedule an appointment as soon as possible for a visit   [address] [facility] [address]  [phone]      [facility] Emergency Medicine  If any new concerns. [address] [facility] [id number] [phone]                                     Miky Brandon MD  03/19/20 1118

## 2020-03-19 NOTE — TELEPHONE ENCOUNTER
Called to speak with the pt about his/her symptoms;    Fever in the last 24 hours? no  Been out of the country in the last 30 days? no  What countries have you been to? no  What dates were you there?no  When did you return, which day?no  How long have you had symptoms for?  Been on a cruise ship or an airplane in the last 30 days?no  Has come in to contact with anyone that has any of the above, that he/she is aware of?no    Had any of the following symptoms;  Cough?no  Muscle Pain?no  S.O.B?no  Diarrhea?no  Rash?no  Emesis?no  Abdominal Pain?no  Red Eye?no  Weakness?no  Bruising or Bleeding?no  Joint Pain?no  Severe Headache? Yes     PT STATES ONLY SORE THROAT     Advised pt to wear a mask to protect himself/herself and others upon arrival.

## 2020-04-01 ENCOUNTER — PATIENT OUTREACH (OUTPATIENT)
Dept: EMERGENCY MEDICINE | Facility: HOSPITAL | Age: 40
End: 2020-04-01

## 2020-04-01 NOTE — TELEPHONE ENCOUNTER
I have reviewed the notes and assessments performed by Jolynn Eli, I concur with her documentation of Caitie Wang. I,Jane Kumar, did not personally perform any of the services outlined in this documentation.

## 2021-05-05 DIAGNOSIS — Z12.31 SCREENING MAMMOGRAM, ENCOUNTER FOR: Primary | ICD-10-CM

## 2021-05-11 ENCOUNTER — HOSPITAL ENCOUNTER (OUTPATIENT)
Dept: RADIOLOGY | Facility: HOSPITAL | Age: 41
Discharge: HOME | End: 2021-05-11
Attending: NURSE PRACTITIONER
Payer: MEDICAID

## 2021-05-11 VITALS — BODY MASS INDEX: 43.22 KG/M2 | WEIGHT: 244 LBS

## 2021-05-11 DIAGNOSIS — Z12.31 SCREENING MAMMOGRAM, ENCOUNTER FOR: ICD-10-CM

## 2021-05-11 PROCEDURE — 77067 SCR MAMMO BI INCL CAD: CPT | Mod: TC

## 2021-05-11 PROCEDURE — 77067 MAMMO DIGITAL SCREENING BILAT: ICD-10-PCS | Mod: 26,,, | Performed by: RADIOLOGY

## 2021-05-11 PROCEDURE — 77067 SCR MAMMO BI INCL CAD: CPT | Mod: 26,,, | Performed by: RADIOLOGY

## 2021-05-31 ENCOUNTER — HOSPITAL ENCOUNTER (EMERGENCY)
Facility: HOSPITAL | Age: 41
Discharge: HOME | End: 2021-05-31
Attending: EMERGENCY MEDICINE
Payer: MEDICAID

## 2021-05-31 VITALS
HEART RATE: 72 BPM | OXYGEN SATURATION: 99 % | DIASTOLIC BLOOD PRESSURE: 81 MMHG | BODY MASS INDEX: 44.16 KG/M2 | TEMPERATURE: 99 F | SYSTOLIC BLOOD PRESSURE: 136 MMHG | WEIGHT: 240 LBS | RESPIRATION RATE: 18 BRPM | HEIGHT: 62 IN

## 2021-05-31 DIAGNOSIS — E16.2 HYPOGLYCEMIA: ICD-10-CM

## 2021-05-31 DIAGNOSIS — K80.20 CALCULUS OF GALLBLADDER WITHOUT CHOLECYSTITIS WITHOUT OBSTRUCTION: ICD-10-CM

## 2021-05-31 DIAGNOSIS — R10.9 ABDOMINAL PAIN, UNSPECIFIED ABDOMINAL LOCATION: Primary | ICD-10-CM

## 2021-05-31 DIAGNOSIS — E87.6 HYPOKALEMIA: ICD-10-CM

## 2021-05-31 LAB
ALBUMIN SERPL BCP-MCNC: 4 G/DL (ref 3.5–5.2)
ALP SERPL-CCNC: 85 U/L (ref 55–135)
ALT SERPL W/O P-5'-P-CCNC: 14 U/L (ref 10–44)
ANION GAP SERPL CALC-SCNC: 9 MMOL/L (ref 8–16)
AST SERPL-CCNC: 19 U/L (ref 10–40)
B-HCG UR QL: NEGATIVE
BACTERIA GENITAL QL WET PREP: NORMAL
BASOPHILS # BLD AUTO: 0.05 K/UL (ref 0–0.2)
BASOPHILS NFR BLD: 0.6 % (ref 0–1.9)
BILIRUB SERPL-MCNC: 0.5 MG/DL (ref 0.1–1)
BILIRUB UR QL STRIP: NEGATIVE
BUN SERPL-MCNC: 8 MG/DL (ref 6–20)
BUN SERPL-MCNC: 8 MG/DL (ref 6–30)
CALCIUM SERPL-MCNC: 9.2 MG/DL (ref 8.7–10.5)
CHLORIDE SERPL-SCNC: 103 MMOL/L (ref 95–110)
CHLORIDE SERPL-SCNC: 107 MMOL/L (ref 95–110)
CLARITY UR REFRACT.AUTO: ABNORMAL
CLUE CELLS VAG QL WET PREP: NORMAL
CO2 SERPL-SCNC: 24 MMOL/L (ref 23–29)
COLOR UR AUTO: YELLOW
CREAT SERPL-MCNC: 0.9 MG/DL (ref 0.5–1.4)
CREAT SERPL-MCNC: 0.9 MG/DL (ref 0.5–1.4)
CTP QC/QA: YES
DIFFERENTIAL METHOD: ABNORMAL
EOSINOPHIL # BLD AUTO: 0.1 K/UL (ref 0–0.5)
EOSINOPHIL NFR BLD: 1.6 % (ref 0–8)
ERYTHROCYTE [DISTWIDTH] IN BLOOD BY AUTOMATED COUNT: 13.2 % (ref 11.5–14.5)
EST. GFR  (AFRICAN AMERICAN): >60 ML/MIN/1.73 M^2
EST. GFR  (NON AFRICAN AMERICAN): >60 ML/MIN/1.73 M^2
FILAMENT FUNGI VAG WET PREP-#/AREA: NORMAL
GLUCOSE SERPL-MCNC: 55 MG/DL (ref 70–110)
GLUCOSE SERPL-MCNC: 56 MG/DL (ref 70–110)
GLUCOSE UR QL STRIP: NEGATIVE
HCT VFR BLD AUTO: 40.6 % (ref 37–48.5)
HCT VFR BLD CALC: 40 %PCV (ref 36–54)
HGB BLD-MCNC: 12.8 G/DL (ref 12–16)
HGB UR QL STRIP: NEGATIVE
IMM GRANULOCYTES # BLD AUTO: 0.03 K/UL (ref 0–0.04)
IMM GRANULOCYTES NFR BLD AUTO: 0.3 % (ref 0–0.5)
KETONES UR QL STRIP: NEGATIVE
LEUKOCYTE ESTERASE UR QL STRIP: NEGATIVE
LIPASE SERPL-CCNC: 26 U/L (ref 4–60)
LYMPHOCYTES # BLD AUTO: 3.4 K/UL (ref 1–4.8)
LYMPHOCYTES NFR BLD: 38.9 % (ref 18–48)
MCH RBC QN AUTO: 29.6 PG (ref 27–31)
MCHC RBC AUTO-ENTMCNC: 31.5 G/DL (ref 32–36)
MCV RBC AUTO: 94 FL (ref 82–98)
MONOCYTES # BLD AUTO: 0.9 K/UL (ref 0.3–1)
MONOCYTES NFR BLD: 9.8 % (ref 4–15)
NEUTROPHILS # BLD AUTO: 4.3 K/UL (ref 1.8–7.7)
NEUTROPHILS NFR BLD: 48.8 % (ref 38–73)
NITRITE UR QL STRIP: NEGATIVE
NRBC BLD-RTO: 0 /100 WBC
PH UR STRIP: 6 [PH] (ref 5–8)
PLATELET # BLD AUTO: 337 K/UL (ref 150–450)
PMV BLD AUTO: 9.5 FL (ref 9.2–12.9)
POC IONIZED CALCIUM: 1.04 MMOL/L (ref 1.06–1.42)
POC TCO2 (MEASURED): 26 MMOL/L (ref 23–29)
POCT GLUCOSE: 78 MG/DL (ref 70–110)
POTASSIUM BLD-SCNC: 3.4 MMOL/L (ref 3.5–5.1)
POTASSIUM SERPL-SCNC: 3.4 MMOL/L (ref 3.5–5.1)
PROT SERPL-MCNC: 7.8 G/DL (ref 6–8.4)
PROT UR QL STRIP: NEGATIVE
RBC # BLD AUTO: 4.33 M/UL (ref 4–5.4)
SAMPLE: ABNORMAL
SODIUM BLD-SCNC: 141 MMOL/L (ref 136–145)
SODIUM SERPL-SCNC: 140 MMOL/L (ref 136–145)
SP GR UR STRIP: 1.02 (ref 1–1.03)
SPECIMEN SOURCE: NORMAL
T VAGINALIS GENITAL QL WET PREP: NORMAL
URN SPEC COLLECT METH UR: ABNORMAL
WBC # BLD AUTO: 8.79 K/UL (ref 3.9–12.7)
WBC #/AREA VAG WET PREP: NORMAL
YEAST GENITAL QL WET PREP: NORMAL

## 2021-05-31 PROCEDURE — 86803 HEPATITIS C AB TEST: CPT | Performed by: EMERGENCY MEDICINE

## 2021-05-31 PROCEDURE — 87591 N.GONORRHOEAE DNA AMP PROB: CPT | Performed by: EMERGENCY MEDICINE

## 2021-05-31 PROCEDURE — 25500020 PHARM REV CODE 255: Performed by: EMERGENCY MEDICINE

## 2021-05-31 PROCEDURE — 80047 BASIC METABLC PNL IONIZED CA: CPT

## 2021-05-31 PROCEDURE — 99284 PR EMERGENCY DEPT VISIT,LEVEL IV: ICD-10-PCS | Mod: ,,, | Performed by: EMERGENCY MEDICINE

## 2021-05-31 PROCEDURE — 82330 ASSAY OF CALCIUM: CPT

## 2021-05-31 PROCEDURE — 99284 EMERGENCY DEPT VISIT MOD MDM: CPT | Mod: ,,, | Performed by: EMERGENCY MEDICINE

## 2021-05-31 PROCEDURE — 80053 COMPREHEN METABOLIC PANEL: CPT | Performed by: EMERGENCY MEDICINE

## 2021-05-31 PROCEDURE — 87210 SMEAR WET MOUNT SALINE/INK: CPT | Performed by: EMERGENCY MEDICINE

## 2021-05-31 PROCEDURE — 99285 EMERGENCY DEPT VISIT HI MDM: CPT | Mod: 25

## 2021-05-31 PROCEDURE — 86703 HIV-1/HIV-2 1 RESULT ANTBDY: CPT | Performed by: EMERGENCY MEDICINE

## 2021-05-31 PROCEDURE — 63600175 PHARM REV CODE 636 W HCPCS: Performed by: EMERGENCY MEDICINE

## 2021-05-31 PROCEDURE — 81025 URINE PREGNANCY TEST: CPT | Performed by: EMERGENCY MEDICINE

## 2021-05-31 PROCEDURE — 96361 HYDRATE IV INFUSION ADD-ON: CPT

## 2021-05-31 PROCEDURE — 85025 COMPLETE CBC W/AUTO DIFF WBC: CPT | Performed by: EMERGENCY MEDICINE

## 2021-05-31 PROCEDURE — 96374 THER/PROPH/DIAG INJ IV PUSH: CPT | Mod: 59

## 2021-05-31 PROCEDURE — 81003 URINALYSIS AUTO W/O SCOPE: CPT | Performed by: EMERGENCY MEDICINE

## 2021-05-31 PROCEDURE — 82962 GLUCOSE BLOOD TEST: CPT

## 2021-05-31 PROCEDURE — 25000003 PHARM REV CODE 250: Performed by: EMERGENCY MEDICINE

## 2021-05-31 PROCEDURE — 83690 ASSAY OF LIPASE: CPT | Performed by: EMERGENCY MEDICINE

## 2021-05-31 PROCEDURE — 96375 TX/PRO/DX INJ NEW DRUG ADDON: CPT

## 2021-05-31 PROCEDURE — 87491 CHLMYD TRACH DNA AMP PROBE: CPT | Performed by: EMERGENCY MEDICINE

## 2021-05-31 RX ORDER — POTASSIUM CHLORIDE 20 MEQ/1
40 TABLET, EXTENDED RELEASE ORAL
Status: COMPLETED | OUTPATIENT
Start: 2021-05-31 | End: 2021-05-31

## 2021-05-31 RX ORDER — IBUPROFEN 600 MG/1
600 TABLET ORAL EVERY 6 HOURS PRN
Qty: 20 TABLET | Refills: 0 | Status: SHIPPED | OUTPATIENT
Start: 2021-05-31 | End: 2022-06-17

## 2021-05-31 RX ORDER — HYDROMORPHONE HYDROCHLORIDE 1 MG/ML
1 INJECTION, SOLUTION INTRAMUSCULAR; INTRAVENOUS; SUBCUTANEOUS
Status: COMPLETED | OUTPATIENT
Start: 2021-05-31 | End: 2021-05-31

## 2021-05-31 RX ORDER — DEXTROSE 50 % IN WATER (D50W) INTRAVENOUS SYRINGE
12.5
Status: COMPLETED | OUTPATIENT
Start: 2021-05-31 | End: 2021-05-31

## 2021-05-31 RX ADMIN — SODIUM CHLORIDE 500 ML: 0.9 INJECTION, SOLUTION INTRAVENOUS at 06:05

## 2021-05-31 RX ADMIN — HYDROMORPHONE HYDROCHLORIDE 1 MG: 1 INJECTION, SOLUTION INTRAMUSCULAR; INTRAVENOUS; SUBCUTANEOUS at 06:05

## 2021-05-31 RX ADMIN — IOHEXOL 100 ML: 350 INJECTION, SOLUTION INTRAVENOUS at 06:05

## 2021-05-31 RX ADMIN — POTASSIUM CHLORIDE 40 MEQ: 1500 TABLET, EXTENDED RELEASE ORAL at 08:05

## 2021-05-31 RX ADMIN — DEXTROSE MONOHYDRATE 12.5 G: 25 INJECTION, SOLUTION INTRAVENOUS at 06:05

## 2021-06-01 LAB
HCV AB SERPL QL IA: NEGATIVE
HIV 1+2 AB+HIV1 P24 AG SERPL QL IA: NEGATIVE

## 2021-06-02 LAB
C TRACH DNA SPEC QL NAA+PROBE: NOT DETECTED
N GONORRHOEA DNA SPEC QL NAA+PROBE: NOT DETECTED

## 2021-06-16 ENCOUNTER — TELEPHONE (OUTPATIENT)
Dept: RESEARCH | Facility: OTHER | Age: 41
End: 2021-06-16

## 2021-07-08 ENCOUNTER — HOSPITAL ENCOUNTER (EMERGENCY)
Facility: HOSPITAL | Age: 41
Discharge: HOME | End: 2021-07-08
Attending: EMERGENCY MEDICINE
Payer: MEDICAID

## 2021-07-08 VITALS
BODY MASS INDEX: 44.16 KG/M2 | HEIGHT: 62 IN | SYSTOLIC BLOOD PRESSURE: 131 MMHG | RESPIRATION RATE: 17 BRPM | HEART RATE: 88 BPM | DIASTOLIC BLOOD PRESSURE: 82 MMHG | OXYGEN SATURATION: 96 % | TEMPERATURE: 99 F | WEIGHT: 240 LBS

## 2021-07-08 DIAGNOSIS — M25.531 ACUTE WRIST PAIN, RIGHT: ICD-10-CM

## 2021-07-08 DIAGNOSIS — Z98.890 HISTORY OF CARPAL TUNNEL SURGERY OF RIGHT WRIST: ICD-10-CM

## 2021-07-08 DIAGNOSIS — G89.29 CHRONIC PAIN OF RIGHT WRIST: ICD-10-CM

## 2021-07-08 DIAGNOSIS — M25.531 CHRONIC PAIN OF RIGHT WRIST: ICD-10-CM

## 2021-07-08 DIAGNOSIS — S29.012A MUSCLE STRAIN OF RIGHT UPPER BACK, INITIAL ENCOUNTER: Primary | ICD-10-CM

## 2021-07-08 PROCEDURE — 99284 PR EMERGENCY DEPT VISIT,LEVEL IV: ICD-10-PCS | Mod: ,,, | Performed by: EMERGENCY MEDICINE

## 2021-07-08 PROCEDURE — 99284 EMERGENCY DEPT VISIT MOD MDM: CPT | Mod: ,,, | Performed by: EMERGENCY MEDICINE

## 2021-07-08 PROCEDURE — 99284 EMERGENCY DEPT VISIT MOD MDM: CPT

## 2021-07-08 PROCEDURE — 25000003 PHARM REV CODE 250: Performed by: EMERGENCY MEDICINE

## 2021-07-08 RX ORDER — BACLOFEN 10 MG/1
20 TABLET ORAL
Status: COMPLETED | OUTPATIENT
Start: 2021-07-08 | End: 2021-07-08

## 2021-07-08 RX ORDER — NAPROXEN 375 MG/1
375 TABLET ORAL 2 TIMES DAILY WITH MEALS
Qty: 6 TABLET | Refills: 0 | Status: SHIPPED | OUTPATIENT
Start: 2021-07-08 | End: 2021-07-11

## 2021-07-08 RX ORDER — BACLOFEN 20 MG/1
20 TABLET ORAL 3 TIMES DAILY
Qty: 9 TABLET | Refills: 0 | Status: SHIPPED | OUTPATIENT
Start: 2021-07-08 | End: 2023-06-27

## 2021-07-08 RX ORDER — NAPROXEN 500 MG/1
500 TABLET ORAL
Status: COMPLETED | OUTPATIENT
Start: 2021-07-08 | End: 2021-07-08

## 2021-07-08 RX ADMIN — NAPROXEN 500 MG: 500 TABLET ORAL at 11:07

## 2021-07-08 RX ADMIN — BACLOFEN 20 MG: 10 TABLET ORAL at 11:07

## 2021-07-13 ENCOUNTER — DOCUMENTATION ONLY (OUTPATIENT)
Dept: REHABILITATION | Facility: HOSPITAL | Age: 41
End: 2021-07-13

## 2021-11-14 ENCOUNTER — OFFICE VISIT (OUTPATIENT)
Dept: URGENT CARE | Facility: CLINIC | Age: 41
End: 2021-11-14
Payer: MEDICAID

## 2021-11-14 VITALS
BODY MASS INDEX: 44.12 KG/M2 | HEART RATE: 84 BPM | DIASTOLIC BLOOD PRESSURE: 88 MMHG | SYSTOLIC BLOOD PRESSURE: 129 MMHG | HEIGHT: 63 IN | OXYGEN SATURATION: 98 % | WEIGHT: 249 LBS | RESPIRATION RATE: 18 BRPM | TEMPERATURE: 99 F

## 2021-11-14 DIAGNOSIS — Z48.89 ENCOUNTER FOR POST SURGICAL WOUND CHECK: Primary | ICD-10-CM

## 2021-11-14 PROCEDURE — 99202 OFFICE O/P NEW SF 15 MIN: CPT | Mod: S$GLB,,, | Performed by: NURSE PRACTITIONER

## 2021-11-14 PROCEDURE — 99202 PR OFFICE/OUTPT VISIT, NEW, LEVL II, 15-29 MIN: ICD-10-PCS | Mod: S$GLB,,, | Performed by: NURSE PRACTITIONER

## 2022-02-14 ENCOUNTER — OFFICE VISIT (OUTPATIENT)
Dept: URGENT CARE | Facility: CLINIC | Age: 42
End: 2022-02-14
Payer: MEDICAID

## 2022-02-14 VITALS
RESPIRATION RATE: 16 BRPM | HEART RATE: 96 BPM | SYSTOLIC BLOOD PRESSURE: 133 MMHG | TEMPERATURE: 98 F | DIASTOLIC BLOOD PRESSURE: 85 MMHG | BODY MASS INDEX: 39.69 KG/M2 | HEIGHT: 63 IN | OXYGEN SATURATION: 98 % | WEIGHT: 224 LBS

## 2022-02-14 DIAGNOSIS — U07.1 COVID-19: Primary | ICD-10-CM

## 2022-02-14 LAB
CTP QC/QA: YES
SARS-COV-2 RDRP RESP QL NAA+PROBE: POSITIVE

## 2022-02-14 PROCEDURE — 3008F BODY MASS INDEX DOCD: CPT | Mod: CPTII,S$GLB,, | Performed by: NURSE PRACTITIONER

## 2022-02-14 PROCEDURE — 99213 OFFICE O/P EST LOW 20 MIN: CPT | Mod: S$GLB,,, | Performed by: NURSE PRACTITIONER

## 2022-02-14 PROCEDURE — 1159F PR MEDICATION LIST DOCUMENTED IN MEDICAL RECORD: ICD-10-PCS | Mod: CPTII,S$GLB,, | Performed by: NURSE PRACTITIONER

## 2022-02-14 PROCEDURE — 3075F PR MOST RECENT SYSTOLIC BLOOD PRESS GE 130-139MM HG: ICD-10-PCS | Mod: CPTII,S$GLB,, | Performed by: NURSE PRACTITIONER

## 2022-02-14 PROCEDURE — 3075F SYST BP GE 130 - 139MM HG: CPT | Mod: CPTII,S$GLB,, | Performed by: NURSE PRACTITIONER

## 2022-02-14 PROCEDURE — 3008F PR BODY MASS INDEX (BMI) DOCUMENTED: ICD-10-PCS | Mod: CPTII,S$GLB,, | Performed by: NURSE PRACTITIONER

## 2022-02-14 PROCEDURE — 99213 PR OFFICE/OUTPT VISIT, EST, LEVL III, 20-29 MIN: ICD-10-PCS | Mod: S$GLB,,, | Performed by: NURSE PRACTITIONER

## 2022-02-14 PROCEDURE — 1160F PR REVIEW ALL MEDS BY PRESCRIBER/CLIN PHARMACIST DOCUMENTED: ICD-10-PCS | Mod: CPTII,S$GLB,, | Performed by: NURSE PRACTITIONER

## 2022-02-14 PROCEDURE — U0002 COVID-19 LAB TEST NON-CDC: HCPCS | Mod: QW,S$GLB,, | Performed by: NURSE PRACTITIONER

## 2022-02-14 PROCEDURE — 3079F DIAST BP 80-89 MM HG: CPT | Mod: CPTII,S$GLB,, | Performed by: NURSE PRACTITIONER

## 2022-02-14 PROCEDURE — 1159F MED LIST DOCD IN RCRD: CPT | Mod: CPTII,S$GLB,, | Performed by: NURSE PRACTITIONER

## 2022-02-14 PROCEDURE — 1160F RVW MEDS BY RX/DR IN RCRD: CPT | Mod: CPTII,S$GLB,, | Performed by: NURSE PRACTITIONER

## 2022-02-14 PROCEDURE — 3079F PR MOST RECENT DIASTOLIC BLOOD PRESSURE 80-89 MM HG: ICD-10-PCS | Mod: CPTII,S$GLB,, | Performed by: NURSE PRACTITIONER

## 2022-02-14 PROCEDURE — U0002: ICD-10-PCS | Mod: QW,S$GLB,, | Performed by: NURSE PRACTITIONER

## 2022-02-14 RX ORDER — DULAGLUTIDE 0.75 MG/.5ML
INJECTION, SOLUTION SUBCUTANEOUS
COMMUNITY
Start: 2022-01-26 | End: 2023-06-27

## 2022-02-14 RX ORDER — BENZONATATE 100 MG/1
100 CAPSULE ORAL 3 TIMES DAILY PRN
Qty: 30 CAPSULE | Refills: 0 | Status: SHIPPED | OUTPATIENT
Start: 2022-02-14 | End: 2022-02-24

## 2022-02-14 NOTE — PATIENT INSTRUCTIONS
You have tested positive for COVID-19 today.        ISOLATION    If you tested positive and do not have symptoms, you must isolate for 5 days starting on the day of the positive test. I    If you tested positive and have symptoms, you must isolate for 5 days starting on the day of the first symptoms,  not the day of the positive test.    This is the most important part, both the CDC and the [facility] emphasize that you do not test out of isolation.    After 5 days, if your symptoms have improved and you have not had fever on day 5, you can return to the community on day 6- NO TESTING REQUIRED!     In fact, we do not retest if you were positive in the last 90 days.    After your 5 days of isolation are completed, the CDC recommends strict mask use for the first 5 days that you come out of isolation    CDC Testing and Quarantine Guidelines for patients with exposure to a known-positive COVID-19 person:    ·     A 'close exposure' is defined as anyone who has had an exposure (masked or unmasked) to a known COVID -19 positive person            within 6 feet of someone            for a cumulative total of 15 minutes or more over a 24-hour period.    ·     vaccinated Have been boosted or completed the primary series of Pfizer or Moderna vaccine within the last 6 months or completed the primary series of J&J vaccine within the last 2 months and/or had a positive test within 90 days            do NOT need to quarantine after contact with someone who had COVID-19 unless they have symptoms.            fully vaccinated people who have not had a positive test within 90 days, should get tested 3-5 days after their exposure, even if they don't have symptoms and wear a mask indoors in public for 10 days following exposure or until their test result is negative on day 5.            If you develop symptoms test and quarantine.      ·     Unvaccinated, or are more than six months out from their second mRNA dose (or more than 2 months  after the J&J vaccine) and not yet boosted,  and/or NOT had a positive test within 90 days and meet 'close exposure'    you are required by CDC guidelines to quarantine for at least 5 days from time of exposure followed by 5 days of strict masking. It is recommended, but not required to test after 5 days, unless you develop symptoms, in which case you should test at that time.    If you do decide to test at 5 days and are asymptomatic, the risk is that if you test without symptoms on Day 5 for example) and you are positive, your 5 day isolation begins on that day, and you turned your 5 day quarantine into 10 days.            If your exposure does not meet the above definition, you can return to your normal daily activities to include social distancing, wearing a mask and frequent handwashing.    Alternatively, if a 5-day quarantine is not feasible, it is imperative that an exposed person wear a well-fitting mask at all times when around others for 10 days after exposure.            PLEASE READ YOUR DISCHARGE INSTRUCTIONS ENTIRELY AS IT CONTAINS IMPORTANT INFORMATION.      Please drink plenty of fluids.    Please get plenty of rest.    Please return here or go to the Emergency Department for any concerns or worsening of condition.    Please take an over the counter antihistamine medication (allegra/Claritin/Zyrtec) of your choice as directed.    Try an over the counter decongestant like Mucinex D or Sudafed. You buy this behind the pharmacy counter    If you do have Hypertension or palpitations, it is safe to take Coricidin HBP for relief of sinus symptoms.    If not allergic, please take over the counter Tylenol (Acetaminophen) and/or Motrin (Ibuprofen) as directed for control of pain and/or fever.  Please follow up with your primary care doctor or specialist as needed.    Sore throat recommendations: Warm fluids, warm salt water gargles, throat lozenges, tea, honey, soup, rest, hydration.    Use over the counter  flonase: one spray each nostril twice daily OR two sprays each nostril once daily.     If you  smoke, please stop smoking.      Please return or see your primary care doctor if you develop new or worsening symptoms.     Please arrange follow up with your primary medical clinic as soon as possible. You must understand that you've received an Urgent Care treatment only and that you may be released before all of your medical problems are known or treated. You, the patient, will arrange for follow up as instructed. If your symptoms worsen or fail to improve you should go to the Emergency Room.  Instructions for Patients with Confirmed or Suspected COVID-19    If you are awaiting your test result, you will either be called or it will be released to the patient portal.  If you have any questions about your test, please visit [email or web address] or call our COVID-19 information line at [phone].      Please isolate yourself at home.  You may leave home and/or return to work once the following conditions are met:    If you have symptoms and tested positive:   More than 5 days since symptoms first appeared AND   More than 24 hours fever free without medications AND       symptoms have improved   · For five days after ending isolation, masks are required.    If you had no symptoms but tested positive:   More than 5 days since the date of the first positive test. If you develop symptoms, then use the guidelines above  · For five days after ending isolation, masks are required.      Testing is not recommended if you are symptom free after completing isolation.

## 2022-02-14 NOTE — PROGRESS NOTES
"Subjective:       Patient ID: Caitie Wang is a 41 y.o. female.    Vitals:  height is 5' 3" (1.6 m) and weight is 101.6 kg (224 lb). Her temperature is 98.2 °F (36.8 °C). Her blood pressure is 133/85 and her pulse is 96. Her respiration is 16 and oxygen saturation is 98%.     Chief Complaint: DOMITILA Fletcher is a 41 y.o. female who presents today with a chief complaint of  sore throat and cough for 3 days.denies fever, body aches or chills, denies  wheezing or shortness of breath, denies nausea, vomiting, diarrhea or abdominal pain, denies chest pain or dizziness positional lightheadedness, denies  trouble swallowing, denies loss of taste or smell, or any other symptoms      Cough  This is a new problem. The current episode started yesterday. The problem has been gradually worsening. The cough is non-productive. Associated symptoms include a sore throat. Pertinent negatives include no chest pain, chills, fever, shortness of breath or wheezing. She has tried OTC cough suppressant for the symptoms. The treatment provided mild relief. There is no history of environmental allergies.       Constitution: Negative for chills, sweating, fatigue and fever.   HENT: Positive for sore throat. Negative for trouble swallowing.    Cardiovascular: Negative for chest pain.   Respiratory: Positive for cough. Negative for chest tightness, sputum production, shortness of breath and wheezing.    Gastrointestinal: Negative for abdominal pain, nausea, vomiting, constipation and diarrhea.   Allergic/Immunologic: Negative for environmental allergies and seasonal allergies.   Neurological: Negative for dizziness and light-headedness.       Objective:      Physical Exam   Constitutional: She is oriented to person, place, and time. She appears well-developed and well-nourished. She is cooperative.  Non-toxic appearance. She does not have a sickly appearance. She does not appear ill. No distress.   HENT:   Head: Normocephalic and atraumatic. "   Ears:   Right Ear: Hearing, tympanic membrane, external ear and ear canal normal.   Left Ear: Hearing, tympanic membrane, external ear and ear canal normal.   Nose: Nose normal. No mucosal edema, rhinorrhea or nasal deformity. No epistaxis. Right sinus exhibits no maxillary sinus tenderness and no frontal sinus tenderness. Left sinus exhibits no maxillary sinus tenderness and no frontal sinus tenderness.   Mouth/Throat: Uvula is midline, oropharynx is clear and moist and mucous membranes are normal. No trismus in the jaw. Normal dentition. No uvula swelling. No oropharyngeal exudate, posterior oropharyngeal edema, posterior oropharyngeal erythema, tonsillar abscesses or cobblestoning.   Eyes: Conjunctivae and lids are normal. No scleral icterus.   Neck: Trachea normal and phonation normal. Neck supple. No edema present. No erythema present. No neck rigidity present.   Cardiovascular: Normal rate, regular rhythm, normal heart sounds, intact distal pulses and normal pulses.   Pulmonary/Chest: Effort normal and breath sounds normal. No respiratory distress. She has no decreased breath sounds. She has no rhonchi.   Abdominal: Normal appearance.   Musculoskeletal: Normal range of motion.         General: No deformity or edema. Normal range of motion.   Lymphadenopathy:     She has no cervical adenopathy.   Neurological: She is alert and oriented to person, place, and time. She exhibits normal muscle tone. Coordination normal.   Skin: Skin is warm, dry, intact, not diaphoretic and not pale.   Psychiatric: She has a normal mood and affect. Her speech is normal and behavior is normal. Judgment and thought content normal. Cognition and memory  Nursing note and vitals reviewed.        Results for orders placed or performed in visit on 02/14/22   POCT COVID-19 Rapid Screening   Result Value Ref Range    POC Rapid COVID Positive (A) Negative     Acceptable Yes          Patient in no acute distress.  Vitals  reassuring.  Positive COVID 19 results discussed with patient in detail.  Detailed education provided about COVID-19 precautions recommendations as per CDC guidelines.  Medication prescribed over-the-counter medication discussed in length.  Risk of complications score 1  Discussed results/diagnosis/plan in depth with patient in clinic. Strict precautions given to patient to monitor for worsening signs and symptoms. Advised to follow up with primary.All questions answered. Strict ER precautions given. If your symptoms worsens or fail to improve you should go to the Emergency Room. Discharge and follow-up instructions given verbally/printed. Discharge and follow-up instructions discussed with the patient who expressed understanding and willingness to comply with my recommendations.Patient voiced understanding and in agreement with current treatment plan.     Please be advised this text was dictated with [employer] software and may contain errors due to translation.      Assessment:       1. COVID-19          Plan:         COVID-19  -     POCT COVID-19 Rapid Screening    Other orders  -     benzonatate (TESSALON) 100 MG capsule; Take 1 capsule (100 mg total) by mouth 3 (three) times daily as needed for Cough.  Dispense: 30 capsule; Refill: 0                  Patient Instructions     You have tested positive for COVID-19 today.        ISOLATION    If you tested positive and do not have symptoms, you must isolate for 5 days starting on the day of the positive test. I    If you tested positive and have symptoms, you must isolate for 5 days starting on the day of the first symptoms,  not the day of the positive test.    This is the most important part, both the CDC and the [facility] emphasize that you do not test out of isolation.    After 5 days, if your symptoms have improved and you have not had fever on day 5, you can return to the community on day 6- NO TESTING REQUIRED!     In fact, we do not retest if you were positive in  the last 90 days.    After your 5 days of isolation are completed, the CDC recommends strict mask use for the first 5 days that you come out of isolation    CDC Testing and Quarantine Guidelines for patients with exposure to a known-positive COVID-19 person:    ·     A 'close exposure' is defined as anyone who has had an exposure (masked or unmasked) to a known COVID -19 positive person            within 6 feet of someone            for a cumulative total of 15 minutes or more over a 24-hour period.    ·     vaccinated Have been boosted or completed the primary series of Pfizer or Moderna vaccine within the last 6 months or completed the primary series of J&J vaccine within the last 2 months and/or had a positive test within 90 days            do NOT need to quarantine after contact with someone who had COVID-19 unless they have symptoms.            fully vaccinated people who have not had a positive test within 90 days, should get tested 3-5 days after their exposure, even if they don't have symptoms and wear a mask indoors in public for 10 days following exposure or until their test result is negative on day 5.            If you develop symptoms test and quarantine.      ·     Unvaccinated, or are more than six months out from their second mRNA dose (or more than 2 months after the J&J vaccine) and not yet boosted,  and/or NOT had a positive test within 90 days and meet 'close exposure'    you are required by CDC guidelines to quarantine for at least 5 days from time of exposure followed by 5 days of strict masking. It is recommended, but not required to test after 5 days, unless you develop symptoms, in which case you should test at that time.    If you do decide to test at 5 days and are asymptomatic, the risk is that if you test without symptoms on Day 5 for example) and you are positive, your 5 day isolation begins on that day, and you turned your 5 day quarantine into 10 days.            If your exposure does  not meet the above definition, you can return to your normal daily activities to include social distancing, wearing a mask and frequent handwashing.    Alternatively, if a 5-day quarantine is not feasible, it is imperative that an exposed person wear a well-fitting mask at all times when around others for 10 days after exposure.            PLEASE READ YOUR DISCHARGE INSTRUCTIONS ENTIRELY AS IT CONTAINS IMPORTANT INFORMATION.      Please drink plenty of fluids.    Please get plenty of rest.    Please return here or go to the Emergency Department for any concerns or worsening of condition.    Please take an over the counter antihistamine medication (allegra/Claritin/Zyrtec) of your choice as directed.    Try an over the counter decongestant like Mucinex D or Sudafed. You buy this behind the pharmacy counter    If you do have Hypertension or palpitations, it is safe to take Coricidin HBP for relief of sinus symptoms.    If not allergic, please take over the counter Tylenol (Acetaminophen) and/or Motrin (Ibuprofen) as directed for control of pain and/or fever.  Please follow up with your primary care doctor or specialist as needed.    Sore throat recommendations: Warm fluids, warm salt water gargles, throat lozenges, tea, honey, soup, rest, hydration.    Use over the counter flonase: one spray each nostril twice daily OR two sprays each nostril once daily.     If you  smoke, please stop smoking.      Please return or see your primary care doctor if you develop new or worsening symptoms.     Please arrange follow up with your primary medical clinic as soon as possible. You must understand that you've received an Urgent Care treatment only and that you may be released before all of your medical problems are known or treated. You, the patient, will arrange for follow up as instructed. If your symptoms worsen or fail to improve you should go to the Emergency Room.  Instructions for Patients with Confirmed or Suspected  COVID-19    If you are awaiting your test result, you will either be called or it will be released to the patient portal.  If you have any questions about your test, please visit [email or web address] or call our COVID-19 information line at [phone].      Please isolate yourself at home.  You may leave home and/or return to work once the following conditions are met:    If you have symptoms and tested positive:   More than 5 days since symptoms first appeared AND   More than 24 hours fever free without medications AND       symptoms have improved   · For five days after ending isolation, masks are required.    If you had no symptoms but tested positive:   More than 5 days since the date of the first positive test. If you develop symptoms, then use the guidelines above  · For five days after ending isolation, masks are required.      Testing is not recommended if you are symptom free after completing isolation.

## 2022-02-14 NOTE — LETTER
[id number] [place] [address]  Phone: [phone]  Fax: [phone]          Return to Work/School    Patient: Caitie Wang  YOB: 1980   Date: 02/14/2022     To Whom It May Concern:     Caitie Wang was in contact with/seen in my office on 02/14/2022. COVID-19 is present in our communities across the state. There is limited testing for COVID at this time, so not all patients can be tested. In this situation, your employee meets the following criteria:     Caitie Wang has met the criteria for COVID-19 testing and has a POSITIVE result. She can return to work once they are asymptomatic for 24 hours without the use of fever reducing medications AND at least five days from the start of symptoms (or from the first positive result if they have no symptoms).      If you have any questions or concerns, or if I can be of further assistance, please do not hesitate to contact me.     Sincerely,      Asiya Duarte NP

## 2022-05-24 ENCOUNTER — HOSPITAL ENCOUNTER (EMERGENCY)
Facility: HOSPITAL | Age: 42
Discharge: HOME | End: 2022-05-24
Attending: EMERGENCY MEDICINE
Payer: MEDICAID

## 2022-05-24 VITALS
BODY MASS INDEX: 42.52 KG/M2 | WEIGHT: 240 LBS | HEART RATE: 86 BPM | SYSTOLIC BLOOD PRESSURE: 125 MMHG | OXYGEN SATURATION: 93 % | TEMPERATURE: 99 F | HEIGHT: 63 IN | RESPIRATION RATE: 18 BRPM | DIASTOLIC BLOOD PRESSURE: 75 MMHG

## 2022-05-24 DIAGNOSIS — M77.9 TENDONITIS: ICD-10-CM

## 2022-05-24 DIAGNOSIS — R42 LIGHTHEADEDNESS: Primary | ICD-10-CM

## 2022-05-24 LAB
ANION GAP SERPL CALC-SCNC: 9 MMOL/L (ref 8–16)
B-HCG UR QL: NEGATIVE
BASOPHILS # BLD AUTO: 0.02 K/UL (ref 0–0.2)
BASOPHILS NFR BLD: 0.3 % (ref 0–1.9)
BUN SERPL-MCNC: 7 MG/DL (ref 6–20)
CALCIUM SERPL-MCNC: 9.2 MG/DL (ref 8.7–10.5)
CHLORIDE SERPL-SCNC: 105 MMOL/L (ref 95–110)
CO2 SERPL-SCNC: 24 MMOL/L (ref 23–29)
CREAT SERPL-MCNC: 0.8 MG/DL (ref 0.5–1.4)
CTP QC/QA: YES
DIFFERENTIAL METHOD: NORMAL
EOSINOPHIL # BLD AUTO: 0.2 K/UL (ref 0–0.5)
EOSINOPHIL NFR BLD: 1.9 % (ref 0–8)
ERYTHROCYTE [DISTWIDTH] IN BLOOD BY AUTOMATED COUNT: 13.2 % (ref 11.5–14.5)
EST. GFR  (AFRICAN AMERICAN): >60 ML/MIN/1.73 M^2
EST. GFR  (NON AFRICAN AMERICAN): >60 ML/MIN/1.73 M^2
GLUCOSE SERPL-MCNC: 74 MG/DL (ref 70–110)
HCT VFR BLD AUTO: 39.1 % (ref 37–48.5)
HGB BLD-MCNC: 12.8 G/DL (ref 12–16)
IMM GRANULOCYTES # BLD AUTO: 0.03 K/UL (ref 0–0.04)
IMM GRANULOCYTES NFR BLD AUTO: 0.4 % (ref 0–0.5)
LYMPHOCYTES # BLD AUTO: 2.8 K/UL (ref 1–4.8)
LYMPHOCYTES NFR BLD: 35.9 % (ref 18–48)
MCH RBC QN AUTO: 30.6 PG (ref 27–31)
MCHC RBC AUTO-ENTMCNC: 32.7 G/DL (ref 32–36)
MCV RBC AUTO: 94 FL (ref 82–98)
MONOCYTES # BLD AUTO: 0.5 K/UL (ref 0.3–1)
MONOCYTES NFR BLD: 6.9 % (ref 4–15)
NEUTROPHILS # BLD AUTO: 4.3 K/UL (ref 1.8–7.7)
NEUTROPHILS NFR BLD: 54.6 % (ref 38–73)
NRBC BLD-RTO: 0 /100 WBC
PLATELET # BLD AUTO: 349 K/UL (ref 150–450)
PMV BLD AUTO: 9.5 FL (ref 9.2–12.9)
POTASSIUM SERPL-SCNC: 4.4 MMOL/L (ref 3.5–5.1)
RBC # BLD AUTO: 4.18 M/UL (ref 4–5.4)
SODIUM SERPL-SCNC: 138 MMOL/L (ref 136–145)
WBC # BLD AUTO: 7.86 K/UL (ref 3.9–12.7)

## 2022-05-24 PROCEDURE — 93010 EKG 12-LEAD: ICD-10-PCS | Mod: ,,, | Performed by: INTERNAL MEDICINE

## 2022-05-24 PROCEDURE — 81025 URINE PREGNANCY TEST: CPT | Performed by: PHYSICIAN ASSISTANT

## 2022-05-24 PROCEDURE — 93010 ELECTROCARDIOGRAM REPORT: CPT | Mod: ,,, | Performed by: INTERNAL MEDICINE

## 2022-05-24 PROCEDURE — 99284 EMERGENCY DEPT VISIT MOD MDM: CPT | Mod: ,,, | Performed by: PHYSICIAN ASSISTANT

## 2022-05-24 PROCEDURE — 85025 COMPLETE CBC W/AUTO DIFF WBC: CPT | Performed by: PHYSICIAN ASSISTANT

## 2022-05-24 PROCEDURE — 99284 EMERGENCY DEPT VISIT MOD MDM: CPT | Mod: 25

## 2022-05-24 PROCEDURE — 25000003 PHARM REV CODE 250: Performed by: PHYSICIAN ASSISTANT

## 2022-05-24 PROCEDURE — 99284 PR EMERGENCY DEPT VISIT,LEVEL IV: ICD-10-PCS | Mod: ,,, | Performed by: PHYSICIAN ASSISTANT

## 2022-05-24 PROCEDURE — 80048 BASIC METABOLIC PNL TOTAL CA: CPT | Performed by: PHYSICIAN ASSISTANT

## 2022-05-24 PROCEDURE — 93005 ELECTROCARDIOGRAM TRACING: CPT

## 2022-05-24 RX ADMIN — SODIUM CHLORIDE 500 ML: 0.9 INJECTION, SOLUTION INTRAVENOUS at 03:05

## 2022-05-24 NOTE — ED PROVIDER NOTES
Encounter Date: 5/24/2022       History     Chief Complaint   Patient presents with    Multiple complaints     Body got heavy feeling while at work better now, knots to r forearm      42-year-old female with past medical history of anxiety, asthma, depression, DM, endometriosis, hypertension, hyperlipidemia, MITZI who presents to the emergency department with chief complaint of lightheadedness and blurred vision that occurred at approximately 10:00 a.m. this morning.  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  It last for several minutes and spontaneously resolved.  She denies headache, chest pain, shortness of breath, abdominal pain, nausea, vomiting, diarrhea.  She denies symptoms of this nature in the past.  She reports that when she stood up from her chair in the waiting room to walk to the exam room, she felt another episode of lightheadedness and blurred vision.  She now feels back to her baseline. Additionally complains of pain and swelling to the lateral forearm that has been ongoing for several weeks. She is s/p carpal tunnel release several months ago at [facility]. Reports ongoing numbness to the hand since surgery. She has a f/u appointment with her surgeon in 3 days. She denies other worsening or alleviating factors.        Review of patient's allergies indicates:   Allergen Reactions    Cheese Itching and Rash     Past Medical History:   Diagnosis Date    Anxiety     Asthma     Chronic pain     back; inflammatory    Depression     Diabetes mellitus, type 2     Endometriosis     Hyperlipidemia     Hypertension     MITZI (obstructive sleep apnea)     Thyroid disease     Vitamin D deficiency      Past Surgical History:   Procedure Laterality Date    CARPAL TUNNEL RELEASE      CERVICAL SPINE SURGERY      endometriosis      THYROID SURGERY       Family History   Problem Relation Age of Onset    Hypertension Mother     Diabetes Maternal Grandmother      Hyperlipidemia Maternal Grandmother     Diabetes Maternal Grandfather     Hyperlipidemia Maternal Grandfather     Breast cancer Maternal Aunt      Social History     Tobacco Use    Smoking status: Never Smoker    Smokeless tobacco: Never Used   Substance Use Topics    Alcohol use: No    Drug use: No     Review of Systems   Constitutional: Negative for fever.   HENT: Negative for sore throat.    Eyes: Positive for visual disturbance.   Respiratory: Negative for shortness of breath.    Cardiovascular: Negative for chest pain.   Gastrointestinal: Negative for nausea.   Genitourinary: Negative for dysuria.   Musculoskeletal: Positive for myalgias (right forearm ). Negative for back pain.   Skin: Negative for rash.   Neurological: Positive for light-headedness. Negative for weakness.   Hematological: Does not bruise/bleed easily.       Physical Exam     Initial Vitals [05/24/22 1223]   BP Pulse Resp Temp SpO2   (!) 157/109 95 18 98.6 °F (37 °C) 100 %      MAP       --         Physical Exam    Nursing note and vitals reviewed.  Constitutional: She appears well-developed and well-nourished. She is not diaphoretic. No distress.   HENT:   Head: Normocephalic and atraumatic.   Mouth/Throat: Oropharynx is clear and moist.   Eyes: Conjunctivae and EOM are normal. Pupils are equal, round, and reactive to light.   Neck: Neck supple.   Normal range of motion.  Cardiovascular: Normal rate, regular rhythm, normal heart sounds and intact distal pulses. Exam reveals no gallop and no friction rub.    No murmur heard.  Pulmonary/Chest: Breath sounds normal. She has no wheezes. She has no rhonchi. She has no rales.   Abdominal: Abdomen is soft. Bowel sounds are normal. There is no abdominal tenderness. There is no rebound and no guarding.   Musculoskeletal:         General: Normal range of motion.      Cervical back: Normal range of motion and neck supple.      Comments: Tenderness to palpation along extensor pollicis longus  tendon      Neurological: She is alert and oriented to person, place, and time. She has normal strength. No cranial nerve deficit or sensory deficit. GCS score is 15. GCS eye subscore is 4. GCS verbal subscore is 5. GCS motor subscore is 6.   Skin: Skin is warm and dry. Capillary refill takes less than 2 seconds.   Psychiatric: She has a normal mood and affect. Her behavior is normal. Judgment and thought content normal.         ED Course   Procedures  Labs Reviewed   CBC W/ AUTO DIFFERENTIAL   BASIC METABOLIC PANEL   POCT URINE PREGNANCY     EKG Readings: (Independently Interpreted)   Initial Reading: No STEMI. Rhythm: Normal Sinus Rhythm. Heart Rate: 88.     ECG Results          EKG 12-lead (In process)  Result time 05/25/22 07:27:32    In process by Interface, Lab In [facility] (05/25/22 07:27:32)                 Narrative:    Test Reason : R42,    Vent. Rate : 088 BPM     Atrial Rate : 088 BPM     P-R Int : 158 ms          QRS Dur : 086 ms      QT Int : 366 ms       P-R-T Axes : 064 071 059 degrees     QTc Int : 442 ms    Normal sinus rhythm  Normal ECG  When compared with ECG of 08-JAN-2020 13:12,  No significant change was found    Referred By: [email or web address]   SELF           Confirmed By:                             Imaging Results    None          Medications   sodium chloride 0.9% bolus 500 mL (500 mLs Intravenous New Bag 5/24/22 1550)     Medical Decision Making:   History:   Old Medical Records: I decided to obtain old medical records.  Initial Assessment:   Emergent evaluation of a 42-year-old female who presents to the emergency department with chief complaint of lightheadedness and blurred vision that occurred about 10:00 a.m. this morning while patient was at work.  She had another episode while in the emergency department after standing from a seated position.  Patient is afebrile, hemodynamically stable, nontoxic appearing.  Will order labs, EKG, UPT, fluids, and continue to monitor.  Differential  Diagnosis:   Differential diagnosis includes but is not limited to cardiac dysrhythmia, metabolic derangement, dehydration.   Independently Interpreted Test(s):   I have ordered and independently interpreted EKG Reading(s) - see prior notes  Clinical Tests:   Lab Tests: Ordered and Reviewed  Medical Tests: Ordered and Reviewed  ED Management:  No significant metabolic or hematologic derangement.  UPT negative.  Orthostatics negative.  Patient has tenderness to palpation along her extensor hallucis longus tendon.  Suspect tendinitis in this region.  Recommend Tylenol and ibuprofen for pain.  Patient has a follow-up appointment with her hand surgeon in 3 days.  She is advised to keep this appointment.  Return precautions given.  All questions answered.  The patient was instructed to follow up with a primary care provider and hand surgery or to return to the emergency department for worsening symptoms. The treatment plan was discussed with the patient who demonstrated understanding and comfort with plan. The patient's history, physical exam, and plan of care was discussed with and agreed upon with my supervising physician.                         Clinical Impression:   Final diagnoses:  [R42] Lightheadedness (Primary)  [M77.9] Tendonitis          ED Disposition Condition    Discharge Stable        ED Prescriptions     None        Follow-up Information     Follow up With Specialties Details Why Contact Info    [facility] - Emergency Dept Emergency Medicine Go to  If symptoms worsen [address]  [facility] [id number] [phone]    Juana Vines MD Cardiology Schedule an appointment as soon as possible for a visit in 1 week  [address] [facility] [address]  [phone]             Ninfa Edmond PA-C  05/25/22 0801

## 2022-05-24 NOTE — DISCHARGE INSTRUCTIONS
Your workup today is very reassuring.  There are no severe abnormalities with your lab work.  Drink plenty of fluids.  Follow-up with your primary doctor or return to the ER for any new or worsening symptoms.

## 2022-05-24 NOTE — ED NOTES
Pt endorses episode of lightheadedness, blurry vision along with generalized weakness. Denies SOB, cough, fevers, chills. Hx of DM2, on metformin and trulicity. Denies chest pain.     AAOx4. Even and unlabored respirations noted. Ambulatory to Intake. Denies blurry vision at this time.

## 2022-06-17 ENCOUNTER — CLINICAL SUPPORT (OUTPATIENT)
Dept: REHABILITATION | Facility: HOSPITAL | Age: 42
End: 2022-06-17
Payer: MEDICAID

## 2022-06-17 ENCOUNTER — HOSPITAL ENCOUNTER (EMERGENCY)
Facility: HOSPITAL | Age: 42
Discharge: HOME | End: 2022-06-17
Attending: EMERGENCY MEDICINE
Payer: MEDICAID

## 2022-06-17 VITALS
RESPIRATION RATE: 15 BRPM | WEIGHT: 244 LBS | HEIGHT: 63 IN | TEMPERATURE: 98 F | BODY MASS INDEX: 43.23 KG/M2 | SYSTOLIC BLOOD PRESSURE: 128 MMHG | DIASTOLIC BLOOD PRESSURE: 85 MMHG | HEART RATE: 85 BPM | OXYGEN SATURATION: 97 %

## 2022-06-17 DIAGNOSIS — M62.838 MUSCLE SPASM: ICD-10-CM

## 2022-06-17 DIAGNOSIS — M25.531 PAIN IN RIGHT WRIST: ICD-10-CM

## 2022-06-17 DIAGNOSIS — R52 PAIN AGGRAVATED BY ACTIVITIES OF DAILY LIVING: ICD-10-CM

## 2022-06-17 DIAGNOSIS — R07.81 RIB PAIN ON RIGHT SIDE: Primary | ICD-10-CM

## 2022-06-17 DIAGNOSIS — M25.631 DECREASED RANGE OF MOTION OF RIGHT WRIST: ICD-10-CM

## 2022-06-17 DIAGNOSIS — R29.898 WEAKNESS OF RIGHT HAND: ICD-10-CM

## 2022-06-17 PROBLEM — M25.639 DECREASED RANGE OF MOTION OF WRIST: Status: ACTIVE | Noted: 2022-06-17

## 2022-06-17 LAB
ALBUMIN SERPL BCP-MCNC: 3.9 G/DL (ref 3.5–5.2)
ALP SERPL-CCNC: 91 U/L (ref 55–135)
ALT SERPL W/O P-5'-P-CCNC: 12 U/L (ref 10–44)
ANION GAP SERPL CALC-SCNC: 11 MMOL/L (ref 8–16)
AST SERPL-CCNC: 17 U/L (ref 10–40)
BASOPHILS # BLD AUTO: 0.03 K/UL (ref 0–0.2)
BASOPHILS NFR BLD: 0.5 % (ref 0–1.9)
BILIRUB SERPL-MCNC: 0.6 MG/DL (ref 0.1–1)
BILIRUB UR QL STRIP: NEGATIVE
BUN SERPL-MCNC: 7 MG/DL (ref 6–20)
CALCIUM SERPL-MCNC: 9 MG/DL (ref 8.7–10.5)
CHLORIDE SERPL-SCNC: 106 MMOL/L (ref 95–110)
CLARITY UR REFRACT.AUTO: ABNORMAL
CO2 SERPL-SCNC: 21 MMOL/L (ref 23–29)
COLOR UR AUTO: YELLOW
CREAT SERPL-MCNC: 0.8 MG/DL (ref 0.5–1.4)
DIFFERENTIAL METHOD: NORMAL
EOSINOPHIL # BLD AUTO: 0.1 K/UL (ref 0–0.5)
EOSINOPHIL NFR BLD: 2.1 % (ref 0–8)
ERYTHROCYTE [DISTWIDTH] IN BLOOD BY AUTOMATED COUNT: 13 % (ref 11.5–14.5)
EST. GFR  (AFRICAN AMERICAN): >60 ML/MIN/1.73 M^2
EST. GFR  (NON AFRICAN AMERICAN): >60 ML/MIN/1.73 M^2
GLUCOSE SERPL-MCNC: 83 MG/DL (ref 70–110)
GLUCOSE UR QL STRIP: NEGATIVE
HCT VFR BLD AUTO: 41.6 % (ref 37–48.5)
HCV AB SERPL QL IA: NEGATIVE
HGB BLD-MCNC: 13.5 G/DL (ref 12–16)
HGB UR QL STRIP: NEGATIVE
HIV 1+2 AB+HIV1 P24 AG SERPL QL IA: NEGATIVE
IMM GRANULOCYTES # BLD AUTO: 0.02 K/UL (ref 0–0.04)
IMM GRANULOCYTES NFR BLD AUTO: 0.3 % (ref 0–0.5)
KETONES UR QL STRIP: NEGATIVE
LEUKOCYTE ESTERASE UR QL STRIP: NEGATIVE
LIPASE SERPL-CCNC: 24 U/L (ref 4–60)
LYMPHOCYTES # BLD AUTO: 2.4 K/UL (ref 1–4.8)
LYMPHOCYTES NFR BLD: 35.7 % (ref 18–48)
MCH RBC QN AUTO: 29.9 PG (ref 27–31)
MCHC RBC AUTO-ENTMCNC: 32.5 G/DL (ref 32–36)
MCV RBC AUTO: 92 FL (ref 82–98)
MONOCYTES # BLD AUTO: 0.6 K/UL (ref 0.3–1)
MONOCYTES NFR BLD: 9 % (ref 4–15)
NEUTROPHILS # BLD AUTO: 3.5 K/UL (ref 1.8–7.7)
NEUTROPHILS NFR BLD: 52.4 % (ref 38–73)
NITRITE UR QL STRIP: NEGATIVE
NRBC BLD-RTO: 0 /100 WBC
PH UR STRIP: 7 [PH] (ref 5–8)
PLATELET # BLD AUTO: 330 K/UL (ref 150–450)
PMV BLD AUTO: 9.5 FL (ref 9.2–12.9)
POTASSIUM SERPL-SCNC: 4 MMOL/L (ref 3.5–5.1)
PROT SERPL-MCNC: 7.4 G/DL (ref 6–8.4)
PROT UR QL STRIP: NEGATIVE
RBC # BLD AUTO: 4.51 M/UL (ref 4–5.4)
SODIUM SERPL-SCNC: 138 MMOL/L (ref 136–145)
SP GR UR STRIP: 1.02 (ref 1–1.03)
URN SPEC COLLECT METH UR: ABNORMAL
WBC # BLD AUTO: 6.58 K/UL (ref 3.9–12.7)

## 2022-06-17 PROCEDURE — 99284 EMERGENCY DEPT VISIT MOD MDM: CPT | Mod: 25

## 2022-06-17 PROCEDURE — 85025 COMPLETE CBC W/AUTO DIFF WBC: CPT | Performed by: PHYSICIAN ASSISTANT

## 2022-06-17 PROCEDURE — 83690 ASSAY OF LIPASE: CPT | Performed by: PHYSICIAN ASSISTANT

## 2022-06-17 PROCEDURE — 99284 EMERGENCY DEPT VISIT MOD MDM: CPT | Mod: ,,, | Performed by: PHYSICIAN ASSISTANT

## 2022-06-17 PROCEDURE — 80053 COMPREHEN METABOLIC PANEL: CPT | Performed by: PHYSICIAN ASSISTANT

## 2022-06-17 PROCEDURE — 99284 PR EMERGENCY DEPT VISIT,LEVEL IV: ICD-10-PCS | Mod: ,,, | Performed by: PHYSICIAN ASSISTANT

## 2022-06-17 PROCEDURE — 86803 HEPATITIS C AB TEST: CPT | Performed by: EMERGENCY MEDICINE

## 2022-06-17 PROCEDURE — 97165 OT EVAL LOW COMPLEX 30 MIN: CPT | Mod: PN

## 2022-06-17 PROCEDURE — 25000003 PHARM REV CODE 250: Performed by: PHYSICIAN ASSISTANT

## 2022-06-17 PROCEDURE — 81003 URINALYSIS AUTO W/O SCOPE: CPT | Performed by: PHYSICIAN ASSISTANT

## 2022-06-17 PROCEDURE — 97530 THERAPEUTIC ACTIVITIES: CPT | Mod: PN

## 2022-06-17 PROCEDURE — 87389 HIV-1 AG W/HIV-1&-2 AB AG IA: CPT | Performed by: EMERGENCY MEDICINE

## 2022-06-17 RX ORDER — LIDOCAINE 50 MG/G
1 PATCH TOPICAL
Status: DISCONTINUED | OUTPATIENT
Start: 2022-06-17 | End: 2022-06-17 | Stop reason: HOSPADM

## 2022-06-17 RX ORDER — LIDOCAINE 50 MG/G
1 PATCH TOPICAL DAILY PRN
Qty: 15 PATCH | Refills: 0 | Status: SHIPPED | OUTPATIENT
Start: 2022-06-17

## 2022-06-17 RX ORDER — NABUMETONE 500 MG/1
500 TABLET, FILM COATED ORAL 2 TIMES DAILY PRN
Qty: 10 TABLET | Refills: 0 | Status: SHIPPED | OUTPATIENT
Start: 2022-06-17 | End: 2022-06-22

## 2022-06-17 RX ADMIN — LIDOCAINE 1 PATCH: 50 PATCH CUTANEOUS at 08:06

## 2022-06-17 NOTE — ED NOTES
Patient states right flank, rib pain x 1 month, taking muscle relaxants, states pain worse with mvmt or turning.States routinely moves heavy boxes.

## 2022-06-17 NOTE — PLAN OF CARE
"  [facility] Occupational Therapy Wrist & Hand  Initial Evaluation     Date: 6/17/2022  Patient: Caitie Wang  Chart Number: [id number]  Referring Physician: Ariana Garber, *  Therapy Diagnosis:   1. Pain in right wrist     2. Decreased range of motion of right wrist     3. Pain aggravated by activities of daily living     4. 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 No   Eval and Treat Yes   Type of Therapy Outpatient Therapy     Evaluation Date: 6/17/2022  Insurance Authorization Period Expiration: 6/6/23  Plan of Care from 6/17/2022 to 8/17/22   Surgery Date and Procedure: 11/4/2021; [facility]  Date of Return to MD: To be set    Visit #: 1 of 1    Time In: 4:05 pm  Time Out: 4:55 pm  Total Billable Time: 50 min    Precautions: Standard , Diabetes    Subjective     Involved Side: Right  Dominant Side: Right  Date of Onset: Before November 2021  History of Current Condition/Mechanism of Injury: Pt reports "my whole arm would go numb, I kept dropping stuff, and I would burn myself"  Imaging:  none  Previous Therapy: [facility] therapy post [facility]    Past Medical History/Physical Systems Review:   Caitie Wang  has a past medical history of Anxiety, Asthma, Chronic pain, Depression, Diabetes mellitus, type 2, Endometriosis, Hyperlipidemia, Hypertension, MITZI (obstructive sleep apnea), Thyroid disease, and Vitamin D deficiency.    Caitie Wang  has a past surgical history that includes Thyroid surgery; endometriosis; Cervical spine surgery; and Carpal tunnel release.    Caitie DIANA has a current medication list which includes the following prescription(s): albuterol sulfate, baclofen, cetirizine, folic acid, gabapentin, imipramine, levothyroxine, lidocaine, medroxyprogesterone, " metformin, mometasone, nabumetone, pravastatin, ranitidine, trulicity, and vitamin d2.    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    Pain:  Functional Pain Scale Rating 0-10:   5/10 at best  10/10 at worst  Location: Right UE  Description: Sharp and Shooting  Aggravating Factors: After work  Easing Factors: pain medication, ice, heating pad, rest and wearing wrist brace    Occupation:    Working presently: employed  Duties: decorating cakes    Functional Limitations/Social History:    Previous functional status includes:  Independent with all ADLs.     Current Level of Function    Home/Living environment : lives with their spouse   Limitation of Functional Status as follows:  Basic Self Care/ADLs: Mod (I)  IADLs: Mod (I)    - Driving: Mod (I)   Leisure: [employer]    Objective     Observations: Incision/s; wrist incision is firm and sensitive; color is good    Sensation: N/A Distribution   IE-6/17/22    RIGHT   Monofilament Testing    Normal 1.65-2.83 Each digit & thumb tip respond timely       EDEMA (Girth in cm)   Right/Left   DATE IE: 6/17/22   Wrist 17.7/17.3   DPC 19.8/20.0   TransMC 19.7/20.5   Thumb P1 7.3/6.9   6 cm prox to dorsal wrist crease 21.6/21.3   Elbow crease 31.7/32.7         Wrist AROM   Right/Left   DATE 6/17/22   EXT 50/60   FLX 46/53   RD 14/28   UD  40/41   ORDOÑEZ 150/182        Strength: (measured in psi.)    Right/Left   DATE IE: 6/17/22   Zachariah Rung II *10/62   Browne Pinch *8/15   3-pt Pinch *6/12   2-pt Pinch defer   * pain begins in palm      Treatment     Treatment Time In: 4:40 pm  Treatment Time Out: 4:55 pm  Total Treatment time (time-based codes) separate from Evaluation: 10 minutes    Caitie received the following supervised modalities  after being cleared for contraindications for 0 minutes:   -defer    Caitie received the following direct contact modalities after being cleared for contraindications for 0 minutes:  - defer    Caitie received the following manual therapy techniques for 5 minutes:   - defer-Retrograde massage  - defer-IASTM  - fit and issued size C, D & F tubigrips for edema mgmt; precautions discussed  - elastic tape for space correction at wrist and medial elbow for reducing pain and promoting healing. Precautions were discussed in detail.     Caitie performed therapeutic exercises for 5 minutes including:  Wrist & forearm AROM 1x10 each:  Extension/flexion  Rd/ud  Sup/Pronation   Extrinsic flexor and extensor active lengthening 1x10 each   Hand AROM and Tendon gliding 1x10 each:  - 5 positions of the hand (TGEs)  - Thumb opp to each digit tip  - Spreads       Objective Measures Eval   HEP As initially set         Caitie participated in an in-depth and concurrent discussion of evaluation findings and specific home care, including:  - education related to the tissue involvement as evidenced by MD diagnosis, positive response during provative movements and/or special testing.  - the anatomy and pathophysiology of diagnosis.  - instruction in activity modifications for goals of protecting healing tissues to include:  - wearing the wrist support with the solid insert at home and for all other activities that have proven provocative  - encouraged trial of wearing the wrist support with the solid insert removed for decorating cakes  - seeking alternative work position such as returning to  position.    Home Exercise Program and Home Care Resources/Education:  Issued HEP (see patient instructions in EMR) and educated on modality use for pain management . Exercises were reviewed and Caitie was able to demonstrate them prior to the end of the session.   Pt received a written copy of exercises to perform at home. Pt demonstrated good   understanding of the education provided.  Pt was advised to perform these exercises free of pain, and to stop performing them if pain occurs.    Patient/Family Education: role of OT, goals for OT, scheduling/cancellations - pt verbalized understanding. Discussed insurance limitations with patient.    Assessment       Caitie Wang is a 42 y.o. female presents with limitations as described in problem list. Patient can benefit from Occupational Therapy services for Iontophoresis, ultrasound, moist heat, therapeutic exercises, home exercise program provied with written instructions, ice and strengthening and orthotics, if deemed necessary . The following goals were discussed with the patient and she is in agreement with them as to be addressed in the treatment plan.    The patient's rehab potential is Fair.     Anticipated barriers to occupational therapy: chronicity of condition  Pt has no cultural, educational or language barriers to learning provided.    Profile and History Assessment of Occupational Performance Level of Clinical Decision Making Complexity Score   Occupational Profile:   Caitie Wang is a 42 y.o. female who is currently employed Caitie Wang has difficulty with  ADLs and IADLs as listed previously, which  affecting his/her daily functional abilities.      Comorbidities:    has a past medical history of Anxiety, Asthma, Chronic pain, Depression, Diabetes mellitus, type 2, Endometriosis, Hyperlipidemia, Hypertension, MITZI (obstructive sleep apnea), Thyroid disease, and Vitamin D deficiency.    Medical and Therapy History Review:   Brief               Performance Deficits    Physical:  Muscle Power/Strength  Edema   Strength  Pinch Strength  Postural Control  Pain    Cognitive:  No Deficits    Psychosocial:    No Deficits     Clinical Decision Making:  moderate    Assessment Process:  Detailed Assessments    Modification/Need for Assistance:  Not Necessary    Intervention  Selection:  Several Treatment Options       low  Based on PMHX, co morbidities , data from assessments and functional level of assistance required with task and clinical presentation directly impacting function.       The following goals were discussed with the patient and patient is in agreement with them as to be addressed in the treatment plan.     Goals:    LTG's (6-8 weeks):  1)   Decrease complaints of pain to 2 out of 10 at worst to increase functional hand use for ADL/work/leisure activities.  2)   Increase Right wrist ORDOÑEZ to >90% of the unaffected wrist to increase functional hand use for weight bearing and reaching tasks.  3)   Right  strength/tolerance to be > 70% of the unaffected hand for safe grasp and stabilization tasks.  4)   Right pinch strength to be >60% of the unaffected hand for managing all fasteners, locks & latches needed for self care and home management.  5)  Patient to score at 30% or less on FOTO or Quick/DASH to demonstrate improved perception of functional Right hand/UE use.     STG's (3-4 weeks)  1)  Decrease complaints of pain to 4 out of 10 at worst to increase functional hand use for ADL/work/leisure activities.    2)   Increase right wrist ORDOÑEZ by 15 degrees to increase functional reach and support function positions for ADLs/work/leisure activities.  3)  Pt to tolerate advancing PREs and weight bearing with self-graded intensity and effective symptom monitoring.   4)  Patient to be IND with Phase II of HEP and modalities for pain, incision scar and edema managment.   5)  Pt to report decreased episodes of pain and/or abnormal symptoms with implementation of effective activity modification at work or at home.     Plan   Certification Period/Plan of care expiration: 6/17/2022 to 8/17/22.    Outpatient Occupational Therapy 2 times weekly for 8 weeks to include the following interventions: Paraffin, Fluidotherapy, Manual therapy/joint mobilizations, Modalities for pain management,  US 3 mhz, Therapeutic exercises/activities., Strengthening, Orthotic Fabrication/Fit/Training, Edema Control, Scar Management, Joint Protection and Energy Conservation.    Enedina Nguyễn, OT, CHT  Occupational Therapist, Certified Hand Therapist      I CERTIFY THE NEED FOR THESE SERVICES FURNISHED UNDER THIS PLAN OF TREATMENT AND WHILE UNDER MY CARE    Physician's comments:        Physician's Signature: ___________________________________________________

## 2022-06-17 NOTE — ED NOTES
Patient identifiers verified and correct for Ms Wang  C/C: Right flank pain SEE NN  APPEARANCE: awake and alert in NAD.  SKIN: warm, dry and intact. No breakdown or bruising.  MUSCULOSKELETAL: Patient moving all extremities spontaneously, no obvious swelling or deformities noted. Ambulates independently.  RESPIRATORY: Denies shortness of breath.Respirations unlabored.   CARDIAC: Denies CP, 2+ distal pulses; no peripheral edema  ABDOMEN: S/ND/NT, Denies nausea, denies abd pain   : voids spontaneously, denies difficulty  Neurologic: AAO x 4; follows commands equal strength in all extremities; denies numbness/tingling. Denies dizziness reports pain worse qwith mvmt

## 2022-06-17 NOTE — PROGRESS NOTES
"  [facility] Occupational Therapy Wrist & Hand  Initial Evaluation     Date: 6/17/2022  Patient: Caitie Wang  Chart Number: [id number]  Referring Physician: Ariana Garber, *  Therapy Diagnosis:   1. Pain in right wrist     2. Decreased range of motion of right wrist     3. Pain aggravated by activities of daily living     4. 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 No   Eval and Treat Yes   Type of Therapy Outpatient Therapy     Evaluation Date: 6/17/2022  Insurance Authorization Period Expiration: 6/6/23  Plan of Care from 6/17/2022 to 8/17/22   Surgery Date and Procedure: 11/4/2021; [facility]  Date of Return to MD: To be set    Visit #: 1 of 1    Time In: 4:05 pm  Time Out: 4:55 pm  Total Billable Time: 50 min    Precautions: Standard , Diabetes    Subjective     Involved Side: Right  Dominant Side: Right  Date of Onset: Before November 2021  History of Current Condition/Mechanism of Injury: Pt reports "my whole arm would go numb, I kept dropping stuff, and I would burn myself"  Imaging:  none  Previous Therapy: [facility] therapy post CTR    Past Medical History/Physical Systems Review:   Caitie Wang  has a past medical history of Anxiety, Asthma, Chronic pain, Depression, Diabetes mellitus, type 2, Endometriosis, Hyperlipidemia, Hypertension, MITZI (obstructive sleep apnea), Thyroid disease, and Vitamin D deficiency.    Caitie Wang  has a past surgical history that includes Thyroid surgery; endometriosis; Cervical spine surgery; and Carpal tunnel release.    Caitie DIANA has a current medication list which includes the following prescription(s): albuterol sulfate, baclofen, cetirizine, folic acid, gabapentin, imipramine, levothyroxine, lidocaine, medroxyprogesterone, " metformin, mometasone, nabumetone, pravastatin, ranitidine, trulicity, and vitamin d2.    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    Pain:  Functional Pain Scale Rating 0-10:   5/10 at best  10/10 at worst  Location: Right UE  Description: Sharp and Shooting  Aggravating Factors: After work  Easing Factors: pain medication, ice, heating pad, rest and wearing wrist brace    Occupation:    Working presently: employed  Duties: decorating cakes    Functional Limitations/Social History:    Previous functional status includes:  Independent with all ADLs.     Current Level of Function    Home/Living environment : lives with their spouse   Limitation of Functional Status as follows:  Basic Self Care/ADLs: Mod (I)  IADLs: Mod (I)    - Driving: Mod (I)   Leisure: [place]l    Objective     Observations: Incision/s; wrist incision is firm and sensitive; color is good    Sensation: N/A Distribution   IE-6/17/22    RIGHT   Monofilament Testing    Normal 1.65-2.83 Each digit & thumb tip respond timely       EDEMA (Girth in cm)   Right/Left   DATE IE: 6/17/22   Wrist 17.7/17.3   DPC 19.8/20.0   TransMC 19.7/20.5   Thumb P1 7.3/6.9   6 cm prox to dorsal wrist crease 21.6/21.3   Elbow crease 31.7/32.7         Wrist AROM   Right/Left   DATE 6/17/22   EXT 50/60   FLX 46/53   RD 14/28   UD  40/41   ORDOÑEZ 150/182        Strength: (measured in psi.)    Right/Left   DATE IE: 6/17/22   Zachariah Rung II *10/62   Browne Pinch *8/15   3-pt Pinch *6/12   2-pt Pinch defer   * pain begins in palm      Treatment     Treatment Time In: 4:40 pm  Treatment Time Out: 4:55 pm  Total Treatment time (time-based codes) separate from Evaluation: 10 minutes    Caitie received the following supervised modalities  after being cleared for contraindications for 0 minutes:   -defer    Caitie received the following direct contact modalities after being cleared for contraindications for 0 minutes:  - defer    Caitie received the following manual therapy techniques for 5 minutes:   - defer-Retrograde massage  - defer-IASTM  - fit and issued size C, D & F tubigrips for edema mgmt; precautions discussed  - elastic tape for space correction at wrist and medial elbow for reducing pain and promoting healing. Precautions were discussed in detail.     Caitie performed therapeutic exercises for 5 minutes including:  Wrist & forearm AROM 1x10 each:  Extension/flexion  Rd/ud  Sup/Pronation   Extrinsic flexor and extensor active lengthening 1x10 each   Hand AROM and Tendon gliding 1x10 each:  - 5 positions of the hand (TGEs)  - Thumb opp to each digit tip  - Spreads       Objective Measures Eval   HEP As initially set         Caitie participated in an in-depth and concurrent discussion of evaluation findings and specific home care, including:  - education related to the tissue involvement as evidenced by MD diagnosis, positive response during provative movements and/or special testing.  - the anatomy and pathophysiology of diagnosis.  - instruction in activity modifications for goals of protecting healing tissues to include:  - wearing the wrist support with the solid insert at home and for all other activities that have proven provocative  - encouraged trial of wearing the wrist support with the solid insert removed for decorating cakes  - seeking alternative work position such as returning to  position.    Home Exercise Program and Home Care Resources/Education:  Issued HEP (see patient instructions in EMR) and educated on modality use for pain management . Exercises were reviewed and Caitie was able to demonstrate them prior to the end of the session.   Pt received a written copy of exercises to perform at home. Pt demonstrated good   understanding of the education provided.  Pt was advised to perform these exercises free of pain, and to stop performing them if pain occurs.    Patient/Family Education: role of OT, goals for OT, scheduling/cancellations - pt verbalized understanding. Discussed insurance limitations with patient.    Assessment       Caitie Wang is a 42 y.o. female presents with limitations as described in problem list. Patient can benefit from Occupational Therapy services for Iontophoresis, ultrasound, moist heat, therapeutic exercises, home exercise program provied with written instructions, ice and strengthening and orthotics, if deemed necessary . The following goals were discussed with the patient and she is in agreement with them as to be addressed in the treatment plan.    The patient's rehab potential is Fair.     Anticipated barriers to occupational therapy: chronicity of condition  Pt has no cultural, educational or language barriers to learning provided.    Profile and History Assessment of Occupational Performance Level of Clinical Decision Making Complexity Score   Occupational Profile:   Caitie Wang is a 42 y.o. female who is currently employed Caitie Wang has difficulty with  ADLs and IADLs as listed previously, which  affecting his/her daily functional abilities.      Comorbidities:    has a past medical history of Anxiety, Asthma, Chronic pain, Depression, Diabetes mellitus, type 2, Endometriosis, Hyperlipidemia, Hypertension, MITZI (obstructive sleep apnea), Thyroid disease, and Vitamin D deficiency.    Medical and Therapy History Review:   Brief               Performance Deficits    Physical:  Muscle Power/Strength  Edema   Strength  Pinch Strength  Postural Control  Pain    Cognitive:  No Deficits    Psychosocial:    No Deficits     Clinical Decision Making:  moderate    Assessment Process:  Detailed Assessments    Modification/Need for Assistance:  Not Necessary    Intervention  Selection:  Several Treatment Options       low  Based on PMHX, co morbidities , data from assessments and functional level of assistance required with task and clinical presentation directly impacting function.       The following goals were discussed with the patient and patient is in agreement with them as to be addressed in the treatment plan.     Goals:    LTG's (6-8 weeks):  1)   Decrease complaints of pain to 2 out of 10 at worst to increase functional hand use for ADL/work/leisure activities.  2)   Increase Right wrist ORDOÑEZ to >90% of the unaffected wrist to increase functional hand use for weight bearing and reaching tasks.  3)   Right  strength/tolerance to be > 70% of the unaffected hand for safe grasp and stabilization tasks.  4)   Right pinch strength to be >60% of the unaffected hand for managing all fasteners, locks & latches needed for self care and home management.  5)  Patient to score at 30% or less on FOTO or Quick/DASH to demonstrate improved perception of functional Right hand/UE use.     STG's (3-4 weeks)  1)  Decrease complaints of pain to 4 out of 10 at worst to increase functional hand use for ADL/work/leisure activities.    2)   Increase right wrist ORDOÑEZ by 15 degrees to increase functional reach and support function positions for ADLs/work/leisure activities.  3)  Pt to tolerate advancing PREs and weight bearing with self-graded intensity and effective symptom monitoring.   4)  Patient to be IND with Phase II of HEP and modalities for pain, incision scar and edema managment.   5)  Pt to report decreased episodes of pain and/or abnormal symptoms with implementation of effective activity modification at work or at home.     Plan   Certification Period/Plan of care expiration: 6/17/2022 to 8/17/22.    Outpatient Occupational Therapy 2 times weekly for 8 weeks to include the following interventions: Paraffin, Fluidotherapy, Manual therapy/joint mobilizations, Modalities for pain management,  US 3 mhz, Therapeutic exercises/activities., Strengthening, Orthotic Fabrication/Fit/Training, Edema Control, Scar Management, Joint Protection and Energy Conservation.    Enedina Nguyễn, OT, CHT  Occupational Therapist, Certified Hand Therapist

## 2022-06-17 NOTE — PATIENT INSTRUCTIONS
Treatments may include:  Resting the elbow, forearm, and wrist. Youll need to avoid movements that can make your symptoms worse. You also may need to avoid certain sports and types of work for a time. This helps relieve symptoms and prevent further damage to the tendons.  Changing the action that caused the problem. For instance, if the tendons were damaged from playing tennis, it may help to change your playing technique or use different equipment. This helps prevent further damage to the tendons.  Using cold packs. Putting an ice pack on the injured area can help reduce pain and swelling.  Taking pain medicines. Taking prescription or over-the-counter pain medicines may help reduce pain and swelling.    Wearing a brace. This helps reduce strain on the muscles and tendons in the forearm, which may relieve symptoms. It is very important to wear the brace properly.  Doing exercises and physical therapy. These help improve strength and range of motion in the elbow, forearm, and wrist.    Your treatment will depend on how inflamed your tendon is. The goal is to relieve your symptoms and help you regain full use of your elbow.      WRIST brace option can be worn to allow resting of the tendons for 4-6 weeks.      The Wrist Brace can be worn throughout the day, at rest as well as during activities. It is effective when it is worn while participating in daily activities that have proven to be painful or provocative to your condition.      REMOVE THE BRACE AT LEAST 4 TIMES A DAY TO PERFORM GENTLE WRIST AROM FOR PREVENTING STIFFNESS.     PERFORM THE EXERCISES INSTRUCTED BY YOUR THERAPIST WHILE THE WRIST BRACE IS REMOVED.    IT IS OK TO SLEEP IN THE BRACE, BUT NOT NECESSARY.       PHASE I OF YOUR THERAPY IS DIRECTED AT HEALING THE INFLAMED AND/OR IRRITATED TISSUES.      Protecting Healing Tissues:   Use larger joints and muscles when possible (resting objects in crook of elbow)  Stop activities before the point of  discomfort  Avoid activities that put strain on painful tissues and/or joints  Avoid a tight or prolonged grasp on objects  Avoid any lifting or gripping with elbow in extension  If you must lift, lift with elbows bent at side, object should be held close to your body.  Balance Rest and Activity:  Take frequent, short breaks to stretch  Avoid staying in one position for a long time  Alternate heavy and light activities  Eliminate unnecessary activities  Reduce the effort:  Avoid excessive loads. Don't be afraid to ask for help. Use carts and tabletops to make tasks easier.  Keep items nearby (such as keyboard) and store frequently used items in loweest shelves/cabinets  Helpful Tips: slide objects; use your palms instead of fingers, keeps heavy items close to your body, use larger handles, pens and pencils, look for lightweight options, use wheels or carts to roll objects                [facility], OCCUPATIONAL THERAPY  HOME EXERCISE PROGRAM     PERFORM EXERCISES 1 TO 3 SETS OF 10, 4-6 TIMES PER DAY; ALWAYS PAIN-FREE. DON'T LET THE PICTURES PRESSURE YOU TO PUSH THROUGH PAINFUL RANGES.

## 2022-06-17 NOTE — ED PROVIDER NOTES
"Encounter Date: 6/17/2022       History     Chief Complaint   Patient presents with    Abdominal Pain     R upper abd pain for month inc with moving       The patient is a 42 year old female who has a documented past medical history of anxiety, depression, asthma, DM, endometriosis, HTN, HLD, carpal tunnel syndrome, obesity, sciatica, and MITZI who presents to the emergency department with chief complaint of constant pain to her right side/trunk. She describes the pain as a constant aching pain or soreness. She states that the pain began approximately 1 month ago and has been constant ever since. 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 She states that she "feels a knot or a muscle spasm" on her right lateral side. She states that the pain is worse with palpation, turning, bending, certain movements, and when she lifts anything up at work. She states that she has a history of sciatica, but denies any back pain at all. She denies any pain radiation, numbness, tingling, or weakness. She has tried taking Baclofen and Gabapentin, but reports inadequate relief. She denies any coughing or pain with deep breath. She denies any falls or trauma. She denies any urinary symptoms. She denies any change in bowel habit. She reports normal appetite and PO intake and states that eating does not affect her pain. She denies any redness or rash to area. She denies any chest pain or SOB. She denies any abdominal pain.          Review of patient's allergies indicates:   Allergen Reactions    Cheese Itching and Rash     Past Medical History:   Diagnosis Date    Anxiety     Asthma     Chronic pain     back; inflammatory    Depression     Diabetes mellitus, type 2     Endometriosis     Hyperlipidemia     Hypertension     MITZI (obstructive sleep apnea)     Thyroid disease     Vitamin D deficiency      Past Surgical History:   Procedure Laterality Date    " CARPAL TUNNEL RELEASE      CERVICAL SPINE SURGERY      endometriosis      THYROID SURGERY       Family History   Problem Relation Age of Onset    Hypertension Mother     Diabetes Maternal Grandmother     Hyperlipidemia Maternal Grandmother     Diabetes Maternal Grandfather     Hyperlipidemia Maternal Grandfather     Breast cancer Maternal Aunt      Social History     Tobacco Use    Smoking status: Never Smoker    Smokeless tobacco: Never Used   Substance Use Topics    Alcohol use: No    Drug use: No     Review of Systems   Constitutional: Negative for activity change, appetite change, chills, diaphoresis and fever.   HENT: Negative for congestion, rhinorrhea and sore throat.    Eyes: Negative for visual disturbance.   Respiratory: Negative for cough, chest tightness and shortness of breath.    Cardiovascular: Negative for chest pain, palpitations and leg swelling.   Gastrointestinal: Negative for abdominal distention, abdominal pain, constipation, diarrhea, nausea and vomiting.   Genitourinary: Negative for decreased urine volume, difficulty urinating, dysuria, flank pain, frequency, hematuria, pelvic pain and urgency.   Musculoskeletal: Negative for arthralgias, back pain, gait problem, myalgias and neck pain.   Skin: Negative for color change and rash.   Allergic/Immunologic: Negative for immunocompromised state.   Neurological: Negative for dizziness, syncope, weakness, light-headedness, numbness and headaches.   Psychiatric/Behavioral: Negative for confusion.       Physical Exam     Initial Vitals [06/17/22 0726]   BP Pulse Resp Temp SpO2   (!) 144/89 84 18 98.2 °F (36.8 °C) 97 %      MAP       --         Physical Exam    Nursing note and vitals reviewed.  Constitutional: She appears well-developed and well-nourished. She is not diaphoretic.   She is alert and ambulatory. She is well appearing.    HENT:   Head: Normocephalic.   Eyes: Conjunctivae are normal.   Neck: Neck supple.   Normal range of  motion.  Cardiovascular: Normal rate and intact distal pulses.   Pulmonary/Chest: No respiratory distress.   No cough. No wheezes. No tachypnea. No hypoxia. No conversational dyspnea. Denies pain with deep breath.    Abdominal: Abdomen is soft. She exhibits no distension. There is no abdominal tenderness.   No epigastric tenderness. Negative Mclean's sign. No pain to palpation of McBurney's point. Soft and non-tender to palpation. Benign abdomen.  There is no rebound and no guarding.   Musculoskeletal:      Cervical back: Normal range of motion and neck supple.        Back:       Comments: No T/L spine tenderness to palpation. No paraspinal muscle tenderness to palpation. There is localized tenderness to palpation of her right lateral trunk/side over lower ribs; no mass or deformity; palpation reproduces exact pain; pt was laying flat on her right side on exam table with skin exposed for exam and female RN present as chaperone throughout entire exam.       Neurological: She is alert and oriented to person, place, and time. She has normal strength. No sensory deficit.   Normal gait. 5/5 strength extremities x 4.    Skin: Skin is warm and dry. No rash noted.   No shingles. No erythema or ecchymosis. No induration, abscess, rash, or cellulitis.    Psychiatric: She has a normal mood and affect. Her behavior is normal.         ED Course   Procedures  Labs Reviewed   COMPREHENSIVE METABOLIC PANEL - Abnormal; Notable for the following components:       Result Value    CO2 21 (*)     All other components within normal limits    Narrative:     Release to patient->Immediate   URINALYSIS, REFLEX TO URINE CULTURE - Abnormal; Notable for the following components:    Appearance, UA Hazy (*)     All other components within normal limits    Narrative:     Specimen Source->Urine   HIV 1 / 2 ANTIBODY    Narrative:     Release to patient->Immediate   HEPATITIS C ANTIBODY    Narrative:     Release to patient->Immediate   CBC W/ AUTO  DIFFERENTIAL    Narrative:     Release to patient->Immediate   LIPASE    Narrative:     Release to patient->Immediate     Results for orders placed or performed during the hospital encounter of 06/17/22   HIV 1/2 Ag/Ab (4th Gen)   Result Value Ref Range    HIV 1/2 Ag/Ab Negative Negative   Hepatitis C Antibody   Result Value Ref Range    Hepatitis C Ab Negative Negative   CBC auto differential   Result Value Ref Range    WBC 6.58 3.90 - 12.70 K/uL    RBC 4.51 4.00 - 5.40 M/uL    Hemoglobin 13.5 12.0 - 16.0 g/dL    Hematocrit 41.6 37.0 - 48.5 %    MCV 92 82 - 98 fL    MCH 29.9 27.0 - 31.0 pg    MCHC 32.5 32.0 - 36.0 g/dL    RDW 13.0 11.5 - 14.5 %    Platelets 330 150 - 450 K/uL    MPV 9.5 9.2 - 12.9 fL    Immature Granulocytes 0.3 0.0 - 0.5 %    Gran # (ANC) 3.5 1.8 - 7.7 K/uL    Immature Grans (Abs) 0.02 0.00 - 0.04 K/uL    Lymph # 2.4 1.0 - 4.8 K/uL    Mono # 0.6 0.3 - 1.0 K/uL    Eos # 0.1 0.0 - 0.5 K/uL    Baso # 0.03 0.00 - 0.20 K/uL    nRBC 0 0 /100 WBC    Gran % 52.4 38.0 - 73.0 %    Lymph % 35.7 18.0 - 48.0 %    Mono % 9.0 4.0 - 15.0 %    Eosinophil % 2.1 0.0 - 8.0 %    Basophil % 0.5 0.0 - 1.9 %    Differential Method Automated    Comprehensive metabolic panel   Result Value Ref Range    Sodium 138 136 - 145 mmol/L    Potassium 4.0 3.5 - 5.1 mmol/L    Chloride 106 95 - 110 mmol/L    CO2 21 (L) 23 - 29 mmol/L    Glucose 83 70 - 110 mg/dL    BUN 7 6 - 20 mg/dL    Creatinine 0.8 0.5 - 1.4 mg/dL    Calcium 9.0 8.7 - 10.5 mg/dL    Total Protein 7.4 6.0 - 8.4 g/dL    Albumin 3.9 3.5 - 5.2 g/dL    Total Bilirubin 0.6 0.1 - 1.0 mg/dL    Alkaline Phosphatase 91 55 - 135 U/L    AST 17 10 - 40 U/L    ALT 12 10 - 44 U/L    Anion Gap 11 8 - 16 mmol/L    eGFR if African American >60.0 >60 mL/min/1.73 m^2    eGFR if non African American >60.0 >60 mL/min/1.73 m^2   Lipase   Result Value Ref Range    Lipase 24 4 - 60 U/L   Urinalysis, Reflex to Urine Culture Urine, Clean Catch    Specimen: Urine   Result Value Ref Range     Specimen UA Urine, Clean Catch     Color, UA Yellow Yellow, Straw, Lorri    Appearance, UA Hazy (A) Clear    pH, UA 7.0 5.0 - 8.0    Specific Gravity, UA 1.020 1.005 - 1.030    Protein, UA Negative Negative    Glucose, UA Negative Negative    Ketones, UA Negative Negative    Bilirubin (UA) Negative Negative    Occult Blood UA Negative Negative    Nitrite, UA Negative Negative    Leukocytes, UA Negative Negative            Imaging Results          X-Ray Ribs 2 View Right (Final result)  Result time 06/17/22 08:35:15    Final result by Juaquin Viveros III, MD (06/17/22 08:35:15)                 Impression:      No acute process seen.      Electronically signed by: Juaquin Viveros MD  Date:    06/17/2022  Time:    08:35             Narrative:    EXAMINATION:  XR RIBS 2 VIEW RIGHT    CLINICAL HISTORY:  Pleurodynia    FINDINGS:  Rib two views right: No pneumothorax pleural fluid or lung contusion is seen.  No rib fracture or rib lesion is seen.  No rib trauma seen.                               X-Ray Chest AP Portable (Final result)  Result time 06/17/22 08:38:26    Final result by German Palmer MD (06/17/22 08:38:26)                 Impression:      No significant intrathoracic abnormality. No significant detrimental interval change in the appearance of the chest since 02/18/2019 is appreciated.      Electronically signed by: German Palmer MD  Date:    06/17/2022  Time:    08:38             Narrative:    EXAMINATION:  XR CHEST AP PORTABLE    COMPARISON:  Comparison is made to 02/18/2019.  Clinical information of right-sided upper abdominal pain is obtained from the electronic medical record.    FINDINGS:  Heart size is normal, as is the appearance of the pulmonary vascularity. Lung zones are clear, and are free of significant airspace consolidation or volume loss. No pleural fluid. No abnormal mediastinal widening. No pneumothorax or pneumomediastinum. An opacity relating to a disc implant/spacer is again noted superimposed  over the lower cervical spine.                                 Medications - No data to display  Medical Decision Making:   History:   Old Medical Records: I decided to obtain old medical records.  Initial Assessment:   43 yo female, here c/o constant soreness/ache to her right lateral trunk x 1 month that she believes is from repetitive heavy lifting at her job    Differential Diagnosis:   Intercostal muscle strain, oblique muscle strain, muscle spasm, radiculopathy, kidney stone, UTI, pneumonia, rib injury, gallstones, etc   Clinical Tests:   Lab Tests: Ordered and Reviewed  Radiological Study: Ordered and Reviewed  ED Management:  Vital signs reviewed - benign  Records reviewed   Pt reports feeling better after treatment in the ER   Pt advised to follow up with her PCP in the next 1-2 days for re-evaluation and further management   Pt advised to return to the ER promptly if unimproved or if worse in any way     Additional MDM:   X-Rays: I have independently interpreted X-Ray(s) - see notes.                    Clinical Impression:   Final diagnoses:  [R07.81] Rib pain on right side (Primary)  [M62.838] Muscle spasm          ED Disposition Condition    Discharge Stable        ED Prescriptions     Medication Sig Dispense Start Date End Date Auth. Provider    LIDOcaine (LIDODERM) 5 % Place 1 patch onto the skin daily as needed (pain). Remove & Discard patch within 12 hours or as directed by MD 15 patch 6/17/2022  Elliot Bella PA-C    nabumetone (RELAFEN) 500 MG tablet Take 1 tablet (500 mg total) by mouth 2 (two) times daily as needed for Pain. 10 tablet 6/17/2022 6/22/2022 Elliot Bella PA-C        Follow-up Information     Follow up With Specialties Details Why Contact Info    Juana Vines MD Cardiology Schedule an appointment as soon as possible for a visit in 2 days Follow up with your primary care physician in the next 1-2 days for re-evaluation and further management. [address]  [facility] [address]  [phone]      [facility] - Emergency Dept Emergency Medicine  If symptoms worsen in any way or if unimproved. [address]  [facility] [id number] [phone]           Elliot Bella PA-C  06/17/22 6112

## 2022-07-01 ENCOUNTER — CLINICAL SUPPORT (OUTPATIENT)
Dept: REHABILITATION | Facility: HOSPITAL | Age: 42
End: 2022-07-01
Payer: MEDICAID

## 2022-07-01 DIAGNOSIS — M25.531 PAIN IN RIGHT WRIST: Primary | ICD-10-CM

## 2022-07-01 DIAGNOSIS — R52 PAIN AGGRAVATED BY ACTIVITIES OF DAILY LIVING: ICD-10-CM

## 2022-07-01 DIAGNOSIS — R29.898 WEAKNESS OF RIGHT HAND: ICD-10-CM

## 2022-07-01 PROCEDURE — 97530 THERAPEUTIC ACTIVITIES: CPT | Mod: PN

## 2022-07-01 NOTE — PROGRESS NOTES
Occupational Therapy Daily Treatment Note     Name: Caitie Wang  Clinic Number: [id number]    Therapy Diagnosis:   Encounter Diagnoses   Name Primary?   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 No   Eval and Treat Yes   Type of Therapy Outpatient Therapy      Evaluation Date: 6/17/2022  Insurance Authorization Period Expiration: 6/6/23  Plan of Care from 6/17/2022 to 8/17/22   Surgery Date and Procedure: 11/4/2021; [facility]  Date of Return to MD: To be set     Visit #: 1 of 1     Time In: 9:50 am  Time Out: 1045  am  Total Billable Time: 55 min     Precautions: Standard ,        Subjective     Pt reports: Its ok just real sensitive   she was compliant with home exercise program given last session.   Response to previous treatment: increased use but still relying on brace for activities  Functional change: none significant     Pain: 4/10  Location: R elbow  and wrists      Objective     Observations: Incision/s; wrist incision is firm and sensitive; color is good     Sensation: N/A Distribution    IE-6/17/22     RIGHT   Monofilament Testing     Normal 1.65-2.83 Each digit & thumb tip respond timely         EDEMA (Girth in cm)    Right/Left R   DATE IE: 6/17/22 7/1/22   Wrist 17.7/17.3 17.7   DPC 19.8/20.0 19.8   [facility] 19.7/20.5 19.1   Thumb P1 7.3/6.9 7   6 cm prox to dorsal wrist crease 21.6/21.3 20   Elbow crease 31.7/32.7 31.5             Wrist AROM    Right/Left   DATE 6/17/22   EXT 50/60   FLX 46/53   RD 14/28   UD  40/41   ORDOÑEZ 150/182          Strength: (measured in psi.)     Right/Left   DATE IE: 6/17/22   Zachariah Rung II *10/62   [place]ne Pinch *8/15   3-pt Pinch  *6/12   2-pt Pinch defer   * pain begins in palm         Caitie received the following supervised modalities after being cleared for contradictions for 10 minutes:   -Paraffin w/ MHP to R hand for 10 min, pre-tx to decrease pain & increase tissue extensibility      Caitie received the following manual therapy techniques for 10 minutes:   -Pt received retrograde massage as well as scar massage to decrease edema and stiffness for increased ROM. STM performed to decreased stiffness in surrounding musculature.       Caitie received therapeutic exercises for 35 minutes including:  -      Wrist AROM  Flx/ext  RD/UD 2/15            Wave  Hook  straight fist, composite fist finger spreads finger lifts  EDM isolated    X 10 reps each    Opposition  X 20    Finger lifts X 20    Isopheres 2 min    Wrist 3 ways Large PVC Dowel  2/10    Light Grippers X 10 each    Elbow 3 ways No Weight dowel  2/10          Home Exercises and Education Provided     Education provided:   - Hep in place continue.   - Progress towards goals     Written Home Exercises Provided: Patient instructed to cont prior HEP.  Exercises were reviewed and Caitie was able to demonstrate them prior to the end of the session.  Caitie demonstrated good  understanding of the HEP provided.   .   See EMR under Patient Instructions for exercises provided prior visit.        Assessment     Pt would continue to benefit from skilled OT. She did well for first after session with fair demonstration noted of established exercises. Still having some pillar pain and sensitivity over incision site. Also still consistent intersection pain which was taped today. Future sessions to continue focus on functional use and tolerance along with strengthening.     Caitie is progressing well towards her goals and there are no updates to goals at this time. Pt prognosis is Good.     Pt will continue to benefit from skilled outpatient occupational therapy to address the deficits listed in  the problem list on initial evaluation provide pt/family education and to maximize pt's level of independence in the home and community environment.     Anticipated barriers to occupational therapy: pain     Pt's spiritual, cultural and educational needs considered and pt agreeable to plan of care and goals.        Goals:    LTG's (6-8 weeks):  1)   Decrease complaints of pain to 2 out of 10 at worst to increase functional hand use for ADL/work/leisure activities. Progressing 7/1/2022  2)   Increase Right wrist ORDOÑEZ to >90% of the unaffected wrist to increase functional hand use for weight bearing and reaching tasks. Progressing 7/1/2022  3)   Right  strength/tolerance to be > 70% of the unaffected hand for safe grasp and stabilization tasks.Progressing 7/1/2022  4)   Right pinch strength to be >60% of the unaffected hand for managing all fasteners, locks & latches needed for self care and home management. Progressing 7/1/2022  5)  Patient to score at 30% or less on FOTO or Quick/DASH to demonstrate improved perception of functional Right hand/UE use. Progressing 7/1/2022     STG's (3-4 weeks)  1)  Decrease complaints of pain to 4 out of 10 at worst to increase functional hand use for ADL/work/leisure activities.  Progressing 7/1/2022  2)   Increase right wrist ORDOÑEZ by 15 degrees to increase functional reach and support function positions for ADLs/work/leisure activities. Progressing 7/1/2022  3)  Pt to tolerate advancing PREs and weight bearing with self-graded intensity and effective symptom monitoring. Progressing 7/1/2022  4)  Patient to be IND with Phase II of HEP and modalities for pain, incision scar and edema managment. Progressing 7/1/2022  5)  Pt to report decreased episodes of pain and/or abnormal symptoms with implementation of effective activity modification at work or at home. Progressing 7/1/2022           Plan   Continue to progress as tolerated via initial POC.   Updates/Grading for next session:  Progress with strengthening.       Louie Nieves, OT

## 2022-07-05 ENCOUNTER — CLINICAL SUPPORT (OUTPATIENT)
Dept: REHABILITATION | Facility: HOSPITAL | Age: 42
End: 2022-07-05
Payer: MEDICAID

## 2022-07-05 DIAGNOSIS — M25.531 PAIN IN RIGHT WRIST: Primary | ICD-10-CM

## 2022-07-05 DIAGNOSIS — R52 PAIN AGGRAVATED BY ACTIVITIES OF DAILY LIVING: ICD-10-CM

## 2022-07-05 DIAGNOSIS — R29.898 WEAKNESS OF RIGHT HAND: ICD-10-CM

## 2022-07-05 PROCEDURE — 97530 THERAPEUTIC ACTIVITIES: CPT | Mod: PN

## 2022-07-05 NOTE — PROGRESS NOTES
"    Occupational Therapy Daily Treatment Note     Name: Caitie Wang  Clinic Number: [id number]    Therapy Diagnosis:   Encounter Diagnoses   Name Primary?   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 No   Eval and Treat Yes   Type of Therapy Outpatient Therapy      Evaluation Date: 6/17/2022  Insurance Authorization Period Expiration: 6/6/23  Plan of Care from 6/17/2022 to 8/17/22   Surgery Date and Procedure: 11/4/2021; [facility]  Date of Return to MD: To be set     Visit #: 2 of 16     Time In: 8:42 am  Time Out: 9:42  am  Total Billable Time: 60 min     Precautions: Standard ,        Subjective     Pt reports: "Its ok just real sensitive still but more sore today since I had to make a bunch of cakes yesterday"   she was compliant with home exercise program given last session.   Response to previous treatment: increased use but still relying on brace for activities  Functional change: none significant     Pain: 3/10  Location: R elbow  and wrists      Objective     Observations: Incision/s; wrist incision is firm and sensitive; color is good     Sensation: N/A Distribution    IE-6/17/22     RIGHT   Monofilament Testing     Normal 1.65-2.83 Each digit & thumb tip respond timely         EDEMA (Girth in cm)    Right/Left R   DATE IE: 6/17/22 7/1/22   Wrist 17.7/17.3 17.7   DPC 19.8/20.0 19.8   [facility] 19.7/20.5 19.1   Thumb P1 7.3/6.9 7   6 cm prox to dorsal wrist crease 21.6/21.3 20   Elbow crease 31.7/32.7 31.5             Wrist AROM    Right/Left   DATE 6/17/22   EXT 50/60   FLX 46/53   RD 14/28   UD  40/41   ORDOÑEZ 150/182          Strength: (measured in psi.)     " Right/Left   DATE IE: 6/17/22   Zacahriah Rung II *10/62   Browne Pinch *8/15   3-pt Pinch *6/12   2-pt Pinch defer   * pain begins in palm         Caitie received the following supervised modalities after being cleared for contradictions for 10 minutes:   -Paraffin w/ MHP to R hand for 10 min, pre-tx to decrease pain & increase tissue extensibility      Caitie received the following manual therapy techniques for 15 minutes:   -Pt received retrograde massage as well as scar massage to decrease edema and stiffness for increased ROM. STM performed to decreased stiffness in surrounding musculature.   -Cupping to scar as well as IASTYM use for surrounding musculature and forearm STM for intersection syndrome focus.       Caitie received therapeutic exercises for 35 minutes including:  -      Wrist AROM  Flx/ext  RD/UD 2/15    Grupo ball for sensitivity  2 min    Golf ball scar massage 2 min    finger spreads finger lifts  EDM isolated  x 10  With squeeze red foam         Finger lifts X 20    Isopheres 2 min    Wrist 3 ways 1#  2/10    Yellow Putty   Did not provide with putty for HEP  Roll  Pinch  X 30    Elbow 3 ways No Weight dowel  2/10    Wrist dextraciser 2 min    Red t bar  Smilleys   Frowns  2/10          Home Exercises and Education Provided     Education provided:   - Hep in place continue.   - Progress towards goals     Written Home Exercises Provided: Patient instructed to cont prior HEP.  Exercises were reviewed and Caitie was able to demonstrate them prior to the end of the session.  Caitie demonstrated good  understanding of the HEP provided.   .   See EMR under Patient Instructions for exercises provided prior visit.        Assessment     Pt would continue to benefit from skilled OT. She is doing well overall although still having some sensitivity and today some fatigue. Main focus on strengthening and activity tolerance today with STM to incision and desensitization techs still being used. She did well with  progression of established and new exercises. No increases in pain and continued compliance with K tape techs stating this helps at work when she is not wearing the brace.     Caitie is progressing well towards her goals and there are no updates to goals at this time. Pt prognosis is Good.     Pt will continue to benefit from skilled outpatient occupational therapy to address the deficits listed in the problem list on initial evaluation provide pt/family education and to maximize pt's level of independence in the home and community environment.     Anticipated barriers to occupational therapy: pain     Pt's spiritual, cultural and educational needs considered and pt agreeable to plan of care and goals.      Goals:    LTG's (6-8 weeks):  1)   Decrease complaints of pain to 2 out of 10 at worst to increase functional hand use for ADL/work/leisure activities. Progressing 7/5/2022  2)   Increase Right wrist ORDOÑEZ to >90% of the unaffected wrist to increase functional hand use for weight bearing and reaching tasks. Progressing 7/5/2022  3)   Right  strength/tolerance to be > 70% of the unaffected hand for safe grasp and stabilization tasks.Progressing 7/5/2022  4)   Right pinch strength to be >60% of the unaffected hand for managing all fasteners, locks & latches needed for self care and home management. Progressing 7/5/2022  5)  Patient to score at 30% or less on FOTO or Quick/DASH to demonstrate improved perception of functional Right hand/UE use. Progressing 7/5/2022     STG's (3-4 weeks)  1)  Decrease complaints of pain to 4 out of 10 at worst to increase functional hand use for ADL/work/leisure activities.  MET  7/5/2022  2)   Increase right wrist ORDOÑEZ by 15 degrees to increase functional reach and support function positions for ADLs/work/leisure activities. Progressing 7/5/2022  3)  Pt to tolerate advancing PREs and weight bearing with self-graded intensity and effective symptom monitoring. Progressing  7/5/2022  4)  Patient to be IND with Phase II of HEP and modalities for pain, incision scar and edema managment. Progressing 7/5/2022  5)  Pt to report decreased episodes of pain and/or abnormal symptoms with implementation of effective activity modification at work or at home. Progressing 7/5/2022         Plan   Continue to progress as tolerated via initial POC.   Updates/Grading for next session: Progress with strengthening and provided yellow t putty with HEP exercise print out.       Louie Nieves, OT

## 2022-07-08 ENCOUNTER — CLINICAL SUPPORT (OUTPATIENT)
Dept: REHABILITATION | Facility: HOSPITAL | Age: 42
End: 2022-07-08
Payer: MEDICAID

## 2022-07-08 DIAGNOSIS — M25.531 PAIN IN RIGHT WRIST: Primary | ICD-10-CM

## 2022-07-08 DIAGNOSIS — M25.631 DECREASED RANGE OF MOTION OF RIGHT WRIST: ICD-10-CM

## 2022-07-08 DIAGNOSIS — R29.898 WEAKNESS OF RIGHT HAND: ICD-10-CM

## 2022-07-08 DIAGNOSIS — R52 PAIN AGGRAVATED BY ACTIVITIES OF DAILY LIVING: ICD-10-CM

## 2022-07-08 PROCEDURE — 97530 THERAPEUTIC ACTIVITIES: CPT | Mod: PN

## 2022-07-08 NOTE — PROGRESS NOTES
"    Occupational Therapy Daily Treatment Note     Name: Caitie Wang  Clinic Number: [id number]    Therapy Diagnosis:   Encounter Diagnoses   Name Primary?   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 No   Eval and Treat Yes   Type of Therapy Outpatient Therapy      Evaluation Date: 6/17/2022  Insurance Authorization Period Expiration: 6/6/23  Plan of Care from 6/17/2022 to 8/17/22   Surgery Date and Procedure: 11/4/2021; [facility]  Date of Return to MD: To be set     Visit #: 4 of 16     Time In: 1420  Time Out: 1505  Total Billable Time: 45 min     Precautions: Standard ,    Subjective     Pt reports: "It hurts at work, and I'm forced to use it without the brace at home."   she was compliant with home exercise program given last session.   Response to previous treatment: increased use but still relying on brace for activities  Functional change: none significant     Pain: 6/10  Location: R wrist      Objective     Observations: Incision/s; wrist incision is firm and sensitive; color is good     Sensation: N/A Distribution    IE-6/17/22     RIGHT   Monofilament Testing     Normal 1.65-2.83 Each digit & thumb tip respond timely         EDEMA (Girth in cm)    Right/Left R R   DATE IE: 6/17/22 7/1/22 7/8/22   Wrist 17.7/17.3 17.7 17.1   DPC 19.8/20.0 19.8 20   [facility] 19.7/20.5 19.1 20.5   Thumb P1 7.3/6.9 7 7.3   6 cm prox to dorsal wrist crease 21.6/21.3 20 21.4   Elbow crease 31.7/32.7 31.5 31.0              Wrist AROM    Right/Left Right   DATE 6/17/22 7/8/22   EXT 50/60 51   FLX 46/53 58   RD 14/28 19   UD  40/41 52   ORDOÑEZ 150/182 " 180          Strength: (measured in psi.)     Right/Left Right   DATE IE: 6/17/22 7/8/22   Zachariah Rung II *10/62 22   Browne Pinch *8/15 6   3-pt Pinch *6/12 4.5   2-pt Pinch defer -   * pain begins in palm     Caitie received the following supervised modalities after being cleared for contradictions for 10 minutes:   -fluidotherapy to R hand for 10 min, pre-tx to decrease pain & increase tissue extensibility  Defer - Paraffin w/ MHP to R hand for 10 min, pre-tx to decrease pain & increase tissue extensibility    Caitie received the following manual therapy techniques for 20 minutes:   -Cupping to scar as well as IASTYM use for surrounding musculature and forearm STM for intersection syndrome focus.   -K taping with fan technique for reducing swelling through hand and fingers. A fan was anchored at the lateral epicondyle and run along her arm to the anchors of the wrist tapes.  - taping of thumb and circumferentially around wrist for pain relief.    Caitie received therapeutic exercises for 15 minutes including:  -      Wrist AROM  Flx/ext  RD/UD 2/15    Grupo ball for sensitivity  Defer  2 min    Golf ball scar massage Defer  2 min    finger spreads finger lifts  EDM isolated Defer   x 10  With squeeze red foam         Finger lifts Defer  X 20    Isopheres Defer  2 min    Wrist 3 ways Defer  1#  2/10    Yellow Putty Defer    Did not provide with putty for HEP  Roll  Pinch  X 30    Elbow 3 ways Defer  No Weight dowel  2/10    Wrist dextraciser Defer2 min    Red t bar  Defer  Smilleys   Frowns  2/10    Objective measures 7/8/22         Home Exercises and Education Provided     Education provided:   - Hep in place continue.   - Progress towards goals     Written Home Exercises Provided: Patient instructed to cont prior HEP.  Exercises were reviewed and Caitie was able to demonstrate them prior to the end of the session.  Caitie demonstrated good  understanding of the HEP provided.   .   See EMR under Patient  Instructions for exercises provided prior visit.        Assessment   Pt would continue to benefit from skilled OT. Main focus on pain relief today including fluidotherapy, taping, and manual techniques.  Concern present over increased swelling to right upper extremity. No increases in pain and continued compliance with K tape techs stating this helps at work when she is not wearing the brace.     Caitie is progressing well towards her goals and there are no updates to goals at this time. Pt prognosis is Good.     Pt will continue to benefit from skilled outpatient occupational therapy to address the deficits listed in the problem list on initial evaluation provide pt/family education and to maximize pt's level of independence in the home and community environment.     Anticipated barriers to occupational therapy: pain     Pt's spiritual, cultural and educational needs considered and pt agreeable to plan of care and goals.      Goals:    LTG's (6-8 weeks):  1)   Decrease complaints of pain to 2 out of 10 at worst to increase functional hand use for ADL/work/leisure activities. Progressing 7/8/2022  2)   Increase Right wrist ORDOÑEZ to >90% of the unaffected wrist to increase functional hand use for weight bearing and reaching tasks. Progressing 7/8/2022  3)   Right  strength/tolerance to be > 70% of the unaffected hand for safe grasp and stabilization tasks.Progressing 7/8/2022  4)   Right pinch strength to be >60% of the unaffected hand for managing all fasteners, locks & latches needed for self care and home management. Progressing 7/8/2022  5)  Patient to score at 30% or less on FOTO or Quick/DASH to demonstrate improved perception of functional Right hand/UE use. Progressing 7/8/2022     STG's (3-4 weeks)  1)  Decrease complaints of pain to 4 out of 10 at worst to increase functional hand use for ADL/work/leisure activities.  MET  7/8/2022  2)   Increase right wrist ORDOÑEZ by 15 degrees to increase functional reach  and support function positions for ADLs/work/leisure activities. Progressing 7/8/2022  3)  Pt to tolerate advancing PREs and weight bearing with self-graded intensity and effective symptom monitoring. Progressing 7/8/2022  4)  Patient to be IND with Phase II of HEP and modalities for pain, incision scar and edema managment. Progressing 7/8/2022  5)  Pt to report decreased episodes of pain and/or abnormal symptoms with implementation of effective activity modification at work or at home. Progressing 7/8/2022         Plan   Continue to progress as tolerated via initial POC.   Updates/Grading for next session: Progress with strengthening and provided yellow t putty with HEP exercise print out.       KERLINE GR, OT

## 2022-07-12 ENCOUNTER — CLINICAL SUPPORT (OUTPATIENT)
Dept: REHABILITATION | Facility: HOSPITAL | Age: 42
End: 2022-07-12
Payer: MEDICAID

## 2022-07-12 DIAGNOSIS — M25.531 PAIN IN RIGHT WRIST: Primary | ICD-10-CM

## 2022-07-12 DIAGNOSIS — R52 PAIN AGGRAVATED BY ACTIVITIES OF DAILY LIVING: ICD-10-CM

## 2022-07-12 DIAGNOSIS — M25.631 DECREASED RANGE OF MOTION OF RIGHT WRIST: ICD-10-CM

## 2022-07-12 DIAGNOSIS — R29.898 WEAKNESS OF RIGHT HAND: ICD-10-CM

## 2022-07-12 PROCEDURE — 97530 THERAPEUTIC ACTIVITIES: CPT | Mod: PN

## 2022-07-12 NOTE — PATIENT INSTRUCTIONS
[facility]  OCCUPATIONAL THERAPY  HOME EXERCISE PROGRAM     Complete the following strengthening program 1x/day.                       _

## 2022-07-12 NOTE — PROGRESS NOTES
"  Occupational Therapy Daily Treatment Note     Name: Caitie Wang  Clinic Number: [id number]    Therapy Diagnosis:   Encounter Diagnoses   Name Primary?   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 No   Eval and Treat Yes   Type of Therapy Outpatient Therapy      Evaluation Date: 6/17/2022  Insurance Authorization Period Expiration: 6/6/23  Plan of Care from 6/17/2022 to 8/17/22   Surgery Date and Procedure: 11/4/2021; [facility]  Date of Return to MD: To be set     Visit #: 5 of 16 FOTO:43%     Time In: 3:00  Time Out: 3:45  Total Billable Time: 45 min     Precautions: Standard ,    Subjective     Pt reports: "I'm feeling pretty good today, I didn't work so I don't have much pain."  she was compliant with home exercise program given last session.   Response to previous treatment: decreased pain  Functional change: issued putty, pt able to complete new exercises without c/o    Pain: 3/10  Location: R wrist      Objective     Observations: Incision/s; wrist incision is firm and sensitive; color is good     Sensation: N/A Distribution    IE-6/17/22     RIGHT   Monofilament Testing     Normal 1.65-2.83 Each digit & thumb tip respond timely         EDEMA (Girth in cm)    Right/Left R R   DATE IE: 6/17/22 7/1/22 7/8/22   Wrist 17.7/17.3 17.7 17.1   DPC 19.8/20.0 19.8 20   [facility] 19.7/20.5 19.1 20.5   Thumb P1 7.3/6.9 7 7.3   6 cm prox to dorsal wrist crease 21.6/21.3 20 21.4   Elbow crease 31.7/32.7 31.5 31.0              Wrist AROM    Right/Left Right   DATE 6/17/22 7/8/22   EXT 50/60 51   FLX 46/53 58   RD 14/28 19   UD  40/41 52 " "  ORDOÑEZ 150/182 180          Strength: (measured in psi.)     Right/Left Right   DATE IE: 6/17/22 7/8/22   Zachariah Rung II *10/62 22   Browne Pinch *8/15 6   3-pt Pinch *6/12 4.5   2-pt Pinch defer -   * pain begins in palm     Caitie received the following supervised modalities after being cleared for contradictions for 5 minutes:   -Paraffin w/ MHP to R hand for 10 min, pre-tx to decrease pain & increase tissue extensibility    Caitie received the following manual therapy techniques for 15 minutes:   -Cupping to scar as well as IASTYM use for surrounding musculature and forearm STM for intersection syndrome focus.     Caitie received therapeutic exercises for 25 minutes including:  Wrist extension stretch 3/30"   Prayer stretch 3/30"   TGE's 10x    Grupo ball for sensitivity  2 min    finger spreads finger lifts x 10     Isopheres 2 min    Wrist 3 ways 1#  2/10    [place]ach Putty     Roll  Pinch  X 30    Elbow 3 ways No Weight dowel  2/10    Wrist dextraciser 2 min    Red t bar  Smilleys   Frowns  2/10      Home Exercises and Education Provided     Education provided:   - Hep in place continue.   - Progress towards goals     Written Home Exercises Provided: Patient instructed to cont prior HEP.  Exercises were reviewed and Caitie was able to demonstrate them prior to the end of the session.  Caitie demonstrated good  understanding of the HEP provided.     See EMR under Patient Instructions for exercises provided prior visit.     Assessment   Pt with good participation today. Her pain report remains low in sessions. Signs and symptoms of fibrotic tissue noted with ASTYM however tolerated well. Pt continues with dense scar tissue and hypersensitivity noted at [facility] however responded well to scar extractor and dycem.  Issued dycem and peach putty for home use.  Pt able to demonstrate all updated home exercise program with good technique and without c/o. Pt motivated.     Caitie is progressing well towards her goals and " there are no updates to goals at this time. Pt prognosis is Good.     Pt will continue to benefit from skilled outpatient occupational therapy to address the deficits listed in the problem list on initial evaluation provide pt/family education and to maximize pt's level of independence in the home and community environment.     Anticipated barriers to occupational therapy: pain     Pt's spiritual, cultural and educational needs considered and pt agreeable to plan of care and goals.      Goals:    LTG's (6-8 weeks):  1)   Decrease complaints of pain to 2 out of 10 at worst to increase functional hand use for ADL/work/leisure activities. Progressing 7/12/2022  2)   Increase Right wrist ORDOÑEZ to >90% of the unaffected wrist to increase functional hand use for weight bearing and reaching tasks. Progressing 7/12/2022  3)   Right  strength/tolerance to be > 70% of the unaffected hand for safe grasp and stabilization tasks.Progressing 7/12/2022  4)   Right pinch strength to be >60% of the unaffected hand for managing all fasteners, locks & latches needed for self care and home management. Progressing 7/12/2022  5)  Patient to score at 30% or less on FOTO or Quick/DASH to demonstrate improved perception of functional Right hand/UE use. Progressing 7/12/2022     STG's (3-4 weeks)  1)  Decrease complaints of pain to 4 out of 10 at worst to increase functional hand use for ADL/work/leisure activities.  MET  7/12/2022  2)   Increase right wrist ORDOÑEZ by 15 degrees to increase functional reach and support function positions for ADLs/work/leisure activities. Progressing 7/12/2022  3)  Pt to tolerate advancing PREs and weight bearing with self-graded intensity and effective symptom monitoring. Progressing 7/12/2022  4)  Patient to be IND with Phase II of HEP and modalities for pain, incision scar and edema managment. Progressing 7/12/2022  5)  Pt to report decreased episodes of pain and/or abnormal symptoms with implementation of  effective activity modification at work or at home. Progressing 7/12/2022         Plan   Continue to progress as tolerated via initial POC.   Updates/Grading for next session: Progress with strengthening and provided yellow t putty with HEP exercise print out.       MARV Espino

## 2022-07-15 ENCOUNTER — CLINICAL SUPPORT (OUTPATIENT)
Dept: REHABILITATION | Facility: HOSPITAL | Age: 42
End: 2022-07-15
Payer: MEDICAID

## 2022-07-15 DIAGNOSIS — R29.898 WEAKNESS OF RIGHT HAND: ICD-10-CM

## 2022-07-15 DIAGNOSIS — M25.531 PAIN IN RIGHT WRIST: Primary | ICD-10-CM

## 2022-07-15 DIAGNOSIS — M25.639 DECREASED RANGE OF MOTION OF WRIST, UNSPECIFIED LATERALITY: ICD-10-CM

## 2022-07-15 DIAGNOSIS — R52 PAIN AGGRAVATED BY ACTIVITIES OF DAILY LIVING: ICD-10-CM

## 2022-07-15 PROCEDURE — 97530 THERAPEUTIC ACTIVITIES: CPT | Mod: PN

## 2022-07-15 NOTE — PROGRESS NOTES
"  Occupational Therapy Daily Treatment Note     Name: Caitie Wang  Clinic Number: [id number]    Therapy Diagnosis:   Encounter Diagnoses   Name Primary?   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 No   Eval and Treat Yes   Type of Therapy Outpatient Therapy      Evaluation Date: 6/17/2022  Insurance Authorization Period Expiration: 6/6/23  Plan of Care from 6/17/2022 to 8/17/22   Surgery Date and Procedure: 11/4/2021; [facility]  Date of Return to MD: To be set     Visit #: 6 of 16 FOTO:43%     Time In: 1430  Time Out: 3:15  Total Billable Time: 45 min     Precautions: Standard ,    Subjective     Pt reports: "I was packing today, so I have pain."  she was compliant with home exercise program given last session.   Response to previous treatment: no adverse reactions.  Functional change: pt able to complete new exercises without c/o additional pain.    Pain: 4/10  Location: R wrist      Objective     Observations: Incision/s; wrist incision is firm and sensitive; color is good     Sensation: N/A Distribution    IE-6/17/22     RIGHT   Monofilament Testing     Normal 1.65-2.83 Each digit & thumb tip respond timely         EDEMA (Girth in cm)    Right/Left R R   DATE IE: 6/17/22 7/1/22 7/8/22   Wrist 17.7/17.3 17.7 17.1   DPC 19.8/20.0 19.8 20   TransMC 19.7/20.5 19.1 20.5   Thumb P1 7.3/6.9 7 7.3   6 cm prox to dorsal wrist crease 21.6/21.3 20 21.4   Elbow crease 31.7/32.7 31.5 31.0              Wrist AROM    Right/Left Right   DATE 6/17/22 7/8/22   EXT 50/60 51   FLX 46/53 58   RD 14/28 19   UD  40/41 52   ORDOÑEZ " "150/182 180          Strength: (measured in psi.)     Right/Left Right   DATE IE: 6/17/22 7/8/22   Zachariah Rung II *10/62 22   [place] Pinch *8/15 6   3-pt Pinch *6/12 4.5   2-pt Pinch defer -   * pain begins in palm     Caitie received the following supervised modalities after being cleared for contradictions for 0 minutes:   -defer- Paraffin w/ MHP to R hand for 10 min, pre-tx to decrease pain & increase tissue extensibility    Caitie received the following manual therapy techniques for 0 minutes:   -defer-Cupping to scar as well as IASTYM use for surrounding musculature and forearm STM for intersection syndrome focus.     Caitie received therapeutic exercises for 0 minutes including:  Defer  Wrist extension stretch 3/30"   Prayer stretch 3/30"   TGE's 10x    Grupo ball for sensitivity  2 min    finger spreads finger lifts x 10     Isopheres 2 min    Wrist 3 ways 1#  2/10    Peach Putty     Roll  Pinch  X 30    Elbow 3 ways No Weight dowel  2/10    Wrist dextraciser 2 min    Red t bar  Smilleys   Frowns  2/10      Self care and orthotic management for 45 minutes included fabrication of a long, forearm based thumb spica splint to protect her forearm at work.    Home Exercises and Education Provided     Education provided:   - Hep in place continue.   - Progress towards goals     Written Home Exercises Provided: Patient instructed to cont prior HEP.  Exercises were reviewed and Caitie was able to demonstrate them prior to the end of the session.  Caitie demonstrated good  understanding of the HEP provided.     See EMR under Patient Instructions for exercises provided prior visit.     Assessment   Splinting today since the [employer] recommends splinting for intersection syndrome.  Patient instructions provided.     Caitie is progressing well towards her goals and there are no updates to goals at this time. Pt prognosis is Good.     Pt will continue to benefit from skilled outpatient occupational " therapy to address the deficits listed in the problem list on initial evaluation provide pt/family education and to maximize pt's level of independence in the home and community environment.     Anticipated barriers to occupational therapy: pain     Pt's spiritual, cultural and educational needs considered and pt agreeable to plan of care and goals.      Goals:    LTG's (6-8 weeks):  1)   Decrease complaints of pain to 2 out of 10 at worst to increase functional hand use for ADL/work/leisure activities. Progressing 7/15/2022  2)   Increase Right wrist ORDOÑEZ to >90% of the unaffected wrist to increase functional hand use for weight bearing and reaching tasks. Progressing 7/15/2022  3)   Right  strength/tolerance to be > 70% of the unaffected hand for safe grasp and stabilization tasks.Progressing 7/15/2022  4)   Right pinch strength to be >60% of the unaffected hand for managing all fasteners, locks & latches needed for self care and home management. Progressing 7/15/2022  5)  Patient to score at 30% or less on FOTO or Quick/DASH to demonstrate improved perception of functional Right hand/UE use. Progressing 7/15/2022     STG's (3-4 weeks)  1)  Decrease complaints of pain to 4 out of 10 at worst to increase functional hand use for ADL/work/leisure activities.  MET  7/15/2022  2)   Increase right wrist ORDOÑEZ by 15 degrees to increase functional reach and support function positions for ADLs/work/leisure activities. Progressing 7/15/2022  3)  Pt to tolerate advancing PREs and weight bearing with self-graded intensity and effective symptom monitoring. Progressing 7/15/2022  4)  Patient to be IND with Phase II of HEP and modalities for pain, incision scar and edema managment. Progressing 7/15/2022  5)  Pt to report decreased episodes of pain and/or abnormal symptoms with implementation of effective activity modification at work or at home. Progressing 7/15/2022         Plan   Continue to progress as tolerated via initial  POC.   Updates/Grading for next session: Progress with strengthening and provided yellow t putty with HEP exercise print out.       KERLINE GR, OT

## 2022-07-15 NOTE — PATIENT INSTRUCTIONS
CUSTOM  ORTHOSIS (SPLINT) INSTRUCTIONS      A CUSTOM SPLINT HAS BEEN FABRICATED FOR YOUR Right Thumb and Hand and Wrist.    THE PURPOSE OF YOUR SPLINT IS TO:  - To protect your injury site.      PLEASE WEAR YOUR SPLINT/S AS FOLLOWS:      - Remove for bathing and/or hygiene. Use your unaffected hand for bathing.    REMOVE YOUR SPLINT TO PERFORM THE HOME EXERCISES IF INSTRUCTED.     BRING SPLINT/S TO EACH THERAPY SESSION SO THAT YOUR OCCUPATIONAL THERAPIST CAN PROVIDE MODIFICATIONS AS NEEDED TO:  -  Improve the fit and/or comfort. Reduce pressure areas or areas of abrasion. Repair straps or velcro. Monitor effectiveness of the splint.    PLEASE WATCH FOR AND BE AWARE OF THE FOLLOWING:  - Signs of pressure or abrasion that will cause skin irritation or blistering. and Broken straps or velcro. Use an ace wrap or tape as needed to secure the splint until it can be repaired.      Keep your orthosis away from any heat sources that may cause it to change shape. Do not leave your orthosis in, near or around the following sources:       Hot water       hand dryers       stove       clothes dryer         radiator     swati car    swati window sill     baggage stored under an airplane            microwave oven    DO NOT alter the SPLINT yourself. It may no longer fit or be effective if it is damaged.    Please contact your Occupational Therapist/Hand Therapist, Enedina Nguyễn at [phone] if you need to request a splint check. If you have specific questions or concerns, you can message her through the [facility] portal at [email or web address].

## 2022-07-19 ENCOUNTER — CLINICAL SUPPORT (OUTPATIENT)
Dept: REHABILITATION | Facility: HOSPITAL | Age: 42
End: 2022-07-19
Payer: MEDICAID

## 2022-07-19 DIAGNOSIS — R29.898 WEAKNESS OF RIGHT HAND: ICD-10-CM

## 2022-07-19 DIAGNOSIS — R52 PAIN AGGRAVATED BY ACTIVITIES OF DAILY LIVING: ICD-10-CM

## 2022-07-19 DIAGNOSIS — M25.531 PAIN IN RIGHT WRIST: Primary | ICD-10-CM

## 2022-07-19 DIAGNOSIS — M25.639 DECREASED RANGE OF MOTION OF WRIST, UNSPECIFIED LATERALITY: ICD-10-CM

## 2022-07-19 PROCEDURE — 97530 THERAPEUTIC ACTIVITIES: CPT | Mod: PN

## 2022-07-19 NOTE — PROGRESS NOTES
"  Occupational Therapy Daily Treatment Note     Name: Caitie Wang  Clinic Number: [id number]    Therapy Diagnosis:   Encounter Diagnoses   Name Primary?   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 No   Eval and Treat Yes   Type of Therapy Outpatient Therapy      Evaluation Date: 6/17/2022  Insurance Authorization Period Expiration: 6/6/23  Plan of Care from 6/17/2022 to 8/17/22   Surgery Date and Procedure: 11/4/2021; [facility]  Date of Return to MD: To be set     Visit #: 7 of 16 FOTO:43%     Time In: 3:15  Time Out: 4:00  Total Billable Time: 45 min     Precautions: Standard    Subjective     Pt reports: "This splint is hurting me." OT discharged splint made by another therapist last session.  she was compliant with home exercise program given last session.   Response to previous treatment: pain with splint wear, otherwise pain decreased when not in splint  Functional change: able to complete exercises in pain free range of motion, fatigue noted after    Pain: 2/10  Location: R wrist when not in splint    Objective       EDEMA (Girth in cm)    Right/Left R R   DATE IE: 6/17/22 7/1/22 7/8/22   Wrist 17.7/17.3 17.7 17.1   DPC 19.8/20.0 19.8 20   TransMC 19.7/20.5 19.1 20.5   Thumb P1 7.3/6.9 7 7.3   6 cm prox to dorsal wrist crease 21.6/21.3 20 21.4   Elbow crease 31.7/32.7 31.5 31.0              Wrist AROM    Right/Left Right   DATE 6/17/22 7/8/22   EXT 50/60 51   FLX 46/53 58   RD 14/28 19   UD  40/41 52   ORDOÑEZ 150/182 180          Strength: (measured in psi.)     Right/Left Right   DATE IE: 6/17/22 7/8/22 " "  Zachariah Rung II *10/62 22   [place] Pinch *8/15 6   3-pt Pinch *6/12 4.5   * pain begins in palm     Caitie received the following supervised modalities after being cleared for contradictions for 10 minutes:   -Paraffin w/ MHP to R hand for 10 min, pre-tx to decrease pain & increase tissue extensibility    Caitie received the following manual therapy techniques for 10 minutes:   -ASTYM to right elbow, wrist and hand    Caitie received therapeutic exercises for 25 minutes including:  Wrist extension stretch 3/30"   Prayer stretch 3/30"   Wave, hook, straight fist, fist 10x each   Grupo ball massage 2 min    finger spreads finger lifts x 10     Isopheres 2 min    Wrist 3 ways 1#  2/10    Peach Putty  2x10   Roll/pinch 3 logs      Elbow 3 ways 1#  2/10    Red flex bar  Smilles   Frowns  2/10      Home Exercises and Education Provided     Education provided:   - Hep in place continue.   - Progress towards goals     Written Home Exercises Provided: Patient instructed to cont prior HEP.  Exercises were reviewed and Caitie was able to demonstrate them prior to the end of the session.  Caitie demonstrated good  understanding of the HEP provided.     See EMR under Patient Instructions for exercises provided prior visit.     Assessment   Pt came into today's session wearing thumb spica splint made by therapist last session stating splint has caused increased pain. OT discharged splint. Pt with good response to ASTYM, stretching, scar mobilization and exercise in pain free range of motion. Signs and symptoms of fibrotic tissue noted during ASTYM however pt reporting decreased pain after. Fatigue with strengthening exercises yet she was able to complete all with good technique and in a pain free range of motion. Pt continues to be limited by pain, weakness and stiffness which limit her functionally.     Caitie is progressing well towards her goals and there are no updates to goals at this time. Pt prognosis is Good.     Pt will " continue to benefit from skilled outpatient occupational therapy to address the deficits listed in the problem list on initial evaluation provide pt/family education and to maximize pt's level of independence in the home and community environment.     Anticipated barriers to occupational therapy: pain     Pt's spiritual, cultural and educational needs considered and pt agreeable to plan of care and goals.      Goals:    LTG's (6-8 weeks):  1)   Decrease complaints of pain to 2 out of 10 at worst to increase functional hand use for ADL/work/leisure activities. Progressing 7/19/2022  2)   Increase Right wrist ORDOÑEZ to >90% of the unaffected wrist to increase functional hand use for weight bearing and reaching tasks. Progressing 7/19/2022  3)   Right  strength/tolerance to be > 70% of the unaffected hand for safe grasp and stabilization tasks.Progressing 7/19/2022  4)   Right pinch strength to be >60% of the unaffected hand for managing all fasteners, locks & latches needed for self care and home management. Progressing 7/19/2022  5)  Patient to score at 30% or less on FOTO or Quick/DASH to demonstrate improved perception of functional Right hand/UE use. Progressing 7/19/2022     STG's (3-4 weeks)  1)  Decrease complaints of pain to 4 out of 10 at worst to increase functional hand use for ADL/work/leisure activities.  MET  7/19/2022  2)   Increase right wrist ORDOÑEZ by 15 degrees to increase functional reach and support function positions for ADLs/work/leisure activities. Progressing 7/19/2022  3)  Pt to tolerate advancing PREs and weight bearing with self-graded intensity and effective symptom monitoring. Progressing 7/19/2022  4)  Patient to be IND with Phase II of HEP and modalities for pain, incision scar and edema managment. Progressing 7/19/2022  5)  Pt to report decreased episodes of pain and/or abnormal symptoms with implementation of effective activity modification at work or at home. Progressing 7/19/2022          Plan   Continue to progress as tolerated via initial POC.   Updates/Grading for next session: Reassess next session for increased pain or soreness due to progression of activity this session. If pt with increased pain plan to downgrade exercises to pain free activity only.       MARV Espino

## 2022-07-22 DIAGNOSIS — Z12.31 VISIT FOR SCREENING MAMMOGRAM: Primary | ICD-10-CM

## 2022-07-25 NOTE — PROGRESS NOTES
"  Occupational Therapy Daily Treatment Note     Name: Caitie Wang  Clinic Number: [id number]    Therapy Diagnosis:   Encounter Diagnoses   Name Primary?   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 No   Eval and Treat Yes   Type of Therapy Outpatient Therapy      Evaluation Date: 6/17/2022  Insurance Authorization Period Expiration: 6/6/23  Plan of Care from 6/17/2022 to 8/17/22   Surgery Date and Procedure: 11/4/2021; [facility]  Date of Return to MD: 7/29/2022     Visit #: 7(+1) of 16   FOTO not issued at initial evaluation  First intake 5th visit:43%  Second intake 8th visit: 52%    Time In: 3:15  Time Out: 4:00  Total Billable Time: 45 min     Precautions: Standard    Subjective     Pt reports: "when you work on the massage it hurts at the time but it feels better after."  she was compliant with home exercise program given last session.   Response to previous treatment: increased pain due to increased use  Functional change: improved range of motion and strength right hand    Pain: 4/10  Location: R palm around incision    Objective       EDEMA (Girth in cm)    Right/Left R R Right/Left   DATE IE: 6/17/22 7/1/22 7/8/22 7/26/22   Wrist 17.7/17.3 17.7 17.1 17/16.5   DPC 19.8/20.0 19.8 20 20/21   TransMC 19.7/20.5 19.1 20.5 19/19   Thumb P1 7.3/6.9 7 7.3 -   Elbow crease 31.7/32.7 31.5 31.0 30/30      Wrist AROM    Right/Left Right Right/Left   DATE 6/17/22 7/8/22 7/26/22   EXT 50/60 51 60/65   FLX 46/53 58 60/65   RD 14/28 19 25/25   UD  40/41 52 40/35   ORDOÑEZ 150/182 180 -          Strength: (measured in " "psi.)     Right/Left Right Right/Left   DATE IE: 6/17/22 7/8/22 7/26/22   Zachariah Rung II *10/62 22 30/55   [place] Pinch *8/15 6 7/13   3-pt Pinch *6/12 4.5 6*/12   * pain      Caitie received the following supervised modalities after being cleared for contradictions for 10 minutes:   -Paraffin w/ MHP to R hand for 10 min, pre-tx to decrease pain & increase tissue extensibility    Caitie received the following manual therapy techniques for 10 minutes:   -ASTYM to right elbow, wrist and hand    Caitie received therapeutic exercises for 25 minutes including:  Wrist extension stretch 3/30"   Prayer stretch 3/30"   Wave, hook, straight fist, fist 10x each   Flex bar massage 2 min    finger spreads finger lifts x 10     Isopheres 2 min    Wrist 3 ways 1#  2/10    Peach Putty  2x10   Roll/pinch 3 logs      Elbow 3 ways 1#  2/10    Red flex bar  Smiles   Frowns  2/10      Home Exercises and Education Provided     Education provided:   - Hep in place continue.   - Progress towards goals     Written Home Exercises Provided: Patient instructed to cont prior HEP.  Exercises were reviewed and Caitie was able to demonstrate them prior to the end of the session.  Caitie demonstrated good  understanding of the HEP provided.     See EMR under Patient Instructions for exercises provided prior visit.     Assessment   Pt with increased pain this date however also report increased activity at work. Dense scar tissue noted at incision however pt with good response to ASTYM, and scar extractor. Signs and symptoms of fibrotic tissue noted throughout right upper extremity during ASTYM however pt reporting decreased pain after. Pt with improved range of motion and strength since last measurements.  Pt continues to be limited by pain, weakness and stiffness which limit her functionally, however she is very motivated.     Caitie is progressing well towards her goals and there are no updates to goals at this time. Pt prognosis is Good.     Pt " will continue to benefit from skilled outpatient occupational therapy to address the deficits listed in the problem list on initial evaluation provide pt/family education and to maximize pt's level of independence in the home and community environment.     Anticipated barriers to occupational therapy: pain     Pt's spiritual, cultural and educational needs considered and pt agreeable to plan of care and goals.      Goals:    LTG's (6-8 weeks):  1)   Decrease complaints of pain to 2 out of 10 at worst to increase functional hand use for ADL/work/leisure activities. Progressing 7/26/2022  2)   Increase Right wrist ORDOÑEZ to >90% of the unaffected wrist to increase functional hand use for weight bearing and reaching tasks. Progressing 7/26/2022  3)   Right  strength/tolerance to be > 70% of the unaffected hand for safe grasp and stabilization tasks.Progressing 7/26/2022  4)   Right pinch strength to be >60% of the unaffected hand for managing all fasteners, locks & latches needed for self care and home management. Progressing 7/26/2022  5)  Patient to score at 30% or less on FOTO or Quick/DASH to demonstrate improved perception of functional Right hand/UE use. Progressing 7/26/2022     STG's (3-4 weeks)  1)  Decrease complaints of pain to 4 out of 10 at worst to increase functional hand use for ADL/work/leisure activities.  MET  7/26/2022  2)   Increase right wrist ORDOÑEZ by 15 degrees to increase functional reach and support function positions for ADLs/work/leisure activities. Progressing 7/26/2022  3)  Pt to tolerate advancing PREs and weight bearing with self-graded intensity and effective symptom monitoring. Progressing 7/26/2022  4)  Patient to be IND with Phase II of HEP and modalities for pain, incision scar and edema managment. Progressing 7/26/2022  5)  Pt to report decreased episodes of pain and/or abnormal symptoms with implementation of effective activity modification at work or at home. Progressing  7/26/2022         Plan   Continue to progress as tolerated via initial POC.   Updates/Grading for next session: Reassess next session for increased pain or soreness due to progression of activity this session. If pt with increased pain plan to downgrade exercises to pain free activity only.       MARV Espino

## 2022-07-26 ENCOUNTER — CLINICAL SUPPORT (OUTPATIENT)
Dept: REHABILITATION | Facility: HOSPITAL | Age: 42
End: 2022-07-26
Payer: MEDICAID

## 2022-07-26 DIAGNOSIS — M25.639 DECREASED RANGE OF MOTION OF WRIST, UNSPECIFIED LATERALITY: ICD-10-CM

## 2022-07-26 DIAGNOSIS — M25.531 PAIN IN RIGHT WRIST: Primary | ICD-10-CM

## 2022-07-26 DIAGNOSIS — R29.898 WEAKNESS OF RIGHT HAND: ICD-10-CM

## 2022-07-26 DIAGNOSIS — R52 PAIN AGGRAVATED BY ACTIVITIES OF DAILY LIVING: ICD-10-CM

## 2022-07-26 PROCEDURE — 97530 THERAPEUTIC ACTIVITIES: CPT | Mod: PN

## 2022-08-02 ENCOUNTER — CLINICAL SUPPORT (OUTPATIENT)
Dept: REHABILITATION | Facility: HOSPITAL | Age: 42
End: 2022-08-02
Payer: MEDICAID

## 2022-08-02 DIAGNOSIS — R52 PAIN AGGRAVATED BY ACTIVITIES OF DAILY LIVING: ICD-10-CM

## 2022-08-02 DIAGNOSIS — M25.531 PAIN IN RIGHT WRIST: Primary | ICD-10-CM

## 2022-08-02 DIAGNOSIS — M25.639 DECREASED RANGE OF MOTION OF WRIST, UNSPECIFIED LATERALITY: ICD-10-CM

## 2022-08-02 DIAGNOSIS — R29.898 WEAKNESS OF RIGHT HAND: ICD-10-CM

## 2022-08-02 PROCEDURE — 97530 THERAPEUTIC ACTIVITIES: CPT | Mod: PN

## 2022-08-02 NOTE — PROGRESS NOTES
"  Occupational Therapy Daily Treatment Note     Name: Caitie Wang  Clinic Number: [id number]    Therapy Diagnosis:   Encounter Diagnoses   Name Primary?   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 No   Eval and Treat Yes   Type of Therapy Outpatient Therapy      Evaluation Date: 6/17/2022  Insurance Authorization Period Expiration: 6/6/23  Plan of Care from 6/17/2022 to 8/17/22   Surgery Date and Procedure: 11/4/2021; [facility]  Date of Return to MD: 8/5/2022     Visit #: 8(+1) of 16   FOTO not issued at initial evaluation  First intake 5th visit:43%  Second intake 8th visit: 52%    Time In: 3:15  Time Out: 4:00  Total Billable Time: 45 min     Precautions: Standard    Subjective     Pt reports: "I was working all weekend so my hand is killing me." pt states she had to reschedule her MD appointment for this Friday.   she was compliant with home exercise program given last session.   Response to previous treatment: increased pain due to increased use, pt working more on the weekends  Functional change: none noted since last session, pt with overall improved range of motion and strength    Pain: 8/10  Location: R palm around incision    Objective       EDEMA (Girth in cm)    Right/Left R R Right/Left   DATE IE: 6/17/22 7/1/22 7/8/22 7/26/22   Wrist 17.7/17.3 17.7 17.1 17/16.5   DPC 19.8/20.0 19.8 20 20/21   TransMC 19.7/20.5 19.1 20.5 19/19   Thumb P1 7.3/6.9 7 7.3 -   Elbow crease 31.7/32.7 31.5 31.0 30/30      Wrist AROM    Right/Left Right Right/Left   DATE 6/17/22 7/8/22 7/26/22   EXT 50/60 51 60/65   FLX 46/53 " "58 60/65   RD 14/28 19 25/25   UD  40/41 52 40/35   ORDOÑEZ 150/182 180 -          Strength: (measured in psi.)     Right/Left Right Right/Left   DATE IE: 6/17/22 7/8/22 7/26/22   Zachariah Rung II *10/62 22 30/55   Browne Pinch *8/15 6 7/13   3-pt Pinch *6/12 4.5 6*/12   * pain      Caitie received the following supervised modalities after being cleared for contradictions for 10 minutes:   -Paraffin w/ MHP to R hand for 10 min, pre-tx to decrease pain & increase tissue extensibility    Caitie received the following manual therapy techniques for 10 minutes:   -ASTYM to right elbow, wrist and hand    Caitie received therapeutic exercises for 25 minutes including:  Wrist prayer stretch 3/30"   Prayer stretch 3/30"   Wave, hook, straight fist, fist 20x   Flex bar massage 2 min    finger spreads finger lifts 20x     Isopheres 2 min    Wrist 3 ways 1#  2/10    Peach Putty  2x10   Roll/pinch 3 logs      Elbow 3 ways 2#  2/10    Red flex bar  Smiles   Frowns  2/15   pom pom  Red clothespin  1 container     Home Exercises and Education Provided     Education provided:   - Hep in place continue.   - Progress towards goals     Written Home Exercises Provided: Patient instructed to cont prior HEP.  Exercises were reviewed and Caitie was able to demonstrate them prior to the end of the session.  Caitie demonstrated good  understanding of the HEP provided.     See EMR under Patient Instructions for exercises provided prior visit.     Assessment   Pt with increased pain initially this date however continues to report increased activity at work. Dense scar tissue noted at incision however pt with good response to ASTYM, and scar extractor with scar becoming more pliable each session. Signs and symptoms of fibrotic tissue noted throughout right upper extremity during ASTYM however pt reporting decreased pain after.  Fatigue with exercises however able to complete all exercises in a pain free/low pain range. Pt continues to be " limited by pain, weakness and stiffness which limit her functionally, however she is very motivated.     Caitie is progressing well towards her goals and there are no updates to goals at this time. Pt prognosis is Good.     Pt will continue to benefit from skilled outpatient occupational therapy to address the deficits listed in the problem list on initial evaluation provide pt/family education and to maximize pt's level of independence in the home and community environment.     Anticipated barriers to occupational therapy: pain     Pt's spiritual, cultural and educational needs considered and pt agreeable to plan of care and goals.      Goals:    LTG's (6-8 weeks):  1)   Decrease complaints of pain to 2 out of 10 at worst to increase functional hand use for ADL/work/leisure activities. Progressing 8/2/2022  2)   Increase Right wrist ORDOÑEZ to >90% of the unaffected wrist to increase functional hand use for weight bearing and reaching tasks. Progressing 8/2/2022  3)   Right  strength/tolerance to be > 70% of the unaffected hand for safe grasp and stabilization tasks.Progressing 8/2/2022  4)   Right pinch strength to be >60% of the unaffected hand for managing all fasteners, locks & latches needed for self care and home management. Progressing 8/2/2022  5)  Patient to score at 30% or less on FOTO or Quick/DASH to demonstrate improved perception of functional Right hand/UE use. Progressing 8/2/2022     STG's (3-4 weeks)  1)  Decrease complaints of pain to 4 out of 10 at worst to increase functional hand use for ADL/work/leisure activities.  MET    2)   Increase right wrist ORDOÑEZ by 15 degrees to increase functional reach and support function positions for ADLs/work/leisure activities. Met  3)  Pt to tolerate advancing PREs and weight bearing with self-graded intensity and effective symptom monitoring. Met  4)  Patient to be IND with Phase II of HEP and modalities for pain, incision scar and edema managment. Met  5)  Pt  to report decreased episodes of pain and/or abnormal symptoms with implementation of effective activity modification at work or at home. Progressing 8/2/2022    Plan   Continue to progress as tolerated via initial POC.   Updates/Grading for next session: Pt to follow up with MD Friday, plan to progress as indicated by MD.       MARV Espino

## 2022-08-24 ENCOUNTER — HOSPITAL ENCOUNTER (EMERGENCY)
Facility: HOSPITAL | Age: 42
Discharge: HOME | End: 2022-08-24
Attending: EMERGENCY MEDICINE
Payer: MEDICAID

## 2022-08-24 VITALS
HEART RATE: 88 BPM | SYSTOLIC BLOOD PRESSURE: 119 MMHG | RESPIRATION RATE: 16 BRPM | OXYGEN SATURATION: 94 % | TEMPERATURE: 99 F | WEIGHT: 245 LBS | BODY MASS INDEX: 43.4 KG/M2 | DIASTOLIC BLOOD PRESSURE: 75 MMHG

## 2022-08-24 DIAGNOSIS — S46.911A STRAIN OF RIGHT SHOULDER, INITIAL ENCOUNTER: ICD-10-CM

## 2022-08-24 DIAGNOSIS — M25.511 ACUTE PAIN OF RIGHT SHOULDER: Primary | ICD-10-CM

## 2022-08-24 PROCEDURE — 99284 EMERGENCY DEPT VISIT MOD MDM: CPT

## 2022-08-24 PROCEDURE — 99284 EMERGENCY DEPT VISIT MOD MDM: CPT | Mod: ,,, | Performed by: PHYSICIAN ASSISTANT

## 2022-08-24 PROCEDURE — 63600175 PHARM REV CODE 636 W HCPCS: Performed by: PHYSICIAN ASSISTANT

## 2022-08-24 PROCEDURE — 25000003 PHARM REV CODE 250: Performed by: PHYSICIAN ASSISTANT

## 2022-08-24 PROCEDURE — 96372 THER/PROPH/DIAG INJ SC/IM: CPT | Performed by: PHYSICIAN ASSISTANT

## 2022-08-24 PROCEDURE — 99284 PR EMERGENCY DEPT VISIT,LEVEL IV: ICD-10-PCS | Mod: ,,, | Performed by: PHYSICIAN ASSISTANT

## 2022-08-24 RX ORDER — METHOCARBAMOL 500 MG/1
500-1000 TABLET, FILM COATED ORAL 2 TIMES DAILY PRN
Qty: 20 TABLET | Refills: 0 | Status: SHIPPED | OUTPATIENT
Start: 2022-08-24 | End: 2022-08-29

## 2022-08-24 RX ORDER — TRIAMCINOLONE ACETONIDE 40 MG/ML
40 INJECTION, SUSPENSION INTRA-ARTICULAR; INTRAMUSCULAR
Status: COMPLETED | OUTPATIENT
Start: 2022-08-24 | End: 2022-08-24

## 2022-08-24 RX ORDER — OXYCODONE AND ACETAMINOPHEN 5; 325 MG/1; MG/1
1 TABLET ORAL
Status: COMPLETED | OUTPATIENT
Start: 2022-08-24 | End: 2022-08-24

## 2022-08-24 RX ORDER — LIDOCAINE 50 MG/G
1 PATCH TOPICAL
Status: DISCONTINUED | OUTPATIENT
Start: 2022-08-24 | End: 2022-08-24 | Stop reason: HOSPADM

## 2022-08-24 RX ORDER — IBUPROFEN 600 MG/1
600 TABLET ORAL
Status: COMPLETED | OUTPATIENT
Start: 2022-08-24 | End: 2022-08-24

## 2022-08-24 RX ORDER — IBUPROFEN 600 MG/1
600 TABLET ORAL EVERY 6 HOURS PRN
Qty: 20 TABLET | Refills: 0 | Status: SHIPPED | OUTPATIENT
Start: 2022-08-24

## 2022-08-24 RX ADMIN — TRIAMCINOLONE ACETONIDE 40 MG: 200 INJECTION, SUSPENSION INTRA-ARTICULAR; INTRAMUSCULAR at 08:08

## 2022-08-24 RX ADMIN — LIDOCAINE 1 PATCH: 50 PATCH CUTANEOUS at 07:08

## 2022-08-24 RX ADMIN — OXYCODONE HYDROCHLORIDE AND ACETAMINOPHEN 1 TABLET: 5; 325 TABLET ORAL at 07:08

## 2022-08-24 RX ADMIN — IBUPROFEN 600 MG: 600 TABLET, FILM COATED ORAL at 07:08

## 2022-08-24 NOTE — Clinical Note
"Caitie DIANA "Caitiemarcie Wang was seen and treated in our emergency department on 8/24/2022.  She may return to work on 08/27/2022.  Please keep patient on light duty until she follows up with her doctor in 1 week.     If you have any questions or concerns, please don't hesitate to call.      Sherri Michaels PA-C"

## 2022-08-24 NOTE — ED PROVIDER NOTES
Encounter Date: 8/24/2022       History     Chief Complaint   Patient presents with    Arm Pain     Pt reports surgery for carpal tunnel on R arm in this past November. Pt reports pain has worsened and now hurts in shoulder.      This is a 42 year old female with a PMH of HTN, DM, asthma, depression, chronic pain presenting to the ED with a chief complaint of right arm pain. Patient has been seen by an outside provider for wrist and forearm pain related to her hx of carpal tunnel syndrome s/p surgical release. She was recently treated with local steroid injections and antiinflammatories without improvement. She states this morning when she woke up she had significantly increased pain in her right shoulder radiating to the right arm. The pain is constant and worse with movement. She has decreased ROM of the shoulder secondary to pain. She rates the pain 10/10. 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 She denies trauma, fall, fever, chills, chest pain, SOB, nausea/vomtiing, focal weakness. She does note numbness in the right 3rd-5th digits.        Review of patient's allergies indicates:   Allergen Reactions    Cheese Itching and Rash     Past Medical History:   Diagnosis Date    Anxiety     Asthma     Chronic pain     back; inflammatory    Depression     Diabetes mellitus, type 2     Endometriosis     Hyperlipidemia     Hypertension     MITZI (obstructive sleep apnea)     Thyroid disease     Vitamin D deficiency      Past Surgical History:   Procedure Laterality Date    CARPAL TUNNEL RELEASE      CERVICAL SPINE SURGERY      endometriosis      THYROID SURGERY       Family History   Problem Relation Age of Onset    Hypertension Mother     Diabetes Maternal Grandmother     Hyperlipidemia Maternal Grandmother     Diabetes Maternal Grandfather     Hyperlipidemia Maternal Grandfather     Breast cancer Maternal Aunt      Social History     Tobacco Use     Smoking status: Never Smoker    Smokeless tobacco: Never Used   Substance Use Topics    Alcohol use: No    Drug use: No     Review of Systems   Constitutional: Negative for chills and fever.   Respiratory: Negative for shortness of breath.    Cardiovascular: Negative for chest pain.   Gastrointestinal: Negative for nausea and vomiting.   Musculoskeletal: Positive for arthralgias and myalgias.   Skin: Negative for rash.   Allergic/Immunologic: Negative for immunocompromised state.   Neurological: Positive for numbness. Negative for weakness.   Hematological: Does not bruise/bleed easily.       Physical Exam     Initial Vitals [08/24/22 0636]   BP Pulse Resp Temp SpO2   (!) 190/88 104 20 98.2 °F (36.8 °C) 98 %      MAP       --         Physical Exam    Constitutional: She appears well-developed and well-nourished. She is Obese . No distress. Face mask in place.   Tearful   HENT:   Head: Atraumatic.   Eyes: Conjunctivae and EOM are normal. Pupils are equal, round, and reactive to light.   Cardiovascular: Normal rate, regular rhythm and normal heart sounds.   Pulmonary/Chest: Breath sounds normal. No respiratory distress. She has no wheezes. She has no rhonchi. She has no rales.   Abdominal: Abdomen is soft. Bowel sounds are normal. There is no abdominal tenderness.   Musculoskeletal:      Right shoulder: Tenderness present. Decreased range of motion. Normal strength. Normal pulse.      Right wrist: Tenderness present. No swelling. Normal pulse.      Cervical back: Normal.      Thoracic back: Normal.      Lumbar back: Normal.      Comments: Diffuse tenderness of the right anterior and posterior shoulder, over the clavicle/AC joint, right upper arm and forearm. 2+ distal pulses, sensation to light touch intact, cap refill brisk.     Neurological: She is alert and oriented to person, place, and time.   Skin: Skin is warm and dry. No rash noted.         ED Course   Procedures  Labs Reviewed - No data to display        Imaging Results    None          Medications   LIDOcaine 5 % patch 1 patch (1 patch Transdermal Patch Applied 8/24/22 0731)   oxyCODONE-acetaminophen 5-325 mg per tablet 1 tablet (1 tablet Oral Given 8/24/22 0731)   ibuprofen tablet 600 mg (600 mg Oral Given 8/24/22 0731)   triamcinolone acetonide injection 40 mg (40 mg Intramuscular Given 8/24/22 0850)     Medical Decision Making:   History:   Old Medical Records: I decided to obtain old medical records.       APC / Resident Notes:   42 y.o. year old female presenting with right arm pain.    DDx includes but is not limited to shoulder strain, cervical radiculopathy, ulnar neuritis, carpal tunnel syndrome. I considered but do not suspect this is an anginal equivalent given the reproducible nature.    Pain was addressed in ED with percocet, ibuprofen and lidoderm patch. While patient reported no change or improvement in symptoms, her reassessment showed significantly improved ROM.    Plan  IM Kenalog, advised to monitor glucose closely  PRN robaxin  F/u ortho  Activity modification    Discussed findings and plan with patient who verbalized understanding and agrees with the plan and course of treatment. Return to ED precautions discussed. Patient is stable for discharge.         Attending Attestation:             Attending ED Notes:   I was immediately available for consultation, but was not involved in the management of the patient.                Clinical Impression:   Final diagnoses:  [M25.511] Acute pain of right shoulder (Primary)  [S46.911A] Strain of right shoulder, initial encounter          ED Disposition Condition    Discharge Stable        ED Prescriptions     Medication Sig Dispense Start Date End Date Auth. Provider    ibuprofen (ADVIL,MOTRIN) 600 MG tablet Take 1 tablet (600 mg total) by mouth every 6 (six) hours as needed for Pain. 20 tablet 8/24/2022  Sherri Michaels PA-C    methocarbamoL (ROBAXIN) 500 MG Tab Take 1-2 tablets (500-1,000 mg  total) by mouth 2 (two) times daily as needed (pain/muscle spasm). 20 tablet 8/24/2022 8/29/2022 Sherri Michaels PA-C        Follow-up Information     Follow up With Specialties Details Why Contact Info    Juana Vines MD Cardiology Schedule an appointment as soon as possible for a visit   [address] [facility] [address]  [phone]             Sherri Michaels PA-C  08/24/22 1036       Senthil Hernandez MD  08/24/22 1021

## 2022-08-24 NOTE — ED NOTES
"Pt reports, "I had carpal tunnel sx in Nov and have been following up. I have had pain ever since and they said it is nerve damage. I have gabapentin and all that, but I woke up this morning and this arm hurts so bad I can hardly raise it." pt endorses that pain feels similar to past nerve pain but more severe, reports that morning gabapentin dose is due for 0900 and has not been taken yet today. RUE ROM is mildly affected r/t pain associated with movement. R hand splint in place PTA. Pulses present, skin warm, dry, normal color for ethnicity. Pt AAOx4, denies any other discomfort at this time.  "

## 2022-08-26 ENCOUNTER — PATIENT OUTREACH (OUTPATIENT)
Dept: EMERGENCY MEDICINE | Facility: HOSPITAL | Age: 42
End: 2022-08-26
Payer: MEDICAID

## 2022-08-26 NOTE — PROGRESS NOTES
Patient declined ED navigation assessment and denied any needs at this time.     Sohail states she has an appt on Monday wit her hand doctor and on 8/31/22 with her PCP.          Jolynn Eli, ED Navigator, [facility]  [phone], ext. [phone]

## 2022-09-23 ENCOUNTER — HOSPITAL ENCOUNTER (EMERGENCY)
Facility: HOSPITAL | Age: 42
Discharge: HOME | End: 2022-09-23
Attending: EMERGENCY MEDICINE
Payer: MEDICAID

## 2022-09-23 VITALS
HEART RATE: 84 BPM | OXYGEN SATURATION: 99 % | WEIGHT: 245 LBS | RESPIRATION RATE: 18 BRPM | DIASTOLIC BLOOD PRESSURE: 74 MMHG | SYSTOLIC BLOOD PRESSURE: 124 MMHG | HEIGHT: 63 IN | BODY MASS INDEX: 43.41 KG/M2 | TEMPERATURE: 99 F

## 2022-09-23 DIAGNOSIS — G89.29 RIGHT FLANK PAIN, CHRONIC: Primary | ICD-10-CM

## 2022-09-23 DIAGNOSIS — R10.9 RIGHT FLANK PAIN, CHRONIC: Primary | ICD-10-CM

## 2022-09-23 LAB
ALBUMIN SERPL BCP-MCNC: 3.7 G/DL (ref 3.5–5.2)
ALP SERPL-CCNC: 80 U/L (ref 55–135)
ALT SERPL W/O P-5'-P-CCNC: 13 U/L (ref 10–44)
ANION GAP SERPL CALC-SCNC: 7 MMOL/L (ref 8–16)
AST SERPL-CCNC: 15 U/L (ref 10–40)
B-HCG UR QL: NEGATIVE
BASOPHILS # BLD AUTO: 0.02 K/UL (ref 0–0.2)
BASOPHILS NFR BLD: 0.2 % (ref 0–1.9)
BILIRUB SERPL-MCNC: 0.4 MG/DL (ref 0.1–1)
BILIRUB UR QL STRIP: NEGATIVE
BUN SERPL-MCNC: 10 MG/DL (ref 6–20)
CALCIUM SERPL-MCNC: 8.9 MG/DL (ref 8.7–10.5)
CHLORIDE SERPL-SCNC: 107 MMOL/L (ref 95–110)
CLARITY UR REFRACT.AUTO: CLEAR
CO2 SERPL-SCNC: 25 MMOL/L (ref 23–29)
COLOR UR AUTO: YELLOW
CREAT SERPL-MCNC: 0.8 MG/DL (ref 0.5–1.4)
CTP QC/QA: YES
DIFFERENTIAL METHOD: ABNORMAL
EOSINOPHIL # BLD AUTO: 0.1 K/UL (ref 0–0.5)
EOSINOPHIL NFR BLD: 1 % (ref 0–8)
ERYTHROCYTE [DISTWIDTH] IN BLOOD BY AUTOMATED COUNT: 13.7 % (ref 11.5–14.5)
EST. GFR  (NO RACE VARIABLE): >60 ML/MIN/1.73 M^2
GLUCOSE SERPL-MCNC: 77 MG/DL (ref 70–110)
GLUCOSE UR QL STRIP: NEGATIVE
HCT VFR BLD AUTO: 42.6 % (ref 37–48.5)
HGB BLD-MCNC: 13.4 G/DL (ref 12–16)
HGB UR QL STRIP: NEGATIVE
IMM GRANULOCYTES # BLD AUTO: 0.03 K/UL (ref 0–0.04)
IMM GRANULOCYTES NFR BLD AUTO: 0.4 % (ref 0–0.5)
KETONES UR QL STRIP: NEGATIVE
LEUKOCYTE ESTERASE UR QL STRIP: NEGATIVE
LYMPHOCYTES # BLD AUTO: 2.4 K/UL (ref 1–4.8)
LYMPHOCYTES NFR BLD: 29.7 % (ref 18–48)
MCH RBC QN AUTO: 30.2 PG (ref 27–31)
MCHC RBC AUTO-ENTMCNC: 31.5 G/DL (ref 32–36)
MCV RBC AUTO: 96 FL (ref 82–98)
MONOCYTES # BLD AUTO: 0.6 K/UL (ref 0.3–1)
MONOCYTES NFR BLD: 7.3 % (ref 4–15)
NEUTROPHILS # BLD AUTO: 5 K/UL (ref 1.8–7.7)
NEUTROPHILS NFR BLD: 61.4 % (ref 38–73)
NITRITE UR QL STRIP: NEGATIVE
NRBC BLD-RTO: 0 /100 WBC
PH UR STRIP: 7 [PH] (ref 5–8)
PLATELET # BLD AUTO: 386 K/UL (ref 150–450)
PMV BLD AUTO: 9.6 FL (ref 9.2–12.9)
POTASSIUM SERPL-SCNC: 4 MMOL/L (ref 3.5–5.1)
PROT SERPL-MCNC: 7.5 G/DL (ref 6–8.4)
PROT UR QL STRIP: NEGATIVE
RBC # BLD AUTO: 4.44 M/UL (ref 4–5.4)
SODIUM SERPL-SCNC: 139 MMOL/L (ref 136–145)
SP GR UR STRIP: 1.02 (ref 1–1.03)
URN SPEC COLLECT METH UR: NORMAL
WBC # BLD AUTO: 8.11 K/UL (ref 3.9–12.7)

## 2022-09-23 PROCEDURE — 99284 PR EMERGENCY DEPT VISIT,LEVEL IV: ICD-10-PCS | Mod: ,,, | Performed by: EMERGENCY MEDICINE

## 2022-09-23 PROCEDURE — 80053 COMPREHEN METABOLIC PANEL: CPT | Performed by: EMERGENCY MEDICINE

## 2022-09-23 PROCEDURE — 63600175 PHARM REV CODE 636 W HCPCS: Performed by: EMERGENCY MEDICINE

## 2022-09-23 PROCEDURE — 81003 URINALYSIS AUTO W/O SCOPE: CPT | Performed by: EMERGENCY MEDICINE

## 2022-09-23 PROCEDURE — 85025 COMPLETE CBC W/AUTO DIFF WBC: CPT | Performed by: EMERGENCY MEDICINE

## 2022-09-23 PROCEDURE — 99284 EMERGENCY DEPT VISIT MOD MDM: CPT | Mod: ,,, | Performed by: EMERGENCY MEDICINE

## 2022-09-23 PROCEDURE — 99284 EMERGENCY DEPT VISIT MOD MDM: CPT | Mod: 25

## 2022-09-23 PROCEDURE — 96374 THER/PROPH/DIAG INJ IV PUSH: CPT

## 2022-09-23 PROCEDURE — 81025 URINE PREGNANCY TEST: CPT | Performed by: EMERGENCY MEDICINE

## 2022-09-23 RX ORDER — NAPROXEN 375 MG/1
375 TABLET ORAL 2 TIMES DAILY WITH MEALS
Qty: 10 TABLET | Refills: 0 | Status: SHIPPED | OUTPATIENT
Start: 2022-09-23 | End: 2022-09-28

## 2022-09-23 RX ORDER — KETOROLAC TROMETHAMINE 30 MG/ML
15 INJECTION, SOLUTION INTRAMUSCULAR; INTRAVENOUS
Status: COMPLETED | OUTPATIENT
Start: 2022-09-23 | End: 2022-09-23

## 2022-09-23 RX ORDER — METHOCARBAMOL 500 MG/1
500 TABLET, FILM COATED ORAL 3 TIMES DAILY PRN
Qty: 15 TABLET | Refills: 0 | Status: SHIPPED | OUTPATIENT
Start: 2022-09-23 | End: 2022-09-28

## 2022-09-23 RX ADMIN — KETOROLAC TROMETHAMINE 15 MG: 30 INJECTION, SOLUTION INTRAMUSCULAR; INTRAVENOUS at 12:09

## 2022-09-23 NOTE — ED PROVIDER NOTES
Encounter Date: 9/23/2022    SCRIBE #1 NOTE: ISena, rodrigue scribing for, and in the presence of,  Eduardo Ledezma DO. I have scribed the following portions of the note - Other sections scribed: HPI, ROS.     History     Chief Complaint   Patient presents with    Multiple complaints     Back /r side pain, still having pain from carpal tunnel surg, burn to L forearm, ear pain, keep getting migraines      The history is provided by the patient and medical records. No  was used.   Time patient was seen by the provider: 11:45 AM      The patient is a 42 y.o. female with past medical history of HTN, HLD, endometriosis, and type II DM who presents to the ED with a complaint of chronic right sided abdominal pain ongoing for the last 5 months. Patient was seen for the same complaint on different occasions. She underwent an XRay with unremarkable results. Therefore pt was discharged home with muscle relaxants and antiinflammatories. However, she notes no relief with her prescriptions and presents today for reevaluation. Patient has not established care with a gynecologist. Additionally, pt reports persistent weakness to the right upper extremity. Patient underwent carpal tunnel release on 11/4/21 but states no relief to her weakness since her procedure.    Review of patient's allergies indicates:   Allergen Reactions    Cheese Itching and Rash     Past Medical History:   Diagnosis Date    Anxiety     Asthma     Chronic pain     back; inflammatory    Depression     Diabetes mellitus, type 2     Endometriosis     Hyperlipidemia     Hypertension     MITZI (obstructive sleep apnea)     Thyroid disease     Vitamin D deficiency      Past Surgical History:   Procedure Laterality Date    CARPAL TUNNEL RELEASE      CERVICAL SPINE SURGERY      endometriosis      THYROID SURGERY       Family History   Problem Relation Age of Onset    Hypertension Mother     Diabetes Maternal Grandmother     Hyperlipidemia  Maternal Grandmother     Diabetes Maternal Grandfather     Hyperlipidemia Maternal Grandfather     Breast cancer Maternal Aunt      Social History     Tobacco Use    Smoking status: Never    Smokeless tobacco: Never   Substance Use Topics    Alcohol use: No    Drug use: No     Review of Systems   Constitutional:  Negative for fever.   HENT:  Negative for sore throat.    Eyes:  Negative for visual disturbance.   Respiratory:  Negative for cough and shortness of breath.    Cardiovascular:  Negative for chest pain.   Gastrointestinal:  Positive for abdominal pain. Negative for diarrhea, nausea and vomiting.   Genitourinary:  Negative for dysuria.   Musculoskeletal:  Negative for neck pain.   Skin:  Negative for rash and wound.   Allergic/Immunologic: Negative for immunocompromised state.   Neurological:  Positive for weakness. Negative for syncope.   Psychiatric/Behavioral:  Negative for confusion.      Physical Exam     Initial Vitals [09/23/22 1035]   BP Pulse Resp Temp SpO2   (!) 166/96 90 18 98.6 °F (37 °C) 100 %      MAP       --         Physical Exam    Nursing note and vitals reviewed.    Gen/Constitutional: Interactive. No acute distress  Head: Normocephalic, Atraumatic  Neck: supple, no masses or LAD, no JVD  Eyes: PERRLA, conjunctiva clear  Ears, Nose and Throat: No rhinorrhea or stridor.  Cardiac:  Regular rate, Reg Rhythm, No murmur  Pulmonary: CTA Bilat, no wheezes, rhonchi, rales.  No increased work of breathing.  GI: Abdomen soft, non-tender, non-distended; no rebound or guarding  : No CVA tenderness.  Right flank with slight tenderness  Musculoskeletal: Extremities warm, well perfused, no erythema, no edema  Right upper extremity:  Forearm removable splint in place, neurovascular intact, patient protecting carpal tunnel site, soft compartments, 2+ distal pulses, 5/5 strength  Skin: No rashes, cyanosis or jaundice.  Neuro: Alert and Oriented x 3; No focal motor or sensory deficits.    Psych: Normal  affect  ED Course   Procedures  Labs Reviewed   CBC W/ AUTO DIFFERENTIAL - Abnormal; Notable for the following components:       Result Value    MCHC 31.5 (*)     All other components within normal limits   COMPREHENSIVE METABOLIC PANEL - Abnormal; Notable for the following components:    Anion Gap 7 (*)     All other components within normal limits   URINALYSIS, REFLEX TO URINE CULTURE    Narrative:     Specimen Source->Urine   POCT URINE PREGNANCY          Imaging Results    None          Medications   ketorolac injection 15 mg (15 mg Intravenous Given 9/23/22 1200)     Medical Decision Making:   History:   Old Medical Records: I decided to obtain old medical records.  Initial Assessment:   The patient is a 42 y.o. female with past medical history of HTN, HLD, endometriosis, and type II DM who presents to the ED with a complaint of chronic right sided abdominal pain ongoing for the last 5 months.   Clinical Tests:   Lab Tests: Ordered and Reviewed     Afebrile vital signs are stable.  Physical exam findings remarkable for slight right flank tenderness which has been chronic for her.  No abdominal peritoneal findings on my exam.  She has a removal splint on her right forearm from recent carpal tunnel surgery.  Neurovascular intact, no high risk features for infectious process, septic arthritis, or neurovascular deficits.  5/5 strength.  She has overuse syndrome of that upper extremity secondary to her work.  Regarding her flank pain, no evidence of urinary infection, labs unremarkable with no acidosis, leukocytosis or acute findings.  Patient treated with Toradol in the ED with significant improvement.  Suspect likely musculoskeletal but given exam, history and findings.  Will treat with short course of anti-inflammatory and muscle relaxant for now and outpatient follow-up.  Patient given strict ED precautions return instructions regarding any change or worsening symptoms.  Of note, this symptoms in her right flank  and abdomen have been ongoing for greater than 5 months.  She has a history of endometriosis.  I instructed her to follow-up with gynecology as she has not seen 1 in a long time.  She denies any pain with intercourse, defecation or any other high risk features, but if she continues to have these intermittent chronic pain syndrome would recommend a thorough evaluation including Gynecology.  There are no overlying skin changes on the flank and no CVA tenderness, doubt shingles, skin changes or intra-abdominal process. Patient agreeable to discharge plan. Strict ED precautions and return instructions discussed at length and patient verbalized understanding. All questions were answered and ample time was given for questions.      Complexity:  Moderate high risk     Scribe Attestation:   Scribe #1: I performed the above scribed service and the documentation accurately describes the services I performed. I attest to the accuracy of the note.            I, Dr. Eduardo Ledezma, personally performed the services described in this documentation. All medical record entries made by the scribe were at my direction and in my presence.  I have reviewed the chart and agree that the record reflects my personal performance and is accurate and complete.          Clinical Impression:   Final diagnoses:  [R10.9, G89.29] Right flank pain, chronic (Primary)        ED Disposition Condition    Discharge Stable          ED Prescriptions       Medication Sig Dispense Start Date End Date Auth. Provider    naproxen (NAPROSYN) 375 MG tablet Take 1 tablet (375 mg total) by mouth 2 (two) times daily with meals. for 5 days 10 tablet 9/23/2022 9/28/2022 Eduardo Ledezma DO    methocarbamoL (ROBAXIN) 500 MG Tab Take 1 tablet (500 mg total) by mouth 3 (three) times daily as needed. 15 tablet 9/23/2022 9/28/2022 Eduardo Ledezma DO          Follow-up Information       Follow up With Specialties Details Why Contact Info Additional Information     Juana Vines MD Cardiology Schedule an appointment as soon as possible for a visit in 1 week As needed, If symptoms worsen [address]  [facility] [address]  [phone]       [facility] [place] [address] Obstetrics and Gynecology Schedule an appointment as soon as possible for a visit in 1 week  [address], [address]  [place] [address] [phone] Please park in [address] and take [place] elevators to the 5th Floor          Eduardo Ledezma DO, FAAEM  Emergency Staff Physician   Dept of Emergency Medicine   [facility]  Spectralink: [id number]        Disclaimer: This note has been generated using voice-recognition software. There may be typographical errors that have been missed during proof-reading.       Eduardo Ledezma DO  09/24/22 7526

## 2022-09-23 NOTE — ED TRIAGE NOTES
Caitie Wang, a 42 y.o. female presents to the ED w/ complaint of multiple complaints. Reports right back and right shoulder pain x 5 mo. Has been seen for same chief complaint several times, prescribed muscle relaxer's, state despite this she is still in pain. Additionally c/o of right ear ache, migraine this morning. Denies changes in bowel/bladder habits, chest pain, SOB. HX of carpal tunnel w/ surgery, arrives in right wrist brace.     Triage note:  Chief Complaint   Patient presents with    Multiple complaints     Back /r side pain, still having pain from carpal tunnel surg, burn to L forearm, ear pain, keep getting migraines      Review of patient's allergies indicates:   Allergen Reactions    Cheese Itching and Rash     Past Medical History:   Diagnosis Date    Anxiety     Asthma     Chronic pain     back; inflammatory    Depression     Diabetes mellitus, type 2     Endometriosis     Hyperlipidemia     Hypertension     MITZI (obstructive sleep apnea)     Thyroid disease     Vitamin D deficiency

## 2022-09-23 NOTE — DISCHARGE INSTRUCTIONS
Today, your labs did not show any significant abnormalities.  Chronic flank pain and muscular pain will be treated with anti-inflammatory muscle relaxant.  Please follow-up with a primary care in 1 week if her symptoms do not improve.    Our goal in the emergency department is to always give you outstanding care and exceptional service. You may receive a survey by mail or e-mail in the next week regarding your experience in our ED. We would greatly appreciate your completing and returning the survey. Your feedback provides us with a way to recognize our staff who give very good care and it helps us learn how to improve when your experience was below our aspiration of excellence.

## 2022-09-23 NOTE — FIRST PROVIDER EVALUATION
Emergency Department TeleTriage Encounter Note      CHIEF COMPLAINT    Chief Complaint   Patient presents with    Multiple complaints     Back /r side pain, still having pain from carpal tunnel surg, burn to L forearm, ear pain, keep getting migraines        VITAL SIGNS   Initial Vitals [09/23/22 1035]   BP Pulse Resp Temp SpO2   (!) 166/96 90 18 98.6 °F (37 °C) 100 %      MAP       --            ALLERGIES    Review of patient's allergies indicates:   Allergen Reactions    Cheese Itching and Rash       PROVIDER TRIAGE NOTE  41 y/o presents with multiple complaints, chief complaint of right lower back for a few months - no urinary complaints.  No pain medications taken today.  No abdominal pain.    She reports continued right arm pain since carpal tunnel surgery 1 year ago.  + migraines.  Patient being moved to intake.  VSS, no acute distress noted pending ED provider evaluation.     Initial orders will be placed and care will be transferred to an alternate provider when patient is roomed for a full evaluation. Any additional orders and the final disposition will be determined by that provider.         ORDERS  Labs Reviewed - No data to display    ED Orders (720h ago, onward)      None              Virtual Visit Note: The provider triage portion of this emergency department evaluation and documentation was performed via [employer], a HIPAA-compliant telemedicine application, in concert with a tele-presenter in the room. A face to face patient evaluation with one of my colleagues will occur once the patient is placed in an emergency department room.      DISCLAIMER: This note was prepared with [employer] voice recognition transcription software. Garbled syntax, mangled pronouns, and other bizarre constructions may be attributed to that software system.

## 2022-09-23 NOTE — Clinical Note
"Caitie DIANA "Caitie" Felipe was seen and treated in our emergency department on 9/23/2022.  She may return to work on 09/24/2022.       If you have any questions or concerns, please don't hesitate to call.      Eduardo Ledezma, DO"

## 2022-09-26 ENCOUNTER — PATIENT OUTREACH (OUTPATIENT)
Dept: EMERGENCY MEDICINE | Facility: HOSPITAL | Age: 42
End: 2022-09-26
Payer: MEDICAID

## 2022-09-28 NOTE — PROGRESS NOTES
Patient declined ED navigation assessment and denied any needs at this time.   Patient states she was able to get an appt the end of next week.       Jolynn Eli, ED Navigator, [facility]  [phone], ext. [phone]

## 2022-11-12 ENCOUNTER — HOSPITAL ENCOUNTER (EMERGENCY)
Facility: HOSPITAL | Age: 42
Discharge: HOME | End: 2022-11-12
Attending: EMERGENCY MEDICINE
Payer: MEDICAID

## 2022-11-12 VITALS
SYSTOLIC BLOOD PRESSURE: 134 MMHG | RESPIRATION RATE: 20 BRPM | WEIGHT: 244 LBS | OXYGEN SATURATION: 98 % | TEMPERATURE: 99 F | BODY MASS INDEX: 43.23 KG/M2 | DIASTOLIC BLOOD PRESSURE: 98 MMHG | HEIGHT: 63 IN | HEART RATE: 90 BPM

## 2022-11-12 DIAGNOSIS — L84 FOOT CALLUS: Primary | ICD-10-CM

## 2022-11-12 LAB — POCT GLUCOSE: 71 MG/DL (ref 70–110)

## 2022-11-12 PROCEDURE — 99282 EMERGENCY DEPT VISIT SF MDM: CPT | Mod: ,,, | Performed by: PHYSICIAN ASSISTANT

## 2022-11-12 PROCEDURE — 99282 PR EMERGENCY DEPT VISIT,LEVEL II: ICD-10-PCS | Mod: ,,, | Performed by: PHYSICIAN ASSISTANT

## 2022-11-12 PROCEDURE — 82962 GLUCOSE BLOOD TEST: CPT

## 2022-11-12 PROCEDURE — 99282 EMERGENCY DEPT VISIT SF MDM: CPT

## 2022-11-12 NOTE — ED PROVIDER NOTES
Encounter Date: 11/12/2022       History     Chief Complaint   Patient presents with    Blister     Multiple under left foot, hx of diabetes.      42-year-old female with past medical history of asthma, depression, DM, endometriosis, hypertension, hyperlipidemia, obstructive sleep apnea presents to the emergency department with chief complaint of blisters to the bottom of her left foot.  She states that this began about 5 months ago.  She has seen her PCP regarding this.  She states she was referred to a foot specialist.  She had an x-ray done, but never followed again.  She states that she has pain in the foot, regardless what she she wears.  She denies fever, chills, drainage, chest pain, shortness of breath, nausea, vomiting.  She denies other worsening or alleviating factors.    Review of patient's allergies indicates:   Allergen Reactions    Cheese Itching and Rash     Past Medical History:   Diagnosis Date    Anxiety     Asthma     Chronic pain     back; inflammatory    Depression     Diabetes mellitus, type 2     Endometriosis     Hyperlipidemia     Hypertension     MITZI (obstructive sleep apnea)     Thyroid disease     Vitamin D deficiency      Past Surgical History:   Procedure Laterality Date    CARPAL TUNNEL RELEASE      CERVICAL SPINE SURGERY      endometriosis      THYROID SURGERY       Family History   Problem Relation Age of Onset    Hypertension Mother     Diabetes Maternal Grandmother     Hyperlipidemia Maternal Grandmother     Diabetes Maternal Grandfather     Hyperlipidemia Maternal Grandfather     Breast cancer Maternal Aunt      Social History     Tobacco Use    Smoking status: Never    Smokeless tobacco: Never   Substance Use Topics    Alcohol use: No    Drug use: No     Review of Systems   Constitutional:  Negative for fever.   HENT:  Negative for sore throat.    Respiratory:  Negative for shortness of breath.    Cardiovascular:  Negative for chest pain.   Gastrointestinal:  Negative for  nausea.   Genitourinary:  Negative for dysuria.   Musculoskeletal:  Negative for back pain.        Foot pain    Skin:  Positive for wound. Negative for rash.   Neurological:  Negative for weakness.   Hematological:  Does not bruise/bleed easily.     Physical Exam     Initial Vitals [11/12/22 1400]   BP Pulse Resp Temp SpO2   (!) 147/92 98 18 98.8 °F (37.1 °C) 97 %      MAP       --         Physical Exam    Nursing note and vitals reviewed.  Constitutional: She appears well-developed and well-nourished. She is not diaphoretic. No distress.   HENT:   Head: Normocephalic and atraumatic.   Mouth/Throat: Oropharynx is clear and moist.   Eyes: Conjunctivae and EOM are normal. Pupils are equal, round, and reactive to light.   Neck: Neck supple.   Normal range of motion.  Cardiovascular:  Normal rate.           Pulmonary/Chest: No respiratory distress.   Abdominal: She exhibits no distension. There is no guarding.   Musculoskeletal:         General: Normal range of motion.      Cervical back: Normal range of motion and neck supple.      Comments: Callus to the plantar aspect of the left foot      Neurological: She is alert and oriented to person, place, and time. She has normal strength. No cranial nerve deficit or sensory deficit. GCS score is 15. GCS eye subscore is 4. GCS verbal subscore is 5. GCS motor subscore is 6.   Skin: Skin is warm and dry. Capillary refill takes less than 2 seconds.   Psychiatric: She has a normal mood and affect. Her behavior is normal. Judgment and thought content normal.         ED Course   Procedures  Labs Reviewed   POCT GLUCOSE   POCT GLUCOSE MONITORING CONTINUOUS          Imaging Results    None          Medications - No data to display  Medical Decision Making:   History:   Old Medical Records: I decided to obtain old medical records.  Initial Assessment:   Emergent evaluation of a 42 y.o. female presenting to the emergency department complaining of a callus to the plantar aspect of the  left foot that has been ongoing for the last few months. She reports worsening pain. Patient is afebrile, hemodynamically stable, and non toxic appearing.     Differential Diagnosis:   Ddx includes but is not limited to blister, callus, cellulitis, abscess, ulcer.   ED Management:  Patient presents with a callus to the plantar aspect of the left foot that has been ongoing for months. There is no secondary infection. There is no abscess to drain. She states that she has orthotic shoes, but still experiences pain while wearing them. POCT glucose 71. Labs and imaging are not emergently warranted. Will provide her with a post op shoe for comfort. Recommend podiatry follow up. Return precautions given. All questions answered.   The patient was instructed to follow up with podiatry or to return to the emergency department for worsening symptoms. The treatment plan was discussed with the patient who demonstrated understanding and comfort with plan.           Attending Attestation:             Attending ED Notes:   I was immediately available for consultation, but was not involved in the management of the patient.                     Clinical Impression:   Final diagnoses:  [L84] Foot callus (Primary)        ED Disposition Condition    Discharge Stable          ED Prescriptions    None       Follow-up Information       Follow up With Specialties Details Why Contact Info Additional Information    [facility] - Emergency Dept Emergency Medicine Go to  If symptoms worsen [address] [facility] [phone]     [facility] [address] Podiatry Schedule an appointment as soon as possible for a visit in 1 week  [address] [facility] [address]  [phone] [facility] - Main Building, 5th Floor Please park in [facility] and use Atrium elevator             Ninfa Edmond PA-C  11/12/22 1459       Senthil Hernandez MD  11/12/22 1258

## 2022-11-12 NOTE — DISCHARGE INSTRUCTIONS
Use post op shoe provided. Follow up with podiatry or return to the ER for any new or worsening symptoms.

## 2022-11-12 NOTE — ED NOTES
"The patient state blisters have "been there" on the left foot. Hx of DM. Neuropathy. Feels like they got worse. States "its split in between the toes"  "

## 2022-11-14 ENCOUNTER — TELEPHONE (OUTPATIENT)
Dept: PODIATRY | Facility: CLINIC | Age: 42
End: 2022-11-14
Payer: MEDICAID

## 2022-11-14 NOTE — TELEPHONE ENCOUNTER
----- Message from Henna Garcia sent at 11/14/2022  2:36 PM CST -----  Caitie M Gordon calling regarding Appointment Access  (message) for #calluses and splitting under toes...Pt has Medicaid and no referral, call back [phone]

## 2022-11-14 NOTE — TELEPHONE ENCOUNTER
Called patient and offer her appointment at [facility] tomorrow 11/15/22 and patient refused it also advice her of other locations in [place] area and she stated no that's ok thanks.

## 2022-12-30 ENCOUNTER — HOSPITAL ENCOUNTER (EMERGENCY)
Facility: HOSPITAL | Age: 42
Discharge: HOME | End: 2022-12-30
Attending: EMERGENCY MEDICINE
Payer: MEDICAID

## 2022-12-30 VITALS
OXYGEN SATURATION: 100 % | HEIGHT: 63 IN | TEMPERATURE: 99 F | HEART RATE: 89 BPM | BODY MASS INDEX: 43.23 KG/M2 | SYSTOLIC BLOOD PRESSURE: 154 MMHG | DIASTOLIC BLOOD PRESSURE: 94 MMHG | WEIGHT: 244 LBS | RESPIRATION RATE: 18 BRPM

## 2022-12-30 DIAGNOSIS — I10 HYPERTENSION, UNSPECIFIED TYPE: ICD-10-CM

## 2022-12-30 DIAGNOSIS — M79.674 PAIN OF TOE OF RIGHT FOOT: Primary | ICD-10-CM

## 2022-12-30 PROCEDURE — 99284 PR EMERGENCY DEPT VISIT,LEVEL IV: ICD-10-PCS | Mod: ,,, | Performed by: EMERGENCY MEDICINE

## 2022-12-30 PROCEDURE — 99283 EMERGENCY DEPT VISIT LOW MDM: CPT

## 2022-12-30 PROCEDURE — 25000003 PHARM REV CODE 250: Performed by: EMERGENCY MEDICINE

## 2022-12-30 PROCEDURE — 99284 EMERGENCY DEPT VISIT MOD MDM: CPT | Mod: ,,, | Performed by: EMERGENCY MEDICINE

## 2022-12-30 RX ORDER — ACETAMINOPHEN 325 MG/1
650 TABLET ORAL EVERY 6 HOURS PRN
Qty: 30 TABLET | Refills: 0 | Status: SHIPPED | OUTPATIENT
Start: 2022-12-30

## 2022-12-30 RX ORDER — CEPHALEXIN 500 MG/1
500 CAPSULE ORAL
Status: COMPLETED | OUTPATIENT
Start: 2022-12-30 | End: 2022-12-30

## 2022-12-30 RX ORDER — IBUPROFEN 400 MG/1
400 TABLET ORAL
Status: COMPLETED | OUTPATIENT
Start: 2022-12-30 | End: 2022-12-30

## 2022-12-30 RX ORDER — ACETAMINOPHEN 500 MG
1000 TABLET ORAL
Status: COMPLETED | OUTPATIENT
Start: 2022-12-30 | End: 2022-12-30

## 2022-12-30 RX ORDER — CEPHALEXIN 500 MG/1
500 CAPSULE ORAL 4 TIMES DAILY
Qty: 20 CAPSULE | Refills: 0 | Status: SHIPPED | OUTPATIENT
Start: 2022-12-30 | End: 2023-01-04

## 2022-12-30 RX ADMIN — IBUPROFEN 400 MG: 400 TABLET, FILM COATED ORAL at 05:12

## 2022-12-30 RX ADMIN — ACETAMINOPHEN 1000 MG: 500 TABLET ORAL at 05:12

## 2022-12-30 RX ADMIN — CEPHALEXIN 500 MG: 500 CAPSULE ORAL at 05:12

## 2022-12-30 NOTE — DISCHARGE INSTRUCTIONS
Your exam was consistent with pain from ingrown toenail versus early infection.  However, there is no obvious swelling, redness, pus thus at this time the risk of taking part of her toenail out outweigh the benefits.  Recommend warm soaks, Tylenol and Motrin for symptom control.  Will give course of Keflex.    Please follow-up with Podiatry.  An urgent referral was placed.    If you develop redness, increased pain, pus from the toe, or any other new, worsening worrisome symptoms please return emergency department for re-evaluation.    Your blood pressure was elevated.  Please continue take your blood pressure medications and follow-up with her primary care doctor for continued blood pressure management.

## 2022-12-30 NOTE — ED PROVIDER NOTES
Encounter Date: 12/30/2022    SCRIBE #1 NOTE: I, Greer Esparza, am scribing for, and in the presence of,  Rio Marshall MD. I have scribed the following portions of the note - Other sections scribed: HPI, ROS.     History     Chief Complaint   Patient presents with    Toe Pain     Time patient was seen by the provider: 5:14 PM      The patient is a 42 y.o. female with past medical history of thyroid disease, asthma, HLD, HTN, DM type II, anxiety who presents to the ED with a complaint of medial right big toe nail pain for the past 2 weeks. The pain worsens with touch. She explains that it feels like the toe is being stabbed with needles. She went to [facility] yesterday where a portion of the nail was removed. Since then, the pain has worsened. She has been using peroxide and neosporin as well as ibuprofen, none of which have helped significantly. She reports that there was some redness but denies pus or warmth. She also denies injuring the nail. She has not seen a podiatrist. She denies fever.     The history is provided by the patient and medical records. No  was used.       Review of patient's allergies indicates:   Allergen Reactions    Cheese Itching and Rash     Past Medical History:   Diagnosis Date    Anxiety     Asthma     Chronic pain     back; inflammatory    Depression     Diabetes mellitus, type 2     Endometriosis     Hyperlipidemia     Hypertension     MITZI (obstructive sleep apnea)     Thyroid disease     Vitamin D deficiency      Past Surgical History:   Procedure Laterality Date    CARPAL TUNNEL RELEASE      CERVICAL SPINE SURGERY      endometriosis      THYROID SURGERY       Family History   Problem Relation Age of Onset    Hypertension Mother     Diabetes Maternal Grandmother     Hyperlipidemia Maternal Grandmother     Diabetes Maternal Grandfather     Hyperlipidemia Maternal Grandfather     Breast cancer Maternal Aunt      Social History     Tobacco Use    Smoking status:  Never    Smokeless tobacco: Never   Substance Use Topics    Alcohol use: No    Drug use: No     Review of Systems    Constitutional:  No Fever, No Chills,   Musculoskeletal: No recent trauma.  Skin:  + right medial toenail pain.   Neuro:  No Weakness, No Numbness,    Physical Exam     Initial Vitals [12/30/22 1558]   BP Pulse Resp Temp SpO2   (!) 168/106 99 18 99 °F (37.2 °C) 99 %      MAP       --         Physical Exam    Nursing note and vitals reviewed.      Physical Exam:  CONSTITUTIONAL: Well developed, well nourished, in no acute distress, calm  HENT: Normocephalic, atraumatic    EYES: Sclerae anicteric   NECK: Supple  RESPIRATORY: Breathing comfortably. Speaking in full sentences   NEUROLOGIC: Alert, interacting normally. No facial droop.   MSK:  There is no tenderness to palpation to the distal tib-fib, ankle, foot.  There is no tenderness to the 1st MCP or other toes.  She has tenderness to palpation to the medial big toe near the nail bed.  There is no increased warmth, there is no fluctuance, there is no redness.  There is no significant partial nail removal.  No visible deformities.   SKIN: No visible rash on exposed areas of skin.    PSYCH: Mood and affect normal.           ED Course   Procedures  Labs Reviewed   HIV 1 / 2 ANTIBODY   HEPATITIS C ANTIBODY          Imaging Results    None          Medications   acetaminophen tablet 1,000 mg (1,000 mg Oral Given 12/30/22 1739)   ibuprofen tablet 400 mg (400 mg Oral Given 12/30/22 1739)   cephALEXin capsule 500 mg (500 mg Oral Given 12/30/22 1739)     Medical Decision Making:   History:   Old Medical Records: I decided to obtain old medical records.     42-year-old female coming in with 2 weeks of right toe pain.  Reported somebody worked on her toe earlier today, however on exam there is no significant nail removal. Clinically there is no redness, no swelling, no increased warmth, no pus.  Possible early ingrown toenail/ /early infection.     Symptoms may  have worsened from whoever was attempting a partial nail removal, she reports that was done without numbing.    Discussed risks and benefits of partial toenail removal but given the benign exam of her toe this time risk outweigh benefits.  Will try conservative measures with oral antibiotics, warm soaks, Tylenol and Motrin.    Podiatry follow-up.      If she is having redness, pus, uncontrolled pain, increased swelling, or any other new, worsening worrisome symptoms she can return to the emergency department for partial nail removal.    Findings of ED work up explained to patient. Patient agrees with discharge plan and verbalizes understanding of return precautions.          Scribe Attestation:   Scribe #1: I performed the above scribed service and the documentation accurately describes the services I performed. I attest to the accuracy of the note.    Attending Attestation:           Physician Attestation for Scribe:  Physician Attestation Statement for Scribe #1: I, reviewed documentation, as scribed by Greer Esparza in my presence, and it is both accurate and complete.                        Clinical Impression:   Final diagnoses:  [M79.674] Pain of toe of right foot (Primary)  [I10] Hypertension, unspecified type        ED Disposition Condition    Discharge Stable          ED Prescriptions       Medication Sig Dispense Start Date End Date Auth. Provider    acetaminophen (TYLENOL) 325 MG tablet Take 2 tablets (650 mg total) by mouth every 6 (six) hours as needed for Pain. 30 tablet 12/30/2022 -- Rio Marshall MD    cephALEXin (KEFLEX) 500 MG capsule Take 1 capsule (500 mg total) by mouth 4 (four) times daily. for 5 days 20 capsule 12/30/2022 1/4/2023 Rio Marshall MD          Follow-up Information       Follow up With Specialties Details Why Contact Info Additional Information    [id number] In 3 days  [address] [facility] [address] [phone]  [facility] - Main Building, 5th Floor Please park in [address] and use Atrium elevator             Rio Marshall MD  12/30/22 1800

## 2022-12-30 NOTE — ED NOTES
Patient states had right great toe with procedure 2 weeks ago, returned to Podiatry yesterday, told to come to ED,No OTC meds

## 2022-12-30 NOTE — ED PROVIDER NOTES
"Encounter Date: 12/30/2022       History     Chief Complaint   Patient presents with    Toe Pain     HPI  Review of patient's allergies indicates:   Allergen Reactions    Cheese Itching and Rash     Past Medical History:   Diagnosis Date    Anxiety     Asthma     Chronic pain     back; inflammatory    Depression     Diabetes mellitus, type 2     Endometriosis     Hyperlipidemia     Hypertension     MITZI (obstructive sleep apnea)     Thyroid disease     Vitamin D deficiency      Past Surgical History:   Procedure Laterality Date    CARPAL TUNNEL RELEASE      CERVICAL SPINE SURGERY      endometriosis      THYROID SURGERY       Family History   Problem Relation Age of Onset    Hypertension Mother     Diabetes Maternal Grandmother     Hyperlipidemia Maternal Grandmother     Diabetes Maternal Grandfather     Hyperlipidemia Maternal Grandfather     Breast cancer Maternal Aunt      Social History     Tobacco Use    Smoking status: Never    Smokeless tobacco: Never   Substance Use Topics    Alcohol use: No    Drug use: No     Review of Systems    Physical Exam     Initial Vitals [12/30/22 1558]   BP Pulse Resp Temp SpO2   (!) 168/106 99 18 99 °F (37.2 °C) 99 %      MAP       --         Physical Exam          ED Course   Procedures  Labs Reviewed   HIV 1 / 2 ANTIBODY   HEPATITIS C ANTIBODY          Imaging Results    None          Medications - No data to display                           Clinical Impression:    ***Please document a Clinical Impression and click the "Refresh" button to refresh your note and automatically pull in before signing.***         "

## 2022-12-30 NOTE — ED NOTES
Patient identifiers verified and correct for  Ms Wang  C/C:  Pain to left foot great toe SEE NN  APPEARANCE: awake and alert in NAD. PAIN  10/10  SKIN: warm, dry and intact. No breakdown or bruising.  MUSCULOSKELETAL: Patient moving all extremities spontaneously, no obvious swelling or deformities noted. Ambulates independently.  RESPIRATORY: Denies shortness of breath.Respirations unlabored.   CARDIAC: Denies CP, 2+ distal pulses; no peripheral edema  ABDOMEN: S/ND/NT, Denies nausea  : voids spontaneously, denies difficulty  Neurologic: AAO x 4; follows commands equal strength in all extremities; denies numbness/tingling. Denies dizziness  Reports painto toe, min redness or swelling

## 2022-12-31 NOTE — ED NOTES
Discharge home, states understanding to follow up as directed. Ambulates out of ED without difficulty. ALL INFORMATION PER PROVIDER STAFF

## 2023-01-04 ENCOUNTER — HOSPITAL ENCOUNTER (EMERGENCY)
Facility: HOSPITAL | Age: 43
Discharge: HOME | End: 2023-01-04
Attending: EMERGENCY MEDICINE
Payer: MEDICAID

## 2023-01-04 VITALS
WEIGHT: 244 LBS | RESPIRATION RATE: 18 BRPM | HEART RATE: 92 BPM | HEIGHT: 63 IN | BODY MASS INDEX: 43.23 KG/M2 | TEMPERATURE: 99 F | DIASTOLIC BLOOD PRESSURE: 90 MMHG | OXYGEN SATURATION: 99 % | SYSTOLIC BLOOD PRESSURE: 143 MMHG

## 2023-01-04 DIAGNOSIS — M79.674 GREAT TOE PAIN, RIGHT: Primary | ICD-10-CM

## 2023-01-04 LAB
B-HCG UR QL: NEGATIVE
CTP QC/QA: YES

## 2023-01-04 PROCEDURE — 99283 EMERGENCY DEPT VISIT LOW MDM: CPT | Mod: ,,, | Performed by: PHYSICIAN ASSISTANT

## 2023-01-04 PROCEDURE — 29515 APPLICATION SHORT LEG SPLINT: CPT

## 2023-01-04 PROCEDURE — 81025 URINE PREGNANCY TEST: CPT | Performed by: PHYSICIAN ASSISTANT

## 2023-01-04 PROCEDURE — 25000003 PHARM REV CODE 250: Performed by: PHYSICIAN ASSISTANT

## 2023-01-04 PROCEDURE — 99283 PR EMERGENCY DEPT VISIT,LEVEL III: ICD-10-PCS | Mod: ,,, | Performed by: PHYSICIAN ASSISTANT

## 2023-01-04 PROCEDURE — 99283 EMERGENCY DEPT VISIT LOW MDM: CPT | Mod: 25

## 2023-01-04 RX ORDER — FAMCICLOVIR 500 MG/1
500 TABLET ORAL 3 TIMES DAILY
COMMUNITY
Start: 2022-08-03

## 2023-01-04 RX ORDER — IBUPROFEN 600 MG/1
600 TABLET ORAL
Status: COMPLETED | OUTPATIENT
Start: 2023-01-04 | End: 2023-01-04

## 2023-01-04 RX ORDER — HYDROXYZINE HYDROCHLORIDE 50 MG/1
50 TABLET, FILM COATED ORAL NIGHTLY
COMMUNITY
Start: 2023-01-04

## 2023-01-04 RX ORDER — ATORVASTATIN CALCIUM 40 MG/1
TABLET, FILM COATED ORAL
COMMUNITY
Start: 2022-12-07 | End: 2023-06-27 | Stop reason: SDUPTHER

## 2023-01-04 RX ORDER — ATORVASTATIN CALCIUM 40 MG/1
40 TABLET, FILM COATED ORAL NIGHTLY
COMMUNITY
Start: 2023-01-03

## 2023-01-04 RX ORDER — ASPIRIN 325 MG
325 TABLET ORAL
COMMUNITY
Start: 2022-12-07

## 2023-01-04 RX ORDER — ACETAMINOPHEN 500 MG
1000 TABLET ORAL
Status: COMPLETED | OUTPATIENT
Start: 2023-01-04 | End: 2023-01-04

## 2023-01-04 RX ADMIN — IBUPROFEN 600 MG: 600 TABLET, FILM COATED ORAL at 04:01

## 2023-01-04 RX ADMIN — ACETAMINOPHEN 1000 MG: 500 TABLET ORAL at 04:01

## 2023-01-04 NOTE — ED PROVIDER NOTES
"Encounter Date: 1/4/2023       History     Chief Complaint   Patient presents with    Toe Pain     Right great toe pain, states was seen for infection, told to come back if wasn't getting better, currently on abx     The history is provided by the patient and medical records. No  was used.     Caitie Wang is a 42 y.o. female with medical history of HTN, HLD, DM, Anxiety, Thyroid disease, Asthma presenting to the ED with the chief complaint of right toe pain.     Reports having atraumatic R great toe pain for about 2 weeks. PCP referred her to podiatry at [facility] due to concerns of a callus on her toe. Reports podiatry "dug in her toe" and has been having pain along her medial nail fold since the procedure. She was seen in the ED for similar 5 days ago and given RX for Keflex for infection prophylaxis that she has been taking. Reports having persistent pain. She is concerned the shoes she is wearing at work is putting too much pressure on her toe. No fever.    Review of patient's allergies indicates:   Allergen Reactions    Cheese Itching and Rash     Past Medical History:   Diagnosis Date    Anxiety     Asthma     Chronic pain     back; inflammatory    Depression     Diabetes mellitus, type 2     Endometriosis     Hyperlipidemia     Hypertension     MITZI (obstructive sleep apnea)     Thyroid disease     Vitamin D deficiency      Past Surgical History:   Procedure Laterality Date    CARPAL TUNNEL RELEASE      CERVICAL SPINE SURGERY      endometriosis      THYROID SURGERY       Family History   Problem Relation Age of Onset    Hypertension Mother     Diabetes Maternal Grandmother     Hyperlipidemia Maternal Grandmother     Diabetes Maternal Grandfather     Hyperlipidemia Maternal Grandfather     Breast cancer Maternal Aunt      Social History     Tobacco Use    Smoking status: Never    Smokeless tobacco: Never   Substance Use Topics    Alcohol use: No    Drug use: No     Review of Systems "   Constitutional:  Negative for fever.   HENT:  Negative for sore throat.    Respiratory:  Negative for shortness of breath.    Cardiovascular:  Negative for chest pain.   Gastrointestinal:  Negative for nausea.   Genitourinary:  Negative for dysuria.   Musculoskeletal:  Positive for arthralgias. Negative for back pain.   Skin:  Negative for rash.   Neurological:  Negative for weakness.   Hematological:  Does not bruise/bleed easily.     Physical Exam     Initial Vitals [01/04/23 1459]   BP Pulse Resp Temp SpO2   123/86 95 18 99.1 °F (37.3 °C) 99 %      MAP       --         Physical Exam    Constitutional: She appears well-developed and well-nourished. She is not diaphoretic. No distress.   HENT:   Head: Normocephalic and atraumatic.   Mouth/Throat: Oropharynx is clear and moist. No oropharyngeal exudate.   Eyes: EOM are normal. Pupils are equal, round, and reactive to light.   Cardiovascular:  Normal rate and regular rhythm.           +2 DP and PT pulses   Pulmonary/Chest: Breath sounds normal. No respiratory distress. She has no wheezes. She has no rales.   Musculoskeletal:         General: Tenderness present.      Comments: R foot - superficial callus to medial-lateral aspect. No edema, erythema, warmth. Full ROM. No fluctuance or concerns for abscess or paronychia. Mild tenderness along medial nail fold.     Skin: Skin is warm and dry. No rash noted. No erythema.     Right great toe:          ED Course   Procedures  Labs Reviewed   HIV 1 / 2 ANTIBODY   HEPATITIS C ANTIBODY   POCT URINE PREGNANCY          Imaging Results              X-Ray Toe 2 or More Views Right (Final result)  Result time 01/04/23 17:49:29      Final result by Miquel Botello MD (01/04/23 17:49:29)                   Impression:      1. No acute displaced fracture or dislocation of the visualized toes noting edema about the 1st toe.      Electronically signed by: Miquel Botello MD  Date:    01/04/2023  Time:    17:49                Narrative:    EXAMINATION:  XR TOE 2 OR MORE VIEWS RIGHT    CLINICAL HISTORY:  Great toe pain 2 weeks;    TECHNIQUE:  Three views of the right toes were performed    COMPARISON:  None    FINDINGS:  Three views right toes.    There is edema about the 1st toe.  No acute displaced fracture or dislocation of the visualized toes.  No radiopaque foreign body.                                       Medications   acetaminophen tablet 1,000 mg (1,000 mg Oral Given 1/4/23 1647)   ibuprofen tablet 600 mg (600 mg Oral Given 1/4/23 1647)     Medical Decision Making:   History:   Old Medical Records: I decided to obtain old medical records.  Old Records Summarized: records from clinic visits and records from previous admission(s).  Clinical Tests:   Radiological Study: Ordered and Reviewed     APC / Resident Notes:   42 y.o. female with medical history of HTN, HLD, DM, Anxiety, Thyroid disease, Asthma presenting to the ED c/o R great toe pain for 2 weeks. DDx includes but not limited to ingrown toenail, paronychia, callus, fracture, dislocation, ligament injury.             X-ray negative for fracture or significant findings. Do not feel emergent toenail removal is warranted at this time. Advised outpatient follow-up with podiatry. Ambulatory referrals placed for [facility] and [facility]. Walking boot provided to help take pressure off of her toe. Patient expresses understanding and agreeable to the plan. Return to ED precautions given for new, worsening, or concerning symptoms.       Clinical Impression:   Final diagnoses:  [M79.674] Great toe pain, right (Primary)        ED Disposition Condition    Discharge Stable          ED Prescriptions    None       Follow-up Information       Follow up With Specialties Details Why Contact Info Additional Information    [facility] [address] [facility]   [address]  [facility] [id number] [phone] [facility] - Main Building, 5th Floor Please park in  [address] and use [address] elevator             Sameer Roberts PA-C  01/04/23 2000

## 2023-01-04 NOTE — DISCHARGE INSTRUCTIONS
Follow-up with podiatry for further evaluation. Use the below contact information to obtain a follow-up with [facility] or [facility].     Use the provided walking boot to take pressure off of your toe. Use Tylenol and Ibuprofen for further pain management.    Return to the emergency room for new, worsening, or concerning symptoms.

## 2023-01-05 ENCOUNTER — TELEPHONE (OUTPATIENT)
Dept: PODIATRY | Facility: CLINIC | Age: 43
End: 2023-01-05
Payer: MEDICAID

## 2023-01-05 NOTE — TELEPHONE ENCOUNTER
Spoke to pt that only wanted an appointment when she didn't have to work. Scheduled pt appointment at her convenience.

## 2023-01-05 NOTE — TELEPHONE ENCOUNTER
----- Message from Natali Ruiz sent at 1/5/2023  9:49 AM CST -----  Contact: pt  Offered pt appt at the [facility] location due to insurance, and she is insistent that she was informed she has to schedule at main [place]. She would like a call back from the office as soon as possible.     Confirmed contact below:  Contact Name:Caitie Wang  Phone Number: [phone]

## 2023-01-11 ENCOUNTER — OFFICE VISIT (OUTPATIENT)
Dept: PODIATRY | Facility: CLINIC | Age: 43
End: 2023-01-11
Payer: MEDICAID

## 2023-01-11 VITALS
BODY MASS INDEX: 42.89 KG/M2 | HEART RATE: 93 BPM | HEIGHT: 63 IN | SYSTOLIC BLOOD PRESSURE: 168 MMHG | OXYGEN SATURATION: 98 % | WEIGHT: 242.06 LBS | DIASTOLIC BLOOD PRESSURE: 103 MMHG

## 2023-01-11 DIAGNOSIS — L60.0 INGROWN TOENAIL OF RIGHT FOOT: Primary | ICD-10-CM

## 2023-01-11 DIAGNOSIS — M79.674 PAIN OF TOE OF RIGHT FOOT: ICD-10-CM

## 2023-01-11 DIAGNOSIS — M79.674 GREAT TOE PAIN, RIGHT: ICD-10-CM

## 2023-01-11 PROCEDURE — 3008F BODY MASS INDEX DOCD: CPT | Mod: CPTII,,, | Performed by: PODIATRIST

## 2023-01-11 PROCEDURE — 11765 NAIL REMOVAL: ICD-10-PCS | Mod: T5,S$PBB,, | Performed by: PODIATRIST

## 2023-01-11 PROCEDURE — 99999 PR PBB SHADOW E&M-EST. PATIENT-LVL III: CPT | Mod: PBBFAC,,, | Performed by: PODIATRIST

## 2023-01-11 PROCEDURE — 1159F MED LIST DOCD IN RCRD: CPT | Mod: CPTII,,, | Performed by: PODIATRIST

## 2023-01-11 PROCEDURE — 3008F PR BODY MASS INDEX (BMI) DOCUMENTED: ICD-10-PCS | Mod: CPTII,,, | Performed by: PODIATRIST

## 2023-01-11 PROCEDURE — 99213 OFFICE O/P EST LOW 20 MIN: CPT | Mod: PBBFAC,PO | Performed by: PODIATRIST

## 2023-01-11 PROCEDURE — 1160F PR REVIEW ALL MEDS BY PRESCRIBER/CLIN PHARMACIST DOCUMENTED: ICD-10-PCS | Mod: CPTII,,, | Performed by: PODIATRIST

## 2023-01-11 PROCEDURE — 99203 PR OFFICE/OUTPT VISIT, NEW, LEVL III, 30-44 MIN: ICD-10-PCS | Mod: 25,S$PBB,, | Performed by: PODIATRIST

## 2023-01-11 PROCEDURE — 1160F RVW MEDS BY RX/DR IN RCRD: CPT | Mod: CPTII,,, | Performed by: PODIATRIST

## 2023-01-11 PROCEDURE — 3080F PR MOST RECENT DIASTOLIC BLOOD PRESSURE >= 90 MM HG: ICD-10-PCS | Mod: CPTII,,, | Performed by: PODIATRIST

## 2023-01-11 PROCEDURE — 1159F PR MEDICATION LIST DOCUMENTED IN MEDICAL RECORD: ICD-10-PCS | Mod: CPTII,,, | Performed by: PODIATRIST

## 2023-01-11 PROCEDURE — 3077F SYST BP >= 140 MM HG: CPT | Mod: CPTII,,, | Performed by: PODIATRIST

## 2023-01-11 PROCEDURE — 99999 PR PBB SHADOW E&M-EST. PATIENT-LVL III: ICD-10-PCS | Mod: PBBFAC,,, | Performed by: PODIATRIST

## 2023-01-11 PROCEDURE — 11765 WEDGE EXCISION SKN NAIL FOLD: CPT | Mod: PBBFAC,T5,PO | Performed by: PODIATRIST

## 2023-01-11 PROCEDURE — 99203 OFFICE O/P NEW LOW 30 MIN: CPT | Mod: 25,S$PBB,, | Performed by: PODIATRIST

## 2023-01-11 PROCEDURE — 3077F PR MOST RECENT SYSTOLIC BLOOD PRESSURE >= 140 MM HG: ICD-10-PCS | Mod: CPTII,,, | Performed by: PODIATRIST

## 2023-01-11 PROCEDURE — 3080F DIAST BP >= 90 MM HG: CPT | Mod: CPTII,,, | Performed by: PODIATRIST

## 2023-01-11 RX ORDER — TRAMADOL HYDROCHLORIDE 50 MG/1
TABLET ORAL
COMMUNITY
Start: 2022-10-26 | End: 2023-06-27

## 2023-01-11 RX ORDER — TRAMADOL HYDROCHLORIDE 50 MG/1
50 TABLET ORAL 2 TIMES DAILY PRN
COMMUNITY
Start: 2022-12-28

## 2023-01-11 RX ORDER — MONTELUKAST SODIUM 10 MG/1
10 TABLET ORAL DAILY
COMMUNITY
Start: 2022-10-18

## 2023-01-11 RX ORDER — PANTOPRAZOLE SODIUM 40 MG/1
TABLET, DELAYED RELEASE ORAL
COMMUNITY
Start: 2022-05-15 | End: 2023-05-23

## 2023-01-11 RX ORDER — ONDANSETRON HYDROCHLORIDE 8 MG/1
8 TABLET, FILM COATED ORAL EVERY 8 HOURS PRN
COMMUNITY
Start: 2022-12-07

## 2023-01-11 NOTE — PATIENT INSTRUCTIONS
Instructions for Care after Ingrown Nail removal    General Information: Stay off your feet as much as possible today. You may wear a surgical shoe, sandal or any open toed shoe that does not squeeze, constrict or put pressure on your toe(s). Your toe(s) may remain numb for up to 2-24 hours after the procedure. Although most patients can wear a closed loose fitting shoe after the first week, the toe will heal faster the more you use the open toed shoe in the first 2-3  weeks. Please contact our office if you have any questions or concerns.    Bleeding: Slight bleeding, discoloration and drainage are normal. Due to the chemical used there may be some yellow-clear drainage coming from the toe for 2-3 weeks.    Discomfort: You can elevate your foot to help alleviate minor swelling, bleeding and discomfort. You may also take Advil, Tylenol or other over the counter pain medications to help alleviate pain. Call our office if the pain is not well controlled. Most patients have very little discomfort as long as they minimize their walking for the first 24 hours and do not bump the toe.    Removing the Bandage: Starting the day after surgery, carefully remove the dressing and shower or bathe as normal. It is Ok to get the bandage soaking wet in the shower and when you remove it, it should not stick to the surgery site.    Dressing Options- Traditional Method:  1. Soaking two times a day in WARM water with Epsom salts or diluted Povidone Iodine  (Betadine) for 15-20 minutes. You will need to purchase these products from the pharmacy.  2. Dry toe then apply an antibiotic cream or ointment such as Neosporin or Polysporin plus or  Garamycin and cover with a 2 x 2 inch size gauze and then secure with a 1 inch band aid.  3. In the second week, take the dressing off at bedtime to air dry the toe.  4. If the toe is infected take the Antibiotic Pills as directed until finished.      Ingrown Toenail        What Is an Ingrown Toenail?     When a toenail is ingrown, it is curved and grows into the skin, usually at the nail borders (the sides of the nail). This digging in of the nail irritates the skin, often creating pain, redness, swelling and warmth in the toe.    If an ingrown nail causes a break in the skin, bacteria may enter and cause an infection in the area, which is often marked by drainage and a foul odor. However, even if the toe is not painful, red, swollen or warm, a nail that curves downward into the skin can progress to an infection.    Ingrown toenail and normal toenail    Causes  Causes of ingrown toenails include:    Heredity. In many people, the tendency for ingrown toenails is inherited.  Trauma. Sometimes an ingrown toenail is the result of trauma, such as stubbing your toe, having an object fall on your toe or engaging in activities that involve repeated pressure on the toes, such as kicking or running.  Improper trimming. The most common cause of ingrown toenails is cutting your nails too short. This encourages the skin next to the nail to fold over the nail.   Improperly sized footwear. Ingrown toenails can result from wearing socks and shoes that are tight or short.  Nail conditions. Ingrown toenails can be caused by nail problems, such as fungal infections or losing a nail due to trauma.     Treatment  Sometimes initial treatment for ingrown toenails can be safely performed at home. However, home treatment is strongly discouraged if an infection is suspected or for those who have medical conditions that put feet at high risk, such as diabetes, nerve damage in the foot or poor circulation.        Ingrown toenail before and after treatment    Home Care  If you do not have an infection or any of the above medical conditions, you can soak your foot in room-temperature water (adding Epsom salt may be recommended by your doctor) and gently massage the side of the nail fold to help reduce the inflammation.    Avoid attempting  bathroom surgery. Repeated cutting of the nail can cause the condition to worsen over time. If your symptoms fail to improve, it is time to see a foot and ankle surgeon.    Physician Care  After examining the toe, the foot and ankle surgeon will select the treatment best suited for you. If an infection is present, an oral antibiotic may be prescribed.    Sometimes a minor surgical procedure, often performed in the office, will ease the pain and remove the offending nail. After applying a local anesthetic, the doctor removes part of the nails side border. Some nails may become ingrown again, requiring removal of the nail root.    Following the nail procedure, a light bandage will be applied. Most people experience very little pain after surgery and may resume normal activity the next day. If your surgeon has prescribed an oral antibiotic, be sure to take all the medication, even if your symptoms have improved.    Preventing Ingrown Toenails  Many cases of ingrown toenails may be prevented by:    Proper trimming. Cut toenails in a fairly straight line, and do not cut them too short. You should be able to get your fingernail under the sides and end of the nail.  Well-fitting shoes and socks. Do not wear shoes that are short or tight in the toe area. Avoid shoes that are loose because they too cause pressure on the toes, especially when running or walking briskly.               What You Should Know About Home Treatment  Do not cut a notch in the nail. Contrary to what some people believe, this does not reduce the tendency for the nail to curve downward.  Do not repeatedly trim nail borders. Repeated trimming does not change the way the nail grows and can make the condition worse.  Do not place cotton under the nail. Not only does this not relieve the pain, it provides a place for harmful bacteria to grow, resulting in infection.  Over-the-counter medications are ineffective. Topical medications may mask the pain, but  they do not correct the underlying problem.        Understanding Ingrown Toenails    An ingrown nail is the result of a nail growing into the skin that surrounds it. This often occurs at either edge of the big toe. Ingrown nails may be caused by improper trimming, inherited nail deformities, injuries, fungal infections, or pressure.  Symptoms  Ingrown nails may cause pain at the tip of the toe or all the way to the base of the toe. The pain is often worse while walking. An ingrown nail may also lead to infection, inflammation, or a more serious condition. If its infected, you might see pus or redness.  Evaluation  To determine the extent of your problem, your healthcare provider examines and possibly presses the painful area. If other problems are suspected, blood tests, cultures, and X-rays may be done as well.  Treatment  If the nail isnt infected, your healthcare provider may trim the corner of it to help relieve your symptoms. He or she may need to remove one side of your nail back to the cuticle. The base of the nail may then be treated with a chemical to keep the ingrown part from growing back. Severe infections or ingrown nails may require antibiotics and temporary or permanent removal of a portion of the nail. To prevent pain, a local anesthetic may be used in these procedures. This treatment is usually done at your healthcare provider's office.  Prevention  Many nail problems can be prevented by wearing the right shoes and trimming your nails properly. To help avoid infection, keep your feet clean and dry. If you have diabetes, talk with your healthcare provider before doing any foot self-care.  The right shoes: Get your feet measured (your size may change as you age). Wear shoes that are supportive and roomy enough for your toes to wiggle. Look for shoes made of natural materials such as leather, which allow your feet to breathe.  Proper trimming: To avoid problems, trim your toenails straight across  without cutting down into the corners. If you cant trim your own nails, ask your healthcare provider to do so for you.  Date Last Reviewed: 10/1/2016  © 4050-3041 [employer]. [address] [place] [address], [place] [address]. All rights reserved. This information is not intended as a substitute for professional medical care. Always follow your healthcare professional's instructions.

## 2023-01-11 NOTE — PROGRESS NOTES
Formerly named [facility] - PODIATRY  [address] [place] [address], [address]  [facility] [address]  Dept: [phone]  Dept Fax: [phone]    Stevo Rodgers Jr., DPM     Assessment:   MDM    Coding  1. Ingrown toenail of right foot  Nail Removal      2. Pain of toe of right foot  Ambulatory referral/consult to Podiatry      3. Great toe pain, right  Ambulatory referral/consult to Podiatry          Plan:     Nail Removal    Date/Time: 1/11/2023 2:41 PM  Performed by: Stevo Rodgers Jr., DPM  Authorized by: Stevo Rodgers Jr., DPM     Consent Done?:  Yes (Verbal)  Time out: Immediately prior to the procedure a time out was called    Prep: patient was prepped and draped in usual sterile fashion    Location:     Location:  Right foot    Location detail:  Right big toe  Anesthesia:     Anesthesia:  Digital block    Local anesthetic:  Bupivacaine 0.25% without epinephrine    Anesthetic total (ml):  4  Procedure Details:     Preparation:  Skin prepped with alcohol and skin prepped with Betadine    Amount removed:  Complete    Wedge excision of skin of nail fold: Yes      Nail bed sutured?: No      Nail matrix removed:  None    Removed nail replaced and anchored: No      Dressing applied:  4x4, antibiotic ointment, gauze roll, petrolatum-impregnated gauze, Xeroform gauze and dressing applied    Patient tolerance:  Patient tolerated the procedure well with no immediate complications    Diagnoses and all orders for this visit:    Ingrown toenail of right foot  -     Nail Removal    Pain of toe of right foot  -     Ambulatory referral/consult to Podiatry    Great toe pain, right  -     Ambulatory referral/consult to Podiatry        -pt seen, evaluated, and managed  -dx discussed in detail. All questions/concerns addressed  -all tx options discussed. All alternatives, risks, benefits of all txs discussed  -The patient was educated regarding the above diagnosis.   -discussed ingrowing toenails and all tx options. Pt opts for  avulsion  -pt to perform epsom salt or betadine soaks once daily x 2wks. Written instructions dispensed  -keep toe covered with triple abx ointment + bandaid x 2wks    Rx dispensed: none  Referrals: none  -WB: wbat        Follow up in about 2 weeks (around 1/25/2023).    Subjective:      Patient ID: Caitie Wang is a 42 y.o. female.    Chief Complaint: No chief complaint on file.      CC - ingrown nail: Patient presents to the clinic complaining of painful ingrown toenail on the right foot. Patients rates pain 7/10 on pain scale. patient seeking tx options.    HPI    Last Podiatry Enc: Visit date not found  Last Enc w/ Me: Visit date not found    Outside reports reviewed: historical medical records.  Family hx: as below  Past Medical History:   Diagnosis Date    Anxiety     Asthma     Chronic pain     back; inflammatory    Depression     Diabetes mellitus, type 2     Endometriosis     Hyperlipidemia     Hypertension     MITZI (obstructive sleep apnea)     Thyroid disease     Vitamin D deficiency      Past Surgical History:   Procedure Laterality Date    CARPAL TUNNEL RELEASE      CERVICAL SPINE SURGERY      endometriosis      THYROID SURGERY       Family History   Problem Relation Age of Onset    Hypertension Mother     Diabetes Maternal Grandmother     Hyperlipidemia Maternal Grandmother     Diabetes Maternal Grandfather     Hyperlipidemia Maternal Grandfather     Breast cancer Maternal Aunt      Current Outpatient Medications   Medication Sig Dispense Refill    acetaminophen (TYLENOL) 325 MG tablet Take 2 tablets (650 mg total) by mouth every 6 (six) hours as needed for Pain. 30 tablet 0    ALBUTEROL SULFATE (PROAIR HFA INHL) Inhale into the lungs.      aspirin 325 MG tablet 3 tablets by mouth 3 times daily x 3 days, then twice daily x 14 days      atorvastatin (LIPITOR) 40 MG tablet Take 40 mg by mouth.      atorvastatin (LIPITOR) 40 MG tablet TAKE 1 TABLET BY MOUTH DAILY TO LOWER LIPIDS.      dulaglutide  (TRULICITY) 0.75 mg/0.5 mL pen injector Inject 0.75mg into the skin weekly 12 pen 3    estradiol cypionate (DEPO-ESTRADIOL) 5 mg/mL injection Inject into the muscle every 28 days.      famciclovir (FAMVIR) 500 MG tablet Take 500 mg by mouth 3 (three) times daily.      folic acid (FOLVITE) 1 MG tablet Take 1 mg by mouth once daily.      hydrOXYzine (ATARAX) 50 MG tablet Take 50 mg by mouth every evening.      ibuprofen (ADVIL,MOTRIN) 600 MG tablet Take 1 tablet (600 mg total) by mouth every 6 (six) hours as needed for Pain. 20 tablet 0    imipramine (TOFRANIL) 25 MG tablet Take 25 mg by mouth every evening.      LIDOcaine (LIDODERM) 5 % Place 1 patch onto the skin daily as needed (pain). Remove & Discard patch within 12 hours or as directed by MD 15 patch 0    medroxyPROGESTERone (DEPO-PROVERA) 150 mg/mL Syrg       mometasone (NASONEX) 50 mcg/actuation nasal spray 2 sprays by Nasal route once daily. 17 g 0    montelukast (SINGULAIR) 10 mg tablet Take 10 mg by mouth.      ondansetron (ZOFRAN) 8 MG tablet Take 8 mg by mouth every 8 (eight) hours as needed.      pantoprazole (PROTONIX) 40 MG tablet TAKE 1 TABLET BY MOUTH AT BEDTIME FOR GERD OR GASTRITIS      pravastatin (PRAVACHOL) 40 MG tablet Take 40 mg by mouth once daily.      traMADoL (ULTRAM) 50 mg tablet Take 50 mg by mouth 2 (two) times daily as needed.      traMADoL (ULTRAM) 50 mg tablet One tablet orally twice daily as needed for pain      TRULICITY 0.75 mg/0.5 mL pen injector SMARTSI.5 Milliliter(s) SUB-Q Once a Week      VITAMIN D2 50,000 unit capsule       baclofen (LIORESAL) 20 MG tablet Take 1 tablet (20 mg total) by mouth 3 (three) times daily. for 3 days 9 tablet 0    cetirizine (ZYRTEC) 10 MG tablet Take 1 tablet (10 mg total) by mouth once daily. 30 tablet 0    gabapentin (NEURONTIN) 300 MG capsule Take 1 capsule (300 mg total) by mouth 3 (three) times daily. 90 capsule 11    levothyroxine (SYNTHROID) 150 MCG tablet Take 1 tablet (150 mcg total) by  "mouth once daily. 30 tablet 0    metFORMIN (GLUCOPHAGE) 500 MG tablet Take 1 tablet (500 mg total) by mouth daily with breakfast. 30 tablet 11    ranitidine (ZANTAC) 150 MG capsule Take 1 capsule (150 mg total) by mouth once daily. 30 capsule 0     No current facility-administered medications for this visit.     Review of patient's allergies indicates:   Allergen Reactions    Cheese Itching and Rash     Social History     Socioeconomic History    Marital status: Single   Tobacco Use    Smoking status: Never    Smokeless tobacco: Never   Substance and Sexual Activity    Alcohol use: No    Drug use: No    Sexual activity: Yes     Partners: Male     Birth control/protection: Injection       ROS    REVIEW OF SYSTEMS: Negative as documented below as well as positive findings in bold.       Constitutional  Respiratory  Gastrointestinal  Skin   - Fever - Cough - Heartburn - Rash   - Chills - Spit blood - Nausea - Itching   - Weight Loss - Shortness of breath - Vomiting - Nail pain   - Malaise/Fatigue - Wheezing - Abdominal Pain  Wound/Ulcer   - Weight Gain   - Blood in Stool  Poor wound healing       - Diarrhea          Cardiovascular  Genitourinary  Neurological  HEENT   - Chest Pain - Dysuria - Burning Sensation of feet - Headache   - Palpitations - Hematuria - Tingling / Paresthesia - Congestion   - Pain at night in legs - Flank Pain - Dizziness - Sore Throat   - Cramping   - Tremor - Blurred Vision   - Leg Swelling   - Sensory Change - Double Vision   - Dizzy when standing   - Speech Change - Eye Redness       - Focal Weakness - Dry Eyes       - Loss of Consciousness          Endocrine  Musculoskeletal  Psychiatric   - Cold intolerance - Muscle Pain - Depression   - Heat intolerance - Neck Pain - Insomnia   - Anemia - Joint Pain - Memory Loss   -  Easy bruising, bleeding - Heel pain - Anxiety      Toe Pain        Leg/Ankle/Foot Pain         Objective:     BP (!) 168/103   Pulse 93   Ht 5' 3" (1.6 m)   Wt 109.8 kg " "(242 lb 1 oz)   LMP  (LMP Unknown)   SpO2 98%   BMI 42.88 kg/m²   Vitals:    01/11/23 1414   BP: (!) 168/103   Pulse: 93   SpO2: 98%   Weight: 109.8 kg (242 lb 1 oz)   Height: 5' 3" (1.6 m)   PainSc: 10-Worst pain ever       Physical Exam    General Appearance:   Patient appears well developed, well nourished  Patient appears stated age    Psychiatric:   Patient is oriented to time, place, and person.  Patient has appropriate mood and affect    Neck:  Trachea Midline  No visible masses    Respiratory/Ears:  No distress or labored breathing.  Able to differentiate between normal talking voice and whisper.  Able to follow commands    Eyes:  Visual Acuity intact  Lids and conjunctivae normal. No discoloration noted.    Foot Exam  Physical Exam  Ortho Exam  Ortho/SPM Exam  Physical Exam  Neurologic Exam    R LE exam con't:  V:  DP 2/4, PT 2/4   CRT< 3s to all digits tested   Tibial and popliteal lymph nodes are w/o abnormality        N:  Patient displays normal ankle reflexes   SILT in SP/DP/T/Sue/Saph distributions    Ortho: +Motor EHL/FHL/TA/GA   +TTP R great toe  Compartments soft/compressible. No pain on passive stretch of big toe. No calf  Pain.    Derm:  skin intact, skin warm and dry, skin without ulcers or lesions, skin without induration, right, great toe ingrowing and incurvated     Imaging / Labs:      X-Ray Toe 2 or More Views Right    Result Date: 1/4/2023  EXAMINATION: XR TOE 2 OR MORE VIEWS RIGHT CLINICAL HISTORY: Great toe pain 2 weeks; TECHNIQUE: Three views of the right toes were performed COMPARISON: None FINDINGS: Three views right toes. There is edema about the 1st toe.  No acute displaced fracture or dislocation of the visualized toes.  No radiopaque foreign body.     1. No acute displaced fracture or dislocation of the visualized toes noting edema about the 1st toe. Electronically signed by: Miquel Botello MD Date:    01/04/2023 Time:    17:49        Note: This was dictated using a computer " transcription program. Although proofread, it may contain computer transcription errors and phonetic errors. Other human proofreading errors may also exist. Corrections may be performed at a later time. Please contact us for any clarification if needed.    Stevo Rodgers DPM  [facility]

## 2023-01-25 ENCOUNTER — OFFICE VISIT (OUTPATIENT)
Dept: PODIATRY | Facility: CLINIC | Age: 43
End: 2023-01-25
Payer: MEDICAID

## 2023-01-25 VITALS
HEIGHT: 63 IN | RESPIRATION RATE: 18 BRPM | HEART RATE: 90 BPM | DIASTOLIC BLOOD PRESSURE: 96 MMHG | BODY MASS INDEX: 43.63 KG/M2 | WEIGHT: 246.25 LBS | SYSTOLIC BLOOD PRESSURE: 142 MMHG

## 2023-01-25 DIAGNOSIS — L60.0 INGROWN TOENAIL OF RIGHT FOOT: Primary | ICD-10-CM

## 2023-01-25 PROCEDURE — 1159F MED LIST DOCD IN RCRD: CPT | Mod: CPTII,,, | Performed by: PODIATRIST

## 2023-01-25 PROCEDURE — 99213 PR OFFICE/OUTPT VISIT, EST, LEVL III, 20-29 MIN: ICD-10-PCS | Mod: S$PBB,,, | Performed by: PODIATRIST

## 2023-01-25 PROCEDURE — 3077F PR MOST RECENT SYSTOLIC BLOOD PRESSURE >= 140 MM HG: ICD-10-PCS | Mod: CPTII,,, | Performed by: PODIATRIST

## 2023-01-25 PROCEDURE — 99999 PR PBB SHADOW E&M-EST. PATIENT-LVL III: CPT | Mod: PBBFAC,,, | Performed by: PODIATRIST

## 2023-01-25 PROCEDURE — 3077F SYST BP >= 140 MM HG: CPT | Mod: CPTII,,, | Performed by: PODIATRIST

## 2023-01-25 PROCEDURE — 3080F PR MOST RECENT DIASTOLIC BLOOD PRESSURE >= 90 MM HG: ICD-10-PCS | Mod: CPTII,,, | Performed by: PODIATRIST

## 2023-01-25 PROCEDURE — 1160F RVW MEDS BY RX/DR IN RCRD: CPT | Mod: CPTII,,, | Performed by: PODIATRIST

## 2023-01-25 PROCEDURE — 3008F BODY MASS INDEX DOCD: CPT | Mod: CPTII,,, | Performed by: PODIATRIST

## 2023-01-25 PROCEDURE — 1159F PR MEDICATION LIST DOCUMENTED IN MEDICAL RECORD: ICD-10-PCS | Mod: CPTII,,, | Performed by: PODIATRIST

## 2023-01-25 PROCEDURE — 99213 OFFICE O/P EST LOW 20 MIN: CPT | Mod: S$PBB,,, | Performed by: PODIATRIST

## 2023-01-25 PROCEDURE — 3080F DIAST BP >= 90 MM HG: CPT | Mod: CPTII,,, | Performed by: PODIATRIST

## 2023-01-25 PROCEDURE — 1160F PR REVIEW ALL MEDS BY PRESCRIBER/CLIN PHARMACIST DOCUMENTED: ICD-10-PCS | Mod: CPTII,,, | Performed by: PODIATRIST

## 2023-01-25 PROCEDURE — 3008F PR BODY MASS INDEX (BMI) DOCUMENTED: ICD-10-PCS | Mod: CPTII,,, | Performed by: PODIATRIST

## 2023-01-25 PROCEDURE — 99213 OFFICE O/P EST LOW 20 MIN: CPT | Mod: PBBFAC,PO | Performed by: PODIATRIST

## 2023-01-25 PROCEDURE — 99999 PR PBB SHADOW E&M-EST. PATIENT-LVL III: ICD-10-PCS | Mod: PBBFAC,,, | Performed by: PODIATRIST

## 2023-01-25 NOTE — PATIENT INSTRUCTIONS
Instructions for Care after Ingrown Nail removal    General Information: Stay off your feet as much as possible today. You may wear a surgical shoe, sandal or any open toed shoe that does not squeeze, constrict or put pressure on your toe(s). Your toe(s) may remain numb for up to 2-24 hours after the procedure. Although most patients can wear a closed loose fitting shoe after the first week, the toe will heal faster the more you use the open toed shoe in the first 2-3  weeks. Please contact our office if you have any questions or concerns.    Bleeding: Slight bleeding, discoloration and drainage are normal. Due to the chemical used there may be some yellow-clear drainage coming from the toe for 2-3 weeks.    Discomfort: You can elevate your foot to help alleviate minor swelling, bleeding and discomfort. You may also take Advil, Tylenol or other over the counter pain medications to help alleviate pain. Call our office if the pain is not well controlled. Most patients have very little discomfort as long as they minimize their walking for the first 24 hours and do not bump the toe.    Removing the Bandage: Starting the day after surgery, carefully remove the dressing and shower or bathe as normal. It is Ok to get the bandage soaking wet in the shower and when you remove it, it should not stick to the surgery site.    Dressing Options- Traditional Method:  1. Soaking two times a day in WARM water with Epsom salts or diluted Povidone Iodine  (Betadine) for 15-20 minutes. You will need to purchase these products from the pharmacy.  2. Dry toe then apply an antibiotic cream or ointment such as Neosporin or Polysporin plus or  Garamycin and cover with a 2 x 2 inch size gauze and then secure with a 1 inch band aid.  3. In the second week, take the dressing off at bedtime to air dry the toe.  4. If the toe is infected take the Antibiotic Pills as directed until finished.      Ingrown Toenail        What Is an Ingrown Toenail?     When a toenail is ingrown, it is curved and grows into the skin, usually at the nail borders (the sides of the nail). This digging in of the nail irritates the skin, often creating pain, redness, swelling and warmth in the toe.    If an ingrown nail causes a break in the skin, bacteria may enter and cause an infection in the area, which is often marked by drainage and a foul odor. However, even if the toe is not painful, red, swollen or warm, a nail that curves downward into the skin can progress to an infection.    Ingrown toenail and normal toenail    Causes  Causes of ingrown toenails include:    Heredity. In many people, the tendency for ingrown toenails is inherited.  Trauma. Sometimes an ingrown toenail is the result of trauma, such as stubbing your toe, having an object fall on your toe or engaging in activities that involve repeated pressure on the toes, such as kicking or running.  Improper trimming. The most common cause of ingrown toenails is cutting your nails too short. This encourages the skin next to the nail to fold over the nail.   Improperly sized footwear. Ingrown toenails can result from wearing socks and shoes that are tight or short.  Nail conditions. Ingrown toenails can be caused by nail problems, such as fungal infections or losing a nail due to trauma.     Treatment  Sometimes initial treatment for ingrown toenails can be safely performed at home. However, home treatment is strongly discouraged if an infection is suspected or for those who have medical conditions that put feet at high risk, such as diabetes, nerve damage in the foot or poor circulation.        Ingrown toenail before and after treatment    Home Care  If you do not have an infection or any of the above medical conditions, you can soak your foot in room-temperature water (adding Epsom salt may be recommended by your doctor) and gently massage the side of the nail fold to help reduce the inflammation.    Avoid attempting  bathroom surgery. Repeated cutting of the nail can cause the condition to worsen over time. If your symptoms fail to improve, it is time to see a foot and ankle surgeon.    Physician Care  After examining the toe, the foot and ankle surgeon will select the treatment best suited for you. If an infection is present, an oral antibiotic may be prescribed.    Sometimes a minor surgical procedure, often performed in the office, will ease the pain and remove the offending nail. After applying a local anesthetic, the doctor removes part of the nails side border. Some nails may become ingrown again, requiring removal of the nail root.    Following the nail procedure, a light bandage will be applied. Most people experience very little pain after surgery and may resume normal activity the next day. If your surgeon has prescribed an oral antibiotic, be sure to take all the medication, even if your symptoms have improved.    Preventing Ingrown Toenails  Many cases of ingrown toenails may be prevented by:    Proper trimming. Cut toenails in a fairly straight line, and do not cut them too short. You should be able to get your fingernail under the sides and end of the nail.  Well-fitting shoes and socks. Do not wear shoes that are short or tight in the toe area. Avoid shoes that are loose because they too cause pressure on the toes, especially when running or walking briskly.               What You Should Know About Home Treatment  Do not cut a notch in the nail. Contrary to what some people believe, this does not reduce the tendency for the nail to curve downward.  Do not repeatedly trim nail borders. Repeated trimming does not change the way the nail grows and can make the condition worse.  Do not place cotton under the nail. Not only does this not relieve the pain, it provides a place for harmful bacteria to grow, resulting in infection.  Over-the-counter medications are ineffective. Topical medications may mask the pain, but  they do not correct the underlying problem.        Understanding Ingrown Toenails    An ingrown nail is the result of a nail growing into the skin that surrounds it. This often occurs at either edge of the big toe. Ingrown nails may be caused by improper trimming, inherited nail deformities, injuries, fungal infections, or pressure.  Symptoms  Ingrown nails may cause pain at the tip of the toe or all the way to the base of the toe. The pain is often worse while walking. An ingrown nail may also lead to infection, inflammation, or a more serious condition. If its infected, you might see pus or redness.  Evaluation  To determine the extent of your problem, your healthcare provider examines and possibly presses the painful area. If other problems are suspected, blood tests, cultures, and X-rays may be done as well.  Treatment  If the nail isnt infected, your healthcare provider may trim the corner of it to help relieve your symptoms. He or she may need to remove one side of your nail back to the cuticle. The base of the nail may then be treated with a chemical to keep the ingrown part from growing back. Severe infections or ingrown nails may require antibiotics and temporary or permanent removal of a portion of the nail. To prevent pain, a local anesthetic may be used in these procedures. This treatment is usually done at your healthcare provider's office.  Prevention  Many nail problems can be prevented by wearing the right shoes and trimming your nails properly. To help avoid infection, keep your feet clean and dry. If you have diabetes, talk with your healthcare provider before doing any foot self-care.  The right shoes: Get your feet measured (your size may change as you age). Wear shoes that are supportive and roomy enough for your toes to wiggle. Look for shoes made of natural materials such as leather, which allow your feet to breathe.  Proper trimming: To avoid problems, trim your toenails straight across  without cutting down into the corners. If you cant trim your own nails, ask your healthcare provider to do so for you.  Date Last Reviewed: 10/1/2016  © 8998-8311 [employer]. [address] [place] [address], [place] [address]. All rights reserved. This information is not intended as a substitute for professional medical care. Always follow your healthcare professional's instructions.

## 2023-01-25 NOTE — PROGRESS NOTES
[facility]  [address] [place] [address], [address]  [facility] [address]  Dept: [phone]  Dept Fax: [phone]    Stevo Rodgers Jr., DPM     Assessment:   MDM    Coding  1. Ingrown toenail of right foot            Plan:     Procedures    Caitie DIANA was seen today for ingrown toenail.    Diagnoses and all orders for this visit:    Ingrown toenail of right foot        -pt seen, evaluated, and managed  -dx discussed in detail. All questions/concerns addressed  -all tx options discussed. All alternatives, risks, benefits of all txs discussed  -The patient was educated regarding the above diagnosis.   -discussed ingrowing toenails and all tx options. Focused on prevention going fwd  -manually curetted nail px site and covered w/trpl abx ointment + bandaid  -pt to perform epsom salt or betadine soaks once daily x 2wks. Written instructions dispensed  -keep toe covered with triple abx ointment + bandaid x 2wks    Rx dispensed: none  Referrals: none  -WB: wbat        Follow up if symptoms worsen or fail to improve.    Subjective:      Patient ID: Caitie Wang is a 42 y.o. female.    Chief Complaint:   Chief Complaint   Patient presents with    Ingrown Toenail     Right        CC - ingrown nail: Patient presents to the clinic complaining of painful ingrown toenail on the right foot. Patients rates pain 3/10 on pain scale. patient seeking tx options. She is s/p nail avulsion.    HPI    Last Podiatry Enc: Visit date not found  Last Enc w/ Me: Visit date not found    Outside reports reviewed: historical medical records.  Family hx: as below  Past Medical History:   Diagnosis Date    Anxiety     Asthma     Chronic pain     back; inflammatory    Depression     Diabetes mellitus, type 2     Endometriosis     Hyperlipidemia     Hypertension     MITZI (obstructive sleep apnea)     Thyroid disease     Vitamin D deficiency      Past Surgical History:   Procedure Laterality Date    CARPAL TUNNEL RELEASE       CERVICAL SPINE SURGERY      endometriosis      THYROID SURGERY       Family History   Problem Relation Age of Onset    Hypertension Mother     Diabetes Maternal Grandmother     Hyperlipidemia Maternal Grandmother     Diabetes Maternal Grandfather     Hyperlipidemia Maternal Grandfather     Breast cancer Maternal Aunt      Current Outpatient Medications   Medication Sig Dispense Refill    acetaminophen (TYLENOL) 325 MG tablet Take 2 tablets (650 mg total) by mouth every 6 (six) hours as needed for Pain. 30 tablet 0    ALBUTEROL SULFATE (PROAIR HFA INHL) Inhale into the lungs.      aspirin 325 MG tablet 3 tablets by mouth 3 times daily x 3 days, then twice daily x 14 days      atorvastatin (LIPITOR) 40 MG tablet Take 40 mg by mouth.      atorvastatin (LIPITOR) 40 MG tablet TAKE 1 TABLET BY MOUTH DAILY TO LOWER LIPIDS.      dulaglutide (TRULICITY) 0.75 mg/0.5 mL pen injector Inject 0.75mg into the skin weekly 12 pen 3    estradiol cypionate (DEPO-ESTRADIOL) 5 mg/mL injection Inject into the muscle every 28 days.      famciclovir (FAMVIR) 500 MG tablet Take 500 mg by mouth 3 (three) times daily.      folic acid (FOLVITE) 1 MG tablet Take 1 mg by mouth once daily.      hydrOXYzine (ATARAX) 50 MG tablet Take 50 mg by mouth every evening.      ibuprofen (ADVIL,MOTRIN) 600 MG tablet Take 1 tablet (600 mg total) by mouth every 6 (six) hours as needed for Pain. 20 tablet 0    imipramine (TOFRANIL) 25 MG tablet Take 25 mg by mouth every evening.      LIDOcaine (LIDODERM) 5 % Place 1 patch onto the skin daily as needed (pain). Remove & Discard patch within 12 hours or as directed by MD 15 patch 0    medroxyPROGESTERone (DEPO-PROVERA) 150 mg/mL Syrg       mometasone (NASONEX) 50 mcg/actuation nasal spray 2 sprays by Nasal route once daily. 17 g 0    montelukast (SINGULAIR) 10 mg tablet Take 10 mg by mouth.      ondansetron (ZOFRAN) 8 MG tablet Take 8 mg by mouth every 8 (eight) hours as needed.      pantoprazole (PROTONIX) 40 MG  tablet TAKE 1 TABLET BY MOUTH AT BEDTIME FOR GERD OR GASTRITIS      pravastatin (PRAVACHOL) 40 MG tablet Take 40 mg by mouth once daily.      traMADoL (ULTRAM) 50 mg tablet Take 50 mg by mouth 2 (two) times daily as needed.      traMADoL (ULTRAM) 50 mg tablet One tablet orally twice daily as needed for pain      TRULICITY 0.75 mg/0.5 mL pen injector SMARTSI.5 Milliliter(s) SUB-Q Once a Week      VITAMIN D2 50,000 unit capsule       baclofen (LIORESAL) 20 MG tablet Take 1 tablet (20 mg total) by mouth 3 (three) times daily. for 3 days 9 tablet 0    cetirizine (ZYRTEC) 10 MG tablet Take 1 tablet (10 mg total) by mouth once daily. 30 tablet 0    gabapentin (NEURONTIN) 300 MG capsule Take 1 capsule (300 mg total) by mouth 3 (three) times daily. 90 capsule 11    levothyroxine (SYNTHROID) 150 MCG tablet Take 1 tablet (150 mcg total) by mouth once daily. 30 tablet 0    metFORMIN (GLUCOPHAGE) 500 MG tablet Take 1 tablet (500 mg total) by mouth daily with breakfast. 30 tablet 11    ranitidine (ZANTAC) 150 MG capsule Take 1 capsule (150 mg total) by mouth once daily. 30 capsule 0     No current facility-administered medications for this visit.     Review of patient's allergies indicates:   Allergen Reactions    Cheese Itching and Rash     Social History     Socioeconomic History    Marital status: Single   Tobacco Use    Smoking status: Never    Smokeless tobacco: Never   Substance and Sexual Activity    Alcohol use: No    Drug use: No    Sexual activity: Yes     Partners: Male     Birth control/protection: Injection       ROS    REVIEW OF SYSTEMS: Negative as documented below as well as positive findings in bold.       Constitutional  Respiratory  Gastrointestinal  Skin   - Fever - Cough - Heartburn - Rash   - Chills - Spit blood - Nausea - Itching   - Weight Loss - Shortness of breath - Vomiting - Nail pain   - Malaise/Fatigue - Wheezing - Abdominal Pain  Wound/Ulcer   - Weight Gain   - Blood in Stool  Poor wound healing  "      - Diarrhea          Cardiovascular  Genitourinary  Neurological  HEENT   - Chest Pain - Dysuria - Burning Sensation of feet - Headache   - Palpitations - Hematuria - Tingling / Paresthesia - Congestion   - Pain at night in legs - Flank Pain - Dizziness - Sore Throat   - Cramping   - Tremor - Blurred Vision   - Leg Swelling   - Sensory Change - Double Vision   - Dizzy when standing   - Speech Change - Eye Redness       - Focal Weakness - Dry Eyes       - Loss of Consciousness          Endocrine  Musculoskeletal  Psychiatric   - Cold intolerance - Muscle Pain - Depression   - Heat intolerance - Neck Pain - Insomnia   - Anemia - Joint Pain - Memory Loss   -  Easy bruising, bleeding - Heel pain - Anxiety      Toe Pain        Leg/Ankle/Foot Pain         Objective:     BP (!) 142/96 (BP Location: Right arm, Patient Position: Sitting, BP Method: X-Large (Automatic))   Pulse 90   Resp 18   Ht 5' 3" (1.6 m)   Wt 111.7 kg (246 lb 4.1 oz)   LMP  (LMP Unknown)   BMI 43.62 kg/m²   Vitals:    01/25/23 1341   BP: (!) 142/96   Pulse: 90   Resp: 18   Weight: 111.7 kg (246 lb 4.1 oz)   Height: 5' 3" (1.6 m)   PainSc:   6   PainLoc: Toe         Physical Exam    General Appearance:   Patient appears well developed, well nourished  Patient appears stated age    Psychiatric:   Patient is oriented to time, place, and person.  Patient has appropriate mood and affect    Neck:  Trachea Midline  No visible masses    Respiratory/Ears:  No distress or labored breathing.  Able to differentiate between normal talking voice and whisper.  Able to follow commands    Eyes:  Visual Acuity intact  Lids and conjunctivae normal. No discoloration noted.    Foot Exam  Physical Exam  Ortho Exam  Ortho/SPM Exam  Physical Exam  Neurologic Exam    R LE exam con't:  V:  DP 2/4, PT 2/4   CRT< 3s to all digits tested   Tibial and popliteal lymph nodes are w/o abnormality        N:  Patient displays normal ankle reflexes   SILT in SP/DP/T/Sue/Saph " distributions    Ortho: +Motor EHL/FHL/TA/GA   +TTP R great toe  Compartments soft/compressible. No pain on passive stretch of big toe. No calf  Pain.    Derm:  skin intact, skin warm and dry, skin without ulcers or lesions, skin without induration, right, great toe px site healing well w/o signs of infection    Imaging / Labs:      X-Ray Toe 2 or More Views Right    Result Date: 1/4/2023  EXAMINATION: XR TOE 2 OR MORE VIEWS RIGHT CLINICAL HISTORY: Great toe pain 2 weeks; TECHNIQUE: Three views of the right toes were performed COMPARISON: None FINDINGS: Three views right toes. There is edema about the 1st toe.  No acute displaced fracture or dislocation of the visualized toes.  No radiopaque foreign body.     1. No acute displaced fracture or dislocation of the visualized toes noting edema about the 1st toe. Electronically signed by: Miquel Botello MD Date:    01/04/2023 Time:    17:49        Note: This was dictated using a computer transcription program. Although proofread, it may contain computer transcription errors and phonetic errors. Other human proofreading errors may also exist. Corrections may be performed at a later time. Please contact us for any clarification if needed.    Stevo Rodgers DPM  [facility]

## 2023-02-16 ENCOUNTER — HOSPITAL ENCOUNTER (EMERGENCY)
Facility: HOSPITAL | Age: 43
Discharge: HOME | End: 2023-02-16
Attending: STUDENT IN AN ORGANIZED HEALTH CARE EDUCATION/TRAINING PROGRAM
Payer: MEDICAID

## 2023-02-16 VITALS
DIASTOLIC BLOOD PRESSURE: 92 MMHG | SYSTOLIC BLOOD PRESSURE: 140 MMHG | TEMPERATURE: 98 F | BODY MASS INDEX: 43.22 KG/M2 | OXYGEN SATURATION: 100 % | WEIGHT: 244 LBS | RESPIRATION RATE: 18 BRPM | HEART RATE: 92 BPM

## 2023-02-16 DIAGNOSIS — R11.0 NAUSEA: Primary | ICD-10-CM

## 2023-02-16 DIAGNOSIS — R19.7 DIARRHEA, UNSPECIFIED TYPE: ICD-10-CM

## 2023-02-16 LAB
B-HCG UR QL: NEGATIVE
BILIRUB UR QL STRIP: NEGATIVE
BUN SERPL-MCNC: 6 MG/DL (ref 6–30)
CHLORIDE SERPL-SCNC: 106 MMOL/L (ref 95–110)
CLARITY UR REFRACT.AUTO: ABNORMAL
COLOR UR AUTO: YELLOW
CREAT SERPL-MCNC: 0.7 MG/DL (ref 0.5–1.4)
CTP QC/QA: YES
GLUCOSE SERPL-MCNC: 78 MG/DL (ref 70–110)
GLUCOSE UR QL STRIP: NEGATIVE
HCT VFR BLD CALC: 40 %PCV (ref 36–54)
HGB UR QL STRIP: NEGATIVE
KETONES UR QL STRIP: NEGATIVE
LEUKOCYTE ESTERASE UR QL STRIP: NEGATIVE
NITRITE UR QL STRIP: NEGATIVE
PH UR STRIP: 7 [PH] (ref 5–8)
POC IONIZED CALCIUM: 1.11 MMOL/L (ref 1.06–1.42)
POC TCO2 (MEASURED): 25 MMOL/L (ref 23–29)
POCT GLUCOSE: 76 MG/DL (ref 70–110)
POTASSIUM BLD-SCNC: 3.7 MMOL/L (ref 3.5–5.1)
PROT UR QL STRIP: ABNORMAL
SAMPLE: NORMAL
SODIUM BLD-SCNC: 139 MMOL/L (ref 136–145)
SP GR UR STRIP: 1.02 (ref 1–1.03)
URN SPEC COLLECT METH UR: ABNORMAL

## 2023-02-16 PROCEDURE — 99283 EMERGENCY DEPT VISIT LOW MDM: CPT | Mod: 25

## 2023-02-16 PROCEDURE — 81025 URINE PREGNANCY TEST: CPT | Performed by: PHYSICIAN ASSISTANT

## 2023-02-16 PROCEDURE — 99284 EMERGENCY DEPT VISIT MOD MDM: CPT | Mod: ,,, | Performed by: PHYSICIAN ASSISTANT

## 2023-02-16 PROCEDURE — 25000003 PHARM REV CODE 250: Performed by: PHYSICIAN ASSISTANT

## 2023-02-16 PROCEDURE — 81003 URINALYSIS AUTO W/O SCOPE: CPT | Performed by: PHYSICIAN ASSISTANT

## 2023-02-16 PROCEDURE — 80048 BASIC METABOLIC PNL TOTAL CA: CPT

## 2023-02-16 PROCEDURE — 82962 GLUCOSE BLOOD TEST: CPT

## 2023-02-16 PROCEDURE — 99284 PR EMERGENCY DEPT VISIT,LEVEL IV: ICD-10-PCS | Mod: ,,, | Performed by: PHYSICIAN ASSISTANT

## 2023-02-16 RX ORDER — ONDANSETRON 4 MG/1
4 TABLET, ORALLY DISINTEGRATING ORAL
Status: COMPLETED | OUTPATIENT
Start: 2023-02-16 | End: 2023-02-16

## 2023-02-16 RX ORDER — ONDANSETRON 4 MG/1
4 TABLET, ORALLY DISINTEGRATING ORAL EVERY 8 HOURS PRN
Qty: 12 TABLET | Refills: 0 | Status: SHIPPED | OUTPATIENT
Start: 2023-02-16 | End: 2023-06-27

## 2023-02-16 RX ADMIN — ONDANSETRON 4 MG: 4 TABLET, ORALLY DISINTEGRATING ORAL at 01:02

## 2023-02-16 NOTE — ED PROVIDER NOTES
Encounter Date: 2/16/2023       History     Chief Complaint   Patient presents with    Nausea     Lower back pain, generalized weakness. Started this morning     42-year-old female with history of diabetes, hypertension, sciatica, hyperlipidemia presents to the ED complaining of nausea and diarrhea that started this morning.  She has not had any episodes of emesis and only 1 episode of nonbloody nonmelanotic diarrhea.  Her nephew and mother are sick with similar symptoms but they are both feeling better today.  She does complain of some low back pain, she has chronic sciatica and did some heavy lifting which exacerbated her chronic back pain.  She denies fever, chest pain, shortness of breath, dysuria, abdominal pain, URI symptoms.    The history is provided by the patient.   Review of patient's allergies indicates:   Allergen Reactions    Cheese Itching and Rash     Past Medical History:   Diagnosis Date    Anxiety     Asthma     Chronic pain     back; inflammatory    Depression     Diabetes mellitus, type 2     Endometriosis     Hyperlipidemia     Hypertension     MITZI (obstructive sleep apnea)     Thyroid disease     Vitamin D deficiency      Past Surgical History:   Procedure Laterality Date    CARPAL TUNNEL RELEASE      CERVICAL SPINE SURGERY      endometriosis      THYROID SURGERY       Family History   Problem Relation Age of Onset    Hypertension Mother     Diabetes Maternal Grandmother     Hyperlipidemia Maternal Grandmother     Diabetes Maternal Grandfather     Hyperlipidemia Maternal Grandfather     Breast cancer Maternal Aunt      Social History     Tobacco Use    Smoking status: Never    Smokeless tobacco: Never   Substance Use Topics    Alcohol use: No    Drug use: No     Review of Systems   Constitutional:  Negative for chills and fever.   Respiratory:  Negative for shortness of breath.    Cardiovascular:  Negative for chest pain.   Gastrointestinal:  Positive for diarrhea and nausea. Negative for  abdominal pain, blood in stool and vomiting.   Genitourinary:  Negative for dysuria and hematuria.   Musculoskeletal:  Positive for back pain.   Skin:  Negative for rash and wound.   Neurological:  Negative for dizziness, weakness, light-headedness and headaches.   Psychiatric/Behavioral:  Negative for confusion.      Physical Exam     Initial Vitals [02/16/23 1105]   BP Pulse Resp Temp SpO2   (!) 140/92 92 18 98.2 °F (36.8 °C) 100 %      MAP       --         Physical Exam    Constitutional: She appears well-developed and well-nourished. She is not diaphoretic. No distress.   Pulmonary/Chest: No respiratory distress.   Abdominal: Abdomen is soft. Bowel sounds are normal. There is no abdominal tenderness. There is no rebound and no guarding.   Musculoskeletal:         General: Normal range of motion.     Neurological: She is alert and oriented to person, place, and time.   Skin: Skin is warm and dry.   Psychiatric: She has a normal mood and affect.       ED Course   Procedures  Labs Reviewed   URINALYSIS, REFLEX TO URINE CULTURE - Abnormal; Notable for the following components:       Result Value    Appearance, UA Hazy (*)     Protein, UA Trace (*)     All other components within normal limits    Narrative:     Specimen Source->Urine   POCT URINE PREGNANCY   ISTAT PROCEDURE   POCT GLUCOSE          Imaging Results    None          Medications   ondansetron disintegrating tablet 4 mg (4 mg Oral Given 2/16/23 1310)     Medical Decision Making:   History:   Old Medical Records: I decided to obtain old medical records.  Old Records Summarized: records from clinic visits and records from previous admission(s).  Clinical Tests:   Lab Tests: Ordered and Reviewed     APC / Resident Notes:   42-year-old female with history of diabetes, hypertension, sciatica, hyperlipidemia presents to the ED complaining of nausea and diarrhea that started this morning.  Vital signs stable.  She is well-appearing.  Abdomen is soft and  nontender.  She is not had any episodes of emesis in only 1 episode of diarrhea. She does have known sick contacts with similar symptoms.  Differential diagnosis includes but is not limited to viral gastroenteritis, dehydration, acute kidney injury, DKA, UTI, pregnancy.  Will get labs, give Zofran ODT.    UPT negative.  Glucose 76.  Chem 8 unremarkable.  UA without any infection.    She reports improvement of her symptoms after Zofran.  She was able to tolerate a p.o. challenge. I do not feel that she needs any further labs or imaging at this time. Stable for discharge.     She was discharged with a prescription for zofran.  She will follow up with her PCP.  Strict ED return precautions given.  All of the patient's questions were answered.  I reviewed the patient's chart and labs and discussed the case with my supervising physician.                            Clinical Impression:   Final diagnoses:  [R11.0] Nausea (Primary)  [R19.7] Diarrhea, unspecified type        ED Disposition Condition    Discharge Stable          ED Prescriptions       Medication Sig Dispense Start Date End Date Auth. Provider    ondansetron (ZOFRAN-ODT) 4 MG TbDL Take 1 tablet (4 mg total) by mouth every 8 (eight) hours as needed (nausea). 12 tablet 2/16/2023 -- Amanda Nur PA-C          Follow-up Information       Follow up With Specialties Details Why Contact Info    Juana Vines MD Cardiology   [address] [facility] [address]  [phone]               Amanda Nur PA-C  02/16/23 6802

## 2023-02-16 NOTE — Clinical Note
"Caitie DIANA "Caitiemarcie Wang was seen and treated in our emergency department on 2/16/2023.  She may return to work on 02/18/2023.       If you have any questions or concerns, please don't hesitate to call.      Amanda Nur PA-C"

## 2023-02-16 NOTE — ED NOTES
I-STAT Chem-8+ Results:   Value Reference Range   Sodium 139 136-145 mmol/L   Potassium  3.7 3.5-5.1 mmol/L   Chloride 106  mmol/L   Ionized Calcium 1.11 1.06-1.42 mmol/L   CO2 (measured) 25 23-29 mmol/L   Glucose 78  mg/dL   BUN 6 6-30 mg/dL   Creatinine 0.7 0.5-1.4 mg/dL   Hematocrit 40 36-54%

## 2023-02-16 NOTE — ED TRIAGE NOTES
Caitie Wang, a 42 y.o. female presents to the ED w/ complaint of nausea that started this morning    Triage note:  Chief Complaint   Patient presents with    Nausea     Lower back pain, generalized weakness. Started this morning     Review of patient's allergies indicates:   Allergen Reactions    Cheese Itching and Rash     Past Medical History:   Diagnosis Date    Anxiety     Asthma     Chronic pain     back; inflammatory    Depression     Diabetes mellitus, type 2     Endometriosis     Hyperlipidemia     Hypertension     MITZI (obstructive sleep apnea)     Thyroid disease     Vitamin D deficiency

## 2023-03-22 ENCOUNTER — TELEPHONE (OUTPATIENT)
Dept: PODIATRY | Facility: CLINIC | Age: 43
End: 2023-03-22
Payer: MEDICAID

## 2023-03-22 NOTE — TELEPHONE ENCOUNTER
----- Message from Duyen Melo sent at 3/21/2023  8:48 AM CDT -----  Regarding: advice  Contact: [phone]  Type:  Needs Medical Advice    Who Called: self   Symptoms (please be specific):  there is a piece of something hard sticking up on the nail bed of her right big toe.  The toe is still hurting and she is unable to wear shoes.   How long has patient had these symptoms:  yesterday after she soaked her foot  Pharmacy name and phone #:     Would the patient rather a call back or a response via [facility]?  Call   Best Call Back Number:  [phone]  Additional Information:

## 2023-03-22 NOTE — TELEPHONE ENCOUNTER
Informed patient we did not have anything available until April, she needed to check with her work schedule and call back

## 2023-03-27 ENCOUNTER — TELEPHONE (OUTPATIENT)
Dept: PODIATRY | Facility: CLINIC | Age: 43
End: 2023-03-27
Payer: MEDICAID

## 2023-03-27 NOTE — TELEPHONE ENCOUNTER
Spoke with Ms Wang to assist with earlier appt with Dr Rodgers in [facility]. Accepted appt date and time of 3/28/23 at 9:15 am due to her being off tomorrow. No other needs voiced. Encouraged to call if further assistance is needed

## 2023-03-27 NOTE — TELEPHONE ENCOUNTER
----- Message from Nga Bose sent at 3/27/2023  9:07 AM CDT -----  Contact: pt  Type:  call back    Who Called:pt    Does the patient know what this is regarding?:appointment  Would the patient rather a call back or a response via [facility]? call  Best Call Back Number:[phone]  Additional Information:   Pt stated she received a text about a sooner appointment     I did not see the appointment she was referring to

## 2023-03-28 ENCOUNTER — OFFICE VISIT (OUTPATIENT)
Dept: PODIATRY | Facility: CLINIC | Age: 43
End: 2023-03-28
Payer: MEDICAID

## 2023-03-28 VITALS — WEIGHT: 243.19 LBS | HEIGHT: 63 IN | BODY MASS INDEX: 43.09 KG/M2 | RESPIRATION RATE: 18 BRPM

## 2023-03-28 DIAGNOSIS — L60.0 INGROWN TOENAIL OF RIGHT FOOT: Primary | ICD-10-CM

## 2023-03-28 PROCEDURE — 1160F PR REVIEW ALL MEDS BY PRESCRIBER/CLIN PHARMACIST DOCUMENTED: ICD-10-PCS | Mod: CPTII,,, | Performed by: PODIATRIST

## 2023-03-28 PROCEDURE — 1159F MED LIST DOCD IN RCRD: CPT | Mod: CPTII,,, | Performed by: PODIATRIST

## 2023-03-28 PROCEDURE — 99215 OFFICE O/P EST HI 40 MIN: CPT | Mod: PBBFAC,PO | Performed by: PODIATRIST

## 2023-03-28 PROCEDURE — 3008F PR BODY MASS INDEX (BMI) DOCUMENTED: ICD-10-PCS | Mod: CPTII,,, | Performed by: PODIATRIST

## 2023-03-28 PROCEDURE — 1159F PR MEDICATION LIST DOCUMENTED IN MEDICAL RECORD: ICD-10-PCS | Mod: CPTII,,, | Performed by: PODIATRIST

## 2023-03-28 PROCEDURE — 1160F RVW MEDS BY RX/DR IN RCRD: CPT | Mod: CPTII,,, | Performed by: PODIATRIST

## 2023-03-28 PROCEDURE — 99213 PR OFFICE/OUTPT VISIT, EST, LEVL III, 20-29 MIN: ICD-10-PCS | Mod: S$PBB,,, | Performed by: PODIATRIST

## 2023-03-28 PROCEDURE — 99213 OFFICE O/P EST LOW 20 MIN: CPT | Mod: S$PBB,,, | Performed by: PODIATRIST

## 2023-03-28 PROCEDURE — 99999 PR PBB SHADOW E&M-EST. PATIENT-LVL V: CPT | Mod: PBBFAC,,, | Performed by: PODIATRIST

## 2023-03-28 PROCEDURE — 3008F BODY MASS INDEX DOCD: CPT | Mod: CPTII,,, | Performed by: PODIATRIST

## 2023-03-28 PROCEDURE — 99999 PR PBB SHADOW E&M-EST. PATIENT-LVL V: ICD-10-PCS | Mod: PBBFAC,,, | Performed by: PODIATRIST

## 2023-03-28 NOTE — PROGRESS NOTES
[facility]  [address] [place] [address], [address]  [facility] [address]  Dept: [phone]  Dept Fax: [phone]    Stevo Rodgers Jr., DPM     Assessment:   MARIE    Coding  1. Ingrown toenail of right foot            Plan:     Procedures    Caitie DIANA was seen today for ingrown toenail.    Diagnoses and all orders for this visit:    Ingrown toenail of right foot        -pt seen, evaluated, and managed  -dx discussed in detail. All questions/concerns addressed  -all tx options discussed. All alternatives, risks, benefits of all txs discussed  -The patient was educated regarding the above diagnosis.   -discussed ingrowing toenails and all tx options. Focused on prevention going fwd  -manually curetted nail px site and covered w/ trpl abx ointment + bandaid  -pt to perform epsom salt or betadine soaks once daily x 2wks. Written instructions dispensed  -keep toe covered with triple abx ointment + bandaid x 2wks    Rx dispensed: none  Referrals: none  -WB: wbat        Follow up if symptoms worsen or fail to improve.    Subjective:      Patient ID: Caitie Wang is a 43 y.o. female.    Chief Complaint:   Chief Complaint   Patient presents with    Ingrown Toenail     Right         CC - ingrown nail: Patient presents to the clinic complaining of painful ingrown toenail on the right foot. Patients rates pain 3/10 on pain scale. patient seeking tx options. She is s/p nail avulsion.    3/28/23:  Hx as above. S/p R hallux nail avulsino in 01/2023. Concerned that toenail is ingrown again.     Ingrown Toenail      Last Podiatry Enc: Visit date not found  Last Enc w/ Me: Visit date not found    Outside reports reviewed: historical medical records.  Family hx: as below  Past Medical History:   Diagnosis Date    Anxiety     Asthma     Chronic pain     back; inflammatory    Depression     Diabetes mellitus, type 2     Endometriosis     Hyperlipidemia     Hypertension     MITZI (obstructive sleep  apnea)     Thyroid disease     Vitamin D deficiency      Past Surgical History:   Procedure Laterality Date    CARPAL TUNNEL RELEASE      CERVICAL SPINE SURGERY      endometriosis      THYROID SURGERY       Family History   Problem Relation Age of Onset    Hypertension Mother     Diabetes Maternal Grandmother     Hyperlipidemia Maternal Grandmother     Diabetes Maternal Grandfather     Hyperlipidemia Maternal Grandfather     Breast cancer Maternal Aunt      Current Outpatient Medications   Medication Sig Dispense Refill    acetaminophen (TYLENOL) 325 MG tablet Take 2 tablets (650 mg total) by mouth every 6 (six) hours as needed for Pain. 30 tablet 0    ALBUTEROL SULFATE (PROAIR HFA INHL) Inhale into the lungs.      aspirin 325 MG tablet 3 tablets by mouth 3 times daily x 3 days, then twice daily x 14 days      atorvastatin (LIPITOR) 40 MG tablet Take 40 mg by mouth.      atorvastatin (LIPITOR) 40 MG tablet TAKE 1 TABLET BY MOUTH DAILY TO LOWER LIPIDS.      dulaglutide (TRULICITY) 0.75 mg/0.5 mL pen injector Inject 0.75mg into the skin weekly 12 pen 3    estradiol cypionate (DEPO-ESTRADIOL) 5 mg/mL injection Inject into the muscle every 28 days.      famciclovir (FAMVIR) 500 MG tablet Take 500 mg by mouth 3 (three) times daily.      folic acid (FOLVITE) 1 MG tablet Take 1 mg by mouth once daily.      hydrOXYzine (ATARAX) 50 MG tablet Take 50 mg by mouth every evening.      ibuprofen (ADVIL,MOTRIN) 600 MG tablet Take 1 tablet (600 mg total) by mouth every 6 (six) hours as needed for Pain. 20 tablet 0    imipramine (TOFRANIL) 25 MG tablet Take 25 mg by mouth every evening.      LIDOcaine (LIDODERM) 5 % Place 1 patch onto the skin daily as needed (pain). Remove & Discard patch within 12 hours or as directed by MD 15 patch 0    medroxyPROGESTERone (DEPO-PROVERA) 150 mg/mL Syrg       mometasone (NASONEX) 50 mcg/actuation nasal spray 2 sprays by Nasal route once daily. 17 g 0    montelukast  (SINGULAIR) 10 mg tablet Take 10 mg by mouth.      ondansetron (ZOFRAN) 8 MG tablet Take 8 mg by mouth every 8 (eight) hours as needed.      ondansetron (ZOFRAN-ODT) 4 MG TbDL Take 1 tablet (4 mg total) by mouth every 8 (eight) hours as needed (nausea). 12 tablet 0    pantoprazole (PROTONIX) 40 MG tablet TAKE 1 TABLET BY MOUTH AT BEDTIME FOR GERD OR GASTRITIS      pravastatin (PRAVACHOL) 40 MG tablet Take 40 mg by mouth once daily.      traMADoL (ULTRAM) 50 mg tablet Take 50 mg by mouth 2 (two) times daily as needed.      traMADoL (ULTRAM) 50 mg tablet One tablet orally twice daily as needed for pain      TRULICITY 0.75 mg/0.5 mL pen injector SMARTSI.5 Milliliter(s) SUB-Q Once a Week      VITAMIN D2 50,000 unit capsule       baclofen (LIORESAL) 20 MG tablet Take 1 tablet (20 mg total) by mouth 3 (three) times daily. for 3 days 9 tablet 0    cetirizine (ZYRTEC) 10 MG tablet Take 1 tablet (10 mg total) by mouth once daily. 30 tablet 0    gabapentin (NEURONTIN) 300 MG capsule Take 1 capsule (300 mg total) by mouth 3 (three) times daily. 90 capsule 11    levothyroxine (SYNTHROID) 150 MCG tablet Take 1 tablet (150 mcg total) by mouth once daily. 30 tablet 0    metFORMIN (GLUCOPHAGE) 500 MG tablet Take 1 tablet (500 mg total) by mouth daily with breakfast. 30 tablet 11    ranitidine (ZANTAC) 150 MG capsule Take 1 capsule (150 mg total) by mouth once daily. 30 capsule 0     No current facility-administered medications for this visit.     Review of patient's allergies indicates:   Allergen Reactions    Cheese Itching and Rash     Social History     Socioeconomic History    Marital status: Single   Tobacco Use    Smoking status: Never    Smokeless tobacco: Never   Substance and Sexual Activity    Alcohol use: No    Drug use: No    Sexual activity: Yes     Partners: Male     Birth control/protection: Injection       ROS    REVIEW OF SYSTEMS: Negative as documented below as well as positive findings in  "bold.       Constitutional  Respiratory  Gastrointestinal  Skin   - Fever - Cough - Heartburn - Rash   - Chills - Spit blood - Nausea - Itching   - Weight Loss - Shortness of breath - Vomiting - Nail pain   - Malaise/Fatigue - Wheezing - Abdominal Pain  Wound/Ulcer   - Weight Gain   - Blood in Stool  Poor wound healing       - Diarrhea          Cardiovascular  Genitourinary  Neurological  HEENT   - Chest Pain - Dysuria - Burning Sensation of feet - Headache   - Palpitations - Hematuria - Tingling / Paresthesia - Congestion   - Pain at night in legs - Flank Pain - Dizziness - Sore Throat   - Cramping   - Tremor - Blurred Vision   - Leg Swelling   - Sensory Change - Double Vision   - Dizzy when standing   - Speech Change - Eye Redness       - Focal Weakness - Dry Eyes       - Loss of Consciousness          Endocrine  Musculoskeletal  Psychiatric   - Cold intolerance - Muscle Pain - Depression   - Heat intolerance - Neck Pain - Insomnia   - Anemia - Joint Pain - Memory Loss   -  Easy bruising, bleeding - Heel pain - Anxiety      Toe Pain        Leg/Ankle/Foot Pain         Objective:     Resp 18   Ht 5' 3" (1.6 m)   Wt 110.3 kg (243 lb 2.7 oz)   BMI 43.08 kg/m²   Vitals:    03/28/23 0909   Resp: 18   Weight: 110.3 kg (243 lb 2.7 oz)   Height: 5' 3" (1.6 m)   PainSc: 0-No pain         Physical Exam    General Appearance:   Patient appears well developed, well nourished  Patient appears stated age    Psychiatric:   Patient is oriented to time, place, and person.  Patient has appropriate mood and affect    Neck:  Trachea Midline  No visible masses    Respiratory/Ears:  No distress or labored breathing.  Able to differentiate between normal talking voice and whisper.  Able to follow commands    Eyes:  Visual Acuity intact  Lids and conjunctivae normal. No discoloration noted.    Foot Exam  Physical Exam  Ortho Exam  Ortho/SPM Exam  Physical Exam  Neurologic Exam    R LE exam con't:  V:  DP 2/4, PT 2/4   CRT< 3s to all " digits tested   Tibial and popliteal lymph nodes are w/o abnormality        N:  Patient displays normal ankle reflexes   SILT in SP/DP/T/Sue/Saph distributions    Ortho: +Motor EHL/FHL/TA/GA   +TTP R great toe  Compartments soft/compressible. No pain on passive stretch of big toe. No calf  Pain.    Derm:  skin intact, skin warm and dry, skin without ulcers or lesions, skin without induration, right, great toe px site healing well w/o signs of infection. New nail has started to grow and there is thick skin has covered medial and lateral nail folds but is not ingrown or infected.    Imaging / Labs:      X-Ray Toe 2 or More Views Right    Result Date: 1/4/2023  EXAMINATION: XR TOE 2 OR MORE VIEWS RIGHT CLINICAL HISTORY: Great toe pain 2 weeks; TECHNIQUE: Three views of the right toes were performed COMPARISON: None FINDINGS: Three views right toes. There is edema about the 1st toe.  No acute displaced fracture or dislocation of the visualized toes.  No radiopaque foreign body.     1. No acute displaced fracture or dislocation of the visualized toes noting edema about the 1st toe. Electronically signed by: Miquel Botello MD Date:    01/04/2023 Time:    17:49        Note: This was dictated using a computer transcription program. Although proofread, it may contain computer transcription errors and phonetic errors. Other human proofreading errors may also exist. Corrections may be performed at a later time. Please contact us for any clarification if needed.    Stevo Rodgers DPM  [facility]

## 2023-03-28 NOTE — PATIENT INSTRUCTIONS
Instructions for Care after Ingrown Nail removal    General Information: Stay off your feet as much as possible today. You may wear a surgical shoe, sandal or any open toed shoe that does not squeeze, constrict or put pressure on your toe(s). Your toe(s) may remain numb for up to 2-24 hours after the procedure. Although most patients can wear a closed loose fitting shoe after the first week, the toe will heal faster the more you use the open toed shoe in the first 2-3  weeks. Please contact our office if you have any questions or concerns.    Bleeding: Slight bleeding, discoloration and drainage are normal. Due to the chemical used there may be some yellow-clear drainage coming from the toe for 2-3 weeks.    Discomfort: You can elevate your foot to help alleviate minor swelling, bleeding and discomfort. You may also take Advil, Tylenol or other over the counter pain medications to help alleviate pain. Call our office if the pain is not well controlled. Most patients have very little discomfort as long as they minimize their walking for the first 24 hours and do not bump the toe.    Removing the Bandage: Starting the day after surgery, carefully remove the dressing and shower or bathe as normal. It is Ok to get the bandage soaking wet in the shower and when you remove it, it should not stick to the surgery site.    Dressing Options- Traditional Method:  1. Soaking two times a day in WARM water with Epsom salts or diluted Povidone Iodine  (Betadine) for 15-20 minutes. You will need to purchase these products from the pharmacy.  2. Dry toe then apply an antibiotic cream or ointment such as Neosporin or Polysporin plus or  Garamycin and cover with a 2 x 2 inch size gauze and then secure with a 1 inch band aid.  3. In the second week, take the dressing off at bedtime to air dry the toe.  4. If the toe is infected take the Antibiotic Pills as directed until finished.      Ingrown Toenail        What Is an Ingrown Toenail?     When a toenail is ingrown, it is curved and grows into the skin, usually at the nail borders (the sides of the nail). This digging in of the nail irritates the skin, often creating pain, redness, swelling and warmth in the toe.    If an ingrown nail causes a break in the skin, bacteria may enter and cause an infection in the area, which is often marked by drainage and a foul odor. However, even if the toe is not painful, red, swollen or warm, a nail that curves downward into the skin can progress to an infection.    Ingrown toenail and normal toenail    Causes  Causes of ingrown toenails include:    Heredity. In many people, the tendency for ingrown toenails is inherited.  Trauma. Sometimes an ingrown toenail is the result of trauma, such as stubbing your toe, having an object fall on your toe or engaging in activities that involve repeated pressure on the toes, such as kicking or running.  Improper trimming. The most common cause of ingrown toenails is cutting your nails too short. This encourages the skin next to the nail to fold over the nail.   Improperly sized footwear. Ingrown toenails can result from wearing socks and shoes that are tight or short.  Nail conditions. Ingrown toenails can be caused by nail problems, such as fungal infections or losing a nail due to trauma.     Treatment  Sometimes initial treatment for ingrown toenails can be safely performed at home. However, home treatment is strongly discouraged if an infection is suspected or for those who have medical conditions that put feet at high risk, such as diabetes, nerve damage in the foot or poor circulation.        Ingrown toenail before and after treatment    Home Care  If you do not have an infection or any of the above medical conditions, you can soak your foot in room-temperature water (adding Epsom salt may be recommended by your doctor) and gently massage the side of the nail fold to help reduce the inflammation.    Avoid attempting  bathroom surgery. Repeated cutting of the nail can cause the condition to worsen over time. If your symptoms fail to improve, it is time to see a foot and ankle surgeon.    Physician Care  After examining the toe, the foot and ankle surgeon will select the treatment best suited for you. If an infection is present, an oral antibiotic may be prescribed.    Sometimes a minor surgical procedure, often performed in the office, will ease the pain and remove the offending nail. After applying a local anesthetic, the doctor removes part of the nails side border. Some nails may become ingrown again, requiring removal of the nail root.    Following the nail procedure, a light bandage will be applied. Most people experience very little pain after surgery and may resume normal activity the next day. If your surgeon has prescribed an oral antibiotic, be sure to take all the medication, even if your symptoms have improved.    Preventing Ingrown Toenails  Many cases of ingrown toenails may be prevented by:    Proper trimming. Cut toenails in a fairly straight line, and do not cut them too short. You should be able to get your fingernail under the sides and end of the nail.  Well-fitting shoes and socks. Do not wear shoes that are short or tight in the toe area. Avoid shoes that are loose because they too cause pressure on the toes, especially when running or walking briskly.               What You Should Know About Home Treatment  Do not cut a notch in the nail. Contrary to what some people believe, this does not reduce the tendency for the nail to curve downward.  Do not repeatedly trim nail borders. Repeated trimming does not change the way the nail grows and can make the condition worse.  Do not place cotton under the nail. Not only does this not relieve the pain, it provides a place for harmful bacteria to grow, resulting in infection.  Over-the-counter medications are ineffective. Topical medications may mask the pain, but  they do not correct the underlying problem.        Understanding Ingrown Toenails    An ingrown nail is the result of a nail growing into the skin that surrounds it. This often occurs at either edge of the big toe. Ingrown nails may be caused by improper trimming, inherited nail deformities, injuries, fungal infections, or pressure.  Symptoms  Ingrown nails may cause pain at the tip of the toe or all the way to the base of the toe. The pain is often worse while walking. An ingrown nail may also lead to infection, inflammation, or a more serious condition. If its infected, you might see pus or redness.  Evaluation  To determine the extent of your problem, your healthcare provider examines and possibly presses the painful area. If other problems are suspected, blood tests, cultures, and X-rays may be done as well.  Treatment  If the nail isnt infected, your healthcare provider may trim the corner of it to help relieve your symptoms. He or she may need to remove one side of your nail back to the cuticle. The base of the nail may then be treated with a chemical to keep the ingrown part from growing back. Severe infections or ingrown nails may require antibiotics and temporary or permanent removal of a portion of the nail. To prevent pain, a local anesthetic may be used in these procedures. This treatment is usually done at your healthcare provider's office.  Prevention  Many nail problems can be prevented by wearing the right shoes and trimming your nails properly. To help avoid infection, keep your feet clean and dry. If you have diabetes, talk with your healthcare provider before doing any foot self-care.  The right shoes: Get your feet measured (your size may change as you age). Wear shoes that are supportive and roomy enough for your toes to wiggle. Look for shoes made of natural materials such as leather, which allow your feet to breathe.  Proper trimming: To avoid problems, trim your toenails straight across  without cutting down into the corners. If you cant trim your own nails, ask your healthcare provider to do so for you.  Date Last Reviewed: 10/1/2016  © 1529-1054 [employer]. [address] [place] [address], [place] [address]. All rights reserved. This information is not intended as a substitute for professional medical care. Always follow your healthcare professional's instructions.

## 2023-04-03 ENCOUNTER — HOSPITAL ENCOUNTER (EMERGENCY)
Facility: HOSPITAL | Age: 43
Discharge: HOME | End: 2023-04-03
Attending: EMERGENCY MEDICINE
Payer: MEDICAID

## 2023-04-03 VITALS
WEIGHT: 244 LBS | HEIGHT: 64 IN | OXYGEN SATURATION: 98 % | BODY MASS INDEX: 41.66 KG/M2 | HEART RATE: 82 BPM | DIASTOLIC BLOOD PRESSURE: 82 MMHG | RESPIRATION RATE: 16 BRPM | SYSTOLIC BLOOD PRESSURE: 134 MMHG | TEMPERATURE: 98 F

## 2023-04-03 DIAGNOSIS — R10.9 ABDOMINAL PAIN: ICD-10-CM

## 2023-04-03 DIAGNOSIS — R10.13 EPIGASTRIC PAIN: Primary | ICD-10-CM

## 2023-04-03 LAB
ALBUMIN SERPL BCP-MCNC: 3.9 G/DL (ref 3.5–5.2)
ALP SERPL-CCNC: 86 U/L (ref 55–135)
ALT SERPL W/O P-5'-P-CCNC: 14 U/L (ref 10–44)
ANION GAP SERPL CALC-SCNC: 10 MMOL/L (ref 8–16)
AST SERPL-CCNC: 18 U/L (ref 10–40)
B-HCG UR QL: NEGATIVE
BASOPHILS # BLD AUTO: 0.01 K/UL (ref 0–0.2)
BASOPHILS NFR BLD: 0.1 % (ref 0–1.9)
BILIRUB SERPL-MCNC: 0.5 MG/DL (ref 0.1–1)
BILIRUB UR QL STRIP: NEGATIVE
BUN SERPL-MCNC: 7 MG/DL (ref 6–20)
CALCIUM SERPL-MCNC: 9.1 MG/DL (ref 8.7–10.5)
CHLORIDE SERPL-SCNC: 108 MMOL/L (ref 95–110)
CLARITY UR REFRACT.AUTO: ABNORMAL
CO2 SERPL-SCNC: 19 MMOL/L (ref 23–29)
COLOR UR AUTO: YELLOW
CREAT SERPL-MCNC: 0.8 MG/DL (ref 0.5–1.4)
CTP QC/QA: YES
DIFFERENTIAL METHOD: NORMAL
EOSINOPHIL # BLD AUTO: 0.1 K/UL (ref 0–0.5)
EOSINOPHIL NFR BLD: 1.4 % (ref 0–8)
ERYTHROCYTE [DISTWIDTH] IN BLOOD BY AUTOMATED COUNT: 13.2 % (ref 11.5–14.5)
EST. GFR  (NO RACE VARIABLE): >60 ML/MIN/1.73 M^2
GLUCOSE SERPL-MCNC: 105 MG/DL (ref 70–110)
GLUCOSE UR QL STRIP: NEGATIVE
HCT VFR BLD AUTO: 41 % (ref 37–48.5)
HGB BLD-MCNC: 13.1 G/DL (ref 12–16)
HGB UR QL STRIP: ABNORMAL
IMM GRANULOCYTES # BLD AUTO: 0.03 K/UL (ref 0–0.04)
IMM GRANULOCYTES NFR BLD AUTO: 0.4 % (ref 0–0.5)
KETONES UR QL STRIP: NEGATIVE
LEUKOCYTE ESTERASE UR QL STRIP: NEGATIVE
LIPASE SERPL-CCNC: 16 U/L (ref 4–60)
LYMPHOCYTES # BLD AUTO: 1.6 K/UL (ref 1–4.8)
LYMPHOCYTES NFR BLD: 19.2 % (ref 18–48)
MCH RBC QN AUTO: 30.8 PG (ref 27–31)
MCHC RBC AUTO-ENTMCNC: 32 G/DL (ref 32–36)
MCV RBC AUTO: 97 FL (ref 82–98)
MONOCYTES # BLD AUTO: 0.5 K/UL (ref 0.3–1)
MONOCYTES NFR BLD: 6 % (ref 4–15)
NEUTROPHILS # BLD AUTO: 6.2 K/UL (ref 1.8–7.7)
NEUTROPHILS NFR BLD: 72.9 % (ref 38–73)
NITRITE UR QL STRIP: NEGATIVE
NRBC BLD-RTO: 0 /100 WBC
PH UR STRIP: 6 [PH] (ref 5–8)
PLATELET # BLD AUTO: 348 K/UL (ref 150–450)
PMV BLD AUTO: 9.7 FL (ref 9.2–12.9)
POTASSIUM SERPL-SCNC: 3.5 MMOL/L (ref 3.5–5.1)
PROT SERPL-MCNC: 7.8 G/DL (ref 6–8.4)
PROT UR QL STRIP: ABNORMAL
RBC # BLD AUTO: 4.25 M/UL (ref 4–5.4)
SODIUM SERPL-SCNC: 137 MMOL/L (ref 136–145)
SP GR UR STRIP: 1.02 (ref 1–1.03)
URN SPEC COLLECT METH UR: ABNORMAL
WBC # BLD AUTO: 8.49 K/UL (ref 3.9–12.7)

## 2023-04-03 PROCEDURE — 63600175 PHARM REV CODE 636 W HCPCS: Performed by: EMERGENCY MEDICINE

## 2023-04-03 PROCEDURE — 25000003 PHARM REV CODE 250: Performed by: EMERGENCY MEDICINE

## 2023-04-03 PROCEDURE — 93010 EKG 12-LEAD: ICD-10-PCS | Mod: ,,, | Performed by: INTERNAL MEDICINE

## 2023-04-03 PROCEDURE — 99285 EMERGENCY DEPT VISIT HI MDM: CPT | Mod: 25

## 2023-04-03 PROCEDURE — 93010 ELECTROCARDIOGRAM REPORT: CPT | Mod: ,,, | Performed by: INTERNAL MEDICINE

## 2023-04-03 PROCEDURE — 85025 COMPLETE CBC W/AUTO DIFF WBC: CPT | Performed by: EMERGENCY MEDICINE

## 2023-04-03 PROCEDURE — 80053 COMPREHEN METABOLIC PANEL: CPT | Performed by: EMERGENCY MEDICINE

## 2023-04-03 PROCEDURE — 99285 PR EMERGENCY DEPT VISIT,LEVEL V: ICD-10-PCS | Mod: ,,, | Performed by: EMERGENCY MEDICINE

## 2023-04-03 PROCEDURE — 96374 THER/PROPH/DIAG INJ IV PUSH: CPT

## 2023-04-03 PROCEDURE — 25500020 PHARM REV CODE 255: Performed by: EMERGENCY MEDICINE

## 2023-04-03 PROCEDURE — 83690 ASSAY OF LIPASE: CPT | Performed by: EMERGENCY MEDICINE

## 2023-04-03 PROCEDURE — 81003 URINALYSIS AUTO W/O SCOPE: CPT | Performed by: EMERGENCY MEDICINE

## 2023-04-03 PROCEDURE — 93005 ELECTROCARDIOGRAM TRACING: CPT

## 2023-04-03 PROCEDURE — 96375 TX/PRO/DX INJ NEW DRUG ADDON: CPT

## 2023-04-03 PROCEDURE — 81025 URINE PREGNANCY TEST: CPT | Performed by: EMERGENCY MEDICINE

## 2023-04-03 PROCEDURE — 99285 EMERGENCY DEPT VISIT HI MDM: CPT | Mod: ,,, | Performed by: EMERGENCY MEDICINE

## 2023-04-03 RX ORDER — DICYCLOMINE HYDROCHLORIDE 10 MG/1
20 CAPSULE ORAL
Status: COMPLETED | OUTPATIENT
Start: 2023-04-03 | End: 2023-04-03

## 2023-04-03 RX ORDER — OMEPRAZOLE 40 MG/1
40 CAPSULE, DELAYED RELEASE ORAL DAILY
Qty: 30 CAPSULE | Refills: 0 | Status: SHIPPED | OUTPATIENT
Start: 2023-04-03 | End: 2023-05-23 | Stop reason: SDUPTHER

## 2023-04-03 RX ORDER — SUCRALFATE 1 G/10ML
1 SUSPENSION ORAL 4 TIMES DAILY
Qty: 160 ML | Refills: 0 | Status: SHIPPED | OUTPATIENT
Start: 2023-04-03 | End: 2023-04-07

## 2023-04-03 RX ORDER — SUCRALFATE 1 G/10ML
1 SUSPENSION ORAL
Status: COMPLETED | OUTPATIENT
Start: 2023-04-03 | End: 2023-04-03

## 2023-04-03 RX ORDER — ONDANSETRON 2 MG/ML
4 INJECTION INTRAMUSCULAR; INTRAVENOUS
Status: COMPLETED | OUTPATIENT
Start: 2023-04-03 | End: 2023-04-03

## 2023-04-03 RX ORDER — MORPHINE SULFATE 4 MG/ML
4 INJECTION, SOLUTION INTRAMUSCULAR; INTRAVENOUS
Status: COMPLETED | OUTPATIENT
Start: 2023-04-03 | End: 2023-04-03

## 2023-04-03 RX ORDER — FAMOTIDINE 10 MG/ML
20 INJECTION INTRAVENOUS
Status: COMPLETED | OUTPATIENT
Start: 2023-04-03 | End: 2023-04-03

## 2023-04-03 RX ADMIN — ONDANSETRON 4 MG: 2 INJECTION INTRAMUSCULAR; INTRAVENOUS at 07:04

## 2023-04-03 RX ADMIN — FAMOTIDINE 20 MG: 10 INJECTION, SOLUTION INTRAVENOUS at 07:04

## 2023-04-03 RX ADMIN — SUCRALFATE 1 G: 1 SUSPENSION ORAL at 09:04

## 2023-04-03 RX ADMIN — DICYCLOMINE HYDROCHLORIDE 20 MG: 10 CAPSULE ORAL at 09:04

## 2023-04-03 RX ADMIN — MORPHINE SULFATE 4 MG: 4 INJECTION INTRAVENOUS at 07:04

## 2023-04-03 RX ADMIN — IOHEXOL 100 ML: 350 INJECTION, SOLUTION INTRAVENOUS at 08:04

## 2023-04-03 NOTE — ED TRIAGE NOTES
Caitie Wang, a 43 y.o. female presents to the ED w/ complaint of mid/epigastric abdominal pain. Reports diarrhea. Nausea. Abdominal discomfort/gas. Tender to touch     Triage note:  Chief Complaint   Patient presents with    Abdominal Pain     Mid abdominal pain. Pt reports having constipation, took laxative, now having diarrhea. Pt reports mid abdominal pain, tender to touch. +nausea     Review of patient's allergies indicates:   Allergen Reactions    Cheese Itching and Rash     Past Medical History:   Diagnosis Date    Anxiety     Asthma     Chronic pain     back; inflammatory    Depression     Diabetes mellitus, type 2     Endometriosis     Hyperlipidemia     Hypertension     MITZI (obstructive sleep apnea)     Thyroid disease     Vitamin D deficiency

## 2023-04-03 NOTE — Clinical Note
"Caitie Chavez" Felipe was seen and treated in our emergency department on 4/3/2023.  She may return to work on 04/05/2023.       If you have any questions or concerns, please don't hesitate to call.      Justin MCKNIGHT RN    "

## 2023-04-03 NOTE — ED PROVIDER NOTES
Chief Complaint   Abdominal Pain (Mid abdominal pain. Pt reports having constipation, took laxative, now having diarrhea. Pt reports mid abdominal pain, tender to touch. +nausea)      History Of Present Illness   Caitie Wang is a 43 y.o. female with history of biliary colic, hypertension, hyperlipidemia, hypothyroidism who presents with acute onset abdominal pain that is upper abdomen, associated with bloating and belching.  She is had nausea but has not thrown up.  It began yesterday.  She thought maybe she was constipated and took a laxative but that has not helped.  She is having bowel movements.  She also took Gas-X which only transiently helped.  This does not feel as intense as whenever she has had gallstone pain in the past.  No fevers orChills.    History obtained from:  Patient    Review of patient's allergies indicates:   Allergen Reactions    Cheese Itching and Rash       No current facility-administered medications on file prior to encounter.     Current Outpatient Medications on File Prior to Encounter   Medication Sig Dispense Refill    acetaminophen (TYLENOL) 325 MG tablet Take 2 tablets (650 mg total) by mouth every 6 (six) hours as needed for Pain. 30 tablet 0    ALBUTEROL SULFATE (PROAIR HFA INHL) Inhale into the lungs.      aspirin 325 MG tablet 3 tablets by mouth 3 times daily x 3 days, then twice daily x 14 days      atorvastatin (LIPITOR) 40 MG tablet Take 40 mg by mouth.      atorvastatin (LIPITOR) 40 MG tablet TAKE 1 TABLET BY MOUTH DAILY TO LOWER LIPIDS.      baclofen (LIORESAL) 20 MG tablet Take 1 tablet (20 mg total) by mouth 3 (three) times daily. for 3 days 9 tablet 0    cetirizine (ZYRTEC) 10 MG tablet Take 1 tablet (10 mg total) by mouth once daily. 30 tablet 0    dulaglutide (TRULICITY) 0.75 mg/0.5 mL pen injector Inject 0.75mg into the skin weekly 12 pen 3    estradiol cypionate (DEPO-ESTRADIOL) 5 mg/mL injection Inject into the muscle every 28 days.      famciclovir (FAMVIR) 500  MG tablet Take 500 mg by mouth 3 (three) times daily.      folic acid (FOLVITE) 1 MG tablet Take 1 mg by mouth once daily.      gabapentin (NEURONTIN) 300 MG capsule Take 1 capsule (300 mg total) by mouth 3 (three) times daily. 90 capsule 11    hydrOXYzine (ATARAX) 50 MG tablet Take 50 mg by mouth every evening.      ibuprofen (ADVIL,MOTRIN) 600 MG tablet Take 1 tablet (600 mg total) by mouth every 6 (six) hours as needed for Pain. 20 tablet 0    imipramine (TOFRANIL) 25 MG tablet Take 25 mg by mouth every evening.      levothyroxine (SYNTHROID) 150 MCG tablet Take 1 tablet (150 mcg total) by mouth once daily. 30 tablet 0    LIDOcaine (LIDODERM) 5 % Place 1 patch onto the skin daily as needed (pain). Remove & Discard patch within 12 hours or as directed by MD 15 patch 0    medroxyPROGESTERone (DEPO-PROVERA) 150 mg/mL Syrg       metFORMIN (GLUCOPHAGE) 500 MG tablet Take 1 tablet (500 mg total) by mouth daily with breakfast. 30 tablet 11    mometasone (NASONEX) 50 mcg/actuation nasal spray 2 sprays by Nasal route once daily. 17 g 0    montelukast (SINGULAIR) 10 mg tablet Take 10 mg by mouth.      ondansetron (ZOFRAN) 8 MG tablet Take 8 mg by mouth every 8 (eight) hours as needed.      ondansetron (ZOFRAN-ODT) 4 MG TbDL Take 1 tablet (4 mg total) by mouth every 8 (eight) hours as needed (nausea). 12 tablet 0    pantoprazole (PROTONIX) 40 MG tablet TAKE 1 TABLET BY MOUTH AT BEDTIME FOR GERD OR GASTRITIS      pravastatin (PRAVACHOL) 40 MG tablet Take 40 mg by mouth once daily.      ranitidine (ZANTAC) 150 MG capsule Take 1 capsule (150 mg total) by mouth once daily. 30 capsule 0    traMADoL (ULTRAM) 50 mg tablet Take 50 mg by mouth 2 (two) times daily as needed.      traMADoL (ULTRAM) 50 mg tablet One tablet orally twice daily as needed for pain      TRULICITY 0.75 mg/0.5 mL pen injector SMARTSI.5 Milliliter(s) SUB-Q Once a Week      VITAMIN D2 50,000 unit capsule          Past History   As per HPI and below:  Past  Medical History:   Diagnosis Date    Anxiety     Asthma     Chronic pain     back; inflammatory    Depression     Diabetes mellitus, type 2     Endometriosis     Hyperlipidemia     Hypertension     MITZI (obstructive sleep apnea)     Thyroid disease     Vitamin D deficiency      Past Surgical History:   Procedure Laterality Date    CARPAL TUNNEL RELEASE      CERVICAL SPINE SURGERY      endometriosis      THYROID SURGERY         Social History     Socioeconomic History    Marital status: Single   Tobacco Use    Smoking status: Never    Smokeless tobacco: Never   Substance and Sexual Activity    Alcohol use: No    Drug use: No    Sexual activity: Yes     Partners: Male     Birth control/protection: Injection       Family History   Problem Relation Age of Onset    Hypertension Mother     Diabetes Maternal Grandmother     Hyperlipidemia Maternal Grandmother     Diabetes Maternal Grandfather     Hyperlipidemia Maternal Grandfather     Breast cancer Maternal Aunt        Physical Exam     Vitals:    04/03/23 0717 04/03/23 0850 04/03/23 1019 04/03/23 1148   BP: 131/88 129/85 136/86 134/82   BP Location: Right arm  Right arm Right arm   Patient Position: Sitting  Sitting Sitting   Pulse: 92 84 85 82   Resp: 16 18 16 16   Temp: 98.4 °F (36.9 °C) 98 °F (36.7 °C) 98 °F (36.7 °C) 97.9 °F (36.6 °C)   TempSrc: Oral Oral Oral Oral   SpO2: 96% 100% 98% 98%   Weight:       Height:         Gen: AxOx3, NAD, well nourished, appears stated age  Eye: EOMI, no scleral icterus, no periorbital edema or ecchymosis  Head: normocephalic, atraumatic, no lesions, scalp appears normal  ENT: neck supple, no stridor, no masses, no drooling or voice changes  CVS: RRR, no m/r/g, distal pulses intact/symmetric  Pulm: CTAB, no wheezes, rales or rhonchi, no increased work of breathing  Abd: soft, tender to palpation in the bilateral upper quadrants and epigastrium, negative Mclean's sign, nondistended, no organomegaly, no CVAT  Ext: no edema, no lesions,  rashes, or deformity  Neuro: GCS15, moving all extremities, gait intact, face grossly symmetric  Psych: normal affect, cooperative, well groomed, makes good eye contact    EKG - independently interpreted   NSR at a rate of 96, no obvious ischemic changes    Initial Impression and MDM   This is a 43-year-old woman who presents with acute onset abdominal pain, bilateral upper quadrant and epigastrium.  Differential is broad and includes gastritis, pancreatitis, peptic ulcer disease, I think less likely cholecystitis given that she has left upper quadrant pain as well but it is certainly possible.  Plan for labs including lipase, antiemetics, Pepcid, and morphine.  We will obtain CT scan of the abdomen and pelvis to further evaluate.    Additional Considerations:   Additional testing  was not considered during the course of this workup.  Comorbidities taken into consideration during the patient's evaluation and treatment include:   chronic abdominal pain  Social determinants of health taken into consideration during development of our treatment plan include: NA    Medications Given     Medications   ondansetron injection 4 mg (4 mg Intravenous Given 4/3/23 0701)   famotidine (PF) injection 20 mg (20 mg Intravenous Given 4/3/23 0702)   morphine injection 4 mg (4 mg Intravenous Given 4/3/23 0702)   ondansetron injection 4 mg (4 mg Intravenous Given 4/3/23 0740)   iohexoL (OMNIPAQUE 350) injection 100 mL (100 mLs Intravenous Given 4/3/23 0836)   sucralfate 100 mg/mL suspension 1 g (1 g Oral Given 4/3/23 0955)   dicyclomine capsule 20 mg (20 mg Oral Given 4/3/23 0955)       Results and Course     Labs Reviewed   URINALYSIS, REFLEX TO URINE CULTURE - Abnormal; Notable for the following components:       Result Value    Appearance, UA Hazy (*)     Protein, UA Trace (*)     Occult Blood UA Trace (*)     All other components within normal limits    Narrative:     Specimen Source->Urine   COMPREHENSIVE METABOLIC PANEL -  Abnormal; Notable for the following components:    CO2 19 (*)     All other components within normal limits    Narrative:     For upper or mid abdominal pain.   CBC W/ AUTO DIFFERENTIAL    Narrative:     For upper or mid abdominal pain.   LIPASE    Narrative:     For upper or mid abdominal pain.   POCT URINE PREGNANCY       Imaging Results              CT Abdomen Pelvis With Contrast (Final result)  Result time 04/03/23 09:26:32      Final result by Rosalino Mills MD (04/03/23 09:26:32)                   Impression:      1. Cholelithiasis without evidence for acute cholecystitis.  2. Hepatic steatosis.  3. Small hiatal hernia.  4. Ground-glass attenuation within the bilateral lung bases, suggestive of possible infectious or inflammatory process.    Electronically signed by resident: Toya Holley  Date:    04/03/2023  Time:    08:51    Electronically signed by: Rosalino Mills MD  Date:    04/03/2023  Time:    09:26               Narrative:    EXAMINATION:  CT ABDOMEN PELVIS WITH CONTRAST    CLINICAL HISTORY:  Nausea/vomiting;Epigastric pain;    TECHNIQUE:  Routine axial CT images of the abdomen were obtained after administration 100cc of IV Omnipaque 350.  Coronal and Sagittal reformatted images were also obtained.    COMPARISON:  CT abdomen pelvis with contrast 05/31/2021.    FINDINGS:  Heart: Normal in size with no pericardial effusion.    Lungs: Mild ground-glass attenuation within the bilateral lung bases.  No consolidation or pleural effusion.    Liver: Normal in size.  Hypoattenuation of the patent parenchyma, suggestive of hepatic steatosis.  Focal area of hypoenhancement in the inferior portion of the right hepatic lobe adjacent to the falciform ligament, possibly representing transient hepatic attenuation difference.    Gallbladder/biliary system: Cholelithiasis with hypodense stones within the lumen of the gallbladder measuring up to 0.8 cm.  No gallbladder wall thickening or pericholecystic fluid to suggest acute  cholecystitis.  No intrahepatic or extrahepatic ductal dilatation.    Pancreas: No mass or peripancreatic fat stranding.    Spleen: Unremarkable.    GI Tract/ Mesentery: Small hiatal hernia.  The stomach is unremarkable.  No evidence of bowel obstruction or inflammation.  Few scattered colonic diverticula without inflammation to suggest acute diverticulitis.  The appendix is unremarkable.    Adrenals: Unremarkable.    Kidneys/ Ureters: Normal in size and location. No hydronephrosis or nephrolithiasis. Both ureters are normal in course caliber with no ureteral stones or hydroureter.    Bladder: Normal in contour with no bladder wall thickening.    Reproductive organs: Unremarkable.    Retroperitoneum: No significant adenopathy.    Peritoneal space: No ascites. No free air.    Abdominal wall: Tiny fat containing umbilical hernia.  Diastasis rectus of the anterior abdominal muscles with protrusion of few loops of small bowel into the defect without obstruction.    Vasculature: Mild calcific atherosclerosis of the abdominal aorta.  No evidence of aneurysmal dilatation.    Bones: Degenerative disease of the spine with no acute fractures or lytic lesions.                                               Additional MDM   43 y.o. female with epigastric abdominal pain, in setting of stopping her PPI at home a few months ago. CT negative by my independent inteprretation, doubt biliary colic. Pt feels improved on serial exam s/p carafate. Plan for re-initiation of PPI and carafate PRN.          Final diagnoses:  [R10.9] Abdominal pain  [R10.13] Epigastric pain (Primary)        ED Disposition Condition    Discharge Stable          ED Prescriptions       Medication Sig Dispense Start Date End Date Auth. Provider    sucralfate (CARAFATE) 100 mg/mL suspension Take 10 mLs (1 g total) by mouth 4 (four) times daily. for 4 days 160 mL 4/3/2023 4/7/2023 Toya Jauregui MD    omeprazole (PRILOSEC) 40 MG capsule Take 1 capsule (40 mg  total) by mouth once daily. 30 capsule 4/3/2023 5/3/2023 Toya Jauregui MD          Follow-up Information       Follow up With Specialties Details Why Contact Info    Juana Vines MD Cardiology Schedule an appointment as soon as possible for a visit   [address] [facility] [address]  [phone]      [facility] - Emergency Dept Emergency Medicine  If symptoms worsen [address] [facility] [id number] [phone]               Toya Jauregui MD  04/04/23 1058

## 2023-04-04 ENCOUNTER — PATIENT MESSAGE (OUTPATIENT)
Dept: RESEARCH | Facility: HOSPITAL | Age: 43
End: 2023-04-04
Payer: MEDICAID

## 2023-04-10 ENCOUNTER — NURSE TRIAGE (OUTPATIENT)
Dept: ADMINISTRATIVE | Facility: CLINIC | Age: 43
End: 2023-04-10
Payer: MEDICAID

## 2023-04-10 NOTE — TELEPHONE ENCOUNTER
Pt called in stating that she went to ED last Monday. Diagnosed with ulser and gallstones. States has been taking med and avoiding caffeine and spicy meds and still has abd pain after eating. States current pain 7/10 adding she just ate which has been causing cramps every time she eats. States the pain usually does not last too long but is not able to eat without pain. No N/V. Denies fever. Care advice per protocol. States she has tried to get an appt with her PCP (non-[facility]) but has not been able to get appt. Other care options discussed- ED, UC, VV. [facility] offered. Pt accepted. Information provided. Pt advised to monitor and to call back with concerns or worsening symptoms. Verbalized understanding.   Reason for Disposition   MILD TO MODERATE pain that comes and goes (cramps) lasts > 24 hours    Additional Information   Negative: Passed out (i.e., fainted, collapsed and was not responding)   Negative: Shock suspected (e.g., cold/pale/clammy skin, too weak to stand, low BP, rapid pulse)   Negative: Sounds like a life-threatening emergency to the triager   Pain is mainly in upper abdomen (if needed ask: 'is it mainly above the belly button?')   Negative: Passed out (i.e., fainted, collapsed and was not responding)   Negative: Shock suspected (e.g., cold/pale/clammy skin, too weak to stand, low BP, rapid pulse)   Negative: Visible sweat on face or sweat is dripping down   Negative: SEVERE abdominal pain (e.g., excruciating)   Negative: Pain lasting > 10 minutes and over 50 years old   Negative: Pain lasting > 10 minutes and over 40 years old and associated chest, arm, neck, upper back, or jaw pain   Negative: Pain lasting > 10 minutes and over 35 years old and at least one cardiac risk factor (i.e., hypertension, diabetes, obesity, smoker or strong family history of heart disease)   Negative: Pain lasting > 10 minutes and history of heart disease (i.e., heart attack, bypass surgery, angina, angioplasty, CHF)    Negative: Recent injury to the abdomen   Negative: Vomiting red blood or black (coffee ground) material   Negative: Bloody, black, or tarry bowel movements  (Exception: Chronic-unchanged black-grey bowel movements and is taking iron pills or Pepto-Bismol.)   Negative: Pregnant 20 weeks or more and new hand or face swelling   Negative: Constant abdominal pain lasting > 2 hours   Negative: Vomiting bile (green color)   Negative: Patient sounds very sick or weak to the triager   Negative: Vomiting and abdomen looks much more swollen than usual   Negative: White of the eyes have turned yellow (i.e., jaundice)   Negative: Fever > 103 F (39.4 C)   Negative: Fever > 101 F (38.3 C) and over 60 years of age   Negative: Fever > 100.0 F (37.8 C) and has diabetes mellitus or a weak immune system (e.g., HIV positive, cancer chemotherapy, organ transplant, splenectomy, chronic steroids)   Negative: Fever > 100.0 F (37.8 C) and bedridden (e.g., nursing home patient, stroke, chronic illness, recovering from surgery)   Negative: Age > 60 years   Negative: Patient wants to be seen    Protocols used: Abdominal Pain - Female-A-OH, Abdominal Pain - Upper-A-OH

## 2023-05-23 ENCOUNTER — OFFICE VISIT (OUTPATIENT)
Dept: GASTROENTEROLOGY | Facility: CLINIC | Age: 43
End: 2023-05-23
Payer: MEDICAID

## 2023-05-23 VITALS
SYSTOLIC BLOOD PRESSURE: 132 MMHG | HEIGHT: 64 IN | HEART RATE: 84 BPM | BODY MASS INDEX: 41.09 KG/M2 | OXYGEN SATURATION: 98 % | DIASTOLIC BLOOD PRESSURE: 78 MMHG | WEIGHT: 240.69 LBS

## 2023-05-23 DIAGNOSIS — R11.0 NAUSEA: ICD-10-CM

## 2023-05-23 DIAGNOSIS — R10.13 EPIGASTRIC PAIN: Primary | ICD-10-CM

## 2023-05-23 PROCEDURE — 1159F MED LIST DOCD IN RCRD: CPT | Mod: CPTII,,, | Performed by: NURSE PRACTITIONER

## 2023-05-23 PROCEDURE — 99999 PR PBB SHADOW E&M-EST. PATIENT-LVL IV: CPT | Mod: PBBFAC,,, | Performed by: NURSE PRACTITIONER

## 2023-05-23 PROCEDURE — 3078F DIAST BP <80 MM HG: CPT | Mod: CPTII,,, | Performed by: NURSE PRACTITIONER

## 2023-05-23 PROCEDURE — 99999 PR PBB SHADOW E&M-EST. PATIENT-LVL IV: ICD-10-PCS | Mod: PBBFAC,,, | Performed by: NURSE PRACTITIONER

## 2023-05-23 PROCEDURE — 1159F PR MEDICATION LIST DOCUMENTED IN MEDICAL RECORD: ICD-10-PCS | Mod: CPTII,,, | Performed by: NURSE PRACTITIONER

## 2023-05-23 PROCEDURE — 99214 OFFICE O/P EST MOD 30 MIN: CPT | Mod: PBBFAC,PO | Performed by: NURSE PRACTITIONER

## 2023-05-23 PROCEDURE — 3075F SYST BP GE 130 - 139MM HG: CPT | Mod: CPTII,,, | Performed by: NURSE PRACTITIONER

## 2023-05-23 PROCEDURE — 99214 PR OFFICE/OUTPT VISIT, EST, LEVL IV, 30-39 MIN: ICD-10-PCS | Mod: S$PBB,,, | Performed by: NURSE PRACTITIONER

## 2023-05-23 PROCEDURE — 3075F PR MOST RECENT SYSTOLIC BLOOD PRESS GE 130-139MM HG: ICD-10-PCS | Mod: CPTII,,, | Performed by: NURSE PRACTITIONER

## 2023-05-23 PROCEDURE — 3008F BODY MASS INDEX DOCD: CPT | Mod: CPTII,,, | Performed by: NURSE PRACTITIONER

## 2023-05-23 PROCEDURE — 1160F RVW MEDS BY RX/DR IN RCRD: CPT | Mod: CPTII,,, | Performed by: NURSE PRACTITIONER

## 2023-05-23 PROCEDURE — 1160F PR REVIEW ALL MEDS BY PRESCRIBER/CLIN PHARMACIST DOCUMENTED: ICD-10-PCS | Mod: CPTII,,, | Performed by: NURSE PRACTITIONER

## 2023-05-23 PROCEDURE — 99214 OFFICE O/P EST MOD 30 MIN: CPT | Mod: S$PBB,,, | Performed by: NURSE PRACTITIONER

## 2023-05-23 PROCEDURE — 3078F PR MOST RECENT DIASTOLIC BLOOD PRESSURE < 80 MM HG: ICD-10-PCS | Mod: CPTII,,, | Performed by: NURSE PRACTITIONER

## 2023-05-23 PROCEDURE — 3008F PR BODY MASS INDEX (BMI) DOCUMENTED: ICD-10-PCS | Mod: CPTII,,, | Performed by: NURSE PRACTITIONER

## 2023-05-23 RX ORDER — OMEPRAZOLE 40 MG/1
40 CAPSULE, DELAYED RELEASE ORAL DAILY
Qty: 30 CAPSULE | Refills: 2 | Status: SHIPPED | OUTPATIENT
Start: 2023-05-23 | End: 2023-07-07 | Stop reason: SDUPTHER

## 2023-05-23 RX ORDER — MECLIZINE HCL 12.5 MG 12.5 MG/1
25 TABLET ORAL
COMMUNITY
Start: 2022-12-07

## 2023-05-23 RX ORDER — MELOXICAM 7.5 MG/1
7.5 TABLET ORAL DAILY
COMMUNITY
Start: 2023-01-23

## 2023-05-23 RX ORDER — LINACLOTIDE 145 UG/1
145 CAPSULE, GELATIN COATED ORAL DAILY
COMMUNITY
Start: 2022-11-27

## 2023-05-23 NOTE — PATIENT INSTRUCTIONS
EGD Prep Instructions    [facility] [address] [place]  [address]    You are scheduled for an EGD with Dr. Vazquez on 6/30/2023 at [facility].  You will enter through the [facility] entrance and check in at Same Day Surgery.    Nothing to eat or drink after midnight before the procedure.  You MAY brush your teeth.    You MAY take your blood pressure, heart, and seizure medication on the morning of the procedure, with a SIP of water.  Hold ALL other medications until after the procedure.    You must have someone with you to DRIVE YOU HOME since you will be receiving IV sedation for the procedure.    If you are on blood thinners THAT YOU HAVE BEEN INSTRUCTED TO HOLD BY YOUR DOCTOR FOR THIS PROCEDURE, then do NOT take this the morning of your EGD.  Do NOT stop these medications on your own, they must be approved to be held by your doctor.  Your EGD can NOT be done if you are on these medications.  Examples of blood thinners include: Coumadin, Aggrenox, Plavix, Pradaxa, Reapro, Pletal, Xarelto, Ticagrelor, Brilinta, Eliquis, and high dose aspirin (325 mg).  You do not have to stop baby aspirin 81 mg.    You will receive a call a few days before your EGD to tell you the time to arrive.  If you have not received a call by the day before your procedure, call the Pre-op Coordinator at [phone].

## 2023-05-24 PROBLEM — R11.0 NAUSEA: Status: ACTIVE | Noted: 2023-05-24

## 2023-05-24 NOTE — PROGRESS NOTES
Subjective:       Patient ID: Caitie Wang is a 43 y.o. female.    Chief Complaint: Abdominal Pain (Upper)    44 y/o female with history of biliary colic, hypertension, hyperlipidemia, hypothyroidism, and prediabetes presents to clinic with c/o abdominal pain    Abdominal Pain  This is a recurrent problem. The current episode started more than 1 month ago. The problem occurs intermittently. The pain is located in the epigastric region. The quality of the pain is aching. Associated symptoms include belching and nausea. Pertinent negatives include no constipation, diarrhea, melena or vomiting.     Past Medical History:   Diagnosis Date    Anxiety     Asthma     Chronic pain     back; inflammatory    Depression     Diabetes mellitus, type 2     Endometriosis     Hyperlipidemia     Hypertension     MITZI (obstructive sleep apnea)     Thyroid disease     Vitamin D deficiency        Past Surgical History:   Procedure Laterality Date    CARPAL TUNNEL RELEASE      CERVICAL SPINE SURGERY      endometriosis      THYROID SURGERY         Family History   Problem Relation Age of Onset    Hypertension Mother     Diabetes Maternal Grandmother     Hyperlipidemia Maternal Grandmother     Diabetes Maternal Grandfather     Hyperlipidemia Maternal Grandfather     Breast cancer Maternal Aunt        Social History     Socioeconomic History    Marital status: Single   Tobacco Use    Smoking status: Never    Smokeless tobacco: Never   Substance and Sexual Activity    Alcohol use: No    Drug use: No    Sexual activity: Yes     Partners: Male     Birth control/protection: Injection       Review of Systems   Constitutional:  Negative for appetite change and unexpected weight change.   HENT:  Negative for trouble swallowing.    Respiratory:  Negative for shortness of breath.    Cardiovascular:  Negative for chest pain.   Gastrointestinal:  Positive for abdominal pain and nausea. Negative for constipation, diarrhea, melena and vomiting.  "  Psychiatric/Behavioral:  Negative for dysphoric mood.        Objective:     Vitals:    05/23/23 1056   BP: 132/78   BP Location: Right arm   Patient Position: Sitting   BP Method: Large (Manual)   Pulse: 84   SpO2: 98%   Weight: 109.2 kg (240 lb 11.2 oz)   Height: 5' 4" (1.626 m)          Physical Exam  Constitutional:       General: She is not in acute distress.     Appearance: She is obese.   HENT:      Head: Normocephalic.   Eyes:      Conjunctiva/sclera: Conjunctivae normal.   Pulmonary:      Effort: Pulmonary effort is normal. No respiratory distress.   Musculoskeletal:         General: Normal range of motion.      Cervical back: Normal range of motion.   Neurological:      Mental Status: She is alert and oriented to person, place, and time.   Psychiatric:         Mood and Affect: Mood normal.         Behavior: Behavior normal.             Assessment:         ICD-10-CM ICD-9-CM   1. Epigastric pain  R10.13 789.06   2. Nausea  R11.0 787.02       Plan:       Epigastric pain  -     Ambulatory referral/consult to Gastroenterology  -     omeprazole (PRILOSEC) 40 MG capsule; Take 1 capsule (40 mg total) by mouth once daily.  Dispense: 30 capsule; Refill: 2  -     NM Gastric Emptying; Future; Expected date: 05/23/2023  -     Case Request Endoscopy: EGD (ESOPHAGOGASTRODUODENOSCOPY)    Nausea  -     NM Gastric Emptying; Future; Expected date: 05/23/2023  -     Case Request Endoscopy: EGD (ESOPHAGOGASTRODUODENOSCOPY)      Follow up if symptoms worsen or fail to improve.     Patient's Medications   New Prescriptions    No medications on file   Previous Medications    ACETAMINOPHEN (TYLENOL) 325 MG TABLET    Take 2 tablets (650 mg total) by mouth every 6 (six) hours as needed for Pain.    ALBUTEROL SULFATE (PROAIR HFA INHL)    Inhale into the lungs.    ASPIRIN 325 MG TABLET    3 tablets by mouth 3 times daily x 3 days, then twice daily x 14 days    ATORVASTATIN (LIPITOR) 40 MG TABLET    Take 40 mg by mouth.    " ATORVASTATIN (LIPITOR) 40 MG TABLET    TAKE 1 TABLET BY MOUTH DAILY TO LOWER LIPIDS.    BACLOFEN (LIORESAL) 20 MG TABLET    Take 1 tablet (20 mg total) by mouth 3 (three) times daily. for 3 days    CETIRIZINE (ZYRTEC) 10 MG TABLET    Take 1 tablet (10 mg total) by mouth once daily.    DULAGLUTIDE (TRULICITY) 0.75 MG/0.5 ML PEN INJECTOR    Inject 0.75mg into the skin weekly    ESTRADIOL CYPIONATE (DEPO-ESTRADIOL) 5 MG/ML INJECTION    Inject into the muscle every 28 days.    FAMCICLOVIR (FAMVIR) 500 MG TABLET    Take 500 mg by mouth 3 (three) times daily.    FOLIC ACID (FOLVITE) 1 MG TABLET    Take 1 mg by mouth once daily.    GABAPENTIN (NEURONTIN) 300 MG CAPSULE    Take 1 capsule (300 mg total) by mouth 3 (three) times daily.    HYDROXYZINE (ATARAX) 50 MG TABLET    Take 50 mg by mouth every evening.    IBUPROFEN (ADVIL,MOTRIN) 600 MG TABLET    Take 1 tablet (600 mg total) by mouth every 6 (six) hours as needed for Pain.    IMIPRAMINE (TOFRANIL) 25 MG TABLET    Take 25 mg by mouth every evening.    LEVOTHYROXINE (SYNTHROID) 150 MCG TABLET    Take 1 tablet (150 mcg total) by mouth once daily.    LIDOCAINE (LIDODERM) 5 %    Place 1 patch onto the skin daily as needed (pain). Remove & Discard patch within 12 hours or as directed by MD    LINZESS 145 MCG CAP CAPSULE    Take 145 mcg by mouth.    MECLIZINE (ANTIVERT) 12.5 MG TABLET    Take 25 mg by mouth.    MEDROXYPROGESTERONE (DEPO-PROVERA) 150 MG/ML SYRG        MELOXICAM (MOBIC) 7.5 MG TABLET    Take 7.5 mg by mouth.    METFORMIN (GLUCOPHAGE) 500 MG TABLET    Take 1 tablet (500 mg total) by mouth daily with breakfast.    MOMETASONE (NASONEX) 50 MCG/ACTUATION NASAL SPRAY    2 sprays by Nasal route once daily.    MONTELUKAST (SINGULAIR) 10 MG TABLET    Take 10 mg by mouth.    ONDANSETRON (ZOFRAN) 8 MG TABLET    Take 8 mg by mouth every 8 (eight) hours as needed.    ONDANSETRON (ZOFRAN-ODT) 4 MG TBDL    Take 1 tablet (4 mg total) by mouth every 8 (eight) hours as needed  (nausea).    PRAVASTATIN (PRAVACHOL) 40 MG TABLET    Take 40 mg by mouth once daily.    TRAMADOL (ULTRAM) 50 MG TABLET    Take 50 mg by mouth 2 (two) times daily as needed.    TRAMADOL (ULTRAM) 50 MG TABLET    One tablet orally twice daily as needed for pain    TRULICITY 0.75 MG/0.5 ML PEN INJECTOR    SMARTSI.5 Milliliter(s) SUB-Q Once a Week    VITAMIN D2 50,000 UNIT CAPSULE       Modified Medications    Modified Medication Previous Medication    OMEPRAZOLE (PRILOSEC) 40 MG CAPSULE omeprazole (PRILOSEC) 40 MG capsule       Take 1 capsule (40 mg total) by mouth once daily.    Take 1 capsule (40 mg total) by mouth once daily.   Discontinued Medications    PANTOPRAZOLE (PROTONIX) 40 MG TABLET    TAKE 1 TABLET BY MOUTH AT BEDTIME FOR GERD OR GASTRITIS    RANITIDINE (ZANTAC) 150 MG CAPSULE    Take 1 capsule (150 mg total) by mouth once daily.

## 2023-05-31 ENCOUNTER — HOSPITAL ENCOUNTER (OUTPATIENT)
Facility: HOSPITAL | Age: 43
Discharge: HOME | End: 2023-06-02
Attending: EMERGENCY MEDICINE | Admitting: STUDENT IN AN ORGANIZED HEALTH CARE EDUCATION/TRAINING PROGRAM
Payer: MEDICAID

## 2023-05-31 DIAGNOSIS — K81.0 ACUTE CHOLECYSTITIS: Primary | ICD-10-CM

## 2023-05-31 DIAGNOSIS — E11.649 TYPE 2 DIABETES MELLITUS WITH HYPOGLYCEMIA WITHOUT COMA, WITHOUT LONG-TERM CURRENT USE OF INSULIN: ICD-10-CM

## 2023-05-31 DIAGNOSIS — R10.11 RUQ ABDOMINAL PAIN: ICD-10-CM

## 2023-05-31 DIAGNOSIS — K80.20 SYMPTOMATIC CHOLELITHIASIS: ICD-10-CM

## 2023-05-31 DIAGNOSIS — E78.5 HYPERLIPIDEMIA, UNSPECIFIED HYPERLIPIDEMIA TYPE: ICD-10-CM

## 2023-05-31 DIAGNOSIS — K80.00 CALCULUS OF GALLBLADDER WITH ACUTE CHOLECYSTITIS WITHOUT OBSTRUCTION: ICD-10-CM

## 2023-05-31 DIAGNOSIS — J45.909 ASTHMA, UNSPECIFIED ASTHMA SEVERITY, UNSPECIFIED WHETHER COMPLICATED, UNSPECIFIED WHETHER PERSISTENT: ICD-10-CM

## 2023-05-31 LAB
ALBUMIN SERPL BCP-MCNC: 3.9 G/DL (ref 3.5–5.2)
ALP SERPL-CCNC: 88 U/L (ref 55–135)
ALT SERPL W/O P-5'-P-CCNC: 15 U/L (ref 10–44)
ANION GAP SERPL CALC-SCNC: 11 MMOL/L (ref 8–16)
AST SERPL-CCNC: 16 U/L (ref 10–40)
B-HCG UR QL: NEGATIVE
BASOPHILS # BLD AUTO: 0.03 K/UL (ref 0–0.2)
BASOPHILS NFR BLD: 0.4 % (ref 0–1.9)
BILIRUB SERPL-MCNC: 0.3 MG/DL (ref 0.1–1)
BILIRUB UR QL STRIP: NEGATIVE
BUN SERPL-MCNC: 10 MG/DL (ref 6–20)
CALCIUM SERPL-MCNC: 9.4 MG/DL (ref 8.7–10.5)
CHLORIDE SERPL-SCNC: 107 MMOL/L (ref 95–110)
CLARITY UR REFRACT.AUTO: CLEAR
CO2 SERPL-SCNC: 22 MMOL/L (ref 23–29)
COLOR UR AUTO: YELLOW
CREAT SERPL-MCNC: 1 MG/DL (ref 0.5–1.4)
CTP QC/QA: YES
DIFFERENTIAL METHOD: ABNORMAL
EOSINOPHIL # BLD AUTO: 0.2 K/UL (ref 0–0.5)
EOSINOPHIL NFR BLD: 2.4 % (ref 0–8)
ERYTHROCYTE [DISTWIDTH] IN BLOOD BY AUTOMATED COUNT: 13 % (ref 11.5–14.5)
EST. GFR  (NO RACE VARIABLE): >60 ML/MIN/1.73 M^2
GLUCOSE SERPL-MCNC: 73 MG/DL (ref 70–110)
GLUCOSE UR QL STRIP: NEGATIVE
HCT VFR BLD AUTO: 39.9 % (ref 37–48.5)
HCV AB SERPL QL IA: NORMAL
HGB BLD-MCNC: 12.7 G/DL (ref 12–16)
HGB UR QL STRIP: NEGATIVE
HIV 1+2 AB+HIV1 P24 AG SERPL QL IA: NORMAL
IMM GRANULOCYTES # BLD AUTO: 0.03 K/UL (ref 0–0.04)
IMM GRANULOCYTES NFR BLD AUTO: 0.4 % (ref 0–0.5)
KETONES UR QL STRIP: NEGATIVE
LEUKOCYTE ESTERASE UR QL STRIP: NEGATIVE
LIPASE SERPL-CCNC: 21 U/L (ref 4–60)
LYMPHOCYTES # BLD AUTO: 2.6 K/UL (ref 1–4.8)
LYMPHOCYTES NFR BLD: 36.4 % (ref 18–48)
MAGNESIUM SERPL-MCNC: 2.4 MG/DL (ref 1.6–2.6)
MCH RBC QN AUTO: 29.5 PG (ref 27–31)
MCHC RBC AUTO-ENTMCNC: 31.8 G/DL (ref 32–36)
MCV RBC AUTO: 93 FL (ref 82–98)
MONOCYTES # BLD AUTO: 0.6 K/UL (ref 0.3–1)
MONOCYTES NFR BLD: 8.4 % (ref 4–15)
NEUTROPHILS # BLD AUTO: 3.6 K/UL (ref 1.8–7.7)
NEUTROPHILS NFR BLD: 52 % (ref 38–73)
NITRITE UR QL STRIP: NEGATIVE
NRBC BLD-RTO: 0 /100 WBC
PH UR STRIP: 6 [PH] (ref 5–8)
PLATELET # BLD AUTO: 363 K/UL (ref 150–450)
PMV BLD AUTO: 9.5 FL (ref 9.2–12.9)
POCT GLUCOSE: 49 MG/DL (ref 70–110)
POCT GLUCOSE: 56 MG/DL (ref 70–110)
POCT GLUCOSE: 62 MG/DL (ref 70–110)
POTASSIUM SERPL-SCNC: 3.9 MMOL/L (ref 3.5–5.1)
PROT SERPL-MCNC: 7.8 G/DL (ref 6–8.4)
PROT UR QL STRIP: ABNORMAL
RBC # BLD AUTO: 4.3 M/UL (ref 4–5.4)
SODIUM SERPL-SCNC: 140 MMOL/L (ref 136–145)
SP GR UR STRIP: 1.02 (ref 1–1.03)
URN SPEC COLLECT METH UR: ABNORMAL
WBC # BLD AUTO: 7.01 K/UL (ref 3.9–12.7)

## 2023-05-31 PROCEDURE — 63600175 PHARM REV CODE 636 W HCPCS: Performed by: STUDENT IN AN ORGANIZED HEALTH CARE EDUCATION/TRAINING PROGRAM

## 2023-05-31 PROCEDURE — 85025 COMPLETE CBC W/AUTO DIFF WBC: CPT | Performed by: PHYSICIAN ASSISTANT

## 2023-05-31 PROCEDURE — 99222 1ST HOSP IP/OBS MODERATE 55: CPT | Mod: ,,, | Performed by: STUDENT IN AN ORGANIZED HEALTH CARE EDUCATION/TRAINING PROGRAM

## 2023-05-31 PROCEDURE — 81025 URINE PREGNANCY TEST: CPT | Performed by: PHYSICIAN ASSISTANT

## 2023-05-31 PROCEDURE — 99285 EMERGENCY DEPT VISIT HI MDM: CPT | Mod: 25

## 2023-05-31 PROCEDURE — 87389 HIV-1 AG W/HIV-1&-2 AB AG IA: CPT | Performed by: PHYSICIAN ASSISTANT

## 2023-05-31 PROCEDURE — 86803 HEPATITIS C AB TEST: CPT | Performed by: PHYSICIAN ASSISTANT

## 2023-05-31 PROCEDURE — 83690 ASSAY OF LIPASE: CPT | Performed by: PHYSICIAN ASSISTANT

## 2023-05-31 PROCEDURE — 99222 PR INITIAL HOSPITAL CARE,LEVL II: ICD-10-PCS | Mod: ,,, | Performed by: STUDENT IN AN ORGANIZED HEALTH CARE EDUCATION/TRAINING PROGRAM

## 2023-05-31 PROCEDURE — 99285 EMERGENCY DEPT VISIT HI MDM: CPT | Mod: ,,, | Performed by: PHYSICIAN ASSISTANT

## 2023-05-31 PROCEDURE — 99285 PR EMERGENCY DEPT VISIT,LEVEL V: ICD-10-PCS | Mod: ,,, | Performed by: PHYSICIAN ASSISTANT

## 2023-05-31 PROCEDURE — 96361 HYDRATE IV INFUSION ADD-ON: CPT

## 2023-05-31 PROCEDURE — 81003 URINALYSIS AUTO W/O SCOPE: CPT | Performed by: PHYSICIAN ASSISTANT

## 2023-05-31 PROCEDURE — 25000003 PHARM REV CODE 250: Performed by: PHYSICIAN ASSISTANT

## 2023-05-31 PROCEDURE — 80053 COMPREHEN METABOLIC PANEL: CPT | Performed by: PHYSICIAN ASSISTANT

## 2023-05-31 PROCEDURE — G0378 HOSPITAL OBSERVATION PER HR: HCPCS

## 2023-05-31 PROCEDURE — 82962 GLUCOSE BLOOD TEST: CPT

## 2023-05-31 PROCEDURE — 83735 ASSAY OF MAGNESIUM: CPT | Performed by: PHYSICIAN ASSISTANT

## 2023-05-31 RX ORDER — SODIUM CHLORIDE 0.9 % (FLUSH) 0.9 %
10 SYRINGE (ML) INJECTION
Status: DISCONTINUED | OUTPATIENT
Start: 2023-05-31 | End: 2023-06-02 | Stop reason: HOSPADM

## 2023-05-31 RX ORDER — PANTOPRAZOLE SODIUM 40 MG/10ML
40 INJECTION, POWDER, LYOPHILIZED, FOR SOLUTION INTRAVENOUS DAILY
Status: DISCONTINUED | OUTPATIENT
Start: 2023-06-01 | End: 2023-06-02 | Stop reason: HOSPADM

## 2023-05-31 RX ORDER — INSULIN ASPART 100 [IU]/ML
1-10 INJECTION, SOLUTION INTRAVENOUS; SUBCUTANEOUS EVERY 6 HOURS PRN
Status: DISCONTINUED | OUTPATIENT
Start: 2023-05-31 | End: 2023-06-02 | Stop reason: HOSPADM

## 2023-05-31 RX ORDER — IMIPRAMINE HYDROCHLORIDE 25 MG/1
25 TABLET, FILM COATED ORAL NIGHTLY
Status: DISCONTINUED | OUTPATIENT
Start: 2023-05-31 | End: 2023-06-02 | Stop reason: HOSPADM

## 2023-05-31 RX ORDER — ENOXAPARIN SODIUM 100 MG/ML
40 INJECTION SUBCUTANEOUS EVERY 24 HOURS
Status: DISCONTINUED | OUTPATIENT
Start: 2023-06-01 | End: 2023-06-02 | Stop reason: HOSPADM

## 2023-05-31 RX ORDER — ONDANSETRON 4 MG/1
4 TABLET, ORALLY DISINTEGRATING ORAL EVERY 6 HOURS PRN
Status: DISCONTINUED | OUTPATIENT
Start: 2023-05-31 | End: 2023-06-02 | Stop reason: HOSPADM

## 2023-05-31 RX ORDER — SUCRALFATE 1 G/10ML
1 SUSPENSION ORAL
Status: COMPLETED | OUTPATIENT
Start: 2023-05-31 | End: 2023-05-31

## 2023-05-31 RX ORDER — LIDOCAINE HYDROCHLORIDE 10 MG/ML
1 INJECTION, SOLUTION EPIDURAL; INFILTRATION; INTRACAUDAL; PERINEURAL ONCE AS NEEDED
Status: DISCONTINUED | OUTPATIENT
Start: 2023-05-31 | End: 2023-06-02 | Stop reason: HOSPADM

## 2023-05-31 RX ORDER — SODIUM CHLORIDE AND POTASSIUM CHLORIDE 150; 450 MG/100ML; MG/100ML
INJECTION, SOLUTION INTRAVENOUS CONTINUOUS
Status: DISCONTINUED | OUTPATIENT
Start: 2023-05-31 | End: 2023-06-01

## 2023-05-31 RX ORDER — GLUCAGON 1 MG
1 KIT INJECTION
Status: DISCONTINUED | OUTPATIENT
Start: 2023-05-31 | End: 2023-06-02 | Stop reason: HOSPADM

## 2023-05-31 RX ORDER — ATORVASTATIN CALCIUM 40 MG/1
40 TABLET, FILM COATED ORAL DAILY
Status: DISCONTINUED | OUTPATIENT
Start: 2023-06-01 | End: 2023-06-02 | Stop reason: HOSPADM

## 2023-05-31 RX ORDER — OXYCODONE HYDROCHLORIDE 5 MG/1
5 TABLET ORAL EVERY 6 HOURS PRN
Status: DISCONTINUED | OUTPATIENT
Start: 2023-05-31 | End: 2023-06-02 | Stop reason: HOSPADM

## 2023-05-31 RX ORDER — ALBUTEROL SULFATE 90 UG/1
2 AEROSOL, METERED RESPIRATORY (INHALATION) EVERY 4 HOURS PRN
Status: DISCONTINUED | OUTPATIENT
Start: 2023-05-31 | End: 2023-06-02 | Stop reason: HOSPADM

## 2023-05-31 RX ORDER — ACETAMINOPHEN 325 MG/1
650 TABLET ORAL EVERY 6 HOURS PRN
Status: DISCONTINUED | OUTPATIENT
Start: 2023-05-31 | End: 2023-06-02 | Stop reason: HOSPADM

## 2023-05-31 RX ORDER — OXYCODONE HYDROCHLORIDE 10 MG/1
10 TABLET ORAL EVERY 6 HOURS PRN
Status: DISCONTINUED | OUTPATIENT
Start: 2023-05-31 | End: 2023-06-01

## 2023-05-31 RX ADMIN — POTASSIUM CHLORIDE AND SODIUM CHLORIDE: 450; 150 INJECTION, SOLUTION INTRAVENOUS at 07:05

## 2023-05-31 RX ADMIN — SUCRALFATE 1 G: 1 SUSPENSION ORAL at 01:05

## 2023-05-31 NOTE — HPI
Patient is a 43 year old female with h/o thyroid disease, asthma, depression, anxiety, HLD, HTN, endometriosis, MITZI, DMII, hiatal hernia, obesity (BMI 41) who presents to the ED complaining of abdominal pain. She reports having the pain for months and it is worsening in severity and frequency over time. Pain is in epigastrium and radiates to RUQ and around her R flank. Pain is cramping and sharp in nature. Worse after meals. No known alleviating factors. Sometimes associated with nausea although not having any today. Also has some constipation which is not unusual for her. Denies fever, chills, emesis, diarrhea, CP, SOB, and all other symptoms.     Surgical hx: dx lap for endometriosis   No AC    In the ED, she is afebrile, HDS.   CBC, CMP unremarkable   US abd with cholelithiasis, concern for acute cholecystitis

## 2023-05-31 NOTE — ED PROVIDER NOTES
Encounter Date: 5/31/2023       History     Chief Complaint   Patient presents with    Abdominal Pain     Known hernia states pain not getting any better     The history is provided by the patient and medical records. No  was used.     Caitie Wang is a 43 y.o. female with medical history of HTN, HLD, Pre-DM, Hypothyroidism, Biliary colic, Hiatal Hernia, MITZI presenting to the ED with the chief complaint of abdominal pain.     Reports ongoing epigastric abdominal pain that radiates to her back. Pain worsens with PO intake a few seconds after swallowing. Now having pain with drinking water. Feels nauseated without vomiting. Last BM yesterday. Believes her pain is from a hiatal hernia. Denies fever, chest pain, SOB, cough, urinary or bowel movement changes. Recently seen in the ED, GI, and by her PCP over the last month. Currently only taking Zofran for her symptoms. GI prescribed her prilosec that her PCP told her to hold off now.    Review of patient's allergies indicates:   Allergen Reactions    Cheese Itching and Rash     Past Medical History:   Diagnosis Date    Anxiety     Asthma     Chronic pain     back; inflammatory    Depression     Diabetes mellitus, type 2     Endometriosis     Hyperlipidemia     Hypertension     MITZI (obstructive sleep apnea)     Thyroid disease     Vitamin D deficiency      Past Surgical History:   Procedure Laterality Date    CARPAL TUNNEL RELEASE      CERVICAL SPINE SURGERY      endometriosis      THYROID SURGERY       Family History   Problem Relation Age of Onset    Hypertension Mother     Diabetes Maternal Grandmother     Hyperlipidemia Maternal Grandmother     Diabetes Maternal Grandfather     Hyperlipidemia Maternal Grandfather     Breast cancer Maternal Aunt      Social History     Tobacco Use    Smoking status: Never    Smokeless tobacco: Never   Substance Use Topics    Alcohol use: No    Drug use: No     Review of Systems   Gastrointestinal:  Positive for  abdominal pain.     Physical Exam     Initial Vitals [05/31/23 1234]   BP Pulse Resp Temp SpO2   (!) 148/82 98 15 98.3 °F (36.8 °C) 100 %      MAP       --         Physical Exam    Constitutional: She appears well-developed and well-nourished. She is not diaphoretic. No distress.   HENT:   Head: Normocephalic and atraumatic.   Mouth/Throat: Oropharynx is clear and moist. No oropharyngeal exudate.   Eyes: Conjunctivae and EOM are normal. Pupils are equal, round, and reactive to light. No scleral icterus.   Neck: Neck supple.   Normal range of motion.  Cardiovascular:  Normal rate and regular rhythm.           Pulmonary/Chest: Breath sounds normal. No respiratory distress. She has no wheezes.   Abdominal: Abdomen is soft. She exhibits no distension. There is abdominal tenderness (epigastric, RUQ).   No CVA tenderness There is no rebound.   Musculoskeletal:         General: No tenderness or edema. Normal range of motion.      Cervical back: Normal range of motion and neck supple.     Neurological: She is alert and oriented to person, place, and time. She has normal strength. No sensory deficit.   Skin: Skin is warm and dry. No rash noted. No erythema.   Psychiatric: She has a normal mood and affect.       ED Course   Procedures  Labs Reviewed   CBC W/ AUTO DIFFERENTIAL - Abnormal; Notable for the following components:       Result Value    MCHC 31.8 (*)     All other components within normal limits   COMPREHENSIVE METABOLIC PANEL - Abnormal; Notable for the following components:    CO2 22 (*)     All other components within normal limits   URINALYSIS, REFLEX TO URINE CULTURE - Abnormal; Notable for the following components:    Protein, UA Trace (*)     All other components within normal limits    Narrative:     Specimen Source->Urine   HIV 1 / 2 ANTIBODY    Narrative:     Release to patient->Immediate   HEPATITIS C ANTIBODY    Narrative:     Release to patient->Immediate   LIPASE   MAGNESIUM   POCT URINE PREGNANCY    POCT GLUCOSE MONITORING CONTINUOUS          Imaging Results               US Abdomen Limited (Gallbladder) (Final result)  Result time 05/31/23 14:50:55      Final result by Sameer Che MD (05/31/23 14:50:55)                   Impression:      Cholelithiasis with findings concerning for acute cholecystitis including reportedly positive sonographic Mclean sign, allowing for limitations above.    This report was flagged in Epic as abnormal.      Electronically signed by: Sameer Che MD  Date:    05/31/2023  Time:    14:50               Narrative:    EXAMINATION:  US ABDOMEN LIMITED    CLINICAL HISTORY:  Right upper quadrant pain    TECHNIQUE:  Limited ultrasound of the right upper quadrant of the abdomen targeted on the biliary system.    COMPARISON:  CT abdomen and pelvis 04/03/2023, gallbladder ultrasound 01/08/2020    FINDINGS:  Gallbladder: The gallbladder is contracted with multiple mobile gallstones within its lumen measuring up to 1.8 cm resulting in wall echo shadow sign and obscuring of the dependent portions of the gallbladder wall.  The visualized portions of the gallbladder wall are within normal limits measuring 3 mm.  No gallbladder wall hypervascularity or definitive pericholecystic fluid, allowing for limitations.  Sonographic Mclean sign is reportedly positive.    Biliary system: The common duct is not dilated, measuring 3 mm, previously 3 mm.  No intrahepatic ductal dilatation.    Miscellaneous: No upper abdominal ascites.  Imaged portions of the pancreas are within normal limits.                                       Medications   LIDOcaine (PF) 10 mg/ml (1%) injection 10 mg (has no administration in time range)   sodium chloride 0.9% flush 10 mL (has no administration in time range)   acetaminophen tablet 650 mg (has no administration in time range)   oxyCODONE immediate release tablet 5 mg (has no administration in time range)   oxyCODONE immediate release tablet 10 mg (has no  administration in time range)   enoxaparin injection 40 mg (has no administration in time range)   0.45 % NaCl with KCl 20 mEq infusion (has no administration in time range)   albuterol inhaler 2 puff (has no administration in time range)   atorvastatin tablet 40 mg (has no administration in time range)   pantoprazole injection 40 mg (has no administration in time range)   ondansetron disintegrating tablet 4 mg (has no administration in time range)   glucagon (human recombinant) injection 1 mg (has no administration in time range)   dextrose 10% bolus 125 mL 125 mL (has no administration in time range)   dextrose 10% bolus 250 mL 250 mL (has no administration in time range)   insulin aspart U-100 pen 1-10 Units (has no administration in time range)   imipramine tablet 25 mg (has no administration in time range)   sucralfate 100 mg/mL suspension 1 g (1 g Oral Given 5/31/23 1329)     Medical Decision Making:   History:   Old Medical Records: I decided to obtain old medical records.  Old Records Summarized: records from clinic visits and records from previous admission(s).  Initial Assessment:   43 y.o. female with medical history of HTN, HLD, Pre-DM, Hypothyroidism, Biliary colic, Hiatal Hernia, MITZI presenting to the ED c/o ongoing epigastric abdominal pain for several weeks.   Differential Diagnosis:   GERD, dyspepsia, gastroparesis, hiatal hernia, acute cholecystitis, biliary colic, pancreatitis. Lower suspicion for appendicitis or bowel obstruction.  Clinical Tests:   Lab Tests: Ordered and Reviewed  Radiological Study: Ordered and Reviewed  Other:   I have discussed this case with another health care provider.       <> Summary of the Discussion: General Surgery           ED Course as of 05/31/23 1936   Wed May 31, 2023   1807 Labs without significant findings. UA negative for UTI. U/S showing signs concerning for acute cholecystitis. General surgery consulted who admitted for ongoing management. Patient expresses  understanding and agreeable to the plan. I have discussed the care of this patient with my supervising physician.  [BA]      ED Course User Index  [BA] Sameer Roberts PA-C                 Clinical Impression:   Final diagnoses:  [R10.11] RUQ abdominal pain  [K81.0] Acute cholecystitis (Primary)        ED Disposition Condition    Observation                 Sameer Roberts PA-C  05/31/23 1937

## 2023-05-31 NOTE — SUBJECTIVE & OBJECTIVE
No current facility-administered medications on file prior to encounter.     Current Outpatient Medications on File Prior to Encounter   Medication Sig    atorvastatin (LIPITOR) 40 MG tablet Take 40 mg by mouth.    famciclovir (FAMVIR) 500 MG tablet Take 500 mg by mouth 3 (three) times daily.    hydrOXYzine (ATARAX) 50 MG tablet Take 50 mg by mouth every evening.    meclizine (ANTIVERT) 12.5 mg tablet Take 25 mg by mouth.    meloxicam (MOBIC) 7.5 MG tablet Take 7.5 mg by mouth.    montelukast (SINGULAIR) 10 mg tablet Take 10 mg by mouth.    omeprazole (PRILOSEC) 40 MG capsule Take 1 capsule (40 mg total) by mouth once daily.    pravastatin (PRAVACHOL) 40 MG tablet Take 40 mg by mouth once daily.    TRULICITY 0.75 mg/0.5 mL pen injector SMARTSI.5 Milliliter(s) SUB-Q Once a Week    VITAMIN D2 50,000 unit capsule     acetaminophen (TYLENOL) 325 MG tablet Take 2 tablets (650 mg total) by mouth every 6 (six) hours as needed for Pain.    ALBUTEROL SULFATE (PROAIR HFA INHL) Inhale into the lungs.    aspirin 325 MG tablet 3 tablets by mouth 3 times daily x 3 days, then twice daily x 14 days    atorvastatin (LIPITOR) 40 MG tablet TAKE 1 TABLET BY MOUTH DAILY TO LOWER LIPIDS.    baclofen (LIORESAL) 20 MG tablet Take 1 tablet (20 mg total) by mouth 3 (three) times daily. for 3 days    cetirizine (ZYRTEC) 10 MG tablet Take 1 tablet (10 mg total) by mouth once daily.    dulaglutide (TRULICITY) 0.75 mg/0.5 mL pen injector Inject 0.75mg into the skin weekly    estradiol cypionate (DEPO-ESTRADIOL) 5 mg/mL injection Inject into the muscle every 28 days.    folic acid (FOLVITE) 1 MG tablet Take 1 mg by mouth once daily.    gabapentin (NEURONTIN) 300 MG capsule Take 1 capsule (300 mg total) by mouth 3 (three) times daily.    ibuprofen (ADVIL,MOTRIN) 600 MG tablet Take 1 tablet (600 mg total) by mouth every 6 (six) hours as needed for Pain.    imipramine (TOFRANIL) 25 MG tablet Take 25 mg by mouth every evening.    levothyroxine  (SYNTHROID) 150 MCG tablet Take 1 tablet (150 mcg total) by mouth once daily.    LIDOcaine (LIDODERM) 5 % Place 1 patch onto the skin daily as needed (pain). Remove & Discard patch within 12 hours or as directed by MD    LINZESS 145 mcg Cap capsule Take 145 mcg by mouth.    medroxyPROGESTERone (DEPO-PROVERA) 150 mg/mL Syrg     metFORMIN (GLUCOPHAGE) 500 MG tablet Take 1 tablet (500 mg total) by mouth daily with breakfast.    mometasone (NASONEX) 50 mcg/actuation nasal spray 2 sprays by Nasal route once daily.    ondansetron (ZOFRAN) 8 MG tablet Take 8 mg by mouth every 8 (eight) hours as needed.    ondansetron (ZOFRAN-ODT) 4 MG TbDL Take 1 tablet (4 mg total) by mouth every 8 (eight) hours as needed (nausea).    traMADoL (ULTRAM) 50 mg tablet Take 50 mg by mouth 2 (two) times daily as needed.    traMADoL (ULTRAM) 50 mg tablet One tablet orally twice daily as needed for pain       Review of patient's allergies indicates:   Allergen Reactions    Cheese Itching and Rash       Past Medical History:   Diagnosis Date    Anxiety     Asthma     Chronic pain     back; inflammatory    Depression     Diabetes mellitus, type 2     Endometriosis     Hyperlipidemia     Hypertension     MITZI (obstructive sleep apnea)     Thyroid disease     Vitamin D deficiency      Past Surgical History:   Procedure Laterality Date    CARPAL TUNNEL RELEASE      CERVICAL SPINE SURGERY      endometriosis      THYROID SURGERY       Family History       Problem Relation (Age of Onset)    Breast cancer Maternal Aunt    Diabetes Maternal Grandmother, Maternal Grandfather    Hyperlipidemia Maternal Grandmother, Maternal Grandfather    Hypertension Mother          Tobacco Use    Smoking status: Never    Smokeless tobacco: Never   Substance and Sexual Activity    Alcohol use: No    Drug use: No    Sexual activity: Yes     Partners: Male     Birth control/protection: Injection     Review of Systems   Constitutional:  Negative for chills and fever.    Respiratory:  Negative for shortness of breath.    Cardiovascular:  Negative for chest pain.   Gastrointestinal:  Positive for abdominal pain, constipation and nausea. Negative for diarrhea and vomiting.   Genitourinary:  Negative for dysuria and hematuria.   Musculoskeletal:  Negative for myalgias.   Skin:  Negative for rash.   Neurological:  Negative for dizziness and headaches.   Psychiatric/Behavioral:  The patient is nervous/anxious.    All other systems reviewed and are negative.  Objective:     Vital Signs (Most Recent):  Temp: 98.3 °F (36.8 °C) (05/31/23 1654)  Pulse: 87 (05/31/23 1654)  Resp: 18 (05/31/23 1654)  BP: (!) 139/90 (05/31/23 1654)  SpO2: 97 % (05/31/23 1654) Vital Signs (24h Range):  Temp:  [98.3 °F (36.8 °C)] 98.3 °F (36.8 °C)  Pulse:  [87-98] 87  Resp:  [15-18] 18  SpO2:  [97 %-100 %] 97 %  BP: (139-148)/(82-90) 139/90     Weight: 108.9 kg (240 lb)  Body mass index is 41.2 kg/m².     Physical Exam  Vitals and nursing note reviewed.   Constitutional:       General: She is not in acute distress.     Appearance: She is obese. She is not diaphoretic.      Comments: Room air   HENT:      Head: Normocephalic and atraumatic.      Mouth/Throat:      Mouth: Mucous membranes are moist.      Pharynx: Oropharynx is clear.   Eyes:      Extraocular Movements: Extraocular movements intact.      Conjunctiva/sclera: Conjunctivae normal.   Cardiovascular:      Rate and Rhythm: Normal rate.   Pulmonary:      Effort: Pulmonary effort is normal. No respiratory distress.   Abdominal:      General: There is no distension.      Palpations: Abdomen is soft.      Tenderness: There is abdominal tenderness. There is no guarding or rebound.      Comments: TTP in RUQ. Negative jennings's. No peritonitic signs    Musculoskeletal:         General: No deformity.      Cervical back: Normal range of motion.   Skin:     General: Skin is warm and dry.   Neurological:      Mental Status: She is alert and oriented to person, place,  and time.          I have reviewed all pertinent lab results within the past 24 hours.    Significant Diagnostics:  I have reviewed all pertinent imaging results/findings within the past 24 hours.

## 2023-05-31 NOTE — H&P
See consult note dated 5/31/23      Raghu Cartwright PA-C  General Surgery   [facility]

## 2023-05-31 NOTE — ASSESSMENT & PLAN NOTE
Patient is a 43 year old female with h/o thyroid disease, asthma, depression, anxiety, HLD, HTN, endometriosis, MITZI, DMII, hiatal hernia, obesity (BMI 41) who presents to the ED complaining of abdominal pain and imaging concerning for symptomatic cholelithiasis. Positive jennings's on US but no pericholecystic fluid, wall thickening, leukocytosis, or left shift    - Patient seen and examined. Labs and imaging reviewed. Case discussed with staff on call, Dr. Puri  - Regular diet, NPO at midnight   - To OR tomorrow for CCY   - Will get consent   - IVF  - Continue antibiotics (zosyn)  - PRN pain and nausea medications  - Daily labs  - Replete electrolytes PRN  - DVT prophylaxis (SCDs and lovenox)  - OOB, ambulate      Dispo: likely home tomorrow after procedure

## 2023-05-31 NOTE — ED NOTES
Bed: [facility]  Expected date:   Expected time:   Means of arrival:   Comments:

## 2023-05-31 NOTE — CONSULTS
[facility] Dept  General Surgery  Consult Note    Patient Name: Caitie Wang  MRN: [id number]  Code Status: Prior  Admission Date: 5/31/2023  Hospital Length of Stay: 0 days  Attending Physician: Dago Rodarte MD  Primary Care Provider: Juana Vines MD    Patient information was obtained from patient, past medical records and ER records.     Inpatient consult to General surgery  Consult performed by: Raghu Cartwright PA-C  Consult ordered by: Sameer Roberts PA-C  Reason for consult: acute cholecystitis  Assessment/Recommendations: Symptomatic cholelithiasis  Patient is a 43 year old female with h/o thyroid disease, asthma, depression, anxiety, HLD, HTN, endometriosis, MITZI, DMII, hiatal hernia, obesity (BMI 41) who presents to the ED complaining of abdominal pain and imaging concerning for symptomatic cholelithiasis. Positive jennings's on US but no pericholecystic fluid, wall thickening, leukocytosis, or left shift    - Patient seen and examined. Labs and imaging reviewed. Case discussed with staff on call, Dr. Puri  - Regular diet, NPO at midnight   - To OR tomorrow for CCY   - Will get consent   - SSI  - IVF  - Continue antibiotics (zosyn)  - PRN pain and nausea medications  - Daily labs  - Replete electrolytes PRN  - DVT prophylaxis (SCDs and lovenox)  - OOB, ambulate      Dispo: likely home tomorrow after procedure       Subjective:     Principal Problem: Symptomatic cholelithiasis    History of Present Illness: Patient is a 43 year old female with h/o thyroid disease, asthma, depression, anxiety, HLD, HTN, endometriosis, MITZI, DMII, hiatal hernia, obesity (BMI 41) who presents to the ED complaining of abdominal pain. She reports having the pain for months and it is worsening in severity and frequency over time. Pain is in epigastrium and radiates to RUQ and around her R flank. Pain is cramping and sharp in nature. Worse after meals. No known alleviating factors. Sometimes associated with  nausea although not having any today. Also has some constipation which is not unusual for her. Denies fever, chills, emesis, diarrhea, CP, SOB, and all other symptoms.     Surgical hx: dx lap for endometriosis   No AC    In the ED, she is afebrile, HDS.   CBC, CMP unremarkable   US abd with cholelithiasis, concern for acute cholecystitis       No current facility-administered medications on file prior to encounter.     Current Outpatient Medications on File Prior to Encounter   Medication Sig    atorvastatin (LIPITOR) 40 MG tablet Take 40 mg by mouth.    famciclovir (FAMVIR) 500 MG tablet Take 500 mg by mouth 3 (three) times daily.    hydrOXYzine (ATARAX) 50 MG tablet Take 50 mg by mouth every evening.    meclizine (ANTIVERT) 12.5 mg tablet Take 25 mg by mouth.    meloxicam (MOBIC) 7.5 MG tablet Take 7.5 mg by mouth.    montelukast (SINGULAIR) 10 mg tablet Take 10 mg by mouth.    omeprazole (PRILOSEC) 40 MG capsule Take 1 capsule (40 mg total) by mouth once daily.    pravastatin (PRAVACHOL) 40 MG tablet Take 40 mg by mouth once daily.    TRULICITY 0.75 mg/0.5 mL pen injector SMARTSI.5 Milliliter(s) SUB-Q Once a Week    VITAMIN D2 50,000 unit capsule     acetaminophen (TYLENOL) 325 MG tablet Take 2 tablets (650 mg total) by mouth every 6 (six) hours as needed for Pain.    ALBUTEROL SULFATE (PROAIR HFA INHL) Inhale into the lungs.    aspirin 325 MG tablet 3 tablets by mouth 3 times daily x 3 days, then twice daily x 14 days    atorvastatin (LIPITOR) 40 MG tablet TAKE 1 TABLET BY MOUTH DAILY TO LOWER LIPIDS.    baclofen (LIORESAL) 20 MG tablet Take 1 tablet (20 mg total) by mouth 3 (three) times daily. for 3 days    cetirizine (ZYRTEC) 10 MG tablet Take 1 tablet (10 mg total) by mouth once daily.    dulaglutide (TRULICITY) 0.75 mg/0.5 mL pen injector Inject 0.75mg into the skin weekly    estradiol cypionate (DEPO-ESTRADIOL) 5 mg/mL injection Inject into the muscle every 28 days.    folic acid (FOLVITE) 1 MG tablet  Take 1 mg by mouth once daily.    gabapentin (NEURONTIN) 300 MG capsule Take 1 capsule (300 mg total) by mouth 3 (three) times daily.    ibuprofen (ADVIL,MOTRIN) 600 MG tablet Take 1 tablet (600 mg total) by mouth every 6 (six) hours as needed for Pain.    imipramine (TOFRANIL) 25 MG tablet Take 25 mg by mouth every evening.    levothyroxine (SYNTHROID) 150 MCG tablet Take 1 tablet (150 mcg total) by mouth once daily.    LIDOcaine (LIDODERM) 5 % Place 1 patch onto the skin daily as needed (pain). Remove & Discard patch within 12 hours or as directed by MD    LINZESS 145 mcg Cap capsule Take 145 mcg by mouth.    medroxyPROGESTERone (DEPO-PROVERA) 150 mg/mL Syrg     metFORMIN (GLUCOPHAGE) 500 MG tablet Take 1 tablet (500 mg total) by mouth daily with breakfast.    mometasone (NASONEX) 50 mcg/actuation nasal spray 2 sprays by Nasal route once daily.    ondansetron (ZOFRAN) 8 MG tablet Take 8 mg by mouth every 8 (eight) hours as needed.    ondansetron (ZOFRAN-ODT) 4 MG TbDL Take 1 tablet (4 mg total) by mouth every 8 (eight) hours as needed (nausea).    traMADoL (ULTRAM) 50 mg tablet Take 50 mg by mouth 2 (two) times daily as needed.    traMADoL (ULTRAM) 50 mg tablet One tablet orally twice daily as needed for pain       Review of patient's allergies indicates:   Allergen Reactions    Cheese Itching and Rash       Past Medical History:   Diagnosis Date    Anxiety     Asthma     Chronic pain     back; inflammatory    Depression     Diabetes mellitus, type 2     Endometriosis     Hyperlipidemia     Hypertension     MITZI (obstructive sleep apnea)     Thyroid disease     Vitamin D deficiency      Past Surgical History:   Procedure Laterality Date    CARPAL TUNNEL RELEASE      CERVICAL SPINE SURGERY      endometriosis      THYROID SURGERY       Family History       Problem Relation (Age of Onset)    Breast cancer Maternal Aunt    Diabetes Maternal Grandmother, Maternal Grandfather    Hyperlipidemia Maternal Grandmother,  Maternal Grandfather    Hypertension Mother          Tobacco Use    Smoking status: Never    Smokeless tobacco: Never   Substance and Sexual Activity    Alcohol use: No    Drug use: No    Sexual activity: Yes     Partners: Male     Birth control/protection: Injection     Review of Systems   Constitutional:  Negative for chills and fever.   Respiratory:  Negative for shortness of breath.    Cardiovascular:  Negative for chest pain.   Gastrointestinal:  Positive for abdominal pain, constipation and nausea. Negative for diarrhea and vomiting.   Genitourinary:  Negative for dysuria and hematuria.   Musculoskeletal:  Negative for myalgias.   Skin:  Negative for rash.   Neurological:  Negative for dizziness and headaches.   Psychiatric/Behavioral:  The patient is nervous/anxious.    All other systems reviewed and are negative.  Objective:     Vital Signs (Most Recent):  Temp: 98.3 °F (36.8 °C) (05/31/23 1654)  Pulse: 87 (05/31/23 1654)  Resp: 18 (05/31/23 1654)  BP: (!) 139/90 (05/31/23 1654)  SpO2: 97 % (05/31/23 1654) Vital Signs (24h Range):  Temp:  [98.3 °F (36.8 °C)] 98.3 °F (36.8 °C)  Pulse:  [87-98] 87  Resp:  [15-18] 18  SpO2:  [97 %-100 %] 97 %  BP: (139-148)/(82-90) 139/90     Weight: 108.9 kg (240 lb)  Body mass index is 41.2 kg/m².     Physical Exam  Vitals and nursing note reviewed.   Constitutional:       General: She is not in acute distress.     Appearance: She is obese. She is not diaphoretic.      Comments: Room air   HENT:      Head: Normocephalic and atraumatic.      Mouth/Throat:      Mouth: Mucous membranes are moist.      Pharynx: Oropharynx is clear.   Eyes:      Extraocular Movements: Extraocular movements intact.      Conjunctiva/sclera: Conjunctivae normal.   Cardiovascular:      Rate and Rhythm: Normal rate.   Pulmonary:      Effort: Pulmonary effort is normal. No respiratory distress.   Abdominal:      General: There is no distension.      Palpations: Abdomen is soft.      Tenderness: There is  abdominal tenderness. There is no guarding or rebound.      Comments: TTP in RUQ. Negative jennings's. No peritonitic signs    Musculoskeletal:         General: No deformity.      Cervical back: Normal range of motion.   Skin:     General: Skin is warm and dry.   Neurological:      Mental Status: She is alert and oriented to person, place, and time.          I have reviewed all pertinent lab results within the past 24 hours.    Significant Diagnostics:  I have reviewed all pertinent imaging results/findings within the past 24 hours.      Assessment/Plan:     * Acute cholecystitis  Patient is a 43 year old female with h/o thyroid disease, asthma, depression, anxiety, HLD, HTN, endometriosis, MITZI, DMII, hiatal hernia, obesity (BMI 41) who presents to the ED complaining of abdominal pain and imaging concerning for symptomatic cholelithiasis. Positive jennings's on US but no pericholecystic fluid, wall thickening, leukocytosis, or left shift    - Patient seen and examined. Labs and imaging reviewed. Case discussed with staff on call, Dr. Puri  - Regular diet, NPO at midnight   - To OR tomorrow for CCY   - Will get consent   - IVF  - Continue antibiotics (zosyn)  - PRN pain and nausea medications  - Daily labs  - Replete electrolytes PRN  - DVT prophylaxis (SCDs and lovenox)  - OOB, ambulate      Dispo: likely home tomorrow after procedure       VTE Risk Mitigation (From admission, onward)      None            Thank you for your consult. I will follow-up with patient. Please contact us if you have any additional questions.    Raghu Cartwright PA-C  General Surgery  [facility]

## 2023-05-31 NOTE — ED TRIAGE NOTES
Pt is a 43 yr old female Aox4 arrived form home with c/o abd pain 10/10 that radiates to back and worsening with eating/drinking. Pt states recently diagnosed with hernia in upper gastric region last week. Pt denies any n/v/ diarrhea, chest pain, sob, fever, chills. Pt states she only takes tylenol at home for pain.Last BM yesterday. Last  PO intake on today

## 2023-06-01 ENCOUNTER — ANESTHESIA (OUTPATIENT)
Dept: SURGERY | Facility: HOSPITAL | Age: 43
End: 2023-06-01
Payer: MEDICAID

## 2023-06-01 ENCOUNTER — ANESTHESIA EVENT (OUTPATIENT)
Dept: SURGERY | Facility: HOSPITAL | Age: 43
End: 2023-06-01
Payer: MEDICAID

## 2023-06-01 PROBLEM — E11.649 TYPE 2 DIABETES MELLITUS WITH HYPOGLYCEMIA, WITHOUT LONG-TERM CURRENT USE OF INSULIN: Status: ACTIVE | Noted: 2023-06-01

## 2023-06-01 PROBLEM — K80.00 CALCULUS OF GALLBLADDER WITH ACUTE CHOLECYSTITIS WITHOUT OBSTRUCTION: Status: ACTIVE | Noted: 2023-05-31

## 2023-06-01 PROBLEM — J45.909 ASTHMA: Status: ACTIVE | Noted: 2023-06-01

## 2023-06-01 PROBLEM — E78.5 HYPERLIPIDEMIA: Status: ACTIVE | Noted: 2023-06-01

## 2023-06-01 LAB
ALBUMIN SERPL BCP-MCNC: 3.6 G/DL (ref 3.5–5.2)
ALP SERPL-CCNC: 83 U/L (ref 55–135)
ALT SERPL W/O P-5'-P-CCNC: 14 U/L (ref 10–44)
ANION GAP SERPL CALC-SCNC: 6 MMOL/L (ref 8–16)
AST SERPL-CCNC: 12 U/L (ref 10–40)
BASOPHILS # BLD AUTO: 0.03 K/UL (ref 0–0.2)
BASOPHILS NFR BLD: 0.4 % (ref 0–1.9)
BILIRUB SERPL-MCNC: 0.7 MG/DL (ref 0.1–1)
BUN SERPL-MCNC: 7 MG/DL (ref 6–20)
CALCIUM SERPL-MCNC: 9.1 MG/DL (ref 8.7–10.5)
CHLORIDE SERPL-SCNC: 106 MMOL/L (ref 95–110)
CO2 SERPL-SCNC: 25 MMOL/L (ref 23–29)
CREAT SERPL-MCNC: 0.8 MG/DL (ref 0.5–1.4)
DIFFERENTIAL METHOD: ABNORMAL
EOSINOPHIL # BLD AUTO: 0.2 K/UL (ref 0–0.5)
EOSINOPHIL NFR BLD: 2.3 % (ref 0–8)
ERYTHROCYTE [DISTWIDTH] IN BLOOD BY AUTOMATED COUNT: 13 % (ref 11.5–14.5)
EST. GFR  (NO RACE VARIABLE): >60 ML/MIN/1.73 M^2
GLUCOSE SERPL-MCNC: 86 MG/DL (ref 70–110)
HCT VFR BLD AUTO: 38.5 % (ref 37–48.5)
HGB BLD-MCNC: 12.3 G/DL (ref 12–16)
IMM GRANULOCYTES # BLD AUTO: 0.03 K/UL (ref 0–0.04)
IMM GRANULOCYTES NFR BLD AUTO: 0.4 % (ref 0–0.5)
LYMPHOCYTES # BLD AUTO: 2.8 K/UL (ref 1–4.8)
LYMPHOCYTES NFR BLD: 36 % (ref 18–48)
MAGNESIUM SERPL-MCNC: 2.4 MG/DL (ref 1.6–2.6)
MCH RBC QN AUTO: 29.4 PG (ref 27–31)
MCHC RBC AUTO-ENTMCNC: 31.9 G/DL (ref 32–36)
MCV RBC AUTO: 92 FL (ref 82–98)
MONOCYTES # BLD AUTO: 0.9 K/UL (ref 0.3–1)
MONOCYTES NFR BLD: 11.4 % (ref 4–15)
NEUTROPHILS # BLD AUTO: 3.8 K/UL (ref 1.8–7.7)
NEUTROPHILS NFR BLD: 49.5 % (ref 38–73)
NRBC BLD-RTO: 0 /100 WBC
PHOSPHATE SERPL-MCNC: 3.7 MG/DL (ref 2.7–4.5)
PLATELET # BLD AUTO: 336 K/UL (ref 150–450)
PMV BLD AUTO: 9.3 FL (ref 9.2–12.9)
POCT GLUCOSE: 116 MG/DL (ref 70–110)
POCT GLUCOSE: 72 MG/DL (ref 70–110)
POCT GLUCOSE: 80 MG/DL (ref 70–110)
POTASSIUM SERPL-SCNC: 3.9 MMOL/L (ref 3.5–5.1)
PROT SERPL-MCNC: 7.1 G/DL (ref 6–8.4)
RBC # BLD AUTO: 4.18 M/UL (ref 4–5.4)
SODIUM SERPL-SCNC: 137 MMOL/L (ref 136–145)
WBC # BLD AUTO: 7.72 K/UL (ref 3.9–12.7)

## 2023-06-01 PROCEDURE — 63600175 PHARM REV CODE 636 W HCPCS: Performed by: ANESTHESIOLOGY

## 2023-06-01 PROCEDURE — 63600175 PHARM REV CODE 636 W HCPCS: Performed by: STUDENT IN AN ORGANIZED HEALTH CARE EDUCATION/TRAINING PROGRAM

## 2023-06-01 PROCEDURE — 00790 ANES IPER UPR ABD NOS: CPT | Performed by: STUDENT IN AN ORGANIZED HEALTH CARE EDUCATION/TRAINING PROGRAM

## 2023-06-01 PROCEDURE — 96361 HYDRATE IV INFUSION ADD-ON: CPT | Mod: 59

## 2023-06-01 PROCEDURE — 88304 TISSUE EXAM BY PATHOLOGIST: CPT | Mod: 26,,, | Performed by: PATHOLOGY

## 2023-06-01 PROCEDURE — 82962 GLUCOSE BLOOD TEST: CPT

## 2023-06-01 PROCEDURE — 25000003 PHARM REV CODE 250: Performed by: NURSE ANESTHETIST, CERTIFIED REGISTERED

## 2023-06-01 PROCEDURE — 96374 THER/PROPH/DIAG INJ IV PUSH: CPT

## 2023-06-01 PROCEDURE — D9220A PRA ANESTHESIA: ICD-10-PCS | Mod: CRNA,,, | Performed by: NURSE ANESTHETIST, CERTIFIED REGISTERED

## 2023-06-01 PROCEDURE — 63600175 PHARM REV CODE 636 W HCPCS: Performed by: NURSE ANESTHETIST, CERTIFIED REGISTERED

## 2023-06-01 PROCEDURE — 96375 TX/PRO/DX INJ NEW DRUG ADDON: CPT | Mod: 59

## 2023-06-01 PROCEDURE — 25000003 PHARM REV CODE 250: Performed by: STUDENT IN AN ORGANIZED HEALTH CARE EDUCATION/TRAINING PROGRAM

## 2023-06-01 PROCEDURE — 82962 GLUCOSE BLOOD TEST: CPT | Performed by: STUDENT IN AN ORGANIZED HEALTH CARE EDUCATION/TRAINING PROGRAM

## 2023-06-01 PROCEDURE — D9220A PRA ANESTHESIA: ICD-10-PCS | Mod: ANES,,, | Performed by: ANESTHESIOLOGY

## 2023-06-01 PROCEDURE — G0378 HOSPITAL OBSERVATION PER HR: HCPCS

## 2023-06-01 PROCEDURE — 96372 THER/PROPH/DIAG INJ SC/IM: CPT | Mod: 59 | Performed by: STUDENT IN AN ORGANIZED HEALTH CARE EDUCATION/TRAINING PROGRAM

## 2023-06-01 PROCEDURE — 36000708 HC OR TIME LEV III 1ST 15 MIN: Performed by: STUDENT IN AN ORGANIZED HEALTH CARE EDUCATION/TRAINING PROGRAM

## 2023-06-01 PROCEDURE — 88304 TISSUE EXAM BY PATHOLOGIST: CPT | Performed by: PATHOLOGY

## 2023-06-01 PROCEDURE — 85025 COMPLETE CBC W/AUTO DIFF WBC: CPT | Performed by: STUDENT IN AN ORGANIZED HEALTH CARE EDUCATION/TRAINING PROGRAM

## 2023-06-01 PROCEDURE — 88304 PR  SURG PATH,LEVEL III: ICD-10-PCS | Mod: 26,,, | Performed by: PATHOLOGY

## 2023-06-01 PROCEDURE — 47562 LAPAROSCOPIC CHOLECYSTECTOMY: CPT | Mod: ,,, | Performed by: STUDENT IN AN ORGANIZED HEALTH CARE EDUCATION/TRAINING PROGRAM

## 2023-06-01 PROCEDURE — 27201423 OPTIME MED/SURG SUP & DEVICES STERILE SUPPLY: Performed by: STUDENT IN AN ORGANIZED HEALTH CARE EDUCATION/TRAINING PROGRAM

## 2023-06-01 PROCEDURE — 84100 ASSAY OF PHOSPHORUS: CPT | Performed by: STUDENT IN AN ORGANIZED HEALTH CARE EDUCATION/TRAINING PROGRAM

## 2023-06-01 PROCEDURE — 99232 PR SUBSEQUENT HOSPITAL CARE,LEVL II: ICD-10-PCS | Mod: ,,, | Performed by: STUDENT IN AN ORGANIZED HEALTH CARE EDUCATION/TRAINING PROGRAM

## 2023-06-01 PROCEDURE — 83735 ASSAY OF MAGNESIUM: CPT | Performed by: STUDENT IN AN ORGANIZED HEALTH CARE EDUCATION/TRAINING PROGRAM

## 2023-06-01 PROCEDURE — 99232 SBSQ HOSP IP/OBS MODERATE 35: CPT | Mod: ,,, | Performed by: STUDENT IN AN ORGANIZED HEALTH CARE EDUCATION/TRAINING PROGRAM

## 2023-06-01 PROCEDURE — D9220A PRA ANESTHESIA: Mod: ANES,,, | Performed by: ANESTHESIOLOGY

## 2023-06-01 PROCEDURE — 94761 N-INVAS EAR/PLS OXIMETRY MLT: CPT

## 2023-06-01 PROCEDURE — 47562 PR LAP,CHOLECYSTECTOMY: ICD-10-PCS | Mod: ,,, | Performed by: STUDENT IN AN ORGANIZED HEALTH CARE EDUCATION/TRAINING PROGRAM

## 2023-06-01 PROCEDURE — 37000009 HC ANESTHESIA EA ADD 15 MINS: Performed by: STUDENT IN AN ORGANIZED HEALTH CARE EDUCATION/TRAINING PROGRAM

## 2023-06-01 PROCEDURE — C9113 INJ PANTOPRAZOLE SODIUM, VIA: HCPCS | Performed by: STUDENT IN AN ORGANIZED HEALTH CARE EDUCATION/TRAINING PROGRAM

## 2023-06-01 PROCEDURE — 71000033 HC RECOVERY, INTIAL HOUR: Performed by: STUDENT IN AN ORGANIZED HEALTH CARE EDUCATION/TRAINING PROGRAM

## 2023-06-01 PROCEDURE — 71000016 HC POSTOP RECOV ADDL HR: Performed by: STUDENT IN AN ORGANIZED HEALTH CARE EDUCATION/TRAINING PROGRAM

## 2023-06-01 PROCEDURE — 36000709 HC OR TIME LEV III EA ADD 15 MIN: Performed by: STUDENT IN AN ORGANIZED HEALTH CARE EDUCATION/TRAINING PROGRAM

## 2023-06-01 PROCEDURE — 27000221 HC OXYGEN, UP TO 24 HOURS

## 2023-06-01 PROCEDURE — D9220A PRA ANESTHESIA: Mod: CRNA,,, | Performed by: NURSE ANESTHETIST, CERTIFIED REGISTERED

## 2023-06-01 PROCEDURE — 80053 COMPREHEN METABOLIC PANEL: CPT | Performed by: STUDENT IN AN ORGANIZED HEALTH CARE EDUCATION/TRAINING PROGRAM

## 2023-06-01 PROCEDURE — 71000015 HC POSTOP RECOV 1ST HR: Performed by: STUDENT IN AN ORGANIZED HEALTH CARE EDUCATION/TRAINING PROGRAM

## 2023-06-01 PROCEDURE — 37000008 HC ANESTHESIA 1ST 15 MINUTES: Performed by: STUDENT IN AN ORGANIZED HEALTH CARE EDUCATION/TRAINING PROGRAM

## 2023-06-01 RX ORDER — DROPERIDOL 2.5 MG/ML
0.62 INJECTION, SOLUTION INTRAMUSCULAR; INTRAVENOUS ONCE AS NEEDED
Status: DISCONTINUED | OUTPATIENT
Start: 2023-06-01 | End: 2023-06-01 | Stop reason: HOSPADM

## 2023-06-01 RX ORDER — DEXAMETHASONE SODIUM PHOSPHATE 4 MG/ML
INJECTION, SOLUTION INTRA-ARTICULAR; INTRALESIONAL; INTRAMUSCULAR; INTRAVENOUS; SOFT TISSUE
Status: DISCONTINUED | OUTPATIENT
Start: 2023-06-01 | End: 2023-06-01

## 2023-06-01 RX ORDER — ONDANSETRON 2 MG/ML
4 INJECTION INTRAMUSCULAR; INTRAVENOUS ONCE AS NEEDED
Status: DISCONTINUED | OUTPATIENT
Start: 2023-06-01 | End: 2023-06-01 | Stop reason: HOSPADM

## 2023-06-01 RX ORDER — LIDOCAINE HYDROCHLORIDE 20 MG/ML
INJECTION INTRAVENOUS
Status: DISCONTINUED | OUTPATIENT
Start: 2023-06-01 | End: 2023-06-01

## 2023-06-01 RX ORDER — SODIUM CHLORIDE 0.9 % (FLUSH) 0.9 %
10 SYRINGE (ML) INJECTION
Status: DISCONTINUED | OUTPATIENT
Start: 2023-06-01 | End: 2023-06-01 | Stop reason: HOSPADM

## 2023-06-01 RX ORDER — BUPIVACAINE HYDROCHLORIDE 2.5 MG/ML
INJECTION, SOLUTION EPIDURAL; INFILTRATION; INTRACAUDAL
Status: DISCONTINUED | OUTPATIENT
Start: 2023-06-01 | End: 2023-06-01 | Stop reason: HOSPADM

## 2023-06-01 RX ORDER — ROCURONIUM BROMIDE 10 MG/ML
INJECTION, SOLUTION INTRAVENOUS
Status: DISCONTINUED | OUTPATIENT
Start: 2023-06-01 | End: 2023-06-01

## 2023-06-01 RX ORDER — ONDANSETRON 2 MG/ML
INJECTION INTRAMUSCULAR; INTRAVENOUS
Status: DISCONTINUED | OUTPATIENT
Start: 2023-06-01 | End: 2023-06-01

## 2023-06-01 RX ORDER — SUCCINYLCHOLINE CHLORIDE 20 MG/ML
INJECTION INTRAMUSCULAR; INTRAVENOUS
Status: DISCONTINUED | OUTPATIENT
Start: 2023-06-01 | End: 2023-06-01

## 2023-06-01 RX ORDER — ACETAMINOPHEN 10 MG/ML
INJECTION, SOLUTION INTRAVENOUS
Status: DISCONTINUED | OUTPATIENT
Start: 2023-06-01 | End: 2023-06-01

## 2023-06-01 RX ORDER — HYDROMORPHONE HYDROCHLORIDE 1 MG/ML
0.2 INJECTION, SOLUTION INTRAMUSCULAR; INTRAVENOUS; SUBCUTANEOUS EVERY 5 MIN PRN
Status: DISCONTINUED | OUTPATIENT
Start: 2023-06-01 | End: 2023-06-01 | Stop reason: HOSPADM

## 2023-06-01 RX ORDER — MIDAZOLAM HYDROCHLORIDE 1 MG/ML
INJECTION, SOLUTION INTRAMUSCULAR; INTRAVENOUS
Status: DISCONTINUED | OUTPATIENT
Start: 2023-06-01 | End: 2023-06-01

## 2023-06-01 RX ORDER — PROPOFOL 10 MG/ML
VIAL (ML) INTRAVENOUS
Status: DISCONTINUED | OUTPATIENT
Start: 2023-06-01 | End: 2023-06-01

## 2023-06-01 RX ORDER — FENTANYL CITRATE 50 UG/ML
INJECTION, SOLUTION INTRAMUSCULAR; INTRAVENOUS
Status: DISCONTINUED | OUTPATIENT
Start: 2023-06-01 | End: 2023-06-01

## 2023-06-01 RX ADMIN — HYDROMORPHONE HYDROCHLORIDE 0.2 MG: 1 INJECTION, SOLUTION INTRAMUSCULAR; INTRAVENOUS; SUBCUTANEOUS at 02:06

## 2023-06-01 RX ADMIN — ROCURONIUM BROMIDE 5 MG: 10 INJECTION INTRAVENOUS at 12:06

## 2023-06-01 RX ADMIN — SUCCINYLCHOLINE CHLORIDE 120 MG: 20 INJECTION, SOLUTION INTRAMUSCULAR; INTRAVENOUS at 12:06

## 2023-06-01 RX ADMIN — LIDOCAINE HYDROCHLORIDE 60 MG: 20 INJECTION INTRAVENOUS at 12:06

## 2023-06-01 RX ADMIN — OXYCODONE HYDROCHLORIDE 10 MG: 10 TABLET ORAL at 02:06

## 2023-06-01 RX ADMIN — DEXAMETHASONE SODIUM PHOSPHATE 4 MG: 4 INJECTION, SOLUTION INTRAMUSCULAR; INTRAVENOUS at 01:06

## 2023-06-01 RX ADMIN — FENTANYL CITRATE 25 MCG: 50 INJECTION, SOLUTION INTRAMUSCULAR; INTRAVENOUS at 02:06

## 2023-06-01 RX ADMIN — PIPERACILLIN AND TAZOBACTAM 4.5 G: 4; .5 INJECTION, POWDER, LYOPHILIZED, FOR SOLUTION INTRAVENOUS; PARENTERAL at 06:06

## 2023-06-01 RX ADMIN — FENTANYL CITRATE 25 MCG: 50 INJECTION, SOLUTION INTRAMUSCULAR; INTRAVENOUS at 01:06

## 2023-06-01 RX ADMIN — IMIPRAMINE HYDROCHLORIDE 25 MG: 25 TABLET, FILM COATED ORAL at 09:06

## 2023-06-01 RX ADMIN — SODIUM CHLORIDE: 0.9 INJECTION, SOLUTION INTRAVENOUS at 12:06

## 2023-06-01 RX ADMIN — OXYCODONE HYDROCHLORIDE 10 MG: 10 TABLET ORAL at 09:06

## 2023-06-01 RX ADMIN — POTASSIUM CHLORIDE AND SODIUM CHLORIDE: 450; 150 INJECTION, SOLUTION INTRAVENOUS at 10:06

## 2023-06-01 RX ADMIN — ATORVASTATIN CALCIUM 40 MG: 40 TABLET, FILM COATED ORAL at 09:06

## 2023-06-01 RX ADMIN — ENOXAPARIN SODIUM 40 MG: 40 INJECTION SUBCUTANEOUS at 05:06

## 2023-06-01 RX ADMIN — ROCURONIUM BROMIDE 45 MG: 10 INJECTION INTRAVENOUS at 12:06

## 2023-06-01 RX ADMIN — SUGAMMADEX 200 MG: 100 INJECTION, SOLUTION INTRAVENOUS at 01:06

## 2023-06-01 RX ADMIN — FENTANYL CITRATE 100 MCG: 50 INJECTION, SOLUTION INTRAMUSCULAR; INTRAVENOUS at 12:06

## 2023-06-01 RX ADMIN — ACETAMINOPHEN 1000 MG: 10 INJECTION, SOLUTION INTRAVENOUS at 01:06

## 2023-06-01 RX ADMIN — MIDAZOLAM HYDROCHLORIDE 2 MG: 1 INJECTION, SOLUTION INTRAMUSCULAR; INTRAVENOUS at 12:06

## 2023-06-01 RX ADMIN — ONDANSETRON 4 MG: 2 INJECTION INTRAMUSCULAR; INTRAVENOUS at 01:06

## 2023-06-01 RX ADMIN — PANTOPRAZOLE SODIUM 40 MG: 40 INJECTION, POWDER, FOR SOLUTION INTRAVENOUS at 09:06

## 2023-06-01 RX ADMIN — FENTANYL CITRATE 50 MCG: 50 INJECTION, SOLUTION INTRAMUSCULAR; INTRAVENOUS at 01:06

## 2023-06-01 RX ADMIN — PROPOFOL 200 MG: 10 INJECTION, EMULSION INTRAVENOUS at 12:06

## 2023-06-01 NOTE — PROGRESS NOTES
[facility]  General Surgery  Progress Note    Subjective:     History of Present Illness:  Patient is a 43 year old female with h/o thyroid disease, asthma, depression, anxiety, HLD, HTN, endometriosis, MITZI, DMII, hiatal hernia, obesity (BMI 41) who presents to the ED complaining of abdominal pain. She reports having the pain for months and it is worsening in severity and frequency over time. Pain is in epigastrium and radiates to RUQ and around her R flank. Pain is cramping and sharp in nature. Worse after meals. No known alleviating factors. Sometimes associated with nausea although not having any today. Also has some constipation which is not unusual for her. Denies fever, chills, emesis, diarrhea, CP, SOB, and all other symptoms.     Surgical hx: dx lap for endometriosis   No AC    In the ED, she is afebrile, HDS.   CBC, CMP unremarkable   US abd with cholelithiasis, concern for acute cholecystitis       Post-Op Info:  Procedure(s) (LRB):  CHOLECYSTECTOMY, LAPAROSCOPIC (N/A)         Interval History: NAEON. Afebrile. HDS. Hypoglycemia overnight, improved this AM. To OR today for CCY. No new symptoms or concerns this morning. All questions answered.       Medications:  Continuous Infusions:   0.45 % NaCl with KCl 20 mEq Stopped (06/01/23 0608)     Scheduled Meds:   atorvastatin  40 mg Oral Daily    enoxparin  40 mg Subcutaneous Q24H (prophylaxis, 1700)    imipramine  25 mg Oral QHS    pantoprazole  40 mg Intravenous Daily    piperacillin-tazobactam (ZOSYN) IVPB  4.5 g Intravenous Q8H     PRN Meds:acetaminophen, albuterol, dextrose 10%, dextrose 10%, glucagon (human recombinant), insulin aspart U-100, LIDOcaine (PF) 10 mg/ml (1%), ondansetron, oxyCODONE, oxyCODONE, sodium chloride 0.9%     Review of patient's allergies indicates:   Allergen Reactions    Cheese Itching and Rash     Objective:     Vital Signs (Most Recent):  Temp: 98.3 °F (36.8 °C) (06/01/23 0412)  Pulse: 95 (06/01/23  0412)  Resp: 14 (06/01/23 0412)  BP: (!) 157/90 (06/01/23 0412)  SpO2: 99 % (06/01/23 0412) Vital Signs (24h Range):  Temp:  [98.1 °F (36.7 °C)-98.7 °F (37.1 °C)] 98.3 °F (36.8 °C)  Pulse:  [80-98] 95  Resp:  [14-20] 14  SpO2:  [97 %-100 %] 99 %  BP: (125-176)/(60-90) 157/90     Weight: 108.9 kg (240 lb)  Body mass index is 41.2 kg/m².    Intake/Output - Last 3 Shifts         05/30 0700  05/31 0659 05/31 0700 06/01 0659 06/01 0700  06/02 0659    I.V. (mL/kg)  1000 (9.2)     Total Intake(mL/kg)  1000 (9.2)     Net  +1000                     Physical Exam  Vitals and nursing note reviewed.   Constitutional:       General: She is not in acute distress.     Appearance: She is obese. She is not diaphoretic.      Comments: Room air   HENT:      Head: Normocephalic and atraumatic.      Mouth/Throat:      Mouth: Mucous membranes are moist.      Pharynx: Oropharynx is clear.   Eyes:      Extraocular Movements: Extraocular movements intact.      Conjunctiva/sclera: Conjunctivae normal.   Cardiovascular:      Rate and Rhythm: Normal rate.   Pulmonary:      Effort: Pulmonary effort is normal. No respiratory distress.   Abdominal:      General: There is no distension.      Palpations: Abdomen is soft.      Tenderness: There is abdominal tenderness. There is no guarding or rebound.      Comments: TTP in RUQ. Negative jennings's. No peritonitic signs    Musculoskeletal:         General: No deformity.      Cervical back: Normal range of motion.   Skin:     General: Skin is warm and dry.   Neurological:      Mental Status: She is alert and oriented to person, place, and time.      Significant Labs:  I have reviewed all pertinent lab results within the past 24 hours.    Significant Diagnostics:  I have reviewed all pertinent imaging results/findings within the past 24 hours.    Assessment/Plan:     * Calculus of gallbladder with acute cholecystitis without obstruction  Patient is a 43 year old female with h/o thyroid disease, asthma,  depression, anxiety, HLD, HTN, endometriosis, MITZI, DMII, hiatal hernia, obesity (BMI 41) who presents to the ED complaining of abdominal pain and imaging concerning for symptomatic cholelithiasis. Positive jennings's on US but no pericholecystic fluid, wall thickening, leukocytosis, or left shift    - NPO  - To OR today for CCY   - Will get consent   - IVF  - Continue antibiotics (zosyn)  - PRN pain and nausea medications  - Daily labs  - Replete electrolytes PRN  - DVT prophylaxis (SCDs and lovenox)  - OOB, ambulate      Dispo: likely home after procedure     Asthma  - Albuterol PRN   - Continue to monitor O2 sats q4 and adjust regimen PRN    Type 2 diabetes mellitus with hypoglycemia, without long-term current use of insulin  - Hypoglycemia overnight, improved this AM  - SSI   - POCT glucose checks  - Hypoglycemia protocol  - Holding home meds in setting of acute disease process      Hyperlipidemia  - Continue home atorvastatin 40mg daily    Case discussed with Dr. Puri.      PETER GuajardoC  General Surgery  [facility]

## 2023-06-01 NOTE — ED NOTES
Care assumed from JUSTINA Liang    APPEARANCE: awake, alert, and appears to be in NAD. Pt resting in hospital bed in NAD. Pt remains cont cardiac monitoring and in hospital gown. Bed in lowest and locked position w/ side rails up x2.   SKIN: warm, dry and intact. No visible breakdown or bruising. 20G PIV @ left AC - clean, dry, and intact   MUSCULOSKELETAL: Pt moving all extremities spontaneously, no obvious swelling or deformities noted. Ambulates independently. Head normocephalic, atraumatic.   RESPIRATORY: Airway open and patent. Denies shortness of breath. Respirations even, unlabored, equal bilaterally on inspiration and expiration.   CARDIAC: Regular HR. Denies CP or discomfort. 2+ radial pulses; no peripheral edema  ABDOMEN: S/ND. Denies N/V/D. TTP at RUQ - pain score 8/10. Pt denies decrease in appetite or abnormal BM.  : Pt voids spontaneously, denies dysuria, hematuria, urinary frequency, or incontinence   NEUROLOGIC: AAO x 4; follows commands equal strength in all extremities; denies numbness/tingling. Denies dizziness, lightheadedness, visual changes, or HA

## 2023-06-01 NOTE — NURSING TRANSFER
Nursing Transfer Note      6/1/2023     Reason patient is being transferred: postop    Transfer To: 542    Transfer via bed    Transfer with n/a    Transported by escort    Telemetry: n/a    Medicines sent: n/a    Any special needs or follow-up needed:     Chart send with patient: Yes    Notified: spouse    Patient reassessed at: 6/1/23  1  Upon arrival to floor: bed in lowest position  Report given to Adela ALBERTO

## 2023-06-01 NOTE — PLAN OF CARE
[facility]  Initial Discharge Assessment       Primary Care Provider: Juana Vines MD    Admission Diagnosis: RUQ abdominal pain [R10.11]    Admission Date: 5/31/2023  Expected Discharge Date: unknwon   Sw met pt at bedside to complete pt's discharge assessment. Pt requested a rollator due to decreased mobility. Sw advised pt to reach out to pt's  insurance provider  to ensure rollators are covered, Sw provided pt with [employer]'s contact number. Pt stated she suffers from depression and anxiety . As per pt, her previous psychiatric provider no longer practices.Pt requested a referral for outpatient psychiatric services., Sw provided pt with the provider finder contact number for pt's insurance.  .      Padma Dooley LMSW  Case Management  Emergency Department  [phone]       Transition of Care Barriers: (P) None    Payor: MEDICAID / Plan: [employer] [facility] / Product Type: Managed Medicaid /     Extended Emergency Contact Information  Primary Emergency Contact: Dayanna Turner   [facility]  Home Phone: [phone]  Mobile Phone: [phone]  Relation: Mother  Secondary Emergency Contact: Antonio Mitchell  Mobile Phone: [phone]  Relation: Spouse   needed? No    Discharge Plan A: (P) Home with family  Discharge Plan B: (P) Home (follow up appts)      [employer] [id number] [place] [address] [address] [facility] [address]  [place] [address] [facility] [address]  Phone: [phone] Fax: [phone]      Initial Assessment (most recent)       Adult Discharge Assessment - 05/31/23 2156          Discharge Assessment    Assessment Type Discharge Planning Assessment (P)      Confirmed/corrected address, phone number and insurance Yes (P)      Confirmed Demographics Correct on Facesheet (P)      Source of Information patient (P)      Communicated ZOIE with patient/caregiver Date not available/Unable to determine (P)       People in Home spouse (P)      Do you expect to return to your current living situation? Yes (P)      Do you have help at home or someone to help you manage your care at home? Yes (P)      Who are your caregiver(s) and their phone number(s)? Antonio Mitchell  [phone] (P)      Prior to hospitilization cognitive status: Alert/Oriented (P)      Current cognitive status: Alert/Oriented (P)      Walking or Climbing Stairs ambulation difficulty, requires equipment (P)      Mobility Management wilson (P)      Dressing/Bathing -- (P)    none reported    Home Accessibility wheelchair accessible (P)      Home Layout Able to live on 1st floor (P)      Equipment Currently Used at Home cane, straight (P)      Readmission within 30 days? No (P)      Patient currently being followed by outpatient case management? No (P)      Do you currently have service(s) that help you manage your care at home? No (P)      Do you take prescription medications? Yes (P)      Do you have prescription coverage? Yes (P)      Do you have any problems affording any of your prescribed medications? No (P)      Is the patient taking medications as prescribed? yes (P)      Who is going to help you get home at discharge? Antonio Mitchell (P)      How do you get to doctors appointments? car, drives self (P)      Are you on dialysis? No (P)      Do you take coumadin? No (P)      Discharge Plan A Home with family (P)      Discharge Plan B Home (P)    follow up appts    DME Needed Upon Discharge  none (P)      Discharge Plan discussed with: Patient (P)      Transition of Care Barriers None (P)         Physical Activity    On average, how many days per week do you engage in moderate to strenuous exercise (like a brisk walk)? 0 days (P)      On average, how many minutes do you engage in exercise at this level? 0 min (P)         Financial Resource Strain    How hard is it for you to pay for the very basics like food, housing, medical care, and heating? Not hard at  all (P)         Housing Stability    In the last 12 months, was there a time when you were not able to pay the mortgage or rent on time? No (P)      In the last 12 months, how many places have you lived? 1 (P)      In the last 12 months, was there a time when you did not have a steady place to sleep or slept in a shelter (including now)? No (P)         Transportation Needs    In the past 12 months, has lack of transportation kept you from medical appointments or from getting medications? No (P)      In the past 12 months, has lack of transportation kept you from meetings, work, or from getting things needed for daily living? No (P)         Food Insecurity    Within the past 12 months, you worried that your food would run out before you got the money to buy more. Never true (P)      Within the past 12 months, the food you bought just didn't last and you didn't have money to get more. Never true (P)         Stress    Do you feel stress - tense, restless, nervous, or anxious, or unable to sleep at night because your mind is troubled all the time - these days? Very much (P)         Social Connections    In a typical week, how many times do you talk on the phone with family, friends, or neighbors? More than three times a week (P)      How often do you get together with friends or relatives? More than three times a week (P)      How often do you attend Hoahaoism or Islam services? More than 4 times per year (P)      Do you belong to any clubs or organizations such as Hoahaoism groups, unions, fraternal or athletic groups, or school groups? Yes (P)      How often do you attend meetings of the clubs or organizations you belong to? More than 4 times per year (P)      Are you , , , , never , or living with a partner?  (P)         Alcohol Use    Q1: How often do you have a drink containing alcohol? Monthly or less (P)      Q2: How many drinks containing alcohol do you have on a  typical day when you are drinking? -- (P)    pt stated she may have 2 alcholic drinks a year    Q3: How often do you have six or more drinks on one occasion? Never (P)

## 2023-06-01 NOTE — ANESTHESIA POSTPROCEDURE EVALUATION
Anesthesia Post Evaluation    Patient: Caitie Wang    Procedure(s) Performed: Procedure(s) (LRB):  CHOLECYSTECTOMY, LAPAROSCOPIC (N/A)    Final Anesthesia Type: general      Patient location during evaluation: PACU  Patient participation: Yes- Able to Participate  Level of consciousness: awake and alert  Post-procedure vital signs: reviewed and stable  Pain management: adequate  Airway patency: patent    PONV status at discharge: No PONV  Anesthetic complications: no      Cardiovascular status: blood pressure returned to baseline and hemodynamically stable  Respiratory status: unassisted and spontaneous ventilation  Hydration status: euvolemic  Follow-up not needed.          Vitals Value Taken Time   /88 06/01/23 1602   Temp 36.2 °C (97.2 °F) 06/01/23 1500   Pulse 82 06/01/23 1604   Resp 24 06/01/23 1604   SpO2 97 % 06/01/23 1604   Vitals shown include unvalidated device data.      Event Time   Out of Recovery 06/01/2023 15:00:00         Pain/Ivanna Score: Pain Rating Prior to Med Admin: 9 (6/1/2023  2:55 PM)  Ivanna Score: 9 (6/1/2023  3:00 PM)

## 2023-06-01 NOTE — SUBJECTIVE & OBJECTIVE
Interval History: NAEON. Afebrile. HDS. Hypoglycemia overnight, improved this AM. To OR today for CCY. No new symptoms or concerns this morning. All questions answered.       Medications:  Continuous Infusions:   0.45 % NaCl with KCl 20 mEq Stopped (06/01/23 0608)     Scheduled Meds:   atorvastatin  40 mg Oral Daily    enoxparin  40 mg Subcutaneous Q24H (prophylaxis, 1700)    imipramine  25 mg Oral QHS    pantoprazole  40 mg Intravenous Daily    piperacillin-tazobactam (ZOSYN) IVPB  4.5 g Intravenous Q8H     PRN Meds:acetaminophen, albuterol, dextrose 10%, dextrose 10%, glucagon (human recombinant), insulin aspart U-100, LIDOcaine (PF) 10 mg/ml (1%), ondansetron, oxyCODONE, oxyCODONE, sodium chloride 0.9%     Review of patient's allergies indicates:   Allergen Reactions    Cheese Itching and Rash     Objective:     Vital Signs (Most Recent):  Temp: 98.3 °F (36.8 °C) (06/01/23 0412)  Pulse: 95 (06/01/23 0412)  Resp: 14 (06/01/23 0412)  BP: (!) 157/90 (06/01/23 0412)  SpO2: 99 % (06/01/23 0412) Vital Signs (24h Range):  Temp:  [98.1 °F (36.7 °C)-98.7 °F (37.1 °C)] 98.3 °F (36.8 °C)  Pulse:  [80-98] 95  Resp:  [14-20] 14  SpO2:  [97 %-100 %] 99 %  BP: (125-176)/(60-90) 157/90     Weight: 108.9 kg (240 lb)  Body mass index is 41.2 kg/m².    Intake/Output - Last 3 Shifts         05/30 0700  05/31 0659 05/31 0700 06/01 0659 06/01 0700 06/02 0659    I.V. (mL/kg)  1000 (9.2)     Total Intake(mL/kg)  1000 (9.2)     Net  +1000                     Physical Exam  Vitals and nursing note reviewed.   Constitutional:       General: She is not in acute distress.     Appearance: She is obese. She is not diaphoretic.      Comments: Room air   HENT:      Head: Normocephalic and atraumatic.      Mouth/Throat:      Mouth: Mucous membranes are moist.      Pharynx: Oropharynx is clear.   Eyes:      Extraocular Movements: Extraocular movements intact.      Conjunctiva/sclera: Conjunctivae normal.   Cardiovascular:      Rate and Rhythm:  Normal rate.   Pulmonary:      Effort: Pulmonary effort is normal. No respiratory distress.   Abdominal:      General: There is no distension.      Palpations: Abdomen is soft.      Tenderness: There is abdominal tenderness. There is no guarding or rebound.      Comments: TTP in RUQ. Negative jennings's. No peritonitic signs    Musculoskeletal:         General: No deformity.      Cervical back: Normal range of motion.   Skin:     General: Skin is warm and dry.   Neurological:      Mental Status: She is alert and oriented to person, place, and time.      Significant Labs:  I have reviewed all pertinent lab results within the past 24 hours.    Significant Diagnostics:  I have reviewed all pertinent imaging results/findings within the past 24 hours.

## 2023-06-01 NOTE — ANESTHESIA PROCEDURE NOTES
Intubation    Date/Time: 6/1/2023 12:47 PM  Performed by: Lily Kenyon CRNA  Authorized by: Shea Amezquita MD     Intubation:     Induction:  Rapid sequence induction    Intubated:  Postinduction    Mask Ventilation:  Not attempted    Attempts:  1    Attempted By:  CRNA    Method of Intubation:  Video laryngoscopy    Blade:  Chuckie 3    Laryngeal View Grade: Grade I - full view of cords      Difficult Airway Encountered?: No      Complications:  None    Airway Device:  Oral endotracheal tube    Airway Device Size:  7.5    Style/Cuff Inflation:  Cuffed (inflated to minimal occlusive pressure)    Tube secured:  22    Secured at:  The lips    Placement Verified By:  Capnometry    Complicating Factors:  None    Findings Post-Intubation:  BS equal bilateral

## 2023-06-01 NOTE — BRIEF OP NOTE
[facility] - Surgery ([facility])  Brief Operative Note    SUMMARY     Surgery Date: 6/1/2023     Surgeon(s) and Role:     * Ilia Puri MD - Primary     * Henry Groves MD - Resident - Assisting     * Tim Avalos MD - Resident - Assisting        Pre-op Diagnosis:  Symptomatic cholelithiasis [K80.20]    Post-op Diagnosis:  Post-Op Diagnosis Codes:     * Symptomatic cholelithiasis [K80.20]    Procedure(s) (LRB):  CHOLECYSTECTOMY, LAPAROSCOPIC (N/A)    Anesthesia: Choice    Operative Findings: Moderately inflamed gallbladder. Minimal bile spillage. No apparent complications.    Estimated Blood Loss: 10mL    Estimated Blood Loss has been documented.         Specimens:   Specimen (24h ago, onward)       Start     Ordered    06/01/23 1322  Specimen to Pathology, Surgery General Surgery  Once        Comments: Pre-op Diagnosis: Symptomatic cholelithiasis [K80.20]Procedure(s):CHOLECYSTECTOMY, LAPAROSCOPIC Number of specimens: 1Name of specimens: 1-Gallbladder-Perm     References:    Click here for ordering Quick Tip   Question Answer Comment   Procedure Type: General Surgery    Specimen Class: Complex case/Special    Which provider would you like to cc? ILIA PURI    Release to patient Immediate        06/01/23 1321                    [id number]

## 2023-06-01 NOTE — ED NOTES
Pt alert/oriented x4 at baseline at this time. Pt not diaphoretic, skin warm/dry. Pt given 3 cups of orange juice and an orange popsickle. Will continue to monitor. Care on going

## 2023-06-01 NOTE — ED NOTES
Pt given 2 additional orange juice. Pt remains at baseline Aox4. Denies any symptoms at this time. Care ongoing

## 2023-06-01 NOTE — TRANSFER OF CARE
"Anesthesia Transfer of Care Note    Patient: Caitie Wang    Procedure(s) Performed: Procedure(s) (LRB):  CHOLECYSTECTOMY, LAPAROSCOPIC (N/A)    Patient location: PACU    Transport from OR: Transported from OR on 6-10 L/min O2 by face mask with adequate spontaneous ventilation    Post pain: adequate analgesia    Post assessment: no apparent anesthetic complications    Post vital signs: stable    Level of consciousness: responds to stimulation and awake    Nausea/Vomiting: no nausea/vomiting    Complications: none    Transfer of care protocol was followed      Last vitals:   Visit Vitals  BP (!) 172/87 (BP Location: Right arm, Patient Position: Lying)   Pulse 85   Temp 36.1 °C (97 °F) (Temporal)   Resp 20   Ht 5' 4" (1.626 m)   Wt 108.9 kg (240 lb 1.3 oz)   SpO2 98%   Breastfeeding No   BMI 41.21 kg/m²     "

## 2023-06-01 NOTE — ANESTHESIA PREPROCEDURE EVALUATION
06/01/2023  Caitie Wang is a 43 y.o., female.    Patient Active Problem List   Diagnosis    Dysphonia    Right knee pain    Epigastric pain    Transaminitis    Pain in right wrist    Decreased range of motion of wrist    Pain aggravated by activities of daily living    Weakness of right hand    Nausea    Calculus of gallbladder with acute cholecystitis without obstruction    Hyperlipidemia    Type 2 diabetes mellitus with hypoglycemia, without long-term current use of insulin    Asthma     Past Surgical History:   Procedure Laterality Date    CARPAL TUNNEL RELEASE      CERVICAL SPINE SURGERY      endometriosis      THYROID SURGERY           Pre-op Assessment    I have reviewed the Patient Summary Reports.     I have reviewed the Nursing Notes. I have reviewed the NPO Status.      Review of Systems      Physical Exam  General: Well nourished    Airway:  Mallampati: II   Mouth Opening: Normal  TM Distance: Normal  Tongue: Normal  Neck ROM: Normal ROM        Anesthesia Plan  Type of Anesthesia, risks & benefits discussed:    Anesthesia Type: Gen ETT, Gen Natural Airway  Intra-op Monitoring Plan: Standard ASA Monitors  Post Op Pain Control Plan: multimodal analgesia  Induction:  IV  Airway Plan: Direct  Informed Consent: Informed consent signed with the Patient and all parties understand the risks and agree with anesthesia plan.  All questions answered.   ASA Score: 3  Day of Surgery Review of History & Physical: H&P Update referred to the surgeon/provider.    Ready For Surgery From Anesthesia Perspective.     .

## 2023-06-01 NOTE — PLAN OF CARE
05/31/23 2134   Post-Acute Status   Post-Acute Authorization Other   Other Status Awaiting f/u Appts   Discharge Delays None known at this time   Discharge Plan   Discharge Plan A Home with family   Discharge Plan B Other  (pt requested a follow up and or referral for psychiatric services)         Hayder met pt at bedside to complete pt's discharge assessment. Pt requested a rollator due to decreased mobility. Hayder advised pt to reach out to pt's  insurance provider  to ensure rollators are covered, Hayder provided pt with [facility]'s contact number. Pt stated she suffers from depression and anxiety . As per pt, her previous psychiatric provider no longer practices.Pt requested a referral for outpatient psychiatric services., Hayder provided pt with the provider finder contact number for pt's insurance.  .      Padma Dooley LMSW  Case Management  Emergency Department  [phone]

## 2023-06-01 NOTE — ED NOTES
[facility]  [facility] date:   Expected time:   Means of arrival:   Comments:  Robin

## 2023-06-01 NOTE — OP NOTE
[facility] - Surgery ([facility])  Surgery Department  Operative Note    SUMMARY     Date of Procedure: 6/1/2023     Procedure: Procedure(s) (LRB):  CHOLECYSTECTOMY, LAPAROSCOPIC (N/A)     Surgeon(s) and Role:     * Eleuterio Puri MD - Primary     * Henry Groves MD - Resident - Assisting     * Tim Avalos MD - Resident - Assisting    Pre-Operative Diagnosis: Symptomatic cholelithiasis [K80.20]    Post-Operative Diagnosis: Post-Op Diagnosis Codes:     * Symptomatic cholelithiasis [K80.20]    Anesthesia: Choice    Operative Findings (including complications, if any): Moderately inflamed gallbladder. Minimal bile spillage. No apparent complications.    Indications: Caitie Wang is a 43 year-old woman with a history of asthma, depression, hypertension, hyperlipidemia, type 2 diabetes, and obesity (BMI 41.2 kg/m^2) who presented to the ER with symptomatic cholelithiasis.  General surgery was consulted for concerns of acute cholecystitis.  The imaging findings were not consistent with that concern though she did have a positive Mclean's sign.  We offered her laparoscopic cholecystectomy for which she agreed.  The risks and benefits of the procedure were discussed with her and she agreed to proceed.  Informed consent was obtained.    Description of Technical Procedures: The patient was identified in Preoperative Holding and brought back to the Operating Room. She was placed supine on the operating table and padded appropriately. Monitors were applied and there was smooth induction of general endotracheal anesthesia. The patient's abdomen was prepped and draped in the standard sterile surgical fashion. A time-out was performed and all team members present agreed this was the correct procedure on the correct patient. We also confirmed administration of appropriate preoperative antibiotics.    An infraumbilical incision was made. This was carried down with electrocautery and blunt dissection to the linea alba. Each  side of the linea alba was grasped with a Kocher clamp and elevated. This was opened sharply in the midline with Metzembaum Scissors. A Lisa clamp was then used to bluntly dissect through the peritoneum and intraabdominal access was obtained. At this time, an 0 Vicryl suture was placed in a figure of eight fashion through the fascia. The needle was cut off and the ends clamped with a hemostat. A 12-mm trocar was then placed through this incision and into the abdominal cavity. The abdomen was then insufflated to 15mmHg. Three additional ports were then placed under direct laparoscopic visualization including an epigastric port, a midclavicular subcostal port on the right and lastly a right anterior axillary subcostal port. The patient was then placed in reverse Trendelenburg position and tilted to the left. The gallbladder was grasped and elevated cephalad and laterally. Similarly, the base was grasped and pulled inferiorly and laterally. At this time, using blunt instrumentation, the peritoneum around the gallbladder was opened, exposing the base of the gallbladder. We continued to take down attachments both bluntly and with hook electrocautery, exposing the cystic artery and cystic duct. The diminutive cystic duct was taken with electrocautery near the infundibulum leaving a moderate sized cystic duct stump that was snared with two endo-loops. The cystic artery was clipped with two clips proximally and one distally and then transected. We then took the gallbladder off the liver bed using electrocautery. An EndoCatch bag was then inserted through the umbilical port and a 5mm camera was inserted through the epigastric port. The gall bladder was placed into the Endocatch bag and removed cleanly from the abdomen. The gallbladder was then inspected for any abnormalities and passed off the table. The cystic plate and surrounding structures were then thoroughly inspected and all bleeding was stopped with electrocautery.  The area was irrigated copiously. The clips were inspected and in good position on the cystic artery and duct. The fluid was suctioned. There was no further bleeding from the liver bed. The ports were then removed under direct visualization. The fascia of the supraumbilical incision was tied down using the previously placed Vicryl suture and the skin of all four ports was closed with 4-0 Monocryl suture in subcuticular fashion. Dermabond was then applied. The patient was allowed to awake from general anesthesia and transferred to the Recovery Room in satisfactory condition.     Estimated Blood Loss (EBL): 10mL           Implants: * No implants in log *    Specimens:   Specimen (24h ago, onward)       Start     Ordered    06/01/23 1322  Specimen to Pathology, Surgery General Surgery  Once        Comments: Pre-op Diagnosis: Symptomatic cholelithiasis [K80.20]Procedure(s):CHOLECYSTECTOMY, LAPAROSCOPIC Number of specimens: 1Name of specimens: 1-Gallbladder-Perm     References:    Click here for ordering Quick Tip   Question Answer Comment   Procedure Type: General Surgery    Specimen Class: Complex case/Special    Which provider would you like to cc? ILIA WARREN    Release to patient Immediate        06/01/23 1321                            Condition: Good    Disposition: PACU - hemodynamically stable.    Attestation: I was present for the key portions of the procedure.

## 2023-06-01 NOTE — ASSESSMENT & PLAN NOTE
- Hypoglycemia overnight, improved this AM  - SSI   - POCT glucose checks  - Hypoglycemia protocol  - Holding home meds in setting of acute disease process

## 2023-06-01 NOTE — ASSESSMENT & PLAN NOTE
Patient is a 43 year old female with h/o thyroid disease, asthma, depression, anxiety, HLD, HTN, endometriosis, MITZI, DMII, hiatal hernia, obesity (BMI 41) who presents to the ED complaining of abdominal pain and imaging concerning for symptomatic cholelithiasis. Positive jennings's on US but no pericholecystic fluid, wall thickening, leukocytosis, or left shift    - NPO  - To OR today for CCY   - Will get consent   - IVF  - Continue antibiotics (zosyn)  - PRN pain and nausea medications  - Daily labs  - Replete electrolytes PRN  - DVT prophylaxis (SCDs and lovenox)  - OOB, ambulate      Dispo: likely home after procedure    Return to ER if symptoms worsen   Increase oral fluids

## 2023-06-02 VITALS
HEIGHT: 64 IN | TEMPERATURE: 98 F | OXYGEN SATURATION: 99 % | BODY MASS INDEX: 40.98 KG/M2 | RESPIRATION RATE: 18 BRPM | SYSTOLIC BLOOD PRESSURE: 130 MMHG | WEIGHT: 240.06 LBS | HEART RATE: 87 BPM | DIASTOLIC BLOOD PRESSURE: 84 MMHG

## 2023-06-02 LAB
ALBUMIN SERPL BCP-MCNC: 3.6 G/DL (ref 3.5–5.2)
ALP SERPL-CCNC: 90 U/L (ref 55–135)
ALT SERPL W/O P-5'-P-CCNC: 23 U/L (ref 10–44)
ANION GAP SERPL CALC-SCNC: 13 MMOL/L (ref 8–16)
AST SERPL-CCNC: 28 U/L (ref 10–40)
BASOPHILS # BLD AUTO: 0.01 K/UL (ref 0–0.2)
BASOPHILS NFR BLD: 0.1 % (ref 0–1.9)
BILIRUB SERPL-MCNC: 0.6 MG/DL (ref 0.1–1)
BUN SERPL-MCNC: 7 MG/DL (ref 6–20)
CALCIUM SERPL-MCNC: 9.8 MG/DL (ref 8.7–10.5)
CHLORIDE SERPL-SCNC: 105 MMOL/L (ref 95–110)
CO2 SERPL-SCNC: 15 MMOL/L (ref 23–29)
CREAT SERPL-MCNC: 0.8 MG/DL (ref 0.5–1.4)
DIFFERENTIAL METHOD: ABNORMAL
EOSINOPHIL # BLD AUTO: 0 K/UL (ref 0–0.5)
EOSINOPHIL NFR BLD: 0 % (ref 0–8)
ERYTHROCYTE [DISTWIDTH] IN BLOOD BY AUTOMATED COUNT: 13 % (ref 11.5–14.5)
EST. GFR  (NO RACE VARIABLE): >60 ML/MIN/1.73 M^2
GLUCOSE SERPL-MCNC: 106 MG/DL (ref 70–110)
HCT VFR BLD AUTO: 39.9 % (ref 37–48.5)
HGB BLD-MCNC: 12.8 G/DL (ref 12–16)
IMM GRANULOCYTES # BLD AUTO: 0.04 K/UL (ref 0–0.04)
IMM GRANULOCYTES NFR BLD AUTO: 0.3 % (ref 0–0.5)
LYMPHOCYTES # BLD AUTO: 1.5 K/UL (ref 1–4.8)
LYMPHOCYTES NFR BLD: 10.8 % (ref 18–48)
MAGNESIUM SERPL-MCNC: 2.3 MG/DL (ref 1.6–2.6)
MCH RBC QN AUTO: 30.4 PG (ref 27–31)
MCHC RBC AUTO-ENTMCNC: 32.1 G/DL (ref 32–36)
MCV RBC AUTO: 95 FL (ref 82–98)
MONOCYTES # BLD AUTO: 0.9 K/UL (ref 0.3–1)
MONOCYTES NFR BLD: 6.1 % (ref 4–15)
NEUTROPHILS # BLD AUTO: 11.4 K/UL (ref 1.8–7.7)
NEUTROPHILS NFR BLD: 82.7 % (ref 38–73)
NRBC BLD-RTO: 0 /100 WBC
PHOSPHATE SERPL-MCNC: 3.7 MG/DL (ref 2.7–4.5)
PLATELET # BLD AUTO: 301 K/UL (ref 150–450)
PMV BLD AUTO: 10.7 FL (ref 9.2–12.9)
POCT GLUCOSE: 102 MG/DL (ref 70–110)
POCT GLUCOSE: 123 MG/DL (ref 70–110)
POCT GLUCOSE: 80 MG/DL (ref 70–110)
POTASSIUM SERPL-SCNC: 4.9 MMOL/L (ref 3.5–5.1)
PROT SERPL-MCNC: 7.5 G/DL (ref 6–8.4)
RBC # BLD AUTO: 4.21 M/UL (ref 4–5.4)
SODIUM SERPL-SCNC: 133 MMOL/L (ref 136–145)
WBC # BLD AUTO: 13.84 K/UL (ref 3.9–12.7)

## 2023-06-02 PROCEDURE — G0378 HOSPITAL OBSERVATION PER HR: HCPCS

## 2023-06-02 PROCEDURE — 94761 N-INVAS EAR/PLS OXIMETRY MLT: CPT

## 2023-06-02 PROCEDURE — 25000003 PHARM REV CODE 250: Performed by: STUDENT IN AN ORGANIZED HEALTH CARE EDUCATION/TRAINING PROGRAM

## 2023-06-02 PROCEDURE — 83735 ASSAY OF MAGNESIUM: CPT | Performed by: STUDENT IN AN ORGANIZED HEALTH CARE EDUCATION/TRAINING PROGRAM

## 2023-06-02 PROCEDURE — 84100 ASSAY OF PHOSPHORUS: CPT | Performed by: STUDENT IN AN ORGANIZED HEALTH CARE EDUCATION/TRAINING PROGRAM

## 2023-06-02 PROCEDURE — 80053 COMPREHEN METABOLIC PANEL: CPT | Performed by: STUDENT IN AN ORGANIZED HEALTH CARE EDUCATION/TRAINING PROGRAM

## 2023-06-02 PROCEDURE — 85025 COMPLETE CBC W/AUTO DIFF WBC: CPT | Performed by: STUDENT IN AN ORGANIZED HEALTH CARE EDUCATION/TRAINING PROGRAM

## 2023-06-02 PROCEDURE — 63600175 PHARM REV CODE 636 W HCPCS: Performed by: STUDENT IN AN ORGANIZED HEALTH CARE EDUCATION/TRAINING PROGRAM

## 2023-06-02 PROCEDURE — 36415 COLL VENOUS BLD VENIPUNCTURE: CPT | Performed by: STUDENT IN AN ORGANIZED HEALTH CARE EDUCATION/TRAINING PROGRAM

## 2023-06-02 PROCEDURE — C9113 INJ PANTOPRAZOLE SODIUM, VIA: HCPCS | Performed by: STUDENT IN AN ORGANIZED HEALTH CARE EDUCATION/TRAINING PROGRAM

## 2023-06-02 RX ORDER — OXYCODONE HYDROCHLORIDE 5 MG/1
5 TABLET ORAL EVERY 4 HOURS PRN
Qty: 12 TABLET | Refills: 0 | Status: SHIPPED | OUTPATIENT
Start: 2023-06-02 | End: 2023-06-27

## 2023-06-02 RX ADMIN — ONDANSETRON 4 MG: 4 TABLET, ORALLY DISINTEGRATING ORAL at 05:06

## 2023-06-02 RX ADMIN — PANTOPRAZOLE SODIUM 40 MG: 40 INJECTION, POWDER, FOR SOLUTION INTRAVENOUS at 08:06

## 2023-06-02 RX ADMIN — ATORVASTATIN CALCIUM 40 MG: 40 TABLET, FILM COATED ORAL at 08:06

## 2023-06-02 RX ADMIN — OXYCODONE HYDROCHLORIDE 5 MG: 5 TABLET ORAL at 05:06

## 2023-06-02 NOTE — DISCHARGE SUMMARY
[facility]  General Surgery  Discharge Summary      Patient Name: Caitie Wang  MRN: [id number]  Admission Date: 5/31/2023  Hospital Length of Stay: 0 days  Discharge Date and Time:  06/02/2023 7:48 AM  Attending Physician: Eleuterio Puri MD   Discharging Provider: William Ortega MD  Primary Care Provider: Juana Vines MD    HPI:   Patient is a 43 year old female with h/o thyroid disease, asthma, depression, anxiety, HLD, HTN, endometriosis, MITZI, DMII, hiatal hernia, obesity (BMI 41) who presents to the ED complaining of abdominal pain. She reports having the pain for months and it is worsening in severity and frequency over time. Pain is in epigastrium and radiates to RUQ and around her R flank. Pain is cramping and sharp in nature. Worse after meals. No known alleviating factors. Sometimes associated with nausea although not having any today. Also has some constipation which is not unusual for her. Denies fever, chills, emesis, diarrhea, CP, SOB, and all other symptoms.     Surgical hx: dx lap for endometriosis   No AC    In the ED, she is afebrile, HDS.   CBC, CMP unremarkable   US abd with cholelithiasis, concern for acute cholecystitis       Procedure(s) (LRB):  CHOLECYSTECTOMY, LAPAROSCOPIC (N/A)      Indwelling Lines/Drains at time of discharge:   Lines/Drains/Airways     None               Hospital Course: Caitie Wang underwent lap cholecystectomy on 6/1/23. There were no complications and the surgery was tolerated well. Caitie Wang recovered in the PACU and was transferred to the floor.  By POD1 the patient was tolerating a regular diet and ambulating without difficulty. Pain was well-controlled with prn meds. Follow up information was provided and Caitie Wang was deemed ready for discharge.     Physical Exam  Constitutional:       General: No acute distress.     Appearance: Well-developed.   HENT:      Head: Normocephalic and atraumatic.   Eyes:      General:          Right eye: No discharge.         Left eye: No discharge.      Conjunctiva/sclera: Conjunctivae normal.   Neck:      Musculoskeletal: Normal range of motion.   Cardiovascular:      Rate and Rhythm: Normal rate and regular rhythm.   Pulmonary:      Effort: Pulmonary effort is normal. No respiratory distress.      Breath sounds: No stridor. No wheezing or rales.   Abdominal:      General: There is no distension.      Palpations: Abdomen is soft.      Tenderness: There is abdominal tenderness (appropriate postop).      Comments: Incisions c/d/i   Musculoskeletal: Normal range of motion.         General: No deformity.   Skin:     General: Skin is warm and dry.   Neurological:      Mental Status: Alert and oriented to person, place, and time.   Psychiatric:         Behavior: Behavior normal.         Goals of Care Treatment Preferences:  Code Status: Full Code      Consults:   Consults (From admission, onward)        Status Ordering Provider     Inpatient consult to General surgery  Once        Provider:  (Not yet assigned)    Completed LES JAMES          Significant Diagnostic Studies: Labs:   CMP   Recent Labs   Lab 05/31/23  1326 06/01/23  0325 06/02/23 0527    137 133*   K 3.9 3.9 4.9    106 105   CO2 22* 25 15*   GLU 73 86 106   BUN 10 7 7   CREATININE 1.0 0.8 0.8   CALCIUM 9.4 9.1 9.8   PROT 7.8 7.1 7.5   ALBUMIN 3.9 3.6 3.6   BILITOT 0.3 0.7 0.6   ALKPHOS 88 83 90   AST 16 12 28   ALT 15 14 23   ANIONGAP 11 6* 13    and CBC   Recent Labs   Lab 05/31/23  1326 06/01/23 0325   WBC 7.01 7.72   HGB 12.7 12.3   HCT 39.9 38.5    336       Pending Diagnostic Studies:     Procedure Component Value Units Date/Time    CBC auto differential [[id number]] Collected: 06/02/23 0527    Order Status: Sent Lab Status: In process Updated: 06/02/23 0630    Specimen: Blood     Specimen to Pathology, Surgery General Surgery [[id number]] Collected: 06/01/23 1322    Order Status: Sent Lab Status: In process Updated:  06/01/23 1718    Specimen: Tissue         Final Active Diagnoses:    Diagnosis Date Noted POA    PRINCIPAL PROBLEM:  Calculus of gallbladder with acute cholecystitis without obstruction [K80.00] 05/31/2023 Yes    Hyperlipidemia [E78.5] 06/01/2023 Yes    Type 2 diabetes mellitus with hypoglycemia, without long-term current use of insulin [E11.649] 06/01/2023 Yes    Asthma [J45.909] 06/01/2023 Yes      Problems Resolved During this Admission:      Discharged Condition: good    Disposition: Home or Self Care    Follow Up:   Follow-up Information     Eleuterio Puri MD Follow up in 2 week(s).    Specialty: General Surgery  Why: Postop Visit  Contact information:  [address] [facility] [address]  [phone]                       Patient Instructions:      Diet Adult Regular     Notify your health care provider if you experience any of the following:  temperature >100.4     Notify your health care provider if you experience any of the following:  persistent nausea and vomiting or diarrhea     Notify your health care provider if you experience any of the following:  severe uncontrolled pain     Notify your health care provider if you experience any of the following:  redness, tenderness, or signs of infection (pain, swelling, redness, odor or green/yellow discharge around incision site)     Notify your health care provider if you experience any of the following:  difficulty breathing or increased cough     Notify your health care provider if you experience any of the following:  severe persistent headache     Notify your health care provider if you experience any of the following:  persistent dizziness, light-headedness, or visual disturbances     Notify your health care provider if you experience any of the following:  increased confusion or weakness     Activity as tolerated     Medications:  Reconciled Home Medications:      Medication List      START taking these medications    oxyCODONE 5 MG immediate  release tablet  Commonly known as: ROXICODONE  Take 1 tablet (5 mg total) by mouth every 4 (four) hours as needed for Pain.        CONTINUE taking these medications    acetaminophen 325 MG tablet  Commonly known as: TYLENOL  Take 2 tablets (650 mg total) by mouth every 6 (six) hours as needed for Pain.     aspirin 325 MG tablet  3 tablets by mouth 3 times daily x 3 days, then twice daily x 14 days     * atorvastatin 40 MG tablet  Commonly known as: LIPITOR  TAKE 1 TABLET BY MOUTH DAILY TO LOWER LIPIDS.     * atorvastatin 40 MG tablet  Commonly known as: LIPITOR  Take 40 mg by mouth.     baclofen 20 MG tablet  Commonly known as: LIORESAL  Take 1 tablet (20 mg total) by mouth 3 (three) times daily. for 3 days     cetirizine 10 MG tablet  Commonly known as: ZYRTEC  Take 1 tablet (10 mg total) by mouth once daily.     estradiol cypionate 5 mg/mL injection  Commonly known as: DEPO-ESTRADIOL  Inject into the muscle every 28 days.     famciclovir 500 MG tablet  Commonly known as: FAMVIR  Take 500 mg by mouth 3 (three) times daily.     folic acid 1 MG tablet  Commonly known as: FOLVITE  Take 1 mg by mouth once daily.     gabapentin 300 MG capsule  Commonly known as: NEURONTIN  Take 1 capsule (300 mg total) by mouth 3 (three) times daily.     hydrOXYzine 50 MG tablet  Commonly known as: ATARAX  Take 50 mg by mouth every evening.     ibuprofen 600 MG tablet  Commonly known as: ADVIL,MOTRIN  Take 1 tablet (600 mg total) by mouth every 6 (six) hours as needed for Pain.     imipramine 25 MG tablet  Commonly known as: TOFRANIL  Take 25 mg by mouth every evening.     levothyroxine 150 MCG tablet  Commonly known as: SYNTHROID  Take 1 tablet (150 mcg total) by mouth once daily.     LIDOcaine 5 %  Commonly known as: LIDODERM  Place 1 patch onto the skin daily as needed (pain). Remove & Discard patch within 12 hours or as directed by MD MADRIGAL 145 mcg Cap capsule  Generic drug: linaCLOtide  Take 145 mcg by mouth.     meclizine  12.5 mg tablet  Commonly known as: ANTIVERT  Take 25 mg by mouth.     medroxyPROGESTERone 150 mg/mL Syrg  Commonly known as: DEPO-PROVERA     meloxicam 7.5 MG tablet  Commonly known as: MOBIC  Take 7.5 mg by mouth.     metFORMIN 500 MG tablet  Commonly known as: GLUCOPHAGE  Take 1 tablet (500 mg total) by mouth daily with breakfast.     mometasone 50 mcg/actuation nasal spray  Commonly known as: NASONEX  2 sprays by Nasal route once daily.     montelukast 10 mg tablet  Commonly known as: SINGULAIR  Take 10 mg by mouth.     omeprazole 40 MG capsule  Commonly known as: PRILOSEC  Take 1 capsule (40 mg total) by mouth once daily.     ondansetron 4 MG Tbdl  Commonly known as: ZOFRAN-ODT  Take 1 tablet (4 mg total) by mouth every 8 (eight) hours as needed (nausea).     ondansetron 8 MG tablet  Commonly known as: ZOFRAN  Take 8 mg by mouth every 8 (eight) hours as needed.     pravastatin 40 MG tablet  Commonly known as: PRAVACHOL  Take 40 mg by mouth once daily.     PROAIR HFA INHL  Inhale into the lungs.     * traMADoL 50 mg tablet  Commonly known as: ULTRAM  One tablet orally twice daily as needed for pain     * traMADoL 50 mg tablet  Commonly known as: ULTRAM  Take 50 mg by mouth 2 (two) times daily as needed.     * TRULICITY 0.75 mg/0.5 mL pen injector  Generic drug: dulaglutide  SMARTSI.5 Milliliter(s) SUB-Q Once a Week     * TRULICITY 0.75 mg/0.5 mL pen injector  Generic drug: dulaglutide  Inject 0.75mg into the skin weekly     VITAMIN D2 50,000 unit Cap  Generic drug: ergocalciferol         * This list has 6 medication(s) that are the same as other medications prescribed for you. Read the directions carefully, and ask your doctor or other care provider to review them with you.                  William Ortega MD  General Surgery  [facility] - Surgery

## 2023-06-02 NOTE — NURSING
Pt given discharge instructions on surgical follow up appointment, incision care, medications, work return, and signs and symptoms to notify provider of. Pt verbalizes understanding of discharge education.

## 2023-06-02 NOTE — HOSPITAL COURSE
Caitie Wang underwent lap cholecystectomy on 6/1/23. There were no complications and the surgery was tolerated well. Caitie Wang recovered in the PACU and was transferred to the floor.  By POD1 the patient was tolerating a regular diet and ambulating without difficulty. Pain was well-controlled with prn meds. Follow up information was provided and Caitie Wang was deemed ready for discharge.     Physical Exam  Constitutional:       General: No acute distress.     Appearance: Well-developed.   HENT:      Head: Normocephalic and atraumatic.   Eyes:      General:         Right eye: No discharge.         Left eye: No discharge.      Conjunctiva/sclera: Conjunctivae normal.   Neck:      Musculoskeletal: Normal range of motion.   Cardiovascular:      Rate and Rhythm: Normal rate and regular rhythm.   Pulmonary:      Effort: Pulmonary effort is normal. No respiratory distress.      Breath sounds: No stridor. No wheezing or rales.   Abdominal:      General: There is no distension.      Palpations: Abdomen is soft.      Tenderness: There is abdominal tenderness (appropriate postop).      Comments: Incisions c/d/i   Musculoskeletal: Normal range of motion.         General: No deformity.   Skin:     General: Skin is warm and dry.   Neurological:      Mental Status: Alert and oriented to person, place, and time.   Psychiatric:         Behavior: Behavior normal.

## 2023-06-05 NOTE — PLAN OF CARE
[facility]  Discharge Final Note    Primary Care Provider: Juana Vines MD    Expected Discharge Date: 6/2/2023    Final Discharge Note (most recent)       Final Note - 06/02/23 1303          Final Note    Assessment Type Final Discharge Note     Anticipated Discharge Disposition Home or Self Care     Hospital Resources/Appts/Education Provided Provided patient/caregiver with written discharge plan information;Appointments scheduled by Navigator/Coordinator                   Future Appointments   Date Time Provider Department Center   6/14/2023  2:00 PM Eleuterio Puri MD [facility]   6/27/2023  2:00 PM [facility]   '     Contact Info       Eleuterio Puri MD   Specialty: General Surgery    [address]  [facility] [address]   Phone: [phone]       Next Steps: Follow up in 2 week(s)    Instructions: Postop Visit

## 2023-06-13 LAB
FINAL PATHOLOGIC DIAGNOSIS: NORMAL
GROSS: NORMAL
Lab: NORMAL

## 2023-06-14 ENCOUNTER — OFFICE VISIT (OUTPATIENT)
Dept: SURGERY | Facility: CLINIC | Age: 43
End: 2023-06-14
Payer: MEDICAID

## 2023-06-14 ENCOUNTER — TELEPHONE (OUTPATIENT)
Dept: SURGERY | Facility: CLINIC | Age: 43
End: 2023-06-14

## 2023-06-14 VITALS
DIASTOLIC BLOOD PRESSURE: 77 MMHG | OXYGEN SATURATION: 100 % | HEIGHT: 64 IN | SYSTOLIC BLOOD PRESSURE: 132 MMHG | WEIGHT: 240.63 LBS | BODY MASS INDEX: 41.08 KG/M2 | HEART RATE: 102 BPM

## 2023-06-14 DIAGNOSIS — Z90.49 S/P LAPAROSCOPIC CHOLECYSTECTOMY: Primary | ICD-10-CM

## 2023-06-14 PROCEDURE — 99215 OFFICE O/P EST HI 40 MIN: CPT | Mod: PBBFAC | Performed by: STUDENT IN AN ORGANIZED HEALTH CARE EDUCATION/TRAINING PROGRAM

## 2023-06-14 PROCEDURE — 3075F SYST BP GE 130 - 139MM HG: CPT | Mod: CPTII,,, | Performed by: STUDENT IN AN ORGANIZED HEALTH CARE EDUCATION/TRAINING PROGRAM

## 2023-06-14 PROCEDURE — 3078F PR MOST RECENT DIASTOLIC BLOOD PRESSURE < 80 MM HG: ICD-10-PCS | Mod: CPTII,,, | Performed by: STUDENT IN AN ORGANIZED HEALTH CARE EDUCATION/TRAINING PROGRAM

## 2023-06-14 PROCEDURE — 99999 PR PBB SHADOW E&M-EST. PATIENT-LVL V: ICD-10-PCS | Mod: PBBFAC,,, | Performed by: STUDENT IN AN ORGANIZED HEALTH CARE EDUCATION/TRAINING PROGRAM

## 2023-06-14 PROCEDURE — 99024 POSTOP FOLLOW-UP VISIT: CPT | Mod: ,,, | Performed by: STUDENT IN AN ORGANIZED HEALTH CARE EDUCATION/TRAINING PROGRAM

## 2023-06-14 PROCEDURE — 99999 PR PBB SHADOW E&M-EST. PATIENT-LVL V: CPT | Mod: PBBFAC,,, | Performed by: STUDENT IN AN ORGANIZED HEALTH CARE EDUCATION/TRAINING PROGRAM

## 2023-06-14 PROCEDURE — 1159F PR MEDICATION LIST DOCUMENTED IN MEDICAL RECORD: ICD-10-PCS | Mod: CPTII,,, | Performed by: STUDENT IN AN ORGANIZED HEALTH CARE EDUCATION/TRAINING PROGRAM

## 2023-06-14 PROCEDURE — 3078F DIAST BP <80 MM HG: CPT | Mod: CPTII,,, | Performed by: STUDENT IN AN ORGANIZED HEALTH CARE EDUCATION/TRAINING PROGRAM

## 2023-06-14 PROCEDURE — 1160F PR REVIEW ALL MEDS BY PRESCRIBER/CLIN PHARMACIST DOCUMENTED: ICD-10-PCS | Mod: CPTII,,, | Performed by: STUDENT IN AN ORGANIZED HEALTH CARE EDUCATION/TRAINING PROGRAM

## 2023-06-14 PROCEDURE — 3008F BODY MASS INDEX DOCD: CPT | Mod: CPTII,,, | Performed by: STUDENT IN AN ORGANIZED HEALTH CARE EDUCATION/TRAINING PROGRAM

## 2023-06-14 PROCEDURE — 3075F PR MOST RECENT SYSTOLIC BLOOD PRESS GE 130-139MM HG: ICD-10-PCS | Mod: CPTII,,, | Performed by: STUDENT IN AN ORGANIZED HEALTH CARE EDUCATION/TRAINING PROGRAM

## 2023-06-14 PROCEDURE — 1160F RVW MEDS BY RX/DR IN RCRD: CPT | Mod: CPTII,,, | Performed by: STUDENT IN AN ORGANIZED HEALTH CARE EDUCATION/TRAINING PROGRAM

## 2023-06-14 PROCEDURE — 1159F MED LIST DOCD IN RCRD: CPT | Mod: CPTII,,, | Performed by: STUDENT IN AN ORGANIZED HEALTH CARE EDUCATION/TRAINING PROGRAM

## 2023-06-14 PROCEDURE — 99024 PR POST-OP FOLLOW-UP VISIT: ICD-10-PCS | Mod: ,,, | Performed by: STUDENT IN AN ORGANIZED HEALTH CARE EDUCATION/TRAINING PROGRAM

## 2023-06-14 PROCEDURE — 3008F PR BODY MASS INDEX (BMI) DOCUMENTED: ICD-10-PCS | Mod: CPTII,,, | Performed by: STUDENT IN AN ORGANIZED HEALTH CARE EDUCATION/TRAINING PROGRAM

## 2023-06-14 NOTE — PROGRESS NOTES
"[facility]  General Surgery  Post-Operative Note    Subjective:       Caitie Wang is a 43 year-old woman who presents to the clinic 2 weeks following lap cholecystectomy for biliary colic. She is eating a regular diet without difficulty. Bowel movements are  at baseline while taking Linzess .  Pain is controlled without any medications. She notices some discomfort when getting out of bed or up and off the sofa.     Objective:      /77 (BP Location: Left arm, Patient Position: Sitting)   Pulse 102   Ht 5' 4" (1.626 m)   Wt 109.1 kg (240 lb 10.1 oz)   SpO2 100%   BMI 41.30 kg/m²     General:  alert, appears stated age, and cooperative   Abdomen: soft, bowel sounds active, non-tender   Incision:   healing well, no drainage, no erythema, no hernia, no seroma, no swelling, no dehiscence, incision well approximated      Pathology:   Gallbladder (excision):   Cholelithiasis with chronic cholecystitis    Assessment:     Caitie Wang is a 43 year-old woman doing well s/p lap cholecystectomy    Plan:     1. Continue any current medications.  2. Wound care discussed.  3. Return to full duty in 4 weeks.  4. Follow up as needed.      Eleuterio Puri MD  General Surgery and Surgical Critical Care  [facility]  "

## 2023-07-07 ENCOUNTER — TELEPHONE (OUTPATIENT)
Dept: GASTROENTEROLOGY | Facility: CLINIC | Age: 43
End: 2023-07-07
Payer: MEDICAID

## 2023-07-07 DIAGNOSIS — R10.13 EPIGASTRIC PAIN: ICD-10-CM

## 2023-07-07 RX ORDER — OMEPRAZOLE 40 MG/1
40 CAPSULE, DELAYED RELEASE ORAL DAILY
Qty: 30 CAPSULE | Refills: 2 | Status: SHIPPED | OUTPATIENT
Start: 2023-07-07 | End: 2023-10-04

## 2023-07-07 NOTE — TELEPHONE ENCOUNTER
----- Message from Disha Donaldson sent at 7/7/2023  9:08 AM CDT -----  Type:  RX Refill Request    Who Called: pt   Refill or New Rx:refill  RX Name and Strength:omeprazole (PRILOSEC) 40 MG capsule  How is the patient currently taking it? (ex. 1XDay): Take 1 capsule (40 mg total) by mouth once daily  Is this a 30 day or 90 day RX:30  Preferred Pharmacy with phone number:[id number] [place] [address] [address] AT [facility] [address]   Phone:  [phone]  Fax:  [phone]        Local or Mail Order:local  Ordering Provider:Dr Elaine  Would the patient rather a call back or a response via [facility]?  call  Best Call Back Number:[phone]   Additional Information:

## 2023-07-28 ENCOUNTER — HOSPITAL ENCOUNTER (EMERGENCY)
Facility: HOSPITAL | Age: 43
Discharge: HOME | End: 2023-07-28
Attending: EMERGENCY MEDICINE
Payer: MEDICAID

## 2023-07-28 VITALS
OXYGEN SATURATION: 97 % | SYSTOLIC BLOOD PRESSURE: 127 MMHG | DIASTOLIC BLOOD PRESSURE: 80 MMHG | HEART RATE: 80 BPM | BODY MASS INDEX: 43.23 KG/M2 | HEIGHT: 63 IN | TEMPERATURE: 99 F | WEIGHT: 244 LBS | RESPIRATION RATE: 16 BRPM

## 2023-07-28 DIAGNOSIS — Z90.49 HX LAPAROSCOPIC CHOLECYSTECTOMY: ICD-10-CM

## 2023-07-28 DIAGNOSIS — R59.9 LYMPH NODE ENLARGEMENT: ICD-10-CM

## 2023-07-28 DIAGNOSIS — G89.18 POST-OP PAIN: Primary | ICD-10-CM

## 2023-07-28 LAB
ALBUMIN SERPL BCP-MCNC: 3.9 G/DL (ref 3.5–5.2)
ALP SERPL-CCNC: 83 U/L (ref 55–135)
ALT SERPL W/O P-5'-P-CCNC: 10 U/L (ref 10–44)
ANION GAP SERPL CALC-SCNC: 10 MMOL/L (ref 8–16)
AST SERPL-CCNC: 20 U/L (ref 10–40)
B-HCG UR QL: NEGATIVE
BASOPHILS # BLD AUTO: 0.03 K/UL (ref 0–0.2)
BASOPHILS NFR BLD: 0.4 % (ref 0–1.9)
BILIRUB SERPL-MCNC: 0.3 MG/DL (ref 0.1–1)
BILIRUB UR QL STRIP: NEGATIVE
BUN SERPL-MCNC: 9 MG/DL (ref 6–20)
CALCIUM SERPL-MCNC: 9.7 MG/DL (ref 8.7–10.5)
CHLORIDE SERPL-SCNC: 105 MMOL/L (ref 95–110)
CLARITY UR REFRACT.AUTO: CLEAR
CO2 SERPL-SCNC: 24 MMOL/L (ref 23–29)
COLOR UR AUTO: YELLOW
CREAT SERPL-MCNC: 0.9 MG/DL (ref 0.5–1.4)
CTP QC/QA: YES
DIFFERENTIAL METHOD: NORMAL
EOSINOPHIL # BLD AUTO: 0.2 K/UL (ref 0–0.5)
EOSINOPHIL NFR BLD: 2.2 % (ref 0–8)
ERYTHROCYTE [DISTWIDTH] IN BLOOD BY AUTOMATED COUNT: 13.2 % (ref 11.5–14.5)
EST. GFR  (NO RACE VARIABLE): >60 ML/MIN/1.73 M^2
GLUCOSE SERPL-MCNC: 69 MG/DL (ref 70–110)
GLUCOSE UR QL STRIP: NEGATIVE
HCT VFR BLD AUTO: 38.7 % (ref 37–48.5)
HGB BLD-MCNC: 12.8 G/DL (ref 12–16)
HGB UR QL STRIP: ABNORMAL
IMM GRANULOCYTES # BLD AUTO: 0.02 K/UL (ref 0–0.04)
IMM GRANULOCYTES NFR BLD AUTO: 0.3 % (ref 0–0.5)
KETONES UR QL STRIP: NEGATIVE
LEUKOCYTE ESTERASE UR QL STRIP: NEGATIVE
LIPASE SERPL-CCNC: 25 U/L (ref 4–60)
LYMPHOCYTES # BLD AUTO: 3.1 K/UL (ref 1–4.8)
LYMPHOCYTES NFR BLD: 43.1 % (ref 18–48)
MCH RBC QN AUTO: 30.4 PG (ref 27–31)
MCHC RBC AUTO-ENTMCNC: 33.1 G/DL (ref 32–36)
MCV RBC AUTO: 92 FL (ref 82–98)
MONOCYTES # BLD AUTO: 0.7 K/UL (ref 0.3–1)
MONOCYTES NFR BLD: 9.1 % (ref 4–15)
NEUTROPHILS # BLD AUTO: 3.2 K/UL (ref 1.8–7.7)
NEUTROPHILS NFR BLD: 44.9 % (ref 38–73)
NITRITE UR QL STRIP: NEGATIVE
NRBC BLD-RTO: 0 /100 WBC
PH UR STRIP: 6 [PH] (ref 5–8)
PLATELET # BLD AUTO: 332 K/UL (ref 150–450)
PLATELET BLD QL SMEAR: NORMAL
PMV BLD AUTO: 9.6 FL (ref 9.2–12.9)
POTASSIUM SERPL-SCNC: 4.3 MMOL/L (ref 3.5–5.1)
PROT SERPL-MCNC: 7.9 G/DL (ref 6–8.4)
PROT UR QL STRIP: NEGATIVE
RBC # BLD AUTO: 4.21 M/UL (ref 4–5.4)
SODIUM SERPL-SCNC: 139 MMOL/L (ref 136–145)
SP GR UR STRIP: 1.02 (ref 1–1.03)
URN SPEC COLLECT METH UR: ABNORMAL
WBC # BLD AUTO: 7.13 K/UL (ref 3.9–12.7)

## 2023-07-28 PROCEDURE — 82962 GLUCOSE BLOOD TEST: CPT

## 2023-07-28 PROCEDURE — 83690 ASSAY OF LIPASE: CPT | Performed by: PHYSICIAN ASSISTANT

## 2023-07-28 PROCEDURE — 85025 COMPLETE CBC W/AUTO DIFF WBC: CPT | Performed by: PHYSICIAN ASSISTANT

## 2023-07-28 PROCEDURE — 80053 COMPREHEN METABOLIC PANEL: CPT | Performed by: PHYSICIAN ASSISTANT

## 2023-07-28 PROCEDURE — 25500020 PHARM REV CODE 255: Performed by: EMERGENCY MEDICINE

## 2023-07-28 PROCEDURE — 81003 URINALYSIS AUTO W/O SCOPE: CPT | Performed by: PHYSICIAN ASSISTANT

## 2023-07-28 PROCEDURE — 81025 URINE PREGNANCY TEST: CPT | Performed by: PHYSICIAN ASSISTANT

## 2023-07-28 PROCEDURE — 99285 EMERGENCY DEPT VISIT HI MDM: CPT | Mod: 25

## 2023-07-28 RX ADMIN — IOHEXOL 100 ML: 350 INJECTION, SOLUTION INTRAVENOUS at 07:07

## 2023-07-28 NOTE — ED TRIAGE NOTES
Patient reports having gallbladder removed May 31st and was spotting on July 15th and 16th. She reports still having abdominal pain and pain from her scar. AAOx3 NADN

## 2023-07-28 NOTE — Clinical Note
"Caitie DIANA "Caitiemarcie Wang was seen and treated in our emergency department on 7/28/2023.  She may return to work on 07/29/2023.       If you have any questions or concerns, please don't hesitate to call.      Irina Calderón PA-C"

## 2023-07-28 NOTE — FIRST PROVIDER EVALUATION
Emergency Department TeleTriage Encounter Note      CHIEF COMPLAINT    Chief Complaint   Patient presents with    Post-op Problem     Choley may 31, incision from laparoscopic surg hurting, no period for 25 yrs, was spotting 2 d ago        VITAL SIGNS   Initial Vitals [07/28/23 1432]   BP Pulse Resp Temp SpO2   (!) 183/103 88 18 99 °F (37.2 °C) 98 %      MAP       --            ALLERGIES    Review of patient's allergies indicates:   Allergen Reactions    Cheese Itching and Rash       PROVIDER TRIAGE NOTE  This is a teletriage evaluation of a 43 y.o. female presenting to the ED complaining of abdominal pain. Patient reports cholecystectomy on 5/31/23. She recently went back to work, and has started with right sided abdominal pain. She also reports vaginal bleeding intermittently. She is currently on depo shots and does not have regular periods.    Patient is alert and oriented. She speaks in complete sentences. She is sitting upright in the chair in no distress.     Initial orders will be placed and care will be transferred to an alternate provider when patient is roomed for a full evaluation. Any additional orders and the final disposition will be determined by that provider.         ORDERS  Labs Reviewed   CBC W/ AUTO DIFFERENTIAL   COMPREHENSIVE METABOLIC PANEL   LIPASE   URINALYSIS, REFLEX TO URINE CULTURE   POCT URINE PREGNANCY       ED Orders (720h ago, onward)      Start Ordered     Status Ordering Provider    07/28/23 1546 07/28/23 1545  Vital signs  Every 2 hours         Ordered NAOMIE, GUALBERTO G.    07/28/23 1545 07/28/23 1545  Diet NPO  Diet effective now         Ordered NAOMIE, GUALBERTO G.    07/28/23 1545 07/28/23 1545  Insert peripheral IV  Once         Ordered NAOMIE, GUALBERTO G.    07/28/23 1545 07/28/23 1545  CBC W/ AUTO DIFFERENTIAL  STAT         Ordered NAOMIE, GUALBERTO G.    07/28/23 1545 07/28/23 1545  Comp. Metabolic Panel  STAT         Ordered NAOMIE, GUALBERTO G.    07/28/23 1545 07/28/23 1545  Lipase  STAT          Ordered GUALBERTO AKBAR.    07/28/23 1545 07/28/23 1545  Urinalysis, Reflex to Urine Culture Urine, Clean Catch  STAT         Ordered GUALBERTO AKBAR.    07/28/23 1545 07/28/23 1545  POCT urine pregnancy  Once         Ordered GUALBERTO AKBAR.              Virtual Visit Note: The provider triage portion of this emergency department evaluation and documentation was performed via [employer], a HIPAA-compliant telemedicine application, in concert with a tele-presenter in the room. A face to face patient evaluation with one of my colleagues will occur once the patient is placed in an emergency department room.      DISCLAIMER: This note was prepared with [employer] voice recognition transcription software. Garbled syntax, mangled pronouns, and other bizarre constructions may be attributed to that software system.

## 2023-07-28 NOTE — ED PROVIDER NOTES
Encounter Date: 7/28/2023       History     Chief Complaint   Patient presents with    Post-op Problem     Katelyn may 29, incision from laparoscopic surg hurting, no period for 25 yrs, was spotting 2 d ago      4:38 PM  Patient is a 43 year old female with h/o thyroid disease, asthma, depression, anxiety, HLD, HTN, endometriosis, MITZI, DMII, hiatal hernia, obesity (BMI 41), cholecystectomy on 6/1/2023, recently treated for H pylori with abx after EGD who presents to [facility] ED via POV for emergent evaluation of RUQ pain since surgery.     Patient states that she has had pain since her surgery and was told that that should be normal.  She went back to work on 07/13/2023 and that weak and noted an increase in her right upper quadrant pain.  She states that she has most pain around her incision sites.  She states her pain is worse with movement.  She is decreased appetite but has never had nausea, vomiting, dysuria, hematuria, diarrhea, or constipation.  Food does not change her pain.  She feels like her shirt is irritating her scars.     She is on a Depo-Provera shot and also endorses intermittent vaginal spotting.    She had an EGD after her surgery and had an area biopsy that came back positive for H pylori infection.  She completed 14 days of antibiotics.        Review of patient's allergies indicates:   Allergen Reactions    Cheese Itching and Rash     Past Medical History:   Diagnosis Date    Anxiety     Asthma     Chronic pain     back; inflammatory    Depression     Diabetes mellitus, type 2     Endometriosis     Gastric ulcer     Hiatal hernia     Hyperlipidemia     Hypertension     MITZI (obstructive sleep apnea)     no cpap machine    Thyroid disease     Vitamin D deficiency      Past Surgical History:   Procedure Laterality Date    CARPAL TUNNEL RELEASE Right     CERVICAL SPINE SURGERY      endometriosis      ESOPHAGOGASTRODUODENOSCOPY N/A 6/30/2023    Procedure: EGD (ESOPHAGOGASTRODUODENOSCOPY);  Surgeon:  Ce Vazquez MD;  Location: [facility] ENDO;  Service: Endoscopy;  Laterality: N/A;    LAPAROSCOPIC CHOLECYSTECTOMY N/A 06/01/2023    Procedure: CHOLECYSTECTOMY, LAPAROSCOPIC;  Surgeon: Eleuterio Puri MD;  Location: [facility] OR [address] [place] [address];  Service: General;  Laterality: N/A;    THYROID SURGERY       Family History   Problem Relation Age of Onset    Hypertension Mother     Diabetes Maternal Grandmother     Hyperlipidemia Maternal Grandmother     Diabetes Maternal Grandfather     Hyperlipidemia Maternal Grandfather     Breast cancer Maternal Aunt      Social History     Tobacco Use    Smoking status: Never    Smokeless tobacco: Never   Substance Use Topics    Alcohol use: Yes     Comment: special occasions    Drug use: No     Review of Systems   Constitutional:  Positive for activity change and appetite change. Negative for chills, diaphoresis and fever.   HENT:  Negative for sore throat.    Respiratory:  Negative for shortness of breath.    Cardiovascular:  Negative for chest pain.   Gastrointestinal:  Positive for abdominal pain. Negative for constipation, diarrhea, nausea and vomiting.   Genitourinary:  Negative for dysuria, frequency, hematuria and urgency.   Musculoskeletal:  Negative for back pain.   Skin:  Negative for rash.   Neurological:  Negative for weakness.   Hematological:  Does not bruise/bleed easily.       Physical Exam     Initial Vitals [07/28/23 1432]   BP Pulse Resp Temp SpO2   (!) 183/103 88 18 99 °F (37.2 °C) 98 %      MAP       --         Physical Exam    Vitals reviewed.  Constitutional: She appears well-developed and well-nourished. She is not diaphoretic. She is cooperative.  Non-toxic appearance. She does not have a sickly appearance. She does not appear ill. No distress.   HENT:   Head: Normocephalic and atraumatic.   Nose: Nose normal.   Mouth/Throat: No trismus in the jaw.   Eyes: Conjunctivae and EOM are normal.   Neck:   Normal range of motion.  Pulmonary/Chest: No accessory muscle  usage. No tachypnea. No respiratory distress.   Abdominal: Abdomen is soft. She exhibits no distension. There is abdominal tenderness in the right upper quadrant, right lower quadrant and epigastric area. There is guarding. There is no rebound.   Musculoskeletal:         General: Normal range of motion.      Cervical back: Normal range of motion.     Neurological: She is alert. She has normal strength.   Skin: Skin is warm and dry. No erythema. No pallor.         ED Course   Procedures  Labs Reviewed   COMPREHENSIVE METABOLIC PANEL - Abnormal; Notable for the following components:       Result Value    Glucose 69 (*)     All other components within normal limits   URINALYSIS, REFLEX TO URINE CULTURE - Abnormal; Notable for the following components:    Occult Blood UA Trace (*)     All other components within normal limits    Narrative:     Specimen Source->Urine   CBC W/ AUTO DIFFERENTIAL   LIPASE   POCT URINE PREGNANCY          Imaging Results              CT Abdomen Pelvis With Contrast (Final result)  Result time 07/28/23 20:41:43      Final result by Sameer Che MD (07/28/23 20:41:43)                   Impression:      Mild periumbilical stranding, in the setting of somewhat recent prior laparoscopic surgery.  No focal fluid collection.    Otherwise no acute abnormality.    Partially visualized enlarged precarinal node, nonspecific.    Additional findings as above.    Electronically signed by resident: Gonzalo Ariza  Date:    07/28/2023  Time:    20:06    Electronically signed by: Sameer Che MD  Date:    07/28/2023  Time:    20:41               Narrative:    EXAMINATION:  CT ABDOMEN PELVIS WITH CONTRAST    CLINICAL HISTORY:  Abdominal pain, post-op;    TECHNIQUE:  Low dose axial images were obtained from the lung bases to the pubic symphysis following the intravenous administration of 100 cc of Omnipaque 350.  Sagittal and coronal reformats were provided.    COMPARISON:  Abdominal ultrasound 05/31/2023.  CT  abdomen pelvis 04/03/2023.    FINDINGS:  Reported postoperative change of cholecystectomy on 06/01/2023.    Heart: Normal in size.  No pericardial effusion.    Lung bases: Minimal dependent and bandlike subsegmental atelectasis.  No focal consolidation.    Liver: Normal in size.  No focal hepatic abnormality.  Portal vein is patent.    Gallbladder: Surgically absent    Bile Ducts: No intra or extrahepatic biliary ductal dilation.    Pancreas: No pancreatic mass lesion or peripancreatic inflammatory change.    Spleen: Unremarkable.    Adrenals: Unremarkable.    Kidneys/ Ureters: Normal in size and location.  Kidneys enhance symmetrically.  No focal renal abnormality or nephrolithiasis.  No hydroureteronephrosis.    Bladder: Smooth contours without bladder wall thickening.    Reproductive organs: Unremarkable.    GI Tract/Mesentery: Small hiatal hernia.  No evidence for bowel obstruction.  Appendix appears unremarkable.    Peritoneal Space: No intraperitoneal free fluid or free air. No pathologically enlarged lymph nodes.    Lymph nodes: 1.2 cm precarinal node (axial image 1).    Abdominal wall/extraperitoneal soft tissues: Postoperative change of the ventral abdominal wall.  Mild periumbilical stranding, may be associated with prior laparoscopic trocar placement.  Small fat containing umbilical hernia.    Vasculature: Abdominal aorta is normal in caliber, contour, and course .  Trace aortoiliac calcific atherosclerosis.    Bones: Mild degenerative changes without acute fracture or bone destructive process.                                       Medications   iohexoL (OMNIPAQUE 350) injection 100 mL (100 mLs Intravenous Given 7/28/23 1912)     Medical Decision Making:   History:   Old Medical Records: I decided to obtain old medical records.  Old Records Summarized: records from clinic visits and records from previous admission(s).  Initial Assessment:   Patient is a 43 year old female with h/o thyroid disease, asthma,  depression, anxiety, HLD, HTN, endometriosis, MITZI, DMII, hiatal hernia, obesity (BMI 41), cholecystectomy on 6/1/2023, recently treated for H pylori with abx after EGD who presents to [facility] ED via POV for emergent evaluation of RUQ pain since surgery.   Differential Diagnosis:   Includes but is not limited to postop pain, adhesions, colitis, diverticulitis, biliary colic with sludge, CBD obstruction, seroma, hematoma, abscess, obstruction, volvulus, intussusception, UTI, abnormal uterine bleeding.  Clinical Tests:   Lab Tests: Reviewed and Ordered  Radiological Study: Ordered and Reviewed  ED Management:  Will initiate workup, obtain CT abdomen pelvis given postop pain since day 1 worse in the past 12 days, and reassess.             ED Course as of 07/29/23 1657 Fri Jul 28, 2023   1636 BP(!): 185/107 [CL]   1636 Temp: 99 °F (37.2 °C) [CL]   1636 Pulse: 92 [CL]   1636 Resp: 18 [CL]   1636 SpO2: 98 % [CL]   1646 WBC: 7.13 [CL]   1646 Hemoglobin: 12.8 [CL]   1646 Platelets: 332 [CL]   1744 Immature Granulocytes: 0.3  No leukocytosis or left shift. [CL]   1744 Sodium: 139 [CL]   1744 Potassium: 4.3 [CL]   1744 Glucose(!): 69 [CL]   1744 Creatinine: 0.9 [CL]   1744 BILIRUBIN TOTAL: 0.3 [CL]   1744 AST: 20 [CL]   1744 ALT: 10  No signs of liver failure or CBD obstruction. [CL]   1744 Lipase: 25 [CL]   1744 Leukocytes, UA: Negative  No signs of UTI. [CL]      ED Course User Index  [CL] Irina Calderón PA-C          CT scan shows mild periumbilical straining in the setting of recent laparoscopic surgery.  No fluid collection.  Otherwise no acute abnormality.    Patient updated with CT results and labs.  She was made aware of incidental enlarged lymph node. We discuss follow up with PCP. Report of CT provided on AVS.  No acute findings or postop complications.  Her CMP does not show signs of liver failure or common bile duct obstruction.  No signs of pancreatitis.  She remains in no apparent distress.  Still able to eat and  drink water without difficulty.  Most of her pain is at the scars of her lap incision.  Suspect pain from adhesions/scarring.  No signs of cellulitis or underlying abscess/seroma.  No UTI. Patient is stable for outpatient follow-up.  Follow up closely with General surgery and GI as scheduled. Continue OTC medication.        Clinical Impression:   Final diagnoses:  [G89.18] Post-op pain (Primary)  [Z90.49] Hx laparoscopic cholecystectomy  [R59.9] Lymph node enlargement        ED Disposition Condition    Discharge Stable          ED Prescriptions    None       Follow-up Information       Follow up With Specialties Details Why Contact Info    Eleuterio Puri MD General Surgery Schedule an appointment as soon as possible for a visit   [address] [facility] [address]  [phone]      [facility] - Emergency Dept Emergency Medicine  If symptoms worsen [address] [facility] [id number] [phone]                 Irina Calderón PA-C  07/29/23 0650

## 2023-07-29 NOTE — DISCHARGE INSTRUCTIONS
You do not have any abnormal labs.  No acute kidney injury, liver failure, or common bile duct obstruction.  You do not have a UTI.  Your CT of your abdomen does not show any acute processes.  You have incidental findings that we discussed.    Take ibuprofen 600-800 mg every 6 hours as needed with food for anti-inflammatory relief.  You can take acetaminophen/tylenol 650 mg every 6 hours or 1000 mg every 8 hours for added relief.  Apply ice to the area for 10-20 minutes every 4 hours. You can apply heat 2 days after for the same duration and frequency.  Follow up with follow up with GI, General Surgery, and PCP.  Return to the ER for new or worsening symptoms.  Imaging Results              CT Abdomen Pelvis With Contrast (Final result)  Result time 07/28/23 20:41:43      Final result by Sameer Che MD (07/28/23 20:41:43)                   Impression:      Mild periumbilical stranding, in the setting of somewhat recent prior laparoscopic surgery.  No focal fluid collection.    Otherwise no acute abnormality.    Partially visualized enlarged precarinal node, nonspecific.    Additional findings as above.    Electronically signed by resident: Gonzalo Ariza  Date:    07/28/2023  Time:    20:06    Electronically signed by: Sameer Che MD  Date:    07/28/2023  Time:    20:41               Narrative:    EXAMINATION:  CT ABDOMEN PELVIS WITH CONTRAST    CLINICAL HISTORY:  Abdominal pain, post-op;    TECHNIQUE:  Low dose axial images were obtained from the lung bases to the pubic symphysis following the intravenous administration of 100 cc of Omnipaque 350.  Sagittal and coronal reformats were provided.    COMPARISON:  Abdominal ultrasound 05/31/2023.  CT abdomen pelvis 04/03/2023.    FINDINGS:  Reported postoperative change of cholecystectomy on 06/01/2023.    Heart: Normal in size.  No pericardial effusion.    Lung bases: Minimal dependent and bandlike subsegmental atelectasis.  No focal consolidation.    Liver: Normal  in size.  No focal hepatic abnormality.  Portal vein is patent.    Gallbladder: Surgically absent    Bile Ducts: No intra or extrahepatic biliary ductal dilation.    Pancreas: No pancreatic mass lesion or peripancreatic inflammatory change.    Spleen: Unremarkable.    Adrenals: Unremarkable.    Kidneys/ Ureters: Normal in size and location.  Kidneys enhance symmetrically.  No focal renal abnormality or nephrolithiasis.  No hydroureteronephrosis.    Bladder: Smooth contours without bladder wall thickening.    Reproductive organs: Unremarkable.    GI Tract/Mesentery: Small hiatal hernia.  No evidence for bowel obstruction.  Appendix appears unremarkable.    Peritoneal Space: No intraperitoneal free fluid or free air. No pathologically enlarged lymph nodes.    Lymph nodes: 1.2 cm precarinal node (axial image 1).    Abdominal wall/extraperitoneal soft tissues: Postoperative change of the ventral abdominal wall.  Mild periumbilical stranding, may be associated with prior laparoscopic trocar placement.  Small fat containing umbilical hernia.    Vasculature: Abdominal aorta is normal in caliber, contour, and course .  Trace aortoiliac calcific atherosclerosis.    Bones: Mild degenerative changes without acute fracture or bone destructive process.

## 2023-07-31 LAB — POCT GLUCOSE: 88 MG/DL (ref 70–110)

## 2023-10-04 DIAGNOSIS — R10.13 EPIGASTRIC PAIN: ICD-10-CM

## 2023-10-04 RX ORDER — OMEPRAZOLE 40 MG/1
40 CAPSULE, DELAYED RELEASE ORAL
Qty: 30 CAPSULE | Refills: 2 | Status: SHIPPED | OUTPATIENT
Start: 2023-10-04 | End: 2023-12-26

## 2023-12-24 DIAGNOSIS — R10.13 EPIGASTRIC PAIN: ICD-10-CM

## 2023-12-26 RX ORDER — OMEPRAZOLE 40 MG/1
40 CAPSULE, DELAYED RELEASE ORAL
Qty: 30 CAPSULE | Refills: 2 | Status: SHIPPED | OUTPATIENT
Start: 2023-12-26

## 2024-01-02 ENCOUNTER — OFFICE VISIT (OUTPATIENT)
Dept: PODIATRY | Facility: CLINIC | Age: 44
End: 2024-01-02
Payer: MEDICAID

## 2024-01-02 VITALS — HEIGHT: 63 IN | WEIGHT: 247 LBS | BODY MASS INDEX: 43.77 KG/M2

## 2024-01-02 DIAGNOSIS — L60.0 INGROWN TOENAIL OF RIGHT FOOT: Primary | ICD-10-CM

## 2024-01-02 PROCEDURE — 1160F RVW MEDS BY RX/DR IN RCRD: CPT | Mod: CPTII,,, | Performed by: PODIATRIST

## 2024-01-02 PROCEDURE — 99213 OFFICE O/P EST LOW 20 MIN: CPT | Mod: 25,S$PBB,, | Performed by: PODIATRIST

## 2024-01-02 PROCEDURE — 99999 PR PBB SHADOW E&M-EST. PATIENT-LVL V: CPT | Mod: PBBFAC,,, | Performed by: PODIATRIST

## 2024-01-02 PROCEDURE — 99215 OFFICE O/P EST HI 40 MIN: CPT | Mod: 25,PBBFAC,PN | Performed by: PODIATRIST

## 2024-01-02 PROCEDURE — 1159F MED LIST DOCD IN RCRD: CPT | Mod: CPTII,,, | Performed by: PODIATRIST

## 2024-01-02 PROCEDURE — 3008F BODY MASS INDEX DOCD: CPT | Mod: CPTII,,, | Performed by: PODIATRIST

## 2024-01-02 PROCEDURE — 11730 AVULSION NAIL PLATE SIMPLE 1: CPT | Mod: PBBFAC,PN | Performed by: PODIATRIST

## 2024-01-02 NOTE — PATIENT INSTRUCTIONS
Instructions for Care after Ingrown Nail removal    General Information: Stay off your feet as much as possible today. You may wear a surgical shoe, sandal or any open toed shoe that does not squeeze, constrict or put pressure on your toe(s). Your toe(s) may remain numb for up to 2-24 hours after the procedure. Although most patients can wear a closed loose fitting shoe after the first week, the toe will heal faster the more you use the open toed shoe in the first 2-3  weeks. Please contact our office if you have any questions or concerns.    Bleeding: Slight bleeding, discoloration and drainage are normal. Due to the chemical used there may be some yellow-clear drainage coming from the toe for 2-3 weeks.    Discomfort: You can elevate your foot to help alleviate minor swelling, bleeding and discomfort. You may also take Advil, Tylenol or other over the counter pain medications to help alleviate pain. Call our office if the pain is not well controlled. Most patients have very little discomfort as long as they minimize their walking for the first 24 hours and do not bump the toe.    Removing the Bandage: Starting the day after surgery, carefully remove the dressing and shower or bathe as normal. It is Ok to get the bandage soaking wet in the shower and when you remove it, it should not stick to the surgery site.    Dressing Options- Traditional Method:  1. Soaking two times a day in WARM water with Epsom salts or diluted Povidone Iodine  (Betadine) for 15-20 minutes. You will need to purchase these products from the pharmacy.  2. Dry toe then apply an antibiotic cream or ointment such as Neosporin or Polysporin plus or  Garamycin and cover with a 2 x 2 inch size gauze and then secure with a 1 inch band aid.  3. In the second week, take the dressing off at bedtime to air dry the toe.  4. If the toe is infected take the Antibiotic Pills as directed until finished.      Ingrown Toenail        What Is an Ingrown Toenail?     When a toenail is ingrown, it is curved and grows into the skin, usually at the nail borders (the sides of the nail). This digging in of the nail irritates the skin, often creating pain, redness, swelling and warmth in the toe.    If an ingrown nail causes a break in the skin, bacteria may enter and cause an infection in the area, which is often marked by drainage and a foul odor. However, even if the toe is not painful, red, swollen or warm, a nail that curves downward into the skin can progress to an infection.    Ingrown toenail and normal toenail    Causes  Causes of ingrown toenails include:    Heredity. In many people, the tendency for ingrown toenails is inherited.  Trauma. Sometimes an ingrown toenail is the result of trauma, such as stubbing your toe, having an object fall on your toe or engaging in activities that involve repeated pressure on the toes, such as kicking or running.  Improper trimming. The most common cause of ingrown toenails is cutting your nails too short. This encourages the skin next to the nail to fold over the nail.   Improperly sized footwear. Ingrown toenails can result from wearing socks and shoes that are tight or short.  Nail conditions. Ingrown toenails can be caused by nail problems, such as fungal infections or losing a nail due to trauma.     Treatment  Sometimes initial treatment for ingrown toenails can be safely performed at home. However, home treatment is strongly discouraged if an infection is suspected or for those who have medical conditions that put feet at high risk, such as diabetes, nerve damage in the foot or poor circulation.        Ingrown toenail before and after treatment    Home Care  If you do not have an infection or any of the above medical conditions, you can soak your foot in room-temperature water (adding Epsom salt may be recommended by your doctor) and gently massage the side of the nail fold to help reduce the inflammation.    Avoid attempting  bathroom surgery. Repeated cutting of the nail can cause the condition to worsen over time. If your symptoms fail to improve, it is time to see a foot and ankle surgeon.    Physician Care  After examining the toe, the foot and ankle surgeon will select the treatment best suited for you. If an infection is present, an oral antibiotic may be prescribed.    Sometimes a minor surgical procedure, often performed in the office, will ease the pain and remove the offending nail. After applying a local anesthetic, the doctor removes part of the nails side border. Some nails may become ingrown again, requiring removal of the nail root.    Following the nail procedure, a light bandage will be applied. Most people experience very little pain after surgery and may resume normal activity the next day. If your surgeon has prescribed an oral antibiotic, be sure to take all the medication, even if your symptoms have improved.    Preventing Ingrown Toenails  Many cases of ingrown toenails may be prevented by:    Proper trimming. Cut toenails in a fairly straight line, and do not cut them too short. You should be able to get your fingernail under the sides and end of the nail.  Well-fitting shoes and socks. Do not wear shoes that are short or tight in the toe area. Avoid shoes that are loose because they too cause pressure on the toes, especially when running or walking briskly.               What You Should Know About Home Treatment  Do not cut a notch in the nail. Contrary to what some people believe, this does not reduce the tendency for the nail to curve downward.  Do not repeatedly trim nail borders. Repeated trimming does not change the way the nail grows and can make the condition worse.  Do not place cotton under the nail. Not only does this not relieve the pain, it provides a place for harmful bacteria to grow, resulting in infection.  Over-the-counter medications are ineffective. Topical medications may mask the pain, but  they do not correct the underlying problem.        Understanding Ingrown Toenails    An ingrown nail is the result of a nail growing into the skin that surrounds it. This often occurs at either edge of the big toe. Ingrown nails may be caused by improper trimming, inherited nail deformities, injuries, fungal infections, or pressure.  Symptoms  Ingrown nails may cause pain at the tip of the toe or all the way to the base of the toe. The pain is often worse while walking. An ingrown nail may also lead to infection, inflammation, or a more serious condition. If its infected, you might see pus or redness.  Evaluation  To determine the extent of your problem, your healthcare provider examines and possibly presses the painful area. If other problems are suspected, blood tests, cultures, and X-rays may be done as well.  Treatment  If the nail isnt infected, your healthcare provider may trim the corner of it to help relieve your symptoms. He or she may need to remove one side of your nail back to the cuticle. The base of the nail may then be treated with a chemical to keep the ingrown part from growing back. Severe infections or ingrown nails may require antibiotics and temporary or permanent removal of a portion of the nail. To prevent pain, a local anesthetic may be used in these procedures. This treatment is usually done at your healthcare provider's office.  Prevention  Many nail problems can be prevented by wearing the right shoes and trimming your nails properly. To help avoid infection, keep your feet clean and dry. If you have diabetes, talk with your healthcare provider before doing any foot self-care.  The right shoes: Get your feet measured (your size may change as you age). Wear shoes that are supportive and roomy enough for your toes to wiggle. Look for shoes made of natural materials such as leather, which allow your feet to breathe.  Proper trimming: To avoid problems, trim your toenails straight across  without cutting down into the corners. If you cant trim your own nails, ask your healthcare provider to do so for you.  Date Last Reviewed: 10/1/2016  © 7631-3446 [employer]. [address] [place] [address], [place] [address]. All rights reserved. This information is not intended as a substitute for professional medical care. Always follow your healthcare professional's instructions.

## 2024-01-02 NOTE — PROGRESS NOTES
[facility] - PODIATRY  [address] [place] [address], [address]  [facility] [address]  Dept: [phone]  Dept Fax: [phone]    Stevo Rodgers Jr., DPM     Assessment:   MDM    Coding  1. Ingrown toenail of right foot  Nail Removal          Plan:     Nail Removal    Date/Time: 1/2/2024 2:15 PM    Performed by: Stevo Rodgers Jr., DPM  Authorized by: Stevo Rodgers Jr., DPM    Consent Done?:  Yes (Verbal)  Time out: Immediately prior to the procedure a time out was called    Prep: patient was prepped and draped in usual sterile fashion    Location:     Location:  Right foot    Location detail:  Right big toe  Anesthesia:     Anesthesia:  Local infiltration    Local anesthetic:  Bupivacaine 0.5% without epinephrine    Anesthetic total (ml):  4  Procedure Details:     Preparation:  Skin prepped with alcohol, skin prepped with Betadine and sterile field established    Amount removed:  Partial    Side:  Bilateral    Wedge excision of skin of nail fold: No      Nail bed sutured?: No      Nail matrix removed:  None    Removed nail replaced and anchored: No      Dressing applied:  4x4, antibiotic ointment, gauze roll, petrolatum-impregnated gauze and dressing applied    Patient tolerance:  Patient tolerated the procedure well with no immediate complications      Caitie DIANA was seen today for ingrown toenail.    Diagnoses and all orders for this visit:    Ingrown toenail of right foot  -     Nail Removal        -pt seen, evaluated, and managed  -dx discussed in detail. All questions/concerns addressed  -all tx options discussed. All alternatives, risks, benefits of all txs discussed  -The patient was educated regarding the above diagnosis.   -discussed ingrowing toenails and all tx options. Pt opts for avulsion    -given recurrence, we discussed matrix px. Pt to think over options.    -pt to perform epsom salt or betadine soaks once daily x 2wks. Written instructions dispensed  -keep toe covered with triple  abx ointment + bandaid x 2wks    Rx dispensed: none  Referrals: none  -WB: wbat        Follow up in about 3 weeks (around 1/23/2024).    Subjective:      Patient ID: Caitie Wang is a 43 y.o. female.    Chief Complaint:   Chief Complaint   Patient presents with    Ingrown Toenail     Right great toe        CC - ingrown nail: Patient presents to the clinic complaining of painful ingrown toenail on the right foot. Patients rates pain 7/10 on pain scale. patient seeking tx options. She has had 1 avulsion in the past, now with recurrence.    Ingrown Toenail        Last Podiatry Enc: Visit date not found  Last Enc w/ Me: Visit date not found    Outside reports reviewed: historical medical records.  Family hx: as below  Past Medical History:   Diagnosis Date    Anxiety     Asthma     Chronic pain     back; inflammatory    Depression     Diabetes mellitus, type 2     Endometriosis     Gastric ulcer     Hiatal hernia     Hyperlipidemia     Hypertension     MITZI (obstructive sleep apnea)     no cpap machine    Thyroid disease     Vitamin D deficiency      Past Surgical History:   Procedure Laterality Date    CARPAL TUNNEL RELEASE Right     CERVICAL SPINE SURGERY      endometriosis      ESOPHAGOGASTRODUODENOSCOPY N/A 6/30/2023    Procedure: EGD (ESOPHAGOGASTRODUODENOSCOPY);  Surgeon: Ce Vazquez MD;  Location: [facility];  Service: Endoscopy;  Laterality: N/A;    LAPAROSCOPIC CHOLECYSTECTOMY N/A 06/01/2023    Procedure: CHOLECYSTECTOMY, LAPAROSCOPIC;  Surgeon: Eleuterio Puri MD;  Location: [address];  Service: General;  Laterality: N/A;    THYROID SURGERY       Family History   Problem Relation Age of Onset    Hypertension Mother     Diabetes Maternal Grandmother     Hyperlipidemia Maternal Grandmother     Diabetes Maternal Grandfather     Hyperlipidemia Maternal Grandfather     Breast cancer Maternal Aunt      Current Outpatient Medications   Medication Sig Dispense Refill    acetaminophen (TYLENOL) 325  MG tablet Take 2 tablets (650 mg total) by mouth every 6 (six) hours as needed for Pain. (Patient taking differently: Take 650 mg by mouth as needed for Pain.) 30 tablet 0    ALBUTEROL SULFATE (PROAIR HFA INHL) Inhale 2 puffs into the lungs as needed.      amoxicillin (AMOXIL) 500 MG capsule Amoxicillin 500mg caps, take 2 capsules 2 times per day for 14 days.  Take all 4 medications together 56 capsule 0    aspirin 325 MG tablet Take 325 mg by mouth as needed.      atorvastatin (LIPITOR) 40 MG tablet Take 40 mg by mouth every evening.      dulaglutide (TRULICITY) 1.5 mg/0.5 mL pen injector Inject 1.5 mg into the skin every 7 days.      famciclovir (FAMVIR) 500 MG tablet Take 500 mg by mouth 3 (three) times daily.      folic acid (FOLVITE) 1 MG tablet Take 1 mg by mouth once daily.      hydrOXYzine (ATARAX) 50 MG tablet Take 50 mg by mouth every evening.      ibuprofen (ADVIL,MOTRIN) 600 MG tablet Take 1 tablet (600 mg total) by mouth every 6 (six) hours as needed for Pain. (Patient taking differently: Take 600 mg by mouth as needed for Pain.) 20 tablet 0    imipramine (TOFRANIL) 25 MG tablet Take 25 mg by mouth every evening.      LIDOcaine (LIDODERM) 5 % Place 1 patch onto the skin daily as needed (pain). Remove & Discard patch within 12 hours or as directed by MD 15 patch 0    LINZESS 145 mcg Cap capsule Take 145 mcg by mouth once daily.      meclizine (ANTIVERT) 12.5 mg tablet Take 25 mg by mouth as needed for Dizziness.      medroxyPROGESTERone (DEPO-PROVERA) 150 mg/mL Syrg Inject 150 mg into the muscle every 3 (three) months.      meloxicam (MOBIC) 7.5 MG tablet Take 7.5 mg by mouth once daily.      montelukast (SINGULAIR) 10 mg tablet Take 10 mg by mouth once daily.      omeprazole (PRILOSEC) 40 MG capsule TAKE 1 CAPSULE(40 MG) BY MOUTH EVERY DAY 30 capsule 2    ondansetron (ZOFRAN) 8 MG tablet Take 8 mg by mouth every 8 (eight) hours as needed.      pravastatin (PRAVACHOL) 40 MG tablet Take 40 mg by mouth  once daily.      traMADoL (ULTRAM) 50 mg tablet Take 50 mg by mouth 2 (two) times daily as needed.      VITAMIN D2 50,000 unit capsule Take 50,000 Units by mouth every 7 days.      baclofen (LIORESAL) 20 MG tablet Take 1 tablet (20 mg total) by mouth 3 (three) times daily. for 3 days (Patient taking differently: Take 20 mg by mouth 2 (two) times daily.) 9 tablet 0    cetirizine (ZYRTEC) 10 MG tablet Take 1 tablet (10 mg total) by mouth once daily. 30 tablet 0    gabapentin (NEURONTIN) 300 MG capsule Take 1 capsule (300 mg total) by mouth 3 (three) times daily. 90 capsule 11    levothyroxine (SYNTHROID) 150 MCG tablet Take 1 tablet (150 mcg total) by mouth once daily. 30 tablet 0    metFORMIN (GLUCOPHAGE) 500 MG tablet Take 1 tablet (500 mg total) by mouth daily with breakfast. 30 tablet 11     No current facility-administered medications for this visit.     Review of patient's allergies indicates:   Allergen Reactions    Cheese Itching and Rash     Social History     Socioeconomic History    Marital status: Single   Tobacco Use    Smoking status: Never    Smokeless tobacco: Never   Substance and Sexual Activity    Alcohol use: Yes     Comment: special occasions    Drug use: No    Sexual activity: Yes     Partners: Male     Birth control/protection: Injection     Social Determinants of Health     Financial Resource Strain: Low Risk  (5/31/2023)    Overall Financial Resource Strain (CARDIA)     Difficulty of Paying Living Expenses: Not hard at all   Food Insecurity: No Food Insecurity (5/31/2023)    Hunger Vital Sign     Worried About Running Out of Food in the Last Year: Never true     Ran Out of Food in the Last Year: Never true   Transportation Needs: No Transportation Needs (5/31/2023)    PRAPARE - Transportation     Lack of Transportation (Medical): No     Lack of Transportation (Non-Medical): No   Physical Activity: Inactive (5/31/2023)    Exercise Vital Sign     Days of Exercise per Week: 0 days     Minutes of  Exercise per Session: 0 min   Stress: Stress Concern Present (5/31/2023)    [employer] - Occupational Stress Questionnaire     Feeling of Stress : Very much   Social Connections: Socially Integrated (5/31/2023)    Social Connection and Isolation Panel [NHANES]     Frequency of Communication with Friends and Family: More than three times a week     Frequency of Social Gatherings with Friends and Family: More than three times a week     Attends Synagogue Services: More than 4 times per year     Active Member of Clubs or Organizations: Yes     Attends Club or Organization Meetings: More than 4 times per year     Marital Status:    Housing Stability: Low Risk  (5/31/2023)    Housing Stability Vital Sign     Unable to Pay for Housing in the Last Year: No     Number of Places Lived in the Last Year: 1     Unstable Housing in the Last Year: No       ROS    REVIEW OF SYSTEMS: Negative as documented below as well as positive findings in bold.       Constitutional  Respiratory  Gastrointestinal  Skin   - Fever - Cough - Heartburn - Rash   - Chills - Spit blood - Nausea - Itching   - Weight Loss - Shortness of breath - Vomiting - Nail pain   - Malaise/Fatigue - Wheezing - Abdominal Pain  Wound/Ulcer   - Weight Gain   - Blood in Stool  Poor wound healing       - Diarrhea          Cardiovascular  Genitourinary  Neurological  HEENT   - Chest Pain - Dysuria - Burning Sensation of feet - Headache   - Palpitations - Hematuria - Tingling / Paresthesia - Congestion   - Pain at night in legs - Flank Pain - Dizziness - Sore Throat   - Cramping   - Tremor - Blurred Vision   - Leg Swelling   - Sensory Change - Double Vision   - Dizzy when standing   - Speech Change - Eye Redness       - Focal Weakness - Dry Eyes       - Loss of Consciousness          Endocrine  Musculoskeletal  Psychiatric   - Cold intolerance - Muscle Pain - Depression   - Heat intolerance - Neck Pain - Insomnia   - Anemia - Joint  "Pain - Memory Loss   -  Easy bruising, bleeding - Heel pain - Anxiety      Toe Pain        Leg/Ankle/Foot Pain         Objective:     Ht 5' 3" (1.6 m)   Wt 112.1 kg (247 lb 0.4 oz)   BMI 43.76 kg/m²   Vitals:    01/02/24 1432   Weight: 112.1 kg (247 lb 0.4 oz)   Height: 5' 3" (1.6 m)   PainSc:   8   PainLoc: Toe       Physical Exam    General Appearance:   Patient appears well developed, well nourished  Patient appears stated age    Psychiatric:   Patient is oriented to time, place, and person.  Patient has appropriate mood and affect    Neck:  Trachea Midline  No visible masses    Respiratory/Ears:  No distress or labored breathing.  Able to differentiate between normal talking voice and whisper.  Able to follow commands    Eyes:  Visual Acuity intact  Lids and conjunctivae normal. No discoloration noted.    Foot Exam  Physical Exam  Ortho Exam  Ortho/SPM Exam  Physical Exam  Neurologic Exam    R LE exam con't:  V:  DP 2/4, PT 2/4   CRT< 3s to all digits tested   Tibial and popliteal lymph nodes are w/o abnormality    N:  Patient displays normal ankle reflexes   SILT in SP/DP/T/Sue/Saph distributions    Ortho: +Motor EHL/FHL/TA/GA   +TTP R great toe  Compartments soft/compressible. No pain on passive stretch of big toe. No calf Pain.    Derm:  skin intact, skin warm and dry, skin without ulcers or lesions, skin without induration, right, great toe ingrowing and incurvated     Imaging / Labs:    none        Note: This was dictated using a computer transcription program. Although proofread, it may contain computer transcription errors and phonetic errors. Other human proofreading errors may also exist. Corrections may be performed at a later time. Please contact us for any clarification if needed.    Stevo Rodgers DPM  [facility]    "

## 2024-01-25 ENCOUNTER — OFFICE VISIT (OUTPATIENT)
Dept: PODIATRY | Facility: CLINIC | Age: 44
End: 2024-01-25
Payer: MEDICAID

## 2024-01-25 VITALS — WEIGHT: 247.44 LBS | BODY MASS INDEX: 43.84 KG/M2 | RESPIRATION RATE: 18 BRPM | HEIGHT: 63 IN

## 2024-01-25 DIAGNOSIS — L60.0 INGROWN TOENAIL OF LEFT FOOT: ICD-10-CM

## 2024-01-25 DIAGNOSIS — L60.0 INGROWN TOENAIL OF RIGHT FOOT: Primary | ICD-10-CM

## 2024-01-25 PROCEDURE — 99213 OFFICE O/P EST LOW 20 MIN: CPT | Mod: S$PBB,25,, | Performed by: PODIATRIST

## 2024-01-25 PROCEDURE — 1160F RVW MEDS BY RX/DR IN RCRD: CPT | Mod: CPTII,,, | Performed by: PODIATRIST

## 2024-01-25 PROCEDURE — 11750 EXCISION NAIL&NAIL MATRIX: CPT | Mod: PBBFAC,PN | Performed by: PODIATRIST

## 2024-01-25 PROCEDURE — 1159F MED LIST DOCD IN RCRD: CPT | Mod: CPTII,,, | Performed by: PODIATRIST

## 2024-01-25 PROCEDURE — 3008F BODY MASS INDEX DOCD: CPT | Mod: CPTII,,, | Performed by: PODIATRIST

## 2024-01-25 PROCEDURE — 99999 PR PBB SHADOW E&M-EST. PATIENT-LVL IV: CPT | Mod: PBBFAC,,, | Performed by: PODIATRIST

## 2024-01-25 PROCEDURE — 99214 OFFICE O/P EST MOD 30 MIN: CPT | Mod: PBBFAC,PN | Performed by: PODIATRIST

## 2024-01-25 NOTE — PROGRESS NOTES
[facility] - PODIATRY  [address] [place] [address], [address]  [facility] [address]  Dept: [phone]  Dept Fax: [phone]    Stevo Rodgers Jr., DPM     Assessment:   MDM    Coding  1. Ingrown toenail of right foot  Nail Removal      2. Ingrown toenail of left foot            Plan:     Nail Removal    Date/Time: 1/25/2024 8:14 AM    Performed by: Stevo Rodgers Jr., DPM  Authorized by: Stevo Rodgers Jr., DPM    Consent Done?:  Yes (Verbal)  Time out: Immediately prior to the procedure a time out was called    Location:     Location:  Right foot  Anesthesia:     Anesthesia:  Digital block    Local anesthetic:  Bupivacaine 0.5% without epinephrine    Anesthetic total (ml):  4  Procedure Details:     Side:  Lateral    Wedge excision of skin of nail fold: No      Nail bed sutured?: No      Nail matrix removed:  Partial    Removal method:  Phenol and alcohol    Removed nail replaced and anchored: No      Dressing applied:  4x4, antibiotic ointment, petrolatum-impregnated gauze, Xeroform gauze and dressing applied    Patient tolerance:  Patient tolerated the procedure well with no immediate complications      Caitie DIANA was seen today for ingrown toenail.    Diagnoses and all orders for this visit:    Ingrown toenail of right foot  -     Nail Removal    Ingrown toenail of left foot        -pt seen, evaluated, and managed  -dx discussed in detail. All questions/concerns addressed  -all tx options discussed. All alternatives, risks, benefits of all txs discussed  -The patient was educated regarding the above diagnosis.   -discussed ingrowing toenails and all tx options. Pt opts for matrix px  -matrix px performed as above  -pt to perform epsom salt or betadine soaks once daily x 2wks. Written instructions dispensed  -keep toe covered with triple abx ointment + bandaid x 2wks    Rx dispensed: none  Referrals: none  -WB: wbat        Follow up in about 3 weeks (around 2/15/2024).    Subjective:       Patient ID: Caitie Wang is a 43 y.o. female.    Chief Complaint:   Chief Complaint   Patient presents with    Ingrown Toenail     Right        CC - ingrown nail: Patient presents to the clinic complaining of painful ingrown toenail on the right foot. Patients rates pain 7/10 on pain scale. patient seeking tx options. She has had 1 avulsion in the past, now with recurrence.    1/25/24:  S/p nail avulsion. Reports she wants to make nail removal permanent.    Ingrown Toenail        Last Podiatry Enc: Visit date not found  Last Enc w/ Me: Visit date not found    Outside reports reviewed: historical medical records.  Family hx: as below  Past Medical History:   Diagnosis Date    Anxiety     Asthma     Chronic pain     back; inflammatory    Depression     Diabetes mellitus, type 2     Endometriosis     Gastric ulcer     Hiatal hernia     Hyperlipidemia     Hypertension     MITZI (obstructive sleep apnea)     no cpap machine    Thyroid disease     Vitamin D deficiency      Past Surgical History:   Procedure Laterality Date    CARPAL TUNNEL RELEASE Right     CERVICAL SPINE SURGERY      endometriosis      ESOPHAGOGASTRODUODENOSCOPY N/A 6/30/2023    Procedure: EGD (ESOPHAGOGASTRODUODENOSCOPY);  Surgeon: Ce Vazquez MD;  Location: [facility];  Service: Endoscopy;  Laterality: N/A;    LAPAROSCOPIC CHOLECYSTECTOMY N/A 06/01/2023    Procedure: CHOLECYSTECTOMY, LAPAROSCOPIC;  Surgeon: Eleuterio Puri MD;  Location: [address];  Service: General;  Laterality: N/A;    THYROID SURGERY       Family History   Problem Relation Age of Onset    Hypertension Mother     Diabetes Maternal Grandmother     Hyperlipidemia Maternal Grandmother     Diabetes Maternal Grandfather     Hyperlipidemia Maternal Grandfather     Breast cancer Maternal Aunt      Current Outpatient Medications   Medication Sig Dispense Refill    acetaminophen (TYLENOL) 325 MG tablet Take 2 tablets (650 mg total) by mouth every 6 (six) hours as needed  for Pain. (Patient taking differently: Take 650 mg by mouth as needed for Pain.) 30 tablet 0    ALBUTEROL SULFATE (PROAIR HFA INHL) Inhale 2 puffs into the lungs as needed.      amoxicillin (AMOXIL) 500 MG capsule Amoxicillin 500mg caps, take 2 capsules 2 times per day for 14 days.  Take all 4 medications together 56 capsule 0    aspirin 325 MG tablet Take 325 mg by mouth as needed.      atorvastatin (LIPITOR) 40 MG tablet Take 40 mg by mouth every evening.      dulaglutide (TRULICITY) 1.5 mg/0.5 mL pen injector Inject 1.5 mg into the skin every 7 days.      famciclovir (FAMVIR) 500 MG tablet Take 500 mg by mouth 3 (three) times daily.      folic acid (FOLVITE) 1 MG tablet Take 1 mg by mouth once daily.      hydrOXYzine (ATARAX) 50 MG tablet Take 50 mg by mouth every evening.      ibuprofen (ADVIL,MOTRIN) 600 MG tablet Take 1 tablet (600 mg total) by mouth every 6 (six) hours as needed for Pain. (Patient taking differently: Take 600 mg by mouth as needed for Pain.) 20 tablet 0    imipramine (TOFRANIL) 25 MG tablet Take 25 mg by mouth every evening.      LIDOcaine (LIDODERM) 5 % Place 1 patch onto the skin daily as needed (pain). Remove & Discard patch within 12 hours or as directed by MD 15 patch 0    LINZESS 145 mcg Cap capsule Take 145 mcg by mouth once daily.      meclizine (ANTIVERT) 12.5 mg tablet Take 25 mg by mouth as needed for Dizziness.      medroxyPROGESTERone (DEPO-PROVERA) 150 mg/mL Syrg Inject 150 mg into the muscle every 3 (three) months.      meloxicam (MOBIC) 7.5 MG tablet Take 7.5 mg by mouth once daily.      montelukast (SINGULAIR) 10 mg tablet Take 10 mg by mouth once daily.      omeprazole (PRILOSEC) 40 MG capsule TAKE 1 CAPSULE(40 MG) BY MOUTH EVERY DAY 30 capsule 2    ondansetron (ZOFRAN) 8 MG tablet Take 8 mg by mouth every 8 (eight) hours as needed.      pravastatin (PRAVACHOL) 40 MG tablet Take 40 mg by mouth once daily.      traMADoL (ULTRAM) 50 mg tablet Take 50 mg by mouth 2 (two) times  daily as needed.      VITAMIN D2 50,000 unit capsule Take 50,000 Units by mouth every 7 days.      baclofen (LIORESAL) 20 MG tablet Take 1 tablet (20 mg total) by mouth 3 (three) times daily. for 3 days (Patient taking differently: Take 20 mg by mouth 2 (two) times daily.) 9 tablet 0    cetirizine (ZYRTEC) 10 MG tablet Take 1 tablet (10 mg total) by mouth once daily. 30 tablet 0    gabapentin (NEURONTIN) 300 MG capsule Take 1 capsule (300 mg total) by mouth 3 (three) times daily. 90 capsule 11    levothyroxine (SYNTHROID) 150 MCG tablet Take 1 tablet (150 mcg total) by mouth once daily. 30 tablet 0    metFORMIN (GLUCOPHAGE) 500 MG tablet Take 1 tablet (500 mg total) by mouth daily with breakfast. 30 tablet 11     No current facility-administered medications for this visit.     Review of patient's allergies indicates:   Allergen Reactions    Cheese Itching and Rash     Social History     Socioeconomic History    Marital status: Single   Tobacco Use    Smoking status: Never    Smokeless tobacco: Never   Substance and Sexual Activity    Alcohol use: Yes     Comment: special occasions    Drug use: No    Sexual activity: Yes     Partners: Male     Birth control/protection: Injection     Social Determinants of Health     Financial Resource Strain: Low Risk  (5/31/2023)    Overall Financial Resource Strain (CARDIA)     Difficulty of Paying Living Expenses: Not hard at all   Food Insecurity: No Food Insecurity (5/31/2023)    Hunger Vital Sign     Worried About Running Out of Food in the Last Year: Never true     Ran Out of Food in the Last Year: Never true   Transportation Needs: No Transportation Needs (5/31/2023)    PRAPARE - Transportation     Lack of Transportation (Medical): No     Lack of Transportation (Non-Medical): No   Physical Activity: Inactive (5/31/2023)    Exercise Vital Sign     Days of Exercise per Week: 0 days     Minutes of Exercise per Session: 0 min   Stress: Stress Concern Present (5/31/2023)     [employer] - Occupational Stress Questionnaire     Feeling of Stress : Very much   Social Connections: Socially Integrated (5/31/2023)    Social Connection and Isolation Panel [NHANES]     Frequency of Communication with Friends and Family: More than three times a week     Frequency of Social Gatherings with Friends and Family: More than three times a week     Attends [employer] Services: More than 4 times per year     Active Member of Clubs or Organizations: Yes     Attends Club or Organization Meetings: More than 4 times per year     Marital Status:    Housing Stability: Low Risk  (5/31/2023)    Housing Stability Vital Sign     Unable to Pay for Housing in the Last Year: No     Number of Places Lived in the Last Year: 1     Unstable Housing in the Last Year: No       ROS    REVIEW OF SYSTEMS: Negative as documented below as well as positive findings in bold.       Constitutional  Respiratory  Gastrointestinal  Skin   - Fever - Cough - Heartburn - Rash   - Chills - Spit blood - Nausea - Itching   - Weight Loss - Shortness of breath - Vomiting - Nail pain   - Malaise/Fatigue - Wheezing - Abdominal Pain  Wound/Ulcer   - Weight Gain   - Blood in Stool  Poor wound healing       - Diarrhea          Cardiovascular  Genitourinary  Neurological  HEENT   - Chest Pain - Dysuria - Burning Sensation of feet - Headache   - Palpitations - Hematuria - Tingling / Paresthesia - Congestion   - Pain at night in legs - Flank Pain - Dizziness - Sore Throat   - Cramping   - Tremor - Blurred Vision   - Leg Swelling   - Sensory Change - Double Vision   - Dizzy when standing   - Speech Change - Eye Redness       - Focal Weakness - Dry Eyes       - Loss of Consciousness          Endocrine  Musculoskeletal  Psychiatric   - Cold intolerance - Muscle Pain - Depression   - Heat intolerance - Neck Pain - Insomnia   - Anemia - Joint Pain - Memory Loss   -  Easy bruising, bleeding - Heel pain - Anxiety      Toe  "Pain        Leg/Ankle/Foot Pain         Objective:     Resp 18   Ht 5' 3" (1.6 m)   Wt 112.2 kg (247 lb 7.5 oz)   BMI 43.84 kg/m²   Vitals:    01/25/24 0757   Resp: 18   Weight: 112.2 kg (247 lb 7.5 oz)   Height: 5' 3" (1.6 m)   PainSc:   6   PainLoc: Toe       Physical Exam    General Appearance:   Patient appears well developed, well nourished  Patient appears stated age    Psychiatric:   Patient is oriented to time, place, and person.  Patient has appropriate mood and affect    Neck:  Trachea Midline  No visible masses    Respiratory/Ears:  No distress or labored breathing.  Able to differentiate between normal talking voice and whisper.  Able to follow commands    Eyes:  Visual Acuity intact  Lids and conjunctivae normal. No discoloration noted.    Foot Exam  Physical Exam  Ortho Exam  Ortho/SPM Exam  Physical Exam  Neurologic Exam    R LE exam con't:  V:  DP 2/4, PT 2/4   CRT< 3s to all digits tested   Tibial and popliteal lymph nodes are w/o abnormality    N:  Patient displays normal ankle reflexes   SILT in SP/DP/T/Sue/Saph distributions    Ortho: +Motor EHL/FHL/TA/GA   +TTP R great toe  Compartments soft/compressible. No pain on passive stretch of big toe. No calf Pain.    Derm:  skin intact, skin warm and dry, skin without ulcers or lesions, skin without induration, right, great toe healing well w/o signs of infection    Imaging / Labs:    none        Note: This was dictated using a computer transcription program. Although proofread, it may contain computer transcription errors and phonetic errors. Other human proofreading errors may also exist. Corrections may be performed at a later time. Please contact us for any clarification if needed.    Stevo Rodgers DPM  [facility]    "

## 2024-01-25 NOTE — PATIENT INSTRUCTIONS
Instructions for Care after Ingrown Nail removal    General Information: Stay off your feet as much as possible today. You may wear a surgical shoe, sandal or any open toed shoe that does not squeeze, constrict or put pressure on your toe(s). Your toe(s) may remain numb for up to 2-24 hours after the procedure. Although most patients can wear a closed loose fitting shoe after the first week, the toe will heal faster the more you use the open toed shoe in the first 2-3  weeks. Please contact our office if you have any questions or concerns.    Bleeding: Slight bleeding, discoloration and drainage are normal. Due to the chemical used there may be some yellow-clear drainage coming from the toe for 2-3 weeks.    Discomfort: You can elevate your foot to help alleviate minor swelling, bleeding and discomfort. You may also take Advil, Tylenol or other over the counter pain medications to help alleviate pain. Call our office if the pain is not well controlled. Most patients have very little discomfort as long as they minimize their walking for the first 24 hours and do not bump the toe.    Removing the Bandage: Starting the day after surgery, carefully remove the dressing and shower or bathe as normal. It is Ok to get the bandage soaking wet in the shower and when you remove it, it should not stick to the surgery site.    Dressing Options- Traditional Method:  1. Soaking two times a day in WARM water with Epsom salts or diluted Povidone Iodine  (Betadine) for 15-20 minutes. You will need to purchase these products from the pharmacy.  2. Dry toe then apply an antibiotic cream or ointment such as Neosporin or Polysporin plus or  Garamycin and cover with a 2 x 2 inch size gauze and then secure with a 1 inch band aid.  3. In the second week, take the dressing off at bedtime to air dry the toe.  4. If the toe is infected take the Antibiotic Pills as directed until finished.      Ingrown Toenail        What Is an Ingrown Toenail?     When a toenail is ingrown, it is curved and grows into the skin, usually at the nail borders (the sides of the nail). This digging in of the nail irritates the skin, often creating pain, redness, swelling and warmth in the toe.    If an ingrown nail causes a break in the skin, bacteria may enter and cause an infection in the area, which is often marked by drainage and a foul odor. However, even if the toe is not painful, red, swollen or warm, a nail that curves downward into the skin can progress to an infection.    Ingrown toenail and normal toenail    Causes  Causes of ingrown toenails include:    Heredity. In many people, the tendency for ingrown toenails is inherited.  Trauma. Sometimes an ingrown toenail is the result of trauma, such as stubbing your toe, having an object fall on your toe or engaging in activities that involve repeated pressure on the toes, such as kicking or running.  Improper trimming. The most common cause of ingrown toenails is cutting your nails too short. This encourages the skin next to the nail to fold over the nail.   Improperly sized footwear. Ingrown toenails can result from wearing socks and shoes that are tight or short.  Nail conditions. Ingrown toenails can be caused by nail problems, such as fungal infections or losing a nail due to trauma.     Treatment  Sometimes initial treatment for ingrown toenails can be safely performed at home. However, home treatment is strongly discouraged if an infection is suspected or for those who have medical conditions that put feet at high risk, such as diabetes, nerve damage in the foot or poor circulation.        Ingrown toenail before and after treatment    Home Care  If you do not have an infection or any of the above medical conditions, you can soak your foot in room-temperature water (adding Epsom salt may be recommended by your doctor) and gently massage the side of the nail fold to help reduce the inflammation.    Avoid attempting  bathroom surgery. Repeated cutting of the nail can cause the condition to worsen over time. If your symptoms fail to improve, it is time to see a foot and ankle surgeon.    Physician Care  After examining the toe, the foot and ankle surgeon will select the treatment best suited for you. If an infection is present, an oral antibiotic may be prescribed.    Sometimes a minor surgical procedure, often performed in the office, will ease the pain and remove the offending nail. After applying a local anesthetic, the doctor removes part of the nails side border. Some nails may become ingrown again, requiring removal of the nail root.    Following the nail procedure, a light bandage will be applied. Most people experience very little pain after surgery and may resume normal activity the next day. If your surgeon has prescribed an oral antibiotic, be sure to take all the medication, even if your symptoms have improved.    Preventing Ingrown Toenails  Many cases of ingrown toenails may be prevented by:    Proper trimming. Cut toenails in a fairly straight line, and do not cut them too short. You should be able to get your fingernail under the sides and end of the nail.  Well-fitting shoes and socks. Do not wear shoes that are short or tight in the toe area. Avoid shoes that are loose because they too cause pressure on the toes, especially when running or walking briskly.               What You Should Know About Home Treatment  Do not cut a notch in the nail. Contrary to what some people believe, this does not reduce the tendency for the nail to curve downward.  Do not repeatedly trim nail borders. Repeated trimming does not change the way the nail grows and can make the condition worse.  Do not place cotton under the nail. Not only does this not relieve the pain, it provides a place for harmful bacteria to grow, resulting in infection.  Over-the-counter medications are ineffective. Topical medications may mask the pain, but  they do not correct the underlying problem.        Understanding Ingrown Toenails    An ingrown nail is the result of a nail growing into the skin that surrounds it. This often occurs at either edge of the big toe. Ingrown nails may be caused by improper trimming, inherited nail deformities, injuries, fungal infections, or pressure.  Symptoms  Ingrown nails may cause pain at the tip of the toe or all the way to the base of the toe. The pain is often worse while walking. An ingrown nail may also lead to infection, inflammation, or a more serious condition. If its infected, you might see pus or redness.  Evaluation  To determine the extent of your problem, your healthcare provider examines and possibly presses the painful area. If other problems are suspected, blood tests, cultures, and X-rays may be done as well.  Treatment  If the nail isnt infected, your healthcare provider may trim the corner of it to help relieve your symptoms. He or she may need to remove one side of your nail back to the cuticle. The base of the nail may then be treated with a chemical to keep the ingrown part from growing back. Severe infections or ingrown nails may require antibiotics and temporary or permanent removal of a portion of the nail. To prevent pain, a local anesthetic may be used in these procedures. This treatment is usually done at your healthcare provider's office.  Prevention  Many nail problems can be prevented by wearing the right shoes and trimming your nails properly. To help avoid infection, keep your feet clean and dry. If you have diabetes, talk with your healthcare provider before doing any foot self-care.  The right shoes: Get your feet measured (your size may change as you age). Wear shoes that are supportive and roomy enough for your toes to wiggle. Look for shoes made of natural materials such as leather, which allow your feet to breathe.  Proper trimming: To avoid problems, trim your toenails straight across  without cutting down into the corners. If you cant trim your own nails, ask your healthcare provider to do so for you.  Date Last Reviewed: 10/1/2016  © 5948-3146 [employer]. [address] [place] [address], [place] [address]. All rights reserved. This information is not intended as a substitute for professional medical care. Always follow your healthcare professional's instructions.

## 2024-01-29 ENCOUNTER — TELEPHONE (OUTPATIENT)
Dept: PODIATRY | Facility: CLINIC | Age: 44
End: 2024-01-29
Payer: MEDICAID

## 2024-01-29 NOTE — TELEPHONE ENCOUNTER
Pt said the OTC medicines are not working. I advised her to increase her soaks and pt understood.    no

## 2024-02-19 ENCOUNTER — HOSPITAL ENCOUNTER (EMERGENCY)
Facility: HOSPITAL | Age: 44
Discharge: HOME | End: 2024-02-19
Attending: EMERGENCY MEDICINE
Payer: MEDICAID

## 2024-02-19 VITALS
WEIGHT: 243 LBS | HEART RATE: 90 BPM | HEIGHT: 63 IN | DIASTOLIC BLOOD PRESSURE: 88 MMHG | RESPIRATION RATE: 16 BRPM | SYSTOLIC BLOOD PRESSURE: 140 MMHG | TEMPERATURE: 99 F | OXYGEN SATURATION: 99 % | BODY MASS INDEX: 43.05 KG/M2

## 2024-02-19 DIAGNOSIS — R51.9 NONINTRACTABLE HEADACHE, UNSPECIFIED CHRONICITY PATTERN, UNSPECIFIED HEADACHE TYPE: Primary | ICD-10-CM

## 2024-02-19 LAB
B-HCG UR QL: NEGATIVE
BUN SERPL-MCNC: 9 MG/DL (ref 6–30)
CHLORIDE SERPL-SCNC: 103 MMOL/L (ref 95–110)
CREAT SERPL-MCNC: 0.9 MG/DL (ref 0.5–1.4)
CTP QC/QA: YES
GLUCOSE SERPL-MCNC: 80 MG/DL (ref 70–110)
HCT VFR BLD CALC: 38 %PCV (ref 36–54)
POC IONIZED CALCIUM: 1.2 MMOL/L (ref 1.06–1.42)
POC TCO2 (MEASURED): 27 MMOL/L (ref 23–27)
POTASSIUM BLD-SCNC: 3.8 MMOL/L (ref 3.5–5.1)
SAMPLE: NORMAL
SODIUM BLD-SCNC: 141 MMOL/L (ref 136–145)

## 2024-02-19 PROCEDURE — 96361 HYDRATE IV INFUSION ADD-ON: CPT

## 2024-02-19 PROCEDURE — 25000003 PHARM REV CODE 250: Performed by: PHYSICIAN ASSISTANT

## 2024-02-19 PROCEDURE — 96375 TX/PRO/DX INJ NEW DRUG ADDON: CPT

## 2024-02-19 PROCEDURE — 81025 URINE PREGNANCY TEST: CPT | Performed by: EMERGENCY MEDICINE

## 2024-02-19 PROCEDURE — 63600175 PHARM REV CODE 636 W HCPCS: Performed by: PHYSICIAN ASSISTANT

## 2024-02-19 PROCEDURE — 99284 EMERGENCY DEPT VISIT MOD MDM: CPT | Mod: 25

## 2024-02-19 PROCEDURE — 80048 BASIC METABOLIC PNL TOTAL CA: CPT

## 2024-02-19 PROCEDURE — 96374 THER/PROPH/DIAG INJ IV PUSH: CPT

## 2024-02-19 RX ORDER — DIPHENHYDRAMINE HYDROCHLORIDE 50 MG/ML
12.5 INJECTION INTRAMUSCULAR; INTRAVENOUS
Status: COMPLETED | OUTPATIENT
Start: 2024-02-19 | End: 2024-02-19

## 2024-02-19 RX ORDER — KETOROLAC TROMETHAMINE 30 MG/ML
15 INJECTION, SOLUTION INTRAMUSCULAR; INTRAVENOUS
Status: COMPLETED | OUTPATIENT
Start: 2024-02-19 | End: 2024-02-19

## 2024-02-19 RX ORDER — METOCLOPRAMIDE HYDROCHLORIDE 5 MG/ML
5 INJECTION INTRAMUSCULAR; INTRAVENOUS
Status: COMPLETED | OUTPATIENT
Start: 2024-02-19 | End: 2024-02-19

## 2024-02-19 RX ADMIN — KETOROLAC TROMETHAMINE 15 MG: 30 INJECTION, SOLUTION INTRAMUSCULAR; INTRAVENOUS at 04:02

## 2024-02-19 RX ADMIN — METOCLOPRAMIDE 5 MG: 5 INJECTION, SOLUTION INTRAMUSCULAR; INTRAVENOUS at 04:02

## 2024-02-19 RX ADMIN — DIPHENHYDRAMINE HYDROCHLORIDE 12.5 MG: 50 INJECTION, SOLUTION INTRAMUSCULAR; INTRAVENOUS at 04:02

## 2024-02-19 RX ADMIN — SODIUM CHLORIDE 1000 ML: 9 INJECTION, SOLUTION INTRAVENOUS at 04:02

## 2024-02-19 NOTE — FIRST PROVIDER EVALUATION
"Medical screening examination initiated.  I have conducted a focused provider triage encounter, findings are as follows:    Brief history of present illness:  H/o migraines, headache for 4 days, follows with Neurology. Right-side of face is painful. High blood pressure.     Vitals:    02/19/24 1256   BP: (!) 181/92   Pulse: 100   Resp: 18   Temp: 97.9 °F (36.6 °C)   TempSrc: Oral   SpO2: 100%   Weight: 110.2 kg (243 lb)   Height: 5' 3" (1.6 m)       Pertinent physical exam:  Ambulatory w/o assistance, no facial asymmetry or slurred speech     Brief workup plan:  ISTAT, u preg    Preliminary workup initiated; this workup will be continued and followed by the physician or advanced practice provider that is assigned to the patient when roomed.  "

## 2024-02-19 NOTE — DISCHARGE INSTRUCTIONS
Follow-up with your Neurologist and Primary Care Provider for further evaluation  Continue your home medications

## 2024-02-19 NOTE — ED NOTES
Patient identifiers verified and correct for Ms Wang  C/C:  Headache SEE NN  APPEARANCE: awake and alert in NAD. PAIN  10/10  SKIN: warm, dry and intact. No breakdown or bruising.  MUSCULOSKELETAL: Patient moving all extremities spontaneously, no obvious swelling or deformities noted. Ambulates independently.  RESPIRATORY: Denies shortness of breath.Respirations unlabored.   CARDIAC: Denies CP, 2+ distal pulses; no peripheral edema  ABDOMEN: S/ND/NT, Denies nausea  : voids spontaneously, denies difficulty  Neurologic: AAO x 4; follows commands equal strength in all extremities; denies numbness/tingling. Denies dizziness  denies new weakness, reports headache to right face

## 2024-02-19 NOTE — ED PROVIDER NOTES
Encounter Date: 2/19/2024       History     Chief Complaint   Patient presents with    Migraine     Started 4 d ago, whole r side of face hurting,      The history is provided by the patient and medical records. No  was used.     Caitie Wang is a 43 y.o. female with medical history of T2DM, HTN, HLD, Obesity, Hiatal hernia, H Pylori, Endometriosis, MITZI presenting to the ED with the chief complaint of headache.     Reports having a headache to the R side of her face beginning a few days ago. Locates headache across her headache and down her jaw. Reports taking sumitriptan and sleeping which provides improvement. She is f/b Neurology for headaches and states this headache feels familiar to her. Additionally recently prescribed propanolol by her PCP for HTN and additional headache management. Denies thunder-clap onset. No fever, vision changes, speech changes, neck stiffness, numbness, paresthesias, extremity weakness.     Review of patient's allergies indicates:   Allergen Reactions    Cheese Itching and Rash     Past Medical History:   Diagnosis Date    Anxiety     Asthma     Chronic pain     back; inflammatory    Depression     Diabetes mellitus, type 2     Endometriosis     Gastric ulcer     Hiatal hernia     Hyperlipidemia     Hypertension     MITZI (obstructive sleep apnea)     no cpap machine    Thyroid disease     Vitamin D deficiency      Past Surgical History:   Procedure Laterality Date    CARPAL TUNNEL RELEASE Right     CERVICAL SPINE SURGERY      endometriosis      ESOPHAGOGASTRODUODENOSCOPY N/A 6/30/2023    Procedure: EGD (ESOPHAGOGASTRODUODENOSCOPY);  Surgeon: Ce Vazquez MD;  Location: [facility];  Service: Endoscopy;  Laterality: N/A;    LAPAROSCOPIC CHOLECYSTECTOMY N/A 06/01/2023    Procedure: CHOLECYSTECTOMY, LAPAROSCOPIC;  Surgeon: Eleuterio Puri MD;  Location: [facility] OR [address] [place] [address];  Service: General;  Laterality: N/A;    THYROID SURGERY       Family History   Problem  Relation Age of Onset    Hypertension Mother     Diabetes Maternal Grandmother     Hyperlipidemia Maternal Grandmother     Diabetes Maternal Grandfather     Hyperlipidemia Maternal Grandfather     Breast cancer Maternal Aunt      Social History     Tobacco Use    Smoking status: Never    Smokeless tobacco: Never   Substance Use Topics    Alcohol use: Yes     Comment: special occasions    Drug use: No     Review of Systems   Neurological:  Positive for headaches.     Physical Exam     Initial Vitals [02/19/24 1256]   BP Pulse Resp Temp SpO2   (!) 181/92 100 18 97.9 °F (36.6 °C) 100 %      MAP       --         Physical Exam    Constitutional: She appears well-developed and well-nourished. She is not diaphoretic. No distress.   HENT:   Head: Normocephalic and atraumatic.   Right Ear: Tympanic membrane and ear canal normal.   Left Ear: Tympanic membrane and ear canal normal.   Mouth/Throat: Oropharynx is clear and moist. No oropharyngeal exudate.   TTP V1 and V3 dermatomes  No visible rash   Eyes: Conjunctivae and EOM are normal. Pupils are equal, round, and reactive to light. No scleral icterus.   Neck: Neck supple.   Normal range of motion.  Cardiovascular:  Normal rate and regular rhythm.           Pulmonary/Chest: Breath sounds normal. No respiratory distress. She has no wheezes.   Abdominal: Abdomen is soft. She exhibits no distension. There is no abdominal tenderness. There is no rebound.   Musculoskeletal:         General: No tenderness or edema. Normal range of motion.      Cervical back: Normal range of motion and neck supple.     Neurological: She is alert and oriented to person, place, and time. She has normal strength. No sensory deficit.   Follows commands appropriately. Ambulates without difficulty. No dysmetria, dysdiadochokinesia, peripheral vision deficits. Negative pronator drift   Skin: Skin is warm and dry. No rash noted. No erythema.   Psychiatric: She has a normal mood and affect.       ED Course    Procedures  Labs Reviewed   POCT URINE PREGNANCY   ISTAT PROCEDURE          Imaging Results    None          Medications   ketorolac injection 15 mg (15 mg Intravenous Given 2/19/24 1604)   metoclopramide injection 5 mg (5 mg Intravenous Given 2/19/24 1604)   diphenhydrAMINE injection 12.5 mg (12.5 mg Intravenous Given 2/19/24 1604)   sodium chloride 0.9% bolus 1,000 mL 1,000 mL (0 mLs Intravenous Stopped 2/19/24 2153)     Medical Decision Making  43 y.o. female with medical history of T2DM, HTN, HLD, Obesity, Hiatal hernia, H Pylori, Endometriosis, MITZI presenting to the ED c/o R sided headache for the past 4 days.     Clinical presentation most consistent with trigeminal neuralgia. Other differential including migraine, temporal arteritis, tension headache, cluster headache. I have considered but do not suspect meningitis, central venous thrombosis, SAH.    Amount and/or Complexity of Data Reviewed  Labs: ordered. Decision-making details documented in ED Course.    Risk  Prescription drug management.         APC / Resident Notes:   Headache resolved with HA cocktail. Advised outpatient follow-up with her Neurologist for ongoing management. Patient expresses understanding and agreeable to the plan. Return to ED precautions given for new, worsening, or concerning symptoms.                                  Clinical Impression:  Final diagnoses:  [R51.9] Nonintractable headache, unspecified chronicity pattern, unspecified headache type (Primary)          ED Disposition Condition    Discharge Stable          ED Prescriptions    None       Follow-up Information       Follow up With Specialties Details Why Contact Info    Your Neurologist                 Sameer Roberts PA-C  02/19/24 1805

## 2024-02-20 ENCOUNTER — OFFICE VISIT (OUTPATIENT)
Dept: PODIATRY | Facility: CLINIC | Age: 44
End: 2024-02-20
Payer: MEDICAID

## 2024-02-20 ENCOUNTER — TELEPHONE (OUTPATIENT)
Dept: PODIATRY | Facility: CLINIC | Age: 44
End: 2024-02-20

## 2024-02-20 VITALS — HEIGHT: 63 IN | RESPIRATION RATE: 18 BRPM | BODY MASS INDEX: 43.45 KG/M2 | WEIGHT: 245.25 LBS

## 2024-02-20 DIAGNOSIS — L60.0 INGROWN TOENAIL OF RIGHT FOOT: Primary | ICD-10-CM

## 2024-02-20 DIAGNOSIS — L60.0 INGROWN TOENAIL OF LEFT FOOT: ICD-10-CM

## 2024-02-20 PROCEDURE — 1160F RVW MEDS BY RX/DR IN RCRD: CPT | Mod: CPTII,,, | Performed by: PODIATRIST

## 2024-02-20 PROCEDURE — 99214 OFFICE O/P EST MOD 30 MIN: CPT | Mod: PBBFAC,PN | Performed by: PODIATRIST

## 2024-02-20 PROCEDURE — 99999 PR PBB SHADOW E&M-EST. PATIENT-LVL IV: CPT | Mod: PBBFAC,,, | Performed by: PODIATRIST

## 2024-02-20 PROCEDURE — 1159F MED LIST DOCD IN RCRD: CPT | Mod: CPTII,,, | Performed by: PODIATRIST

## 2024-02-20 PROCEDURE — 4010F ACE/ARB THERAPY RXD/TAKEN: CPT | Mod: CPTII,,, | Performed by: PODIATRIST

## 2024-02-20 PROCEDURE — 3008F BODY MASS INDEX DOCD: CPT | Mod: CPTII,,, | Performed by: PODIATRIST

## 2024-02-20 PROCEDURE — 99213 OFFICE O/P EST LOW 20 MIN: CPT | Mod: S$PBB,,, | Performed by: PODIATRIST

## 2024-02-20 NOTE — PROGRESS NOTES
[facility] - PODIATRY  [address] [place] [address], [address]  [facility] [address]  Dept: [phone]  Dept Fax: [phone]    Stevo Rodgers Jr., JANELL     Assessment:   MDM    Coding  1. Ingrown toenail of right foot        2. Ingrown toenail of left foot            Plan:     Procedures    Caitie DIANA was seen today for ingrown toenail.    Diagnoses and all orders for this visit:    Ingrown toenail of right foot    Ingrown toenail of left foot        -pt seen, evaluated, and managed  -dx discussed in detail. All questions/concerns addressed  -all tx options discussed. All alternatives, risks, benefits of all txs discussed  -The patient was educated regarding the above diagnosis.   -discussed ingrowing toenails and all tx options.Focused on prevention going fwd  -manually curetted nail px site and covered w/trpl abx ointment + bandaid  -pt to perform epsom salt or betadine soaks once daily x 2wks. Written instructions dispensed  -keep toe covered with triple abx ointment + bandaid x 2wks    Rx dispensed: none  Referrals: none  -WB: wbat        Follow up if symptoms worsen or fail to improve.    Subjective:      Patient ID: Caitie Wang is a 43 y.o. female.    Chief Complaint:   Chief Complaint   Patient presents with    Ingrown Toenail     Right        CC - ingrown nail: Patient presents to the clinic complaining of painful ingrown toenail on the right foot. Patients rates pain 7/10 on pain scale. patient seeking tx options. She has had 1 avulsion in the past, now with recurrence.    2/20/24:  S/p nail avulsion and matrix px.     Ingrown Toenail        Last Podiatry Enc: Visit date not found  Last Enc w/ Me: Visit date not found    Outside reports reviewed: historical medical records.  Family hx: as below  Past Medical History:   Diagnosis Date    Anxiety     Asthma     Chronic pain     back; inflammatory    Depression     Diabetes mellitus, type 2     Endometriosis     Gastric ulcer     Hiatal hernia      Hyperlipidemia     Hypertension     MITZI (obstructive sleep apnea)     no cpap machine    Thyroid disease     Vitamin D deficiency      Past Surgical History:   Procedure Laterality Date    CARPAL TUNNEL RELEASE Right     CERVICAL SPINE SURGERY      endometriosis      ESOPHAGOGASTRODUODENOSCOPY N/A 6/30/2023    Procedure: EGD (ESOPHAGOGASTRODUODENOSCOPY);  Surgeon: Ce Vazqeuz MD;  Location: [facility];  Service: Endoscopy;  Laterality: N/A;    LAPAROSCOPIC CHOLECYSTECTOMY N/A 06/01/2023    Procedure: CHOLECYSTECTOMY, LAPAROSCOPIC;  Surgeon: Eleuterio Puri MD;  Location: [address];  Service: General;  Laterality: N/A;    THYROID SURGERY       Family History   Problem Relation Age of Onset    Hypertension Mother     Diabetes Maternal Grandmother     Hyperlipidemia Maternal Grandmother     Diabetes Maternal Grandfather     Hyperlipidemia Maternal Grandfather     Breast cancer Maternal Aunt      Current Outpatient Medications   Medication Sig Dispense Refill    acetaminophen (TYLENOL) 325 MG tablet Take 2 tablets (650 mg total) by mouth every 6 (six) hours as needed for Pain. (Patient taking differently: Take 650 mg by mouth as needed for Pain.) 30 tablet 0    ALBUTEROL SULFATE (PROAIR HFA INHL) Inhale 2 puffs into the lungs as needed.      amoxicillin (AMOXIL) 500 MG capsule Amoxicillin 500mg caps, take 2 capsules 2 times per day for 14 days.  Take all 4 medications together 56 capsule 0    aspirin 325 MG tablet Take 325 mg by mouth as needed.      atorvastatin (LIPITOR) 40 MG tablet Take 40 mg by mouth every evening.      dulaglutide (TRULICITY) 1.5 mg/0.5 mL pen injector Inject 1.5 mg into the skin every 7 days.      famciclovir (FAMVIR) 500 MG tablet Take 500 mg by mouth 3 (three) times daily.      folic acid (FOLVITE) 1 MG tablet Take 1 mg by mouth once daily.      hydrOXYzine (ATARAX) 50 MG tablet Take 50 mg by mouth every evening.      ibuprofen (ADVIL,MOTRIN) 600 MG tablet Take 1 tablet  (600 mg total) by mouth every 6 (six) hours as needed for Pain. (Patient taking differently: Take 600 mg by mouth as needed for Pain.) 20 tablet 0    imipramine (TOFRANIL) 25 MG tablet Take 25 mg by mouth every evening.      LIDOcaine (LIDODERM) 5 % Place 1 patch onto the skin daily as needed (pain). Remove & Discard patch within 12 hours or as directed by MD 15 patch 0    LINZESS 145 mcg Cap capsule Take 145 mcg by mouth once daily.      meclizine (ANTIVERT) 12.5 mg tablet Take 25 mg by mouth as needed for Dizziness.      medroxyPROGESTERone (DEPO-PROVERA) 150 mg/mL Syrg Inject 150 mg into the muscle every 3 (three) months.      meloxicam (MOBIC) 7.5 MG tablet Take 7.5 mg by mouth once daily.      montelukast (SINGULAIR) 10 mg tablet Take 10 mg by mouth once daily.      omeprazole (PRILOSEC) 40 MG capsule TAKE 1 CAPSULE(40 MG) BY MOUTH EVERY DAY 30 capsule 2    ondansetron (ZOFRAN) 8 MG tablet Take 8 mg by mouth every 8 (eight) hours as needed.      pravastatin (PRAVACHOL) 40 MG tablet Take 40 mg by mouth once daily.      traMADoL (ULTRAM) 50 mg tablet Take 50 mg by mouth 2 (two) times daily as needed.      VITAMIN D2 50,000 unit capsule Take 50,000 Units by mouth every 7 days.      baclofen (LIORESAL) 20 MG tablet Take 1 tablet (20 mg total) by mouth 3 (three) times daily. for 3 days (Patient taking differently: Take 20 mg by mouth 2 (two) times daily.) 9 tablet 0    cetirizine (ZYRTEC) 10 MG tablet Take 1 tablet (10 mg total) by mouth once daily. 30 tablet 0    gabapentin (NEURONTIN) 300 MG capsule Take 1 capsule (300 mg total) by mouth 3 (three) times daily. 90 capsule 11    levothyroxine (SYNTHROID) 150 MCG tablet Take 1 tablet (150 mcg total) by mouth once daily. 30 tablet 0    metFORMIN (GLUCOPHAGE) 500 MG tablet Take 1 tablet (500 mg total) by mouth daily with breakfast. 30 tablet 11     No current facility-administered medications for this visit.     Review of patient's allergies indicates:   Allergen  Reactions    Cheese Itching and Rash     Social History     Socioeconomic History    Marital status: Single   Tobacco Use    Smoking status: Never    Smokeless tobacco: Never   Substance and Sexual Activity    Alcohol use: Yes     Comment: special occasions    Drug use: No    Sexual activity: Yes     Partners: Male     Birth control/protection: Injection     Social Determinants of Health     Financial Resource Strain: Low Risk  (5/31/2023)    Overall Financial Resource Strain (CARDIA)     Difficulty of Paying Living Expenses: Not hard at all   Food Insecurity: No Food Insecurity (5/31/2023)    Hunger Vital Sign     Worried About Running Out of Food in the Last Year: Never true     Ran Out of Food in the Last Year: Never true   Transportation Needs: No Transportation Needs (5/31/2023)    PRAPARE - Transportation     Lack of Transportation (Medical): No     Lack of Transportation (Non-Medical): No   Physical Activity: Inactive (5/31/2023)    Exercise Vital Sign     Days of Exercise per Week: 0 days     Minutes of Exercise per Session: 0 min   Stress: Stress Concern Present (5/31/2023)    [employer] - Occupational Stress Questionnaire     Feeling of Stress : Very much   Social Connections: Socially Integrated (5/31/2023)    Social Connection and Isolation Panel [NHANES]     Frequency of Communication with Friends and Family: More than three times a week     Frequency of Social Gatherings with Friends and Family: More than three times a week     Attends Protestant Services: More than 4 times per year     Active Member of Clubs or Organizations: Yes     Attends Club or Organization Meetings: More than 4 times per year     Marital Status:    Housing Stability: Low Risk  (5/31/2023)    Housing Stability Vital Sign     Unable to Pay for Housing in the Last Year: No     Number of Places Lived in the Last Year: 1     Unstable Housing in the Last Year: No       ROS    REVIEW OF SYSTEMS:  "Negative as documented below as well as positive findings in bold.       Constitutional  Respiratory  Gastrointestinal  Skin   - Fever - Cough - Heartburn - Rash   - Chills - Spit blood - Nausea - Itching   - Weight Loss - Shortness of breath - Vomiting - Nail pain   - Malaise/Fatigue - Wheezing - Abdominal Pain  Wound/Ulcer   - Weight Gain   - Blood in Stool  Poor wound healing       - Diarrhea          Cardiovascular  Genitourinary  Neurological  HEENT   - Chest Pain - Dysuria - Burning Sensation of feet - Headache   - Palpitations - Hematuria - Tingling / Paresthesia - Congestion   - Pain at night in legs - Flank Pain - Dizziness - Sore Throat   - Cramping   - Tremor - Blurred Vision   - Leg Swelling   - Sensory Change - Double Vision   - Dizzy when standing   - Speech Change - Eye Redness       - Focal Weakness - Dry Eyes       - Loss of Consciousness          Endocrine  Musculoskeletal  Psychiatric   - Cold intolerance - Muscle Pain - Depression   - Heat intolerance - Neck Pain - Insomnia   - Anemia - Joint Pain - Memory Loss   -  Easy bruising, bleeding - Heel pain - Anxiety      Toe Pain        Leg/Ankle/Foot Pain         Objective:     Resp 18   Ht 5' 3" (1.6 m)   Wt 111.3 kg (245 lb 4.2 oz)   LMP  (LMP Unknown)   BMI 43.45 kg/m²   Vitals:    02/20/24 0805   Resp: 18   Weight: 111.3 kg (245 lb 4.2 oz)   Height: 5' 3" (1.6 m)   PainSc:   6   PainLoc: Toe       Physical Exam    General Appearance:   Patient appears well developed, well nourished  Patient appears stated age    Psychiatric:   Patient is oriented to time, place, and person.  Patient has appropriate mood and affect    Neck:  Trachea Midline  No visible masses    Respiratory/Ears:  No distress or labored breathing.  Able to differentiate between normal talking voice and whisper.  Able to follow commands    Eyes:  Visual Acuity intact  Lids and conjunctivae normal. No discoloration noted.    Foot Exam  Physical Exam  Ortho Exam  Ortho/SPM " Exam  Physical Exam  Neurologic Exam    R LE exam con't:  V:  DP 2/4, PT 2/4   CRT< 3s to all digits tested   Tibial and popliteal lymph nodes are w/o abnormality    N:  Patient displays normal ankle reflexes   SILT in SP/DP/T/Sue/Saph distributions    Ortho: +Motor EHL/FHL/TA/GA   +TTP R great toe  Compartments soft/compressible. No pain on passive stretch of big toe. No calf Pain.    Derm:  skin intact, skin warm and dry, skin without ulcers or lesions, skin without induration, right, great toe healing well w/o signs of infection    Imaging / Labs:    none        Note: This was dictated using a computer transcription program. Although proofread, it may contain computer transcription errors and phonetic errors. Other human proofreading errors may also exist. Corrections may be performed at a later time. Please contact us for any clarification if needed.    Stevo Rodgers DPM  [facility]

## 2024-02-20 NOTE — TELEPHONE ENCOUNTER
----- Message from Polly Ortiz sent at 2/20/2024 10:12 AM CST -----  Type:  Needs Medical Advice    Who Called:  Pt   Would the patient rather a call back or a response via [facility]? Callback  Best Call Back Number: [phone]  Additional Information:  Pt is requesting a callback from this provider office in regards to wrong paperwork that was given at appt

## 2024-02-20 NOTE — TELEPHONE ENCOUNTER
Spoke to patient apologized for the mistake and that her AVS can be located in her [facility] and Dr Rodgers stated return as needed. call ended

## 2024-02-20 NOTE — PATIENT INSTRUCTIONS
Instructions for Care after Ingrown Nail removal    General Information: Stay off your feet as much as possible today. You may wear a surgical shoe, sandal or any open toed shoe that does not squeeze, constrict or put pressure on your toe(s). Your toe(s) may remain numb for up to 2-24 hours after the procedure. Although most patients can wear a closed loose fitting shoe after the first week, the toe will heal faster the more you use the open toed shoe in the first 2-3  weeks. Please contact our office if you have any questions or concerns.    Bleeding: Slight bleeding, discoloration and drainage are normal. Due to the chemical used there may be some yellow-clear drainage coming from the toe for 2-3 weeks.    Discomfort: You can elevate your foot to help alleviate minor swelling, bleeding and discomfort. You may also take Advil, Tylenol or other over the counter pain medications to help alleviate pain. Call our office if the pain is not well controlled. Most patients have very little discomfort as long as they minimize their walking for the first 24 hours and do not bump the toe.    Removing the Bandage: Starting the day after surgery, carefully remove the dressing and shower or bathe as normal. It is Ok to get the bandage soaking wet in the shower and when you remove it, it should not stick to the surgery site.    Dressing Options- Traditional Method:  1. Soaking two times a day in WARM water with Epsom salts or diluted Povidone Iodine  (Betadine) for 15-20 minutes. You will need to purchase these products from the pharmacy.  2. Dry toe then apply an antibiotic cream or ointment such as Neosporin or Polysporin plus or  Garamycin and cover with a 2 x 2 inch size gauze and then secure with a 1 inch band aid.  3. In the second week, take the dressing off at bedtime to air dry the toe.  4. If the toe is infected take the Antibiotic Pills as directed until finished.      Ingrown Toenail        What Is an Ingrown Toenail?     When a toenail is ingrown, it is curved and grows into the skin, usually at the nail borders (the sides of the nail). This digging in of the nail irritates the skin, often creating pain, redness, swelling and warmth in the toe.    If an ingrown nail causes a break in the skin, bacteria may enter and cause an infection in the area, which is often marked by drainage and a foul odor. However, even if the toe is not painful, red, swollen or warm, a nail that curves downward into the skin can progress to an infection.    Ingrown toenail and normal toenail    Causes  Causes of ingrown toenails include:    Heredity. In many people, the tendency for ingrown toenails is inherited.  Trauma. Sometimes an ingrown toenail is the result of trauma, such as stubbing your toe, having an object fall on your toe or engaging in activities that involve repeated pressure on the toes, such as kicking or running.  Improper trimming. The most common cause of ingrown toenails is cutting your nails too short. This encourages the skin next to the nail to fold over the nail.   Improperly sized footwear. Ingrown toenails can result from wearing socks and shoes that are tight or short.  Nail conditions. Ingrown toenails can be caused by nail problems, such as fungal infections or losing a nail due to trauma.     Treatment  Sometimes initial treatment for ingrown toenails can be safely performed at home. However, home treatment is strongly discouraged if an infection is suspected or for those who have medical conditions that put feet at high risk, such as diabetes, nerve damage in the foot or poor circulation.        Ingrown toenail before and after treatment    Home Care  If you do not have an infection or any of the above medical conditions, you can soak your foot in room-temperature water (adding Epsom salt may be recommended by your doctor) and gently massage the side of the nail fold to help reduce the inflammation.    Avoid attempting  bathroom surgery. Repeated cutting of the nail can cause the condition to worsen over time. If your symptoms fail to improve, it is time to see a foot and ankle surgeon.    Physician Care  After examining the toe, the foot and ankle surgeon will select the treatment best suited for you. If an infection is present, an oral antibiotic may be prescribed.    Sometimes a minor surgical procedure, often performed in the office, will ease the pain and remove the offending nail. After applying a local anesthetic, the doctor removes part of the nails side border. Some nails may become ingrown again, requiring removal of the nail root.    Following the nail procedure, a light bandage will be applied. Most people experience very little pain after surgery and may resume normal activity the next day. If your surgeon has prescribed an oral antibiotic, be sure to take all the medication, even if your symptoms have improved.    Preventing Ingrown Toenails  Many cases of ingrown toenails may be prevented by:    Proper trimming. Cut toenails in a fairly straight line, and do not cut them too short. You should be able to get your fingernail under the sides and end of the nail.  Well-fitting shoes and socks. Do not wear shoes that are short or tight in the toe area. Avoid shoes that are loose because they too cause pressure on the toes, especially when running or walking briskly.               What You Should Know About Home Treatment  Do not cut a notch in the nail. Contrary to what some people believe, this does not reduce the tendency for the nail to curve downward.  Do not repeatedly trim nail borders. Repeated trimming does not change the way the nail grows and can make the condition worse.  Do not place cotton under the nail. Not only does this not relieve the pain, it provides a place for harmful bacteria to grow, resulting in infection.  Over-the-counter medications are ineffective. Topical medications may mask the pain, but  they do not correct the underlying problem.        Understanding Ingrown Toenails    An ingrown nail is the result of a nail growing into the skin that surrounds it. This often occurs at either edge of the big toe. Ingrown nails may be caused by improper trimming, inherited nail deformities, injuries, fungal infections, or pressure.  Symptoms  Ingrown nails may cause pain at the tip of the toe or all the way to the base of the toe. The pain is often worse while walking. An ingrown nail may also lead to infection, inflammation, or a more serious condition. If its infected, you might see pus or redness.  Evaluation  To determine the extent of your problem, your healthcare provider examines and possibly presses the painful area. If other problems are suspected, blood tests, cultures, and X-rays may be done as well.  Treatment  If the nail isnt infected, your healthcare provider may trim the corner of it to help relieve your symptoms. He or she may need to remove one side of your nail back to the cuticle. The base of the nail may then be treated with a chemical to keep the ingrown part from growing back. Severe infections or ingrown nails may require antibiotics and temporary or permanent removal of a portion of the nail. To prevent pain, a local anesthetic may be used in these procedures. This treatment is usually done at your healthcare provider's office.  Prevention  Many nail problems can be prevented by wearing the right shoes and trimming your nails properly. To help avoid infection, keep your feet clean and dry. If you have diabetes, talk with your healthcare provider before doing any foot self-care.  The right shoes: Get your feet measured (your size may change as you age). Wear shoes that are supportive and roomy enough for your toes to wiggle. Look for shoes made of natural materials such as leather, which allow your feet to breathe.  Proper trimming: To avoid problems, trim your toenails straight across  without cutting down into the corners. If you cant trim your own nails, ask your healthcare provider to do so for you.  Date Last Reviewed: 10/1/2016  © 4494-8790 [employer]. [address] [place] [address], [place] [address]. All rights reserved. This information is not intended as a substitute for professional medical care. Always follow your healthcare professional's instructions.

## 2024-03-12 ENCOUNTER — TELEPHONE (OUTPATIENT)
Dept: PODIATRY | Facility: CLINIC | Age: 44
End: 2024-03-12
Payer: MEDICAID

## 2024-03-12 NOTE — TELEPHONE ENCOUNTER
Reached out to patient in regards to her message in the portal in regards to her pain in her toe after an ingrown removal. I informed patient of her aftercare instructions, patient confirmed she has been following all given guidelines and is still having pain since the removal. Patient stated she needed a better fitting surgical shoe to wear to work, I informed patient she is welcome to come to the clinic to pick one up. Patient understood.

## 2024-04-15 ENCOUNTER — PATIENT MESSAGE (OUTPATIENT)
Dept: GASTROENTEROLOGY | Facility: CLINIC | Age: 44
End: 2024-04-15
Payer: MEDICAID

## 2024-05-24 NOTE — Clinical Note
"Caitie DIANA "Caitiemarcie Wang was seen and treated in our emergency department on 4/3/2023.  She may return to work on 04/04/2023.       If you have any questions or concerns, please don't hesitate to call.      Toya Jauregui MD" No

## 2024-06-03 ENCOUNTER — TELEPHONE (OUTPATIENT)
Dept: PODIATRY | Facility: CLINIC | Age: 44
End: 2024-06-03
Payer: MEDICAID

## 2024-06-03 NOTE — TELEPHONE ENCOUNTER
----- Message from Disha Donaldson sent at 5/31/2024 11:39 AM CDT -----  Type:  Needs Medical Advice    Who Called: pt  Symptoms (please be specific): pt needs you look at the toe nail that grew back on the big right toe as soon as possible please call to get in    Would the patient rather a call back or a response via [facility]? call  Best Call Back Number: [phone]  Additional Information:

## 2024-06-08 ENCOUNTER — HOSPITAL ENCOUNTER (EMERGENCY)
Facility: HOSPITAL | Age: 44
Discharge: HOME | End: 2024-06-08
Attending: EMERGENCY MEDICINE
Payer: MEDICAID

## 2024-06-08 VITALS
HEART RATE: 81 BPM | BODY MASS INDEX: 43.41 KG/M2 | SYSTOLIC BLOOD PRESSURE: 134 MMHG | HEIGHT: 63 IN | DIASTOLIC BLOOD PRESSURE: 85 MMHG | TEMPERATURE: 98 F | WEIGHT: 245 LBS | OXYGEN SATURATION: 100 % | RESPIRATION RATE: 18 BRPM

## 2024-06-08 DIAGNOSIS — R11.2 NAUSEA AND VOMITING, UNSPECIFIED VOMITING TYPE: Primary | ICD-10-CM

## 2024-06-08 DIAGNOSIS — R19.7 DIARRHEA, UNSPECIFIED TYPE: ICD-10-CM

## 2024-06-08 DIAGNOSIS — R10.9 ABDOMINAL PAIN, UNSPECIFIED ABDOMINAL LOCATION: ICD-10-CM

## 2024-06-08 DIAGNOSIS — T50.905A ADVERSE EFFECT OF DRUG, INITIAL ENCOUNTER: ICD-10-CM

## 2024-06-08 LAB
ALBUMIN SERPL BCP-MCNC: 3.8 G/DL (ref 3.5–5.2)
ALP SERPL-CCNC: 67 U/L (ref 55–135)
ALT SERPL W/O P-5'-P-CCNC: 16 U/L (ref 10–44)
ANION GAP SERPL CALC-SCNC: 10 MMOL/L (ref 8–16)
AST SERPL-CCNC: 21 U/L (ref 10–40)
BASOPHILS # BLD AUTO: 0.04 K/UL (ref 0–0.2)
BASOPHILS NFR BLD: 0.6 % (ref 0–1.9)
BILIRUB SERPL-MCNC: 0.3 MG/DL (ref 0.1–1)
BUN SERPL-MCNC: 7 MG/DL (ref 6–20)
CALCIUM SERPL-MCNC: 8.6 MG/DL (ref 8.7–10.5)
CHLORIDE SERPL-SCNC: 111 MMOL/L (ref 95–110)
CO2 SERPL-SCNC: 18 MMOL/L (ref 23–29)
CREAT SERPL-MCNC: 0.9 MG/DL (ref 0.5–1.4)
DIFFERENTIAL METHOD BLD: NORMAL
EOSINOPHIL # BLD AUTO: 0.1 K/UL (ref 0–0.5)
EOSINOPHIL NFR BLD: 1.9 % (ref 0–8)
ERYTHROCYTE [DISTWIDTH] IN BLOOD BY AUTOMATED COUNT: 13.1 % (ref 11.5–14.5)
EST. GFR  (NO RACE VARIABLE): >60 ML/MIN/1.73 M^2
GLUCOSE SERPL-MCNC: 77 MG/DL (ref 70–110)
HCT VFR BLD AUTO: 38 % (ref 37–48.5)
HCV AB SERPL QL IA: NORMAL
HGB BLD-MCNC: 12.4 G/DL (ref 12–16)
HIV 1+2 AB+HIV1 P24 AG SERPL QL IA: NORMAL
IMM GRANULOCYTES # BLD AUTO: 0.02 K/UL (ref 0–0.04)
IMM GRANULOCYTES NFR BLD AUTO: 0.3 % (ref 0–0.5)
LIPASE SERPL-CCNC: 22 U/L (ref 4–60)
LYMPHOCYTES # BLD AUTO: 2.7 K/UL (ref 1–4.8)
LYMPHOCYTES NFR BLD: 44 % (ref 18–48)
MCH RBC QN AUTO: 30.8 PG (ref 27–31)
MCHC RBC AUTO-ENTMCNC: 32.6 G/DL (ref 32–36)
MCV RBC AUTO: 95 FL (ref 82–98)
MONOCYTES # BLD AUTO: 0.5 K/UL (ref 0.3–1)
MONOCYTES NFR BLD: 7.6 % (ref 4–15)
NEUTROPHILS # BLD AUTO: 2.8 K/UL (ref 1.8–7.7)
NEUTROPHILS NFR BLD: 45.6 % (ref 38–73)
NRBC BLD-RTO: 0 /100 WBC
PLATELET # BLD AUTO: 332 K/UL (ref 150–450)
PMV BLD AUTO: 9.6 FL (ref 9.2–12.9)
POTASSIUM SERPL-SCNC: 4.3 MMOL/L (ref 3.5–5.1)
PROT SERPL-MCNC: 7.7 G/DL (ref 6–8.4)
RBC # BLD AUTO: 4.02 M/UL (ref 4–5.4)
SODIUM SERPL-SCNC: 139 MMOL/L (ref 136–145)
WBC # BLD AUTO: 6.16 K/UL (ref 3.9–12.7)

## 2024-06-08 PROCEDURE — 85025 COMPLETE CBC W/AUTO DIFF WBC: CPT | Performed by: EMERGENCY MEDICINE

## 2024-06-08 PROCEDURE — 83690 ASSAY OF LIPASE: CPT | Performed by: EMERGENCY MEDICINE

## 2024-06-08 PROCEDURE — 99284 EMERGENCY DEPT VISIT MOD MDM: CPT

## 2024-06-08 PROCEDURE — 87389 HIV-1 AG W/HIV-1&-2 AB AG IA: CPT | Performed by: EMERGENCY MEDICINE

## 2024-06-08 PROCEDURE — 25000003 PHARM REV CODE 250: Performed by: EMERGENCY MEDICINE

## 2024-06-08 PROCEDURE — 86803 HEPATITIS C AB TEST: CPT | Performed by: EMERGENCY MEDICINE

## 2024-06-08 PROCEDURE — 80053 COMPREHEN METABOLIC PANEL: CPT | Performed by: EMERGENCY MEDICINE

## 2024-06-08 RX ORDER — CIPROFLOXACIN 500 MG/1
500 TABLET ORAL
Status: COMPLETED | OUTPATIENT
Start: 2024-06-08 | End: 2024-06-08

## 2024-06-08 RX ORDER — FAMOTIDINE 20 MG/1
20 TABLET, FILM COATED ORAL 2 TIMES DAILY
Qty: 20 TABLET | Refills: 0 | Status: SHIPPED | OUTPATIENT
Start: 2024-06-08 | End: 2024-06-18

## 2024-06-08 RX ORDER — FAMOTIDINE 20 MG/1
20 TABLET, FILM COATED ORAL
Status: COMPLETED | OUTPATIENT
Start: 2024-06-08 | End: 2024-06-08

## 2024-06-08 RX ORDER — SEMAGLUTIDE 2.68 MG/ML
INJECTION, SOLUTION SUBCUTANEOUS
COMMUNITY

## 2024-06-08 RX ORDER — CIPROFLOXACIN 500 MG/1
500 TABLET ORAL 2 TIMES DAILY
Qty: 20 TABLET | Refills: 0 | Status: SHIPPED | OUTPATIENT
Start: 2024-06-08 | End: 2024-06-18

## 2024-06-08 RX ORDER — METRONIDAZOLE 500 MG/1
500 TABLET ORAL
Status: COMPLETED | OUTPATIENT
Start: 2024-06-08 | End: 2024-06-08

## 2024-06-08 RX ORDER — METRONIDAZOLE 500 MG/1
500 TABLET ORAL 3 TIMES DAILY
Qty: 30 TABLET | Refills: 0 | Status: SHIPPED | OUTPATIENT
Start: 2024-06-08 | End: 2024-06-18

## 2024-06-08 RX ADMIN — CIPROFLOXACIN 500 MG: 500 TABLET ORAL at 04:06

## 2024-06-08 RX ADMIN — FAMOTIDINE 20 MG: 20 TABLET ORAL at 04:06

## 2024-06-08 RX ADMIN — METRONIDAZOLE 500 MG: 500 TABLET ORAL at 04:06

## 2024-06-08 RX ADMIN — SODIUM CHLORIDE 1000 ML: 9 INJECTION, SOLUTION INTRAVENOUS at 01:06

## 2024-06-08 NOTE — ED NOTES
Patient identifiers verified and correct for  MS Wang   C/C:  Nausea, vomiting, diarrhea SEE NN  APPEARANCE: awake and alert in NAD. PAIN  10/10  SKIN: warm, dry and intact. No breakdown or bruising.  MUSCULOSKELETAL: Patient moving all extremities spontaneously, no obvious swelling or deformities noted. Ambulates independently.  RESPIRATORY: Denies shortness of breath.Respirations unlabored.   CARDIAC: Denies CP, 2+ distal pulses; no peripheral edema  ABDOMEN: Abdomen round, reports nausea, vomiting, diarrhea  : voids spontaneously, denies difficulty  Neurologic: AAO x 4; follows commands equal strength in all extremities; denies numbness/tingling. Denies dizziness  Denies new weakness

## 2024-06-08 NOTE — ED NOTES
Patient states she has been sick x 3 weeks agter change to Ozempic, reports nausea, vomiting, diarrhea, no emesis today, diarrhea x2 today, has been taking Pepto Bismol and Pepcid, reports cramping after eating, no OZempic x 2 weeks

## 2024-06-08 NOTE — ED PROVIDER NOTES
Chief complaint:  GI Problem (PCP switched her from Trulicity to Ozempic 2 mg 3 weeks ago. Pt reports N/V/D and abd cramping since then )      HPI:  Caitie Wang is a 44 y.o. female presenting with acute onset of nausea, vomiting, diarrhea with some generalized abdominal discomfort that has been going on for the last almost 3 weeks.  She states that she took a 2 mg Ozempic that her doctor prescribed her for the 1st time and prior to that she had been on Trulicity.  She states that since then she has been having increasing nausea and vomiting as well as persistent diarrhea.  No fevers or chills.  No dysuria or frequency.    ROS: As per HPI and below:  Constitutional:  No fevers, no chills  Cardiac: no chest pain  Respiratory: no shortness of breath  Abdominal:  Abdominal pain, nausea, vomiting, diarrhea  Neuro: no focal numbness, no focal weakness        Review of patient's allergies indicates:   Allergen Reactions    Cheese Itching and Rash       No current facility-administered medications on file prior to encounter.     Current Outpatient Medications on File Prior to Encounter   Medication Sig Dispense Refill    levothyroxine (SYNTHROID) 150 MCG tablet Take 1 tablet (150 mcg total) by mouth once daily. 30 tablet 0    metFORMIN (GLUCOPHAGE) 500 MG tablet Take 1 tablet (500 mg total) by mouth daily with breakfast. 30 tablet 11    semaglutide (OZEMPIC) 2 mg/dose (8 mg/3 mL) PnIj Inject into the skin every 7 days.      acetaminophen (TYLENOL) 325 MG tablet Take 2 tablets (650 mg total) by mouth every 6 (six) hours as needed for Pain. (Patient taking differently: Take 650 mg by mouth as needed for Pain.) 30 tablet 0    ALBUTEROL SULFATE (PROAIR HFA INHL) Inhale 2 puffs into the lungs as needed.      aspirin 325 MG tablet Take 325 mg by mouth as needed.      atorvastatin (LIPITOR) 40 MG tablet Take 40 mg by mouth every evening.      baclofen (LIORESAL) 20 MG tablet Take 1 tablet (20 mg total) by mouth 3 (three) times  daily. for 3 days (Patient taking differently: Take 20 mg by mouth 2 (two) times daily.) 9 tablet 0    cetirizine (ZYRTEC) 10 MG tablet Take 1 tablet (10 mg total) by mouth once daily. 30 tablet 0    dulaglutide (TRULICITY) 1.5 mg/0.5 mL pen injector Inject 1.5 mg into the skin every 7 days.      famciclovir (FAMVIR) 500 MG tablet Take 500 mg by mouth 3 (three) times daily.      folic acid (FOLVITE) 1 MG tablet Take 1 mg by mouth once daily.      gabapentin (NEURONTIN) 300 MG capsule Take 1 capsule (300 mg total) by mouth 3 (three) times daily. 90 capsule 11    hydrOXYzine (ATARAX) 50 MG tablet Take 50 mg by mouth every evening.      ibuprofen (ADVIL,MOTRIN) 600 MG tablet Take 1 tablet (600 mg total) by mouth every 6 (six) hours as needed for Pain. (Patient taking differently: Take 600 mg by mouth as needed for Pain.) 20 tablet 0    imipramine (TOFRANIL) 25 MG tablet Take 25 mg by mouth every evening.      LIDOcaine (LIDODERM) 5 % Place 1 patch onto the skin daily as needed (pain). Remove & Discard patch within 12 hours or as directed by MD 15 patch 0    LINZESS 145 mcg Cap capsule Take 145 mcg by mouth once daily.      meclizine (ANTIVERT) 12.5 mg tablet Take 25 mg by mouth as needed for Dizziness.      medroxyPROGESTERone (DEPO-PROVERA) 150 mg/mL Syrg Inject 150 mg into the muscle every 3 (three) months.      meloxicam (MOBIC) 7.5 MG tablet Take 7.5 mg by mouth once daily.      montelukast (SINGULAIR) 10 mg tablet Take 10 mg by mouth once daily.      omeprazole (PRILOSEC) 40 MG capsule TAKE 1 CAPSULE(40 MG) BY MOUTH EVERY DAY 30 capsule 2    ondansetron (ZOFRAN) 8 MG tablet Take 8 mg by mouth every 8 (eight) hours as needed.      pravastatin (PRAVACHOL) 40 MG tablet Take 40 mg by mouth once daily.      traMADoL (ULTRAM) 50 mg tablet Take 50 mg by mouth 2 (two) times daily as needed.      VITAMIN D2 50,000 unit capsule Take 50,000 Units by mouth every 7 days.      [DISCONTINUED] amoxicillin (AMOXIL) 500 MG capsule  Amoxicillin 500mg caps, take 2 capsules 2 times per day for 14 days.  Take all 4 medications together 56 capsule 0       PMH:  As per HPI and below:  Past Medical History:   Diagnosis Date    Anxiety     Asthma     Chronic pain     back; inflammatory    Depression     Diabetes mellitus, type 2     Endometriosis     Gastric ulcer     Hiatal hernia     Hyperlipidemia     Hypertension     MITZI (obstructive sleep apnea)     no cpap machine    Thyroid disease     Vitamin D deficiency      Past Surgical History:   Procedure Laterality Date    CARPAL TUNNEL RELEASE Right     CERVICAL SPINE SURGERY      endometriosis      ESOPHAGOGASTRODUODENOSCOPY N/A 6/30/2023    Procedure: EGD (ESOPHAGOGASTRODUODENOSCOPY);  Surgeon: Ce Vazquez MD;  Location: [facility] ENDO;  Service: Endoscopy;  Laterality: N/A;    LAPAROSCOPIC CHOLECYSTECTOMY N/A 06/01/2023    Procedure: CHOLECYSTECTOMY, LAPAROSCOPIC;  Surgeon: Eleuterio Puri MD;  Location: [facility] OR [address] [place] [address];  Service: General;  Laterality: N/A;    THYROID SURGERY         Social History     Socioeconomic History    Marital status: Single   Tobacco Use    Smoking status: Never    Smokeless tobacco: Never   Substance and Sexual Activity    Alcohol use: Yes     Comment: special occasions    Drug use: No    Sexual activity: Yes     Partners: Male     Birth control/protection: Injection     Social Determinants of Health     Financial Resource Strain: Low Risk  (5/31/2023)    Overall Financial Resource Strain (CARDIA)     Difficulty of Paying Living Expenses: Not hard at all   Food Insecurity: No Food Insecurity (5/31/2023)    Hunger Vital Sign     Worried About Running Out of Food in the Last Year: Never true     Ran Out of Food in the Last Year: Never true   Transportation Needs: No Transportation Needs (5/31/2023)    PRAPARE - Transportation     Lack of Transportation (Medical): No     Lack of Transportation (Non-Medical): No   Physical Activity: Inactive (5/31/2023)    Exercise  Vital Sign     Days of Exercise per Week: 0 days     Minutes of Exercise per Session: 0 min   Stress: Stress Concern Present (5/31/2023)    [employer] - Occupational Stress Questionnaire     Feeling of Stress : Very much   Housing Stability: Low Risk  (5/31/2023)    Housing Stability Vital Sign     Unable to Pay for Housing in the Last Year: No     Number of Places Lived in the Last Year: 1     Unstable Housing in the Last Year: No       Family History   Problem Relation Name Age of Onset    Hypertension Mother      Diabetes Maternal Grandmother      Hyperlipidemia Maternal Grandmother      Diabetes Maternal Grandfather      Hyperlipidemia Maternal Grandfather      Breast cancer Maternal Aunt         Physical Exam:    Vitals:    06/08/24 1614   BP: 134/85   Pulse: 81   Resp: 18   Temp: 98.1 °F (36.7 °C)     Constitutional: Well-nourished, well-developed, in no acute distress, not cachectic  Eyes: PERRLA, EOMI, normal conjunctiva, normal sclera  ENT: Moist Mucous membranes  Respiratory: Clear to auscultation bilaterally, no wheezes, no crackles, no rhonchi  Cardiovascular: Regular rate and rhythm, no murmurs, no rubs, no gallops  Abdominal: Soft, mild diffuse abdominal tenderness, nondistended, no guarding, no rebound  Musculoskeletal: Normal range of motion, no obvious deformity, normal capillary refill, head atraumatic, neck supple, no meningismus  Skin: no rash, no ecchymosis, no errythema, no discharge  Neurologic: Cranial nerves II through XII intact, no motor deficits, no sensory deficits, no cerebellar deficits  Psychological: Alert, oriented x3, normal affect, normal mood    Orders Placed This Encounter   Procedures    Stool culture **CANNOT BE ORDERED STAT**    Clostridium difficile EIA    HIV 1/2 Ag/Ab (4th Gen)    Hepatitis C Antibody    CBC auto differential    WBC, Stool    Comprehensive metabolic panel    Lipase    Special contact c diff isolation status    Insert Saline  lock IV       Medications   sodium chloride 0.9% bolus 1,000 mL 1,000 mL (0 mLs Intravenous Stopped 6/8/24 1350)   famotidine tablet 20 mg (20 mg Oral Given 6/8/24 1647)   ciprofloxacin HCl tablet 500 mg (500 mg Oral Given 6/8/24 1647)   metroNIDAZOLE tablet 500 mg (500 mg Oral Given 6/8/24 1647)         Labs Reviewed   COMPREHENSIVE METABOLIC PANEL - Abnormal; Notable for the following components:       Result Value    Chloride 111 (*)     CO2 18 (*)     Calcium 8.6 (*)     All other components within normal limits   CULTURE, STOOL   CLOSTRIDIUM DIFFICILE   HIV 1 / 2 ANTIBODY    Narrative:     Release to patient->Immediate   HEPATITIS C ANTIBODY    Narrative:     Release to patient->Immediate   CBC W/ AUTO DIFFERENTIAL    Narrative:     Release to patient->Immediate   LIPASE   WBC, STOOL       No orders to display       Medical Decision Making  Differential diagnosis includes gastritis, gastroenteritis, life-threatening colitis, pancreatitis, diverticulitis     Patient presents with almost 3 weeks of nausea vomiting diarrhea that started right after taking a high dose of Ozempic as she was switching from Trulicity.  Will check abdominal labs and send stool cultures if she provides them and re-evaluate.    Patient's abdominal labs are relatively unremarkable except for some dehydration.  She continues to have some discomfort in her abdomen that is mild.  She was unable to produce a stool sample.  Given her 3 weeks of discomfort, I will treat her for possible colitis with Cipro and Flagyl and will also discharge her with Pepcid on top of her Prilosec.  Will discharge with abdominal pain precautions.    Amount and/or Complexity of Data Reviewed  Labs: ordered.    Risk  Prescription drug management.      Procedures          ASSESSMENT  1. Nausea and vomiting, unspecified vomiting type    2. Diarrhea, unspecified type    3. Abdominal pain, unspecified abdominal location    4. Adverse effect of drug, initial encounter           Disposition:  Discharged home in stable condition    Discharge Medication List as of 6/8/2024  4:35 PM        START taking these medications    Details   ciprofloxacin HCl (CIPRO) 500 MG tablet Take 1 tablet (500 mg total) by mouth 2 (two) times daily. for 10 days, Starting Sat 6/8/2024, Until Tue 6/18/2024, Normal      famotidine (PEPCID) 20 MG tablet Take 1 tablet (20 mg total) by mouth 2 (two) times daily. for 10 days, Starting Sat 6/8/2024, Until Tue 6/18/2024, Normal      metroNIDAZOLE (FLAGYL) 500 MG tablet Take 1 tablet (500 mg total) by mouth 3 (three) times daily. for 10 days, Starting Sat 6/8/2024, Until Tue 6/18/2024, Normal           Discharge Medication List as of 6/8/2024  4:35 PM        Discharge Medication List as of 6/8/2024  4:35 PM             Jhonny Schmidt III, MD  06/08/24 0510

## 2024-08-12 ENCOUNTER — HOSPITAL ENCOUNTER (EMERGENCY)
Facility: HOSPITAL | Age: 44
Discharge: HOME | End: 2024-08-12
Attending: STUDENT IN AN ORGANIZED HEALTH CARE EDUCATION/TRAINING PROGRAM
Payer: MEDICAID

## 2024-08-12 VITALS
BODY MASS INDEX: 43.41 KG/M2 | HEIGHT: 63 IN | HEART RATE: 74 BPM | SYSTOLIC BLOOD PRESSURE: 138 MMHG | DIASTOLIC BLOOD PRESSURE: 94 MMHG | WEIGHT: 245 LBS | OXYGEN SATURATION: 98 % | TEMPERATURE: 99 F | RESPIRATION RATE: 18 BRPM

## 2024-08-12 DIAGNOSIS — G43.909 MIGRAINE: ICD-10-CM

## 2024-08-12 DIAGNOSIS — R42 DIZZINESS: Primary | ICD-10-CM

## 2024-08-12 LAB
BUN SERPL-MCNC: 8 MG/DL (ref 6–30)
CHLORIDE SERPL-SCNC: 106 MMOL/L (ref 95–110)
CREAT SERPL-MCNC: 0.8 MG/DL (ref 0.5–1.4)
GLUCOSE SERPL-MCNC: 73 MG/DL (ref 70–110)
HCT VFR BLD CALC: 40 %PCV (ref 36–54)
OHS QRS DURATION: 84 MS
OHS QTC CALCULATION: 420 MS
POC IONIZED CALCIUM: 1.16 MMOL/L (ref 1.06–1.42)
POC TCO2 (MEASURED): 23 MMOL/L (ref 23–29)
POTASSIUM BLD-SCNC: 3.6 MMOL/L (ref 3.5–5.1)
SAMPLE: NORMAL
SODIUM BLD-SCNC: 140 MMOL/L (ref 136–145)
T4 FREE SERPL-MCNC: 0.61 NG/DL (ref 0.71–1.51)
TSH SERPL DL<=0.005 MIU/L-ACNC: 14.12 UIU/ML (ref 0.4–4)

## 2024-08-12 PROCEDURE — 84443 ASSAY THYROID STIM HORMONE: CPT | Performed by: STUDENT IN AN ORGANIZED HEALTH CARE EDUCATION/TRAINING PROGRAM

## 2024-08-12 PROCEDURE — 99284 EMERGENCY DEPT VISIT MOD MDM: CPT | Mod: 25

## 2024-08-12 PROCEDURE — 84439 ASSAY OF FREE THYROXINE: CPT | Performed by: STUDENT IN AN ORGANIZED HEALTH CARE EDUCATION/TRAINING PROGRAM

## 2024-08-12 PROCEDURE — 96374 THER/PROPH/DIAG INJ IV PUSH: CPT

## 2024-08-12 PROCEDURE — 25000003 PHARM REV CODE 250: Performed by: STUDENT IN AN ORGANIZED HEALTH CARE EDUCATION/TRAINING PROGRAM

## 2024-08-12 PROCEDURE — 63600175 PHARM REV CODE 636 W HCPCS: Performed by: STUDENT IN AN ORGANIZED HEALTH CARE EDUCATION/TRAINING PROGRAM

## 2024-08-12 RX ORDER — MECLIZINE HCL 12.5 MG 12.5 MG/1
25 TABLET ORAL
Status: COMPLETED | OUTPATIENT
Start: 2024-08-12 | End: 2024-08-12

## 2024-08-12 RX ORDER — ACETAMINOPHEN 325 MG/1
650 TABLET ORAL
Status: COMPLETED | OUTPATIENT
Start: 2024-08-12 | End: 2024-08-12

## 2024-08-12 RX ORDER — PROCHLORPERAZINE EDISYLATE 5 MG/ML
5 INJECTION INTRAMUSCULAR; INTRAVENOUS
Status: COMPLETED | OUTPATIENT
Start: 2024-08-12 | End: 2024-08-12

## 2024-08-12 RX ORDER — MECLIZINE HYDROCHLORIDE 25 MG/1
25 TABLET ORAL 3 TIMES DAILY PRN
Qty: 15 TABLET | Refills: 0 | Status: SHIPPED | OUTPATIENT
Start: 2024-08-12 | End: 2024-08-17

## 2024-08-12 RX ADMIN — MECLIZINE 25 MG: 12.5 TABLET ORAL at 04:08

## 2024-08-12 RX ADMIN — ACETAMINOPHEN 650 MG: 325 TABLET ORAL at 04:08

## 2024-08-12 RX ADMIN — PROCHLORPERAZINE EDISYLATE 5 MG: 5 INJECTION INTRAMUSCULAR; INTRAVENOUS at 04:08

## 2024-08-12 NOTE — ED TRIAGE NOTES
Arrives to ED with c/o of migraines for the past week and recurring vertigo. States PCP is out of town. States the HA and dizziness is worse when the lights are turned on.

## 2024-08-12 NOTE — DISCHARGE INSTRUCTIONS
Your labs today were notable for slightly low thyroid level, please discuss adjustment of her thyroid medication with your PCP.  Please follow up outpatient with your neurologist and PCP within 1 week for re-evaluation.    Please return to the emergency department if you experience any new or concerning symptoms including increasing headache, vision changes, weakness, numbness, inability to tolerate food/liquids, chest pain, or shortness of breath.

## 2024-08-12 NOTE — FIRST PROVIDER EVALUATION
Medical screening examination initiated.  I have conducted a focused provider triage encounter, findings are as follows:    Brief history of present illness:  lightheadedess and dizziness x 2d with migraine x 1 week, h/o vertigo and migraines    There were no vitals filed for this visit.    Pertinent physical exam:  ambulatory    Brief workup plan:  intake    Preliminary workup initiated; this workup will be continued and followed by the physician or advanced practice provider that is assigned to the patient when roomed.

## 2024-08-12 NOTE — ED PROVIDER NOTES
Chief Complaint   Headache (Ha for month, dizziness since sat)      History Of Present Illness   Caitie Wang is a 44 y.o. female with a PMHx including migraines, thyroid disorder  presenting with headache.  Patient states that for the past week she has been having a daily headache that she describes as pressure across the back of her head.  States it feels similar to her pattern of migraines.  Patient states she follows with a neurologist for her migraines.  States she was you takes rizatriptan, topiramate, and Fioricet for her migraines.  States that she was taking this in his helped some but has not gotten the migraine completely away.  States that she was also been having some dizziness particularly associated with light sensitivity for the past few days.  State that she was had this occasionally in the past but not typically with her migraines in the past few years.  She reports room spinning sensation.  She reports no falls, or head injury.  She reports no vision changes.  She reports no weakness, numbness, chest pain, shortness of breath, fevers, or chills.    Independent Historian: Yes  Other Historian or Collateral: Chart review  Interpretor: No      Review of patient's allergies indicates:   Allergen Reactions    Cheese Itching and Rash       No current facility-administered medications on file prior to encounter.     Current Outpatient Medications on File Prior to Encounter   Medication Sig Dispense Refill    acetaminophen (TYLENOL) 325 MG tablet Take 2 tablets (650 mg total) by mouth every 6 (six) hours as needed for Pain. (Patient taking differently: Take 650 mg by mouth as needed for Pain.) 30 tablet 0    ALBUTEROL SULFATE (PROAIR HFA INHL) Inhale 2 puffs into the lungs as needed.      aspirin 325 MG tablet Take 325 mg by mouth as needed.      atorvastatin (LIPITOR) 40 MG tablet Take 40 mg by mouth every evening.      baclofen (LIORESAL) 20 MG tablet Take 1 tablet (20 mg total) by mouth 3 (three)  times daily. for 3 days (Patient taking differently: Take 20 mg by mouth 2 (two) times daily.) 9 tablet 0    cetirizine (ZYRTEC) 10 MG tablet Take 1 tablet (10 mg total) by mouth once daily. 30 tablet 0    dulaglutide (TRULICITY) 1.5 mg/0.5 mL pen injector Inject 1.5 mg into the skin every 7 days.      famciclovir (FAMVIR) 500 MG tablet Take 500 mg by mouth 3 (three) times daily.      famotidine (PEPCID) 20 MG tablet Take 1 tablet (20 mg total) by mouth 2 (two) times daily. for 10 days 20 tablet 0    folic acid (FOLVITE) 1 MG tablet Take 1 mg by mouth once daily.      gabapentin (NEURONTIN) 300 MG capsule Take 1 capsule (300 mg total) by mouth 3 (three) times daily. 90 capsule 11    hydrOXYzine (ATARAX) 50 MG tablet Take 50 mg by mouth every evening.      ibuprofen (ADVIL,MOTRIN) 600 MG tablet Take 1 tablet (600 mg total) by mouth every 6 (six) hours as needed for Pain. (Patient taking differently: Take 600 mg by mouth as needed for Pain.) 20 tablet 0    imipramine (TOFRANIL) 25 MG tablet Take 25 mg by mouth every evening.      levothyroxine (SYNTHROID) 150 MCG tablet Take 1 tablet (150 mcg total) by mouth once daily. 30 tablet 0    LIDOcaine (LIDODERM) 5 % Place 1 patch onto the skin daily as needed (pain). Remove & Discard patch within 12 hours or as directed by MD 15 patch 0    LINZESS 145 mcg Cap capsule Take 145 mcg by mouth once daily.      meclizine (ANTIVERT) 12.5 mg tablet Take 25 mg by mouth as needed for Dizziness.      medroxyPROGESTERone (DEPO-PROVERA) 150 mg/mL Syrg Inject 150 mg into the muscle every 3 (three) months.      meloxicam (MOBIC) 7.5 MG tablet Take 7.5 mg by mouth once daily.      metFORMIN (GLUCOPHAGE) 500 MG tablet Take 1 tablet (500 mg total) by mouth daily with breakfast. 30 tablet 11    montelukast (SINGULAIR) 10 mg tablet Take 10 mg by mouth once daily.      omeprazole (PRILOSEC) 40 MG capsule TAKE 1 CAPSULE(40 MG) BY MOUTH EVERY DAY 30 capsule 2    ondansetron (ZOFRAN) 8 MG tablet  Take 8 mg by mouth every 8 (eight) hours as needed.      pravastatin (PRAVACHOL) 40 MG tablet Take 40 mg by mouth once daily.      semaglutide (OZEMPIC) 2 mg/dose (8 mg/3 mL) PnIj Inject into the skin every 7 days.      traMADoL (ULTRAM) 50 mg tablet Take 50 mg by mouth 2 (two) times daily as needed.      VITAMIN D2 50,000 unit capsule Take 50,000 Units by mouth every 7 days.         Past History   As per HPI and below:  Past Medical History:   Diagnosis Date    Anxiety     Asthma     Chronic pain     back; inflammatory    Depression     Diabetes mellitus, type 2     Endometriosis     Gastric ulcer     Hiatal hernia     Hyperlipidemia     Hypertension     MITZI (obstructive sleep apnea)     no cpap machine    Thyroid disease     Vitamin D deficiency      Past Surgical History:   Procedure Laterality Date    CARPAL TUNNEL RELEASE Right     CERVICAL SPINE SURGERY      endometriosis      ESOPHAGOGASTRODUODENOSCOPY N/A 6/30/2023    Procedure: EGD (ESOPHAGOGASTRODUODENOSCOPY);  Surgeon: Ce Vazquez MD;  Location: [facility];  Service: Endoscopy;  Laterality: N/A;    LAPAROSCOPIC CHOLECYSTECTOMY N/A 06/01/2023    Procedure: CHOLECYSTECTOMY, LAPAROSCOPIC;  Surgeon: Eleuterio Puri MD;  Location: [address];  Service: General;  Laterality: N/A;    THYROID SURGERY         Social History     Socioeconomic History    Marital status: Single   Tobacco Use    Smoking status: Never    Smokeless tobacco: Never   Substance and Sexual Activity    Alcohol use: Yes     Comment: special occasions    Drug use: No    Sexual activity: Yes     Partners: Male     Birth control/protection: Injection     Social Determinants of Health     Financial Resource Strain: Low Risk  (5/31/2023)    Overall Financial Resource Strain (CARDIA)     Difficulty of Paying Living Expenses: Not hard at all   Food Insecurity: No Food Insecurity (5/31/2023)    Hunger Vital Sign     Worried About Running Out of Food in the Last Year: Never true      "Ran Out of Food in the Last Year: Never true   Transportation Needs: No Transportation Needs (5/31/2023)    PRAPARE - Transportation     Lack of Transportation (Medical): No     Lack of Transportation (Non-Medical): No   Physical Activity: Inactive (5/31/2023)    Exercise Vital Sign     Days of Exercise per Week: 0 days     Minutes of Exercise per Session: 0 min   Stress: Stress Concern Present (5/31/2023)    [employer] - Occupational Stress Questionnaire     Feeling of Stress : Very much   Housing Stability: Low Risk  (5/31/2023)    Housing Stability Vital Sign     Unable to Pay for Housing in the Last Year: No     Number of Places Lived in the Last Year: 1     Unstable Housing in the Last Year: No       Family History   Problem Relation Name Age of Onset    Hypertension Mother      Diabetes Maternal Grandmother      Hyperlipidemia Maternal Grandmother      Diabetes Maternal Grandfather      Hyperlipidemia Maternal Grandfather      Breast cancer Maternal Aunt         Physical Exam     Vitals:    08/12/24 1359   BP: (!) 167/73   Pulse: 100   Resp: 18   Temp: 98.9 °F (37.2 °C)   TempSrc: Oral   SpO2: 98%   Weight: 111.1 kg (245 lb)   Height: 5' 3" (1.6 m)       Physical Exam  Vitals reviewed.   Constitutional:       General: She is not in acute distress.     Appearance: Normal appearance. She is not toxic-appearing.   HENT:      Head: Normocephalic and atraumatic.      Nose: No congestion.      Mouth/Throat:      Mouth: Mucous membranes are moist.   Eyes:      General: No scleral icterus.     Extraocular Movements: Extraocular movements intact.      Pupils: Pupils are equal, round, and reactive to light.   Cardiovascular:      Rate and Rhythm: Normal rate and regular rhythm.   Pulmonary:      Effort: No respiratory distress.      Breath sounds: No wheezing or rhonchi.   Abdominal:      General: There is no distension.      Palpations: Abdomen is soft.      Tenderness: There is no " abdominal tenderness.   Musculoskeletal:         General: No deformity.      Cervical back: No rigidity.   Skin:     General: Skin is warm and dry.      Capillary Refill: Capillary refill takes less than 2 seconds.   Neurological:      General: No focal deficit present.      Mental Status: She is alert and oriented to person, place, and time.      Cranial Nerves: No cranial nerve deficit.      Sensory: No sensory deficit.      Motor: No weakness.      Coordination: Coordination normal.             Results     Labs Reviewed   TSH   ISTAT CHEM8       Imaging Results    None               Initial [facility]   Medical Decision Making  Patient is a 44-year-old female presenting with headache and dizziness.  Most likely secondary to her known migraine disorder.  Will treat patient's headache.  Will also get i-STAT and TSH to further evaluate for complicating factors.  Lower suspicion for CVA, intracranial pathology.  Given her reassuring exam, do not feel that head imaging is indicated at this time but will reassess after medications.    Amount and/or Complexity of Data Reviewed  Labs: ordered.    Risk  OTC drugs.  Prescription drug management.               Medications Given / Interventions     Medications   acetaminophen tablet 650 mg (has no administration in time range)   meclizine tablet 25 mg (has no administration in time range)   prochlorperazine injection Soln 5 mg (has no administration in time range)       Procedures     ED POCUS Performed: No    Reassessment and ED Course     ED Course as of 08/14/24 0821   Mon Aug 12, 2024   1733 Re-evaluated the patient, states that her headache and dizziness are resolved.  Pending TSH been anticipate likely discharge [CH]   1829 Slightly low T4, improved from prior.  Will advise PCP follow up and possible adjustment of her thyroid medication [CH]      ED Course User Index  [CH] Amanda Burk MD     Re-evaluate the patient.  States she feels improved.  Ambulating in the ED  without difficulty.  DC to home with return precautions.         Final diagnoses:  [G43.909] Migraine           Dispo                        Amanda Burk MD  08/14/24 7833

## 2024-12-26 ENCOUNTER — OFFICE VISIT (OUTPATIENT)
Dept: URGENT CARE | Facility: CLINIC | Age: 44
End: 2024-12-26
Payer: MEDICAID

## 2024-12-26 VITALS
TEMPERATURE: 98 F | RESPIRATION RATE: 20 BRPM | OXYGEN SATURATION: 98 % | BODY MASS INDEX: 43.41 KG/M2 | SYSTOLIC BLOOD PRESSURE: 159 MMHG | DIASTOLIC BLOOD PRESSURE: 93 MMHG | HEIGHT: 63 IN | WEIGHT: 245 LBS | HEART RATE: 78 BPM

## 2024-12-26 DIAGNOSIS — H65.192 OTHER NON-RECURRENT ACUTE NONSUPPURATIVE OTITIS MEDIA OF LEFT EAR: ICD-10-CM

## 2024-12-26 DIAGNOSIS — J34.89 NASAL CONGESTION WITH RHINORRHEA: ICD-10-CM

## 2024-12-26 DIAGNOSIS — G43.809 OTHER MIGRAINE WITHOUT STATUS MIGRAINOSUS, NOT INTRACTABLE: ICD-10-CM

## 2024-12-26 DIAGNOSIS — I10 ELEVATED BLOOD PRESSURE READING WITH DIAGNOSIS OF HYPERTENSION: ICD-10-CM

## 2024-12-26 DIAGNOSIS — R09.81 NASAL CONGESTION WITH RHINORRHEA: ICD-10-CM

## 2024-12-26 DIAGNOSIS — J01.90 ACUTE BACTERIAL SINUSITIS: Primary | ICD-10-CM

## 2024-12-26 DIAGNOSIS — H92.03 EAR PAIN, BILATERAL: ICD-10-CM

## 2024-12-26 DIAGNOSIS — B96.89 ACUTE BACTERIAL SINUSITIS: Primary | ICD-10-CM

## 2024-12-26 PROBLEM — F33.1 MDD (MAJOR DEPRESSIVE DISORDER), RECURRENT EPISODE, MODERATE: Status: ACTIVE | Noted: 2023-11-03

## 2024-12-26 PROBLEM — F41.1 GENERALIZED ANXIETY DISORDER WITH PANIC ATTACKS: Status: ACTIVE | Noted: 2023-11-03

## 2024-12-26 PROBLEM — F43.10 PTSD (POST-TRAUMATIC STRESS DISORDER): Status: ACTIVE | Noted: 2023-11-03

## 2024-12-26 PROBLEM — J30.9 ALLERGIC RHINITIS: Status: ACTIVE | Noted: 2024-10-08

## 2024-12-26 PROBLEM — J32.9 CHRONIC SINUSITIS: Status: ACTIVE | Noted: 2024-12-26

## 2024-12-26 PROBLEM — F41.0 GENERALIZED ANXIETY DISORDER WITH PANIC ATTACKS: Status: ACTIVE | Noted: 2023-11-03

## 2024-12-26 PROBLEM — J45.30 MILD PERSISTENT ASTHMA WITHOUT COMPLICATION: Status: ACTIVE | Noted: 2023-09-15

## 2024-12-26 PROBLEM — G47.33 OSA (OBSTRUCTIVE SLEEP APNEA): Status: ACTIVE | Noted: 2024-08-21

## 2024-12-26 PROBLEM — E11.40 TYPE 2 DIABETES MELLITUS WITH DIABETIC NEUROPATHY, WITHOUT LONG-TERM CURRENT USE OF INSULIN: Status: ACTIVE | Noted: 2022-08-09

## 2024-12-26 PROBLEM — G43.909 MIGRAINE WITHOUT STATUS MIGRAINOSUS, NOT INTRACTABLE: Status: ACTIVE | Noted: 2023-09-15

## 2024-12-26 RX ORDER — IBUPROFEN 800 MG/1
800 TABLET ORAL EVERY 6 HOURS PRN
Qty: 30 TABLET | Refills: 0 | Status: SHIPPED | OUTPATIENT
Start: 2024-12-27 | End: 2025-01-06

## 2024-12-26 RX ORDER — LOSARTAN POTASSIUM 25 MG/1
25 TABLET ORAL DAILY
COMMUNITY
Start: 2024-12-19

## 2024-12-26 RX ORDER — LORATADINE 10 MG/1
10 TABLET ORAL DAILY PRN
Qty: 30 TABLET | Refills: 0 | Status: SHIPPED | OUTPATIENT
Start: 2024-12-26 | End: 2025-01-25

## 2024-12-26 RX ORDER — AMOXICILLIN AND CLAVULANATE POTASSIUM 875; 125 MG/1; MG/1
1 TABLET, FILM COATED ORAL 2 TIMES DAILY
Qty: 20 TABLET | Refills: 0 | Status: SHIPPED | OUTPATIENT
Start: 2024-12-26 | End: 2025-01-05

## 2024-12-26 RX ORDER — KETOROLAC TROMETHAMINE 30 MG/ML
30 INJECTION, SOLUTION INTRAMUSCULAR; INTRAVENOUS
Status: COMPLETED | OUTPATIENT
Start: 2024-12-26 | End: 2024-12-26

## 2024-12-26 RX ORDER — FLUTICASONE PROPIONATE 50 MCG
2 SPRAY, SUSPENSION (ML) NASAL DAILY PRN
Qty: 15.8 ML | Refills: 0 | Status: SHIPPED | OUTPATIENT
Start: 2024-12-26 | End: 2025-01-25

## 2024-12-26 RX ADMIN — KETOROLAC TROMETHAMINE 30 MG: 30 INJECTION, SOLUTION INTRAMUSCULAR; INTRAVENOUS at 04:12

## 2024-12-26 NOTE — PROGRESS NOTES
"Subjective:      Patient ID: Caitie aWng is a 44 y.o. female.    Vitals:  height is 5' 3" (1.6 m) and weight is 111.1 kg (245 lb). Her oral temperature is 98.1 °F (36.7 °C). Her blood pressure is 159/93 (abnormal) and her pulse is 78. Her respiration is 20 and oxygen saturation is 98%.     Chief Complaint: Otalgia    Caitie Wang is a 44 y.o. female with hypertension, asthma, MITZI, DM II who complains of Bilateral Ear pain. Pt states she has been to her PCP for ear pain last Thursday and was given an ear drop for her ears. Pt states the ear drops (ciprodex) hurt her and it  burns and she still has era pain. She has been using a nasal spray. She reports 2 weeks of sinus congestion. Sore throat has resolved.     Otalgia   There is pain in both ears. This is a recurrent problem. The current episode started in the past 7 days. The problem occurs constantly. The problem has been gradually worsening. There has been no fever. The pain is at a severity of 10/10. The pain is severe. Associated symptoms include headaches and rhinorrhea. Pertinent negatives include no abdominal pain, coughing, diarrhea, ear discharge, hearing loss, neck pain, rash, sore throat or vomiting. She has tried ear drops for the symptoms. The treatment provided no relief.       Constitution: Negative for activity change, appetite change, chills, fatigue, fever and generalized weakness.   HENT:  Positive for ear pain, congestion, postnasal drip, sinus pain and sinus pressure. Negative for ear discharge, foreign body in ear, tinnitus, hearing loss, sore throat, trouble swallowing and voice change.    Neck: Negative for neck pain.   Cardiovascular:  Negative for chest pain, leg swelling, palpitations and sob on exertion.   Respiratory:  Positive for asthma. Negative for cough, sputum production, shortness of breath and wheezing.    Gastrointestinal:  Negative for abdominal pain, vomiting and diarrhea.   Musculoskeletal:  Negative for muscle cramps " and muscle ache.   Skin:  Negative for rash.   Allergic/Immunologic: Positive for environmental allergies, seasonal allergies, asthma and recurrent sinus infections.   Neurological:  Positive for headaches and history of migraines.      Objective:     Physical Exam   Constitutional: She is oriented to person, place, and time. She is cooperative.      Comments:Patient is awake and alert, sitting up in exam chair, speaking and answering in complete sentences, in no signs of acute distress     normalawake  HENT:   Head: Normocephalic and atraumatic.      Comments: Tenderness to frontal and maxillary sinuses; pain elicited to areas when patient lean forward.   Patient observed breathing through her mouth, sniffling, and frequently clearing her throat.    Ears:   Right Ear: External ear and ear canal normal. A middle ear effusion is present.   Left Ear: External ear and ear canal normal. Tympanic membrane is erythematous. A middle ear effusion is present.   Nose: Mucosal edema, rhinorrhea and congestion present. No purulent discharge. Right sinus exhibits maxillary sinus tenderness and frontal sinus tenderness. Left sinus exhibits maxillary sinus tenderness and frontal sinus tenderness.   Mouth/Throat: Uvula is midline, oropharynx is clear and moist and mucous membranes are normal. Mucous membranes are moist. Tonsils are 0 on the right. Tonsils are 0 on the left. No tonsillar exudate. Oropharynx is clear.      Comments:  postnasal discharge noted on the posterior pharyngeal wall    Eyes: Conjunctivae are normal. Pupils are equal, round, and reactive to light. Extraocular movement intact   Neck: Neck supple.   Cardiovascular: Normal rate, regular rhythm, normal heart sounds and normal pulses.   Pulmonary/Chest: Effort normal and breath sounds normal. No respiratory distress. She has no wheezes. She has no rhonchi. She has no rales.   Abdominal: Normal appearance.   Musculoskeletal: Normal range of motion.         General:  Normal range of motion.      Cervical back: She exhibits no tenderness.   Lymphadenopathy:     She has no cervical adenopathy.   Neurological: She is alert and oriented to person, place, and time.   Skin: Skin is warm.   Psychiatric: Her behavior is normal. Mood, judgment and thought content normal.   Nursing note and vitals reviewed.      Assessment:     1. Acute bacterial sinusitis    2. Ear pain, bilateral    3. Nasal congestion with rhinorrhea    4. Other migraine without status migrainosus, not intractable    5. Other non-recurrent acute nonsuppurative otitis media of left ear    6. Elevated blood pressure reading with diagnosis of hypertension      Patient presents with clinical exam findings and history consistent with above.      On exam, patient is nontoxic appearing and vitals are stable.      Diagnostic testing results were reviewed and discussed with patient/guardian.   Tests ordered in clinic: None  2. Previous admissions/labs and medications were reviewed.      Plan:       Acute bacterial sinusitis  -     amoxicillin-clavulanate 875-125mg (AUGMENTIN) 875-125 mg per tablet; Take 1 tablet by mouth 2 (two) times daily. for 10 days  Dispense: 20 tablet; Refill: 0  -     Ambulatory referral/consult to Internal Medicine    Ear pain, bilateral  -     ketorolac injection 30 mg  -     ibuprofen (ADVIL,MOTRIN) 800 MG tablet; Take 1 tablet (800 mg total) by mouth every 6 (six) hours as needed for Pain (headaches).  Dispense: 30 tablet; Refill: 0    Nasal congestion with rhinorrhea  -     fluticasone propionate (FLONASE) 50 mcg/actuation nasal spray; 2 sprays (100 mcg total) by Each Nostril route daily as needed for Allergies or Rhinitis.  Dispense: 15.8 mL; Refill: 0  -     loratadine (CLARITIN) 10 mg tablet; Take 1 tablet (10 mg total) by mouth daily as needed for Allergies (congestion).  Dispense: 30 tablet; Refill: 0    Other migraine without status migrainosus, not intractable  -     ibuprofen (ADVIL,MOTRIN)  "800 MG tablet; Take 1 tablet (800 mg total) by mouth every 6 (six) hours as needed for Pain (headaches).  Dispense: 30 tablet; Refill: 0    Other non-recurrent acute nonsuppurative otitis media of left ear  -     amoxicillin-clavulanate 875-125mg (AUGMENTIN) 875-125 mg per tablet; Take 1 tablet by mouth 2 (two) times daily. for 10 days  Dispense: 20 tablet; Refill: 0  -     Ambulatory referral/consult to Internal Medicine    Elevated blood pressure reading with diagnosis of hypertension  -     Ambulatory referral/consult to Internal Medicine                    1) See orders for this visit as documented in the electronic medical record.  2) Symptomatic therapy suggested: use acetaminophen/ibuprofen every 6-8 hours prn pain or fever, push fluids.   3) Call or return to clinic prn if these symptoms worsen or fail to improve as anticipated.    Discussed results/diagnosis/plan with patient in clinic.  We had shared decision making for patient's treatment. Patient verbalized understanding and in agreement with current treatment plan.     Patient was instructed to return for re-evaluation with urgent care or PCP for continued outpatient workup and management if symptoms do not improve/worsening symptoms. Strict ED versus clinic precautions given in depth.    Discharge and follow-up instructions given verbally/printed with the patient who expressed understanding. [facility] instructions and [facility] also [facility].              Tila "Chano Sierra PA-C          Patient Instructions   Discussed with patient that if he/she is in pain today, only take tylenol due to toradol injection received in clinic. You can continue NSAIDS tomorrow such as ibuprofen (Motrin/Advil), naproxen (Aleve), Mobic (Meloxicam).         Recommend oral antihistamine (claritin, zyrtec, allegra,xyzal)) +/- oral decongestant (pseudoephedrine)  for rhinorrhea/ear congestion <3 days if blood pressure is <130/80mmhg, steroid nasal spray (flonase), " OTC cough medicine (Mucinex DM 12 hour),  Tylenol (Acetaminophen) and/or Motrin (Ibuprofen) as directed for control of pain and/or fever.  Pseudoephedrine Oral: Immediate release: 60 mg every 4 to 6 hours; Extended release: 120 mg every 12 hours or 240 mg every 24 hours; maximum: 240 mg per 24 hours.       Please drink plenty of fluids.  Please get plenty of rest.  Nasal irrigation with a saline spray or Netti Pot like device per their directions is also recommended.  If you  smoke, please stop smoking.    To help ease a sore throat, you can:  Use a sore throat spray.  Suck on hard candy or throat lozenges.  Gargle with warm saltwater a few times each day. Mix of 1/4 teaspoon (1.25 grams) salt in 8 ounces (240 mL) of warm water.  Use a cool mist humidifier to help you breathe easier.    If you negative (-) for a COVID test today and you are continuing to have symptoms, it is recommended to repeat the test in 48 hours x 3. If you continue to be negative, you may return to school/work once you have improved symptoms and no fever for 24 hours without any medications. This applies to all viral illnesses.       Discussed prescriptions and over-the-counter medicines to help with patient's symptoms:  A steroid nose spray (flonase) and antihistamine nasal spray (azelastine) can help with a stuffy nose. It can also help with drainage down the back of your throat.  An antihistamine (loratadine,zyrtec,allegra, xyzal) can help with itching, sneezing, or runny nose.  An antihistamine eye drop can help with itchy eyes.  A decongestant (pseudoephedrine,  Phenylephrine, oxymetazoline aka afrin nasal spray) can help with a stuffy nose. Take <10 days for congestion and rhinorrhea. Once symptoms improve, proceed with loratadine/zyrtec once a day. These ingredients can keep you up all night, decrease appetite, feel jittery, and raise blood pressure with long term use.  OTC Coricidin can be used for patients with hypertension and  palpitations because you cannot use ingredients such as pseudoephedrine and phenylephrine for oral decongestants.  Coricidin HBP Cough & Cold (Chlorpheniramine/Dextromethorphan)  Coricidin HBP Maximum Strength Multi-Symptom Flu  (Acetaminophen,Dextromethorphan, Chlorpheniramine)  Coricidin HBP® Maximum Strength Cold & Flu Day/Night (Acetaminophen,Dextromethorphan, Doxylamine, Guaifenesin)  Coricidin HBP Chest Congestion & Cough (Dextromethorphan + Guaifenesin)    Medications that control cough are suppressants and expectorants. Suppressants are tessalon pearls and dextromethorphan. If you have a productive cough with sputum, you need an expectorant called guaifenesin. Dextromethorphan and Guaifenesin are active ingredients in many OTC cough/cold medications such as Dayquil/Nyquil, Mucinex, and Robitussin Mucus+Chest Congestion.            Common Cold Medicine Ingredients Cheat sheet  Acetaminophen (APAP) -pain reliever/fever reducer  Dextromethorphan - cough suppressant  Guaifenesin - expectorant/thins and loosens mucus  Phenylephrine - nasal decongestant  Diphenhydramine or Doxylamine succinate - antihistamine, helps you fall asleep  Promethazine or Brompheniramine - Prescription strength antihistamines    These OTC cold medications are safe to use if you do not have high blood pressure (hypertension) or palpitations.  DayQuil and NyQuil - Cough, Cold & Flu Relief LiquiCaps  DayQuil: Acetaminophen, Dextromethorphan, and Phenylephrine   NyQuil: Acetaminophen, Dextromethorphan, and Doxylamine  DayQuil and NyQuil SEVERE Maximum Strength Cough, Cold & Flu Relief LiquiCaps  DAYQUIL: Acetaminophen, Dextromethorphan, Guaifenesin, and Phenylephrine  NYQUIL: Acetaminophen, Dextromethorphan, Doxylamine, and Phenylephrine   Mucinex DM: Guaifenesin,Dextromethorphan  Mucinex Maximum Strength Sinus-Max® Pressure, Pain & Cough Liquid Gels: Acetaminophen/Dextromethorphan/ Guaifenesin/Phenylephrine     If not allergic, take Tylenol  (Acetaminophen) 650 mg to  1 g every 6 hours as needed  and/or Motrin (Ibuprofen) 600 to 800 mg every 6 hours as needed for fever or pain.    If your blood pressure was elevated during your visit and this was discussed with you, please obtain a home blood pressure cuff and take your blood pressure once daily for two weeks, record values and follow up with your PCP. If you were started on blood pressure medication today, ensure you obtain a follow up appointment with your PCP in 5-7 days for a re-check, if you develop shortness of breath, severe headache, weakness, chest pain, vision problems after leaving urgent care, call 911 and go to the ER immediately.         Please remember that you have received care at an urgent care today. Urgent cares are not emergency rooms and are not equipped to handle life threatening emergencies and cannot rule in or out certain medical conditions and you may be released before all of your medical problems are known or treated.     Please arrange follow up with your primary care physician or speciality clinic within 2-5 days if your signs and symptoms have not resolved or worsen.     Patient can call our Referral Hotline at [phone] to make an appointment.      Please return here or go to the Emergency Department for any concerns or worsening of condition.  Signs of infection. These include a fever of 100.4°F (38°C) or higher, chills, cough, more sputum or change in color of sputum.  You are having so much trouble breathing that you can only say one or two words at a time.  You need to sit upright at all times to be able to breathe and or cannot lie down.  You have trouble breathing when talking or sitting still.  You have a fever of 100.4°F (38°C) or higher or chills.  You have chest pain when you cough, have trouble breathing but can still talk in full sentences, or cough up blood.

## 2024-12-26 NOTE — PATIENT INSTRUCTIONS
Discussed with patient that if he/she is in pain today, only take tylenol due to toradol injection received in clinic. You can continue NSAIDS tomorrow such as ibuprofen (Motrin/Advil), naproxen (Aleve), Mobic (Meloxicam).         Recommend oral antihistamine (claritin, zyrtec, allegra,xyzal)) +/- oral decongestant (pseudoephedrine)  for rhinorrhea/ear congestion <3 days if blood pressure is <130/80mmhg, steroid nasal spray (flonase), OTC cough medicine (Mucinex DM 12 hour),  Tylenol (Acetaminophen) and/or Motrin (Ibuprofen) as directed for control of pain and/or fever.  Pseudoephedrine Oral: Immediate release: 60 mg every 4 to 6 hours; Extended release: 120 mg every 12 hours or 240 mg every 24 hours; maximum: 240 mg per 24 hours.       Please drink plenty of fluids.  Please get plenty of rest.  Nasal irrigation with a saline spray or Netti Pot like device per their directions is also recommended.  If you  smoke, please stop smoking.    To help ease a sore throat, you can:  Use a sore throat spray.  Suck on hard candy or throat lozenges.  Gargle with warm saltwater a few times each day. Mix of 1/4 teaspoon (1.25 grams) salt in 8 ounces (240 mL) of warm water.  Use a cool mist humidifier to help you breathe easier.    If you negative (-) for a COVID test today and you are continuing to have symptoms, it is recommended to repeat the test in 48 hours x 3. If you continue to be negative, you may return to school/work once you have improved symptoms and no fever for 24 hours without any medications. This applies to all viral illnesses.       Discussed prescriptions and over-the-counter medicines to help with patient's symptoms:  A steroid nose spray (flonase) and antihistamine nasal spray (azelastine) can help with a stuffy nose. It can also help with drainage down the back of your throat.  An antihistamine (loratadine,zyrtec,allegra, xyzal) can help with itching, sneezing, or runny nose.  An antihistamine eye drop can  help with itchy eyes.  A decongestant (pseudoephedrine,  Phenylephrine, oxymetazoline aka afrin nasal spray) can help with a stuffy nose. Take <10 days for congestion and rhinorrhea. Once symptoms improve, proceed with loratadine/zyrtec once a day. These ingredients can keep you up all night, decrease appetite, feel jittery, and raise blood pressure with long term use.  OTC Coricidin can be used for patients with hypertension and palpitations because you cannot use ingredients such as pseudoephedrine and phenylephrine for oral decongestants.  Coricidin HBP Cough & Cold (Chlorpheniramine/Dextromethorphan)  Coricidin HBP Maximum Strength Multi-Symptom Flu  (Acetaminophen,Dextromethorphan, Chlorpheniramine)  Coricidin HBP® Maximum Strength Cold & Flu Day/Night (Acetaminophen,Dextromethorphan, Doxylamine, Guaifenesin)  Coricidin HBP Chest Congestion & Cough (Dextromethorphan + Guaifenesin)    Medications that control cough are suppressants and expectorants. Suppressants are tessalon pearls and dextromethorphan. If you have a productive cough with sputum, you need an expectorant called guaifenesin. Dextromethorphan and Guaifenesin are active ingredients in many OTC cough/cold medications such as Dayquil/Nyquil, Mucinex, and Robitussin Mucus+Chest Congestion.            Common Cold Medicine Ingredients Cheat sheet  Acetaminophen (APAP) -pain reliever/fever reducer  Dextromethorphan - cough suppressant  Guaifenesin - expectorant/thins and loosens mucus  Phenylephrine - nasal decongestant  Diphenhydramine or Doxylamine succinate - antihistamine, helps you fall asleep  Promethazine or Brompheniramine - Prescription strength antihistamines    These OTC cold medications are safe to use if you do not have high blood pressure (hypertension) or palpitations.  DayQuil and NyQuil - Cough, Cold & Flu Relief LiquiCaps  DayQuil: Acetaminophen, Dextromethorphan, and Phenylephrine   NyQuil: Acetaminophen, Dextromethorphan, and  Doxylamine  DayQuil and NyQuil SEVERE Maximum Strength Cough, Cold & Flu Relief LiquiCaps  DAYQUIL: Acetaminophen, Dextromethorphan, Guaifenesin, and Phenylephrine  NYQUIL: Acetaminophen, Dextromethorphan, Doxylamine, and Phenylephrine   Mucinex DM: Guaifenesin,Dextromethorphan  Mucinex Maximum Strength Sinus-Max® Pressure, Pain & Cough Liquid Gels: Acetaminophen/Dextromethorphan/ Guaifenesin/Phenylephrine     If not allergic, take Tylenol (Acetaminophen) 650 mg to  1 g every 6 hours as needed  and/or Motrin (Ibuprofen) 600 to 800 mg every 6 hours as needed for fever or pain.    If your blood pressure was elevated during your visit and this was discussed with you, please obtain a home blood pressure cuff and take your blood pressure once daily for two weeks, record values and follow up with your PCP. If you were started on blood pressure medication today, ensure you obtain a follow up appointment with your PCP in 5-7 days for a re-check, if you develop shortness of breath, severe headache, weakness, chest pain, vision problems after leaving urgent care, call 911 and go to the ER immediately.         Please remember that you have received care at an urgent care today. Urgent cares are not emergency rooms and are not equipped to handle life threatening emergencies and cannot rule in or out certain medical conditions and you may be released before all of your medical problems are known or treated.     Please arrange follow up with your primary care physician or speciality clinic within 2-5 days if your signs and symptoms have not resolved or worsen.     Patient can call our Referral Hotline at [phone] to make an appointment.      Please return here or go to the Emergency Department for any concerns or worsening of condition.  Signs of infection. These include a fever of 100.4°F (38°C) or higher, chills, cough, more sputum or change in color of sputum.  You are having so much trouble breathing that you can only  say one or two words at a time.  You need to sit upright at all times to be able to breathe and or cannot lie down.  You have trouble breathing when talking or sitting still.  You have a fever of 100.4°F (38°C) or higher or chills.  You have chest pain when you cough, have trouble breathing but can still talk in full sentences, or cough up blood.

## 2024-12-26 NOTE — LETTER
"  December 26, 2024      [facility] [address]  [place] [address]  Phone: [phone]  Fax: [phone]       Patient: Caitie Wang   YOB: 1980  Date of Visit: 12/26/2024    To Whom It May Concern:    Nagi Wang  was at [facility] on 12/26/2024. The patient may return to work/school on 12/27/24 with no restrictions. If you have any questions or concerns, or if I can be of further assistance, please do not hesitate to contact me.    Sincerely,        Tila Sierra PA-C (Jackie)       "

## 2025-02-27 ENCOUNTER — TELEPHONE (OUTPATIENT)
Dept: PODIATRY | Facility: CLINIC | Age: 45
End: 2025-02-27
Payer: MEDICAID

## 2025-02-27 NOTE — TELEPHONE ENCOUNTER
Called and spoke to patient. Scheduled appointment with patient for 3/31/25. Patient understood date, time, and location.

## 2025-03-31 ENCOUNTER — OFFICE VISIT (OUTPATIENT)
Dept: PODIATRY | Facility: CLINIC | Age: 45
End: 2025-03-31
Payer: MEDICAID

## 2025-03-31 VITALS — WEIGHT: 254.31 LBS | BODY MASS INDEX: 45.05 KG/M2

## 2025-03-31 DIAGNOSIS — L98.9 SKIN LESION OF FOOT: ICD-10-CM

## 2025-03-31 DIAGNOSIS — B07.0 PLANTAR WART: ICD-10-CM

## 2025-03-31 DIAGNOSIS — M79.671 FOOT PAIN, BILATERAL: Primary | ICD-10-CM

## 2025-03-31 DIAGNOSIS — M79.672 FOOT PAIN, BILATERAL: Primary | ICD-10-CM

## 2025-03-31 PROCEDURE — 17110 DESTRUCTION B9 LES UP TO 14: CPT | Mod: PBBFAC,PN | Performed by: PODIATRIST

## 2025-03-31 PROCEDURE — 99999 PR PBB SHADOW E&M-EST. PATIENT-LVL III: CPT | Mod: PBBFAC,,, | Performed by: PODIATRIST

## 2025-03-31 PROCEDURE — 99213 OFFICE O/P EST LOW 20 MIN: CPT | Mod: PBBFAC,PN | Performed by: PODIATRIST

## 2025-03-31 NOTE — PROGRESS NOTES
"[facility] - PODIATRY  [address]   [address]  [place] [address]  Dept: [phone]  Dept Fax: [phone]    Stevo Rodgers Jr., DPM     Assessment:   MDM    Coding  1. Foot pain, bilateral        2. Skin lesion of foot        3. Plantar wart            Plan:     Procedures    Caitie Chavez" was seen today for callouses.    Diagnoses and all orders for this visit:    Foot pain, bilateral    Skin lesion of foot    Plantar wart        - Pt seen evaluated and treated. Diagnosis as above reviewed  - Discussed surgical and conservative tx options  - All alternatives, risks, benefits of all tx options were discussed in detail  -after obtaining consent and marking and then performing timeout, I cleansed w/ alcohol prep pad the above mentioned hyperkeratosis which was then trimmed utilizing No 15 scapel, to a smooth base with pinpoint bleeding with out incident. Silver nitrate was used as needed. The skin lesion was then destroyed with application of cantherone and covered with occlusive dressing. Patient tolerated this well and reported comfort to the area.  -Warnings regarding pain and blister formation given  -OTC medications for pain  -pt instructed to keep lesion covered with bandaid+triple abx ointment - change QD as needed x 14-21 days  -Patient education regarding warts was given / Handout on warts was given  -OTC Salicylic acid to be applied daily by patient at home as needed  -rxs dispensed: none  -referrals: none  -WB: wbat      Follow up if symptoms worsen or fail to improve.    Subjective:      Patient ID: Caitie Wang is a 45 y.o. female.    Chief Complaint:   Chief Complaint   Patient presents with    Callouses       CC - skin lesion Patient presents to the clinic complaining of painful skin lesion on both feet. Patient is inquiring about treatment options.    HPI    Last Podiatry Enc: Visit date not found  Last Enc w/ Me: 2/20/2024    Outside " PHYSICAL THERAPY NOTE          Patient Name: Michelle Lainez  NWWRD'R Date: 5/25/2022 05/25/22 1155   PT Last Visit   PT Visit Date 05/25/22   Note Type   Note Type Treatment   Pain Assessment   Pain Assessment Tool 0-10   Pain Score 9   Pain Location/Orientation Location: Back   Restrictions/Precautions   Weight Bearing Precautions Per Order No   Braces or Orthoses Sling  (RUE)   Other Precautions   (Airborne/isolation; Contact/isolation; Telemetry; Fall Risk; Pain)   General   Family/Caregiver Present No   Cognition   Overall Cognitive Status WFL   Arousal/Participation Alert   Following Commands Follows one step commands without difficulty   Subjective   Subjective "I'm not going to rehab"  Reports he will manage at home  Agreeable to therapeutic exercises  Bed Mobility   Additional Comments refused OOB  Endurance Deficit   Endurance Deficit Yes   Activity Tolerance   Activity Tolerance Patient limited by fatigue;Patient limited by pain   Exercises   Heelslides Supine;10 reps   Hip Flexion Supine;5 reps   Hip Abduction Supine;10 reps   Hip Adduction Supine;10 reps   Ankle Pumps Supine;15 reps   Assessment   Prognosis Guarded   Problem List Decreased strength;Decreased endurance; Impaired balance;Decreased mobility; Impaired judgement;Decreased safety awareness;Pain   Assessment Pt  seen for PT treatment session this date with interventions consisting of  therapeutic exercises and review for HEP  Declined tx/gait training w/ emphasis on improving pt's ability to ambulate  Pt  In comparison to previous session, Pt  With no change in activity tolerance  Limited due to back pain and refusing to participate  Pt is in need of continued activity in PT to improve strength balance endurance mobility transfers and ambulation with return to maximize LOF   From PT/mobility standpoint, recommendation at time of d/c would be post acute rehab  in order to promote return to PLOF and independence  The patient's AM-PAC Basic Mobility Inpatient Short Form Raw Score is 11  A Raw score of less than or equal to 16 suggests the patient may benefit from discharge to post-acute rehabilitation services  Please also refer to physical therapy recommendation for safe DC planning  Goals   LTG Expiration Date 05/25/22   PT Treatment Day 2   Plan   Treatment/Interventions Functional transfer training;LE strengthening/ROM; Therapeutic exercise; Endurance training;Bed mobility;Gait training   Progress Slow progress, decreased activity tolerance   PT Frequency 3-5x/wk   Recommendation   PT Discharge Recommendation Post acute rehabilitation services   AM-PAC Basic Mobility Inpatient   Turning in Bed Without Bedrails 3   Lying on Back to Sitting on Edge of Flat Bed 2   Moving Bed to Chair 2   Standing Up From Chair 2   Walk in Room 1   Climb 3-5 Stairs 1   Basic Mobility Inpatient Raw Score 11   Basic Mobility Standardized Score 30 25   Highest Level Of Mobility   JH-HLM Goal 4: Move to chair/commode   JH-HLM Achieved 2: Bed activities/Dependent transfer   Education   Education Provided Mobility training;Home exercise program   Patient Reinforcement needed   End of Consult   Patient Position at End of Consult Supine; All needs within reach   End of Consult Comments discussed POC with PT reports reviewed: historical medical records.  Family hx: as below  Past Medical History:   Diagnosis Date    Anxiety     Asthma     Chronic pain     back; inflammatory    Depression     Diabetes mellitus, type 2     Endometriosis     Gastric ulcer     Hiatal hernia     Hyperlipidemia     Hypertension     MITZI (obstructive sleep apnea)     no cpap machine    Thyroid disease     Vitamin D deficiency      Past Surgical History:   Procedure Laterality Date    CARPAL TUNNEL RELEASE Right     CERVICAL SPINE SURGERY      endometriosis      ESOPHAGOGASTRODUODENOSCOPY N/A 6/30/2023    Procedure: EGD (ESOPHAGOGASTRODUODENOSCOPY);  Surgeon: Ce Vazquez MD;  Location: [facility];  Service: Endoscopy;  Laterality: N/A;    LAPAROSCOPIC CHOLECYSTECTOMY N/A 06/01/2023    Procedure: CHOLECYSTECTOMY, LAPAROSCOPIC;  Surgeon: Eleuterio Puri MD;  Location: [facility] OR [address] [place] [address];  Service: General;  Laterality: N/A;    THYROID SURGERY       Family History   Problem Relation Name Age of Onset    Hypertension Mother      Diabetes Maternal Grandmother      Hyperlipidemia Maternal Grandmother      Diabetes Maternal Grandfather      Hyperlipidemia Maternal Grandfather      Breast cancer Maternal Aunt       Current Medications[1]  Review of patient's allergies indicates:  No Known Allergies  Social History[2]    ROS    REVIEW OF SYSTEMS: Negative as documented below as well as positive findings in bold.       Constitutional  Respiratory  Gastrointestinal  Skin   - Fever - Cough - Heartburn - Rash   - Chills - Spit blood - Nausea - Itching   - Weight Loss - Shortness of breath - Vomiting - Nail pain   - Malaise/Fatigue - Wheezing - Abdominal Pain  Wound/Ulcer   - Weight Gain   - Blood in Stool  Poor wound healing       - Diarrhea          Cardiovascular  Genitourinary  Neurological  HEENT   - Chest Pain - Dysuria - Burning Sensation of feet - Headache   - Palpitations - Hematuria - Tingling / Paresthesia - Congestion   - Pain at night in  legs - Flank Pain - Dizziness - Sore Throat   - Cramping   - Tremor - Blurred Vision   - Leg Swelling   - Sensory Change - Double Vision   - Dizzy when standing   - Speech Change - Eye Redness       - Focal Weakness - Dry Eyes       - Loss of Consciousness          Endocrine  Musculoskeletal  Psychiatric   - Cold intolerance - Muscle Pain - Depression   - Heat intolerance - Neck Pain - Insomnia   - Anemia - Joint Pain - Memory Loss   -  Easy bruising, bleeding - Heel pain - Anxiety      Toe Pain        Leg/Ankle/Foot Pain         Objective:     Wt 115.4 kg (254 lb 4.8 oz)   BMI 45.05 kg/m²   Vitals:    03/31/25 0804   Weight: 115.4 kg (254 lb 4.8 oz)   PainSc:   7       Physical Exam    General Appearance:   Patient appears well developed, well nourished  Patient appears stated age    Psychiatric:   Patient is oriented to time, place, and person.  Patient has appropriate mood and affect    Neck:  Trachea Midline  No visible masses    Respiratory/Ears:  No distress or labored breathing.  Able to differentiate between normal talking voice and whisper.  Able to follow commands    Eyes:  Visual Acuity intact  Lids and conjunctivae normal. No discoloration noted.    Foot Exam  Physical Exam  Ortho Exam  Ortho/SPM Exam  Physical Exam  Neurological Exam    B/l LE exam con't:  V:  DP 2/4, PT 2/4   CRT< 3s to all digits tested   Tibial and popliteal lymph nodes are w/o abnormality    N:  Patient displays normal ankle reflexes   SILT in SP/DP/T/Sue/Saph distributions    Ortho: +Motor EHL/FHL/TA/GA   +TTP skin lesion(s)    Compartments soft/compressible. No pain on passive stretch of big toe. No calf  Pain.   no deformity noted on exam    Derm: skin intact, skin warm and dry, skin without induration, skin without ulcers, nails normal, +hyperkeratotic v verrucous lesion that breaks normal skin lines and has pinpoint bleeding upon debridement b/l foot    Imaging / Labs:      No results found.      Note: This was dictated using  a computer transcription program. Although proofread, it may contain computer transcription errors and phonetic errors. Other human proofreading errors may also exist. Corrections may be performed at a later time. Please contact us for any clarification if needed.    Stevo Rodgers DPM  [facility]         [1]   Current Outpatient Medications   Medication Sig Dispense Refill    acetaminophen (TYLENOL) 325 MG tablet Take 2 tablets (650 mg total) by mouth every 6 (six) hours as needed for Pain. 30 tablet 0    ALBUTEROL SULFATE (PROAIR HFA INHL) Inhale 2 puffs into the lungs as needed.      aspirin 325 MG tablet Take 325 mg by mouth as needed.      atorvastatin (LIPITOR) 40 MG tablet Take 40 mg by mouth every evening.      dulaglutide (TRULICITY) 1.5 mg/0.5 mL pen injector Inject 1.5 mg into the skin every 7 days.      famciclovir (FAMVIR) 500 MG tablet Take 500 mg by mouth 3 (three) times daily.      folic acid (FOLVITE) 1 MG tablet Take 1 mg by mouth once daily.      hydrOXYzine (ATARAX) 50 MG tablet Take 50 mg by mouth every evening.      imipramine (TOFRANIL) 25 MG tablet Take 25 mg by mouth every evening.      LIDOcaine (LIDODERM) 5 % Place 1 patch onto the skin daily as needed (pain). Remove & Discard patch within 12 hours or as directed by MD 15 patch 0    LINZESS 145 mcg Cap capsule Take 145 mcg by mouth once daily.      losartan (COZAAR) 25 MG tablet Take 25 mg by mouth once daily.      medroxyPROGESTERone (DEPO-PROVERA) 150 mg/mL Syrg Inject 150 mg into the muscle every 3 (three) months.      montelukast (SINGULAIR) 10 mg tablet Take 10 mg by mouth once daily.      omeprazole (PRILOSEC) 40 MG capsule TAKE 1 CAPSULE(40 MG) BY MOUTH EVERY DAY 30 capsule 2    ondansetron (ZOFRAN) 8 MG tablet Take 8 mg by mouth every 8 (eight) hours as needed.      pravastatin (PRAVACHOL) 40 MG tablet Take 40 mg by mouth once daily.      semaglutide (OZEMPIC) 2 mg/dose (8 mg/3 mL) PnIj Inject into  the skin every 7 days.      traMADoL (ULTRAM) 50 mg tablet Take 50 mg by mouth 2 (two) times daily as needed.      VITAMIN D2 50,000 unit capsule Take 50,000 Units by mouth every 7 days.      baclofen (LIORESAL) 20 MG tablet Take 1 tablet (20 mg total) by mouth 3 (three) times daily. for 3 days (Patient taking differently: Take 20 mg by mouth 2 (two) times daily.) 9 tablet 0    cetirizine (ZYRTEC) 10 MG tablet Take 1 tablet (10 mg total) by mouth once daily. 30 tablet 0    famotidine (PEPCID) 20 MG tablet Take 1 tablet (20 mg total) by mouth 2 (two) times daily. for 10 days 20 tablet 0    gabapentin (NEURONTIN) 300 MG capsule Take 1 capsule (300 mg total) by mouth 3 (three) times daily. 90 capsule 11    levothyroxine (SYNTHROID) 150 MCG tablet Take 1 tablet (150 mcg total) by mouth once daily. 30 tablet 0    loratadine (CLARITIN) 10 mg tablet Take 1 tablet (10 mg total) by mouth daily as needed for Allergies (congestion). 30 tablet 0    meclizine (ANTIVERT) 25 mg tablet Take 1 tablet (25 mg total) by mouth 3 (three) times daily as needed for Dizziness. 15 tablet 0    metFORMIN (GLUCOPHAGE) 500 MG tablet Take 1 tablet (500 mg total) by mouth daily with breakfast. 30 tablet 11     No current facility-administered medications for this visit.   [2]   Social History  Socioeconomic History    Marital status: Single   Tobacco Use    Smoking status: Never    Smokeless tobacco: Never   Substance and Sexual Activity    Alcohol use: Yes     Comment: special occasions    Drug use: No    Sexual activity: Yes     Partners: Male     Birth control/protection: Injection     Social Drivers of Health     Financial Resource Strain: Low Risk  (5/31/2023)    Overall Financial Resource Strain (CARDIA)     Difficulty of Paying Living Expenses: Not hard at all   Food Insecurity: No Food Insecurity (5/31/2023)    Hunger Vital Sign     Worried About Running Out of Food in the Last Year: Never true     Ran Out of Food in the Last Year: Never  true   Transportation Needs: No Transportation Needs (5/31/2023)    PRAPARE - Transportation     Lack of Transportation (Medical): No     Lack of Transportation (Non-Medical): No   Physical Activity: Inactive (5/31/2023)    Exercise Vital Sign     Days of Exercise per Week: 0 days     Minutes of Exercise per Session: 0 min   Stress: Stress Concern Present (5/31/2023)    [employer] - Occupational Stress Questionnaire     Feeling of Stress : Very much   Housing Stability: Low Risk  (5/31/2023)    Housing Stability Vital Sign     Unable to Pay for Housing in the Last Year: No     Number of Places Lived in the Last Year: 1     Unstable Housing in the Last Year: No

## 2025-04-20 ENCOUNTER — HOSPITAL ENCOUNTER (EMERGENCY)
Facility: HOSPITAL | Age: 45
Discharge: HOME | End: 2025-04-20
Attending: STUDENT IN AN ORGANIZED HEALTH CARE EDUCATION/TRAINING PROGRAM
Payer: MEDICAID

## 2025-04-20 VITALS
HEART RATE: 88 BPM | WEIGHT: 245 LBS | OXYGEN SATURATION: 99 % | HEIGHT: 64 IN | RESPIRATION RATE: 15 BRPM | TEMPERATURE: 98 F | DIASTOLIC BLOOD PRESSURE: 88 MMHG | BODY MASS INDEX: 41.83 KG/M2 | SYSTOLIC BLOOD PRESSURE: 147 MMHG

## 2025-04-20 DIAGNOSIS — R10.9 ABDOMINAL PAIN, VOMITING, AND DIARRHEA: Primary | ICD-10-CM

## 2025-04-20 DIAGNOSIS — A08.4 VIRAL GASTROENTERITIS: ICD-10-CM

## 2025-04-20 DIAGNOSIS — R11.10 ABDOMINAL PAIN, VOMITING, AND DIARRHEA: Primary | ICD-10-CM

## 2025-04-20 DIAGNOSIS — R19.7 ABDOMINAL PAIN, VOMITING, AND DIARRHEA: Primary | ICD-10-CM

## 2025-04-20 LAB
ABSOLUTE EOSINOPHIL (OHS): 0.12 K/UL
ABSOLUTE MONOCYTE (OHS): 0.69 K/UL (ref 0.3–1)
ABSOLUTE NEUTROPHIL COUNT (OHS): 8.58 K/UL (ref 1.8–7.7)
ALBUMIN SERPL BCP-MCNC: 3.9 G/DL (ref 3.5–5.2)
ALP SERPL-CCNC: 81 UNIT/L (ref 40–150)
ALT SERPL W/O P-5'-P-CCNC: 18 UNIT/L (ref 10–44)
ANION GAP (OHS): 12 MMOL/L (ref 8–16)
AST SERPL-CCNC: 24 UNIT/L (ref 11–45)
BASOPHILS # BLD AUTO: 0.03 K/UL
BASOPHILS NFR BLD AUTO: 0.3 %
BILIRUB SERPL-MCNC: 0.6 MG/DL (ref 0.1–1)
BUN SERPL-MCNC: 9 MG/DL (ref 6–20)
CALCIUM SERPL-MCNC: 8.7 MG/DL (ref 8.7–10.5)
CHLORIDE SERPL-SCNC: 110 MMOL/L (ref 95–110)
CO2 SERPL-SCNC: 17 MMOL/L (ref 23–29)
CREAT SERPL-MCNC: 0.8 MG/DL (ref 0.5–1.4)
ERYTHROCYTE [DISTWIDTH] IN BLOOD BY AUTOMATED COUNT: 13 % (ref 11.5–14.5)
GFR SERPLBLD CREATININE-BSD FMLA CKD-EPI: >60 ML/MIN/1.73/M2
GLUCOSE SERPL-MCNC: 94 MG/DL (ref 70–110)
HCT VFR BLD AUTO: 41.7 % (ref 37–48.5)
HCV AB SERPL QL IA: NORMAL
HGB BLD-MCNC: 13.5 GM/DL (ref 12–16)
HIV 1+2 AB+HIV1 P24 AG SERPL QL IA: NORMAL
IMM GRANULOCYTES # BLD AUTO: 0.05 K/UL (ref 0–0.04)
IMM GRANULOCYTES NFR BLD AUTO: 0.5 % (ref 0–0.5)
LIPASE SERPL-CCNC: 20 U/L (ref 4–60)
LYMPHOCYTES # BLD AUTO: 1.55 K/UL (ref 1–4.8)
MCH RBC QN AUTO: 30.6 PG (ref 27–31)
MCHC RBC AUTO-ENTMCNC: 32.4 G/DL (ref 32–36)
MCV RBC AUTO: 95 FL (ref 82–98)
NUCLEATED RBC (/100WBC) (OHS): 0 /100 WBC
PLATELET # BLD AUTO: 324 K/UL (ref 150–450)
PMV BLD AUTO: 9.5 FL (ref 9.2–12.9)
POTASSIUM SERPL-SCNC: 4.2 MMOL/L (ref 3.5–5.1)
PROT SERPL-MCNC: 7.9 GM/DL (ref 6–8.4)
RBC # BLD AUTO: 4.41 M/UL (ref 4–5.4)
RELATIVE EOSINOPHIL (OHS): 1.1 %
RELATIVE LYMPHOCYTE (OHS): 14.1 % (ref 18–48)
RELATIVE MONOCYTE (OHS): 6.3 % (ref 4–15)
RELATIVE NEUTROPHIL (OHS): 77.7 % (ref 38–73)
SODIUM SERPL-SCNC: 139 MMOL/L (ref 136–145)
WBC # BLD AUTO: 11.02 K/UL (ref 3.9–12.7)

## 2025-04-20 PROCEDURE — 96374 THER/PROPH/DIAG INJ IV PUSH: CPT

## 2025-04-20 PROCEDURE — 63600175 PHARM REV CODE 636 W HCPCS: Performed by: PHYSICIAN ASSISTANT

## 2025-04-20 PROCEDURE — 96375 TX/PRO/DX INJ NEW DRUG ADDON: CPT

## 2025-04-20 PROCEDURE — 83690 ASSAY OF LIPASE: CPT | Performed by: PHYSICIAN ASSISTANT

## 2025-04-20 PROCEDURE — 80053 COMPREHEN METABOLIC PANEL: CPT | Performed by: PHYSICIAN ASSISTANT

## 2025-04-20 PROCEDURE — 99284 EMERGENCY DEPT VISIT MOD MDM: CPT | Mod: 25

## 2025-04-20 PROCEDURE — 96361 HYDRATE IV INFUSION ADD-ON: CPT

## 2025-04-20 PROCEDURE — 87389 HIV-1 AG W/HIV-1&-2 AB AG IA: CPT | Performed by: PHYSICIAN ASSISTANT

## 2025-04-20 PROCEDURE — 85025 COMPLETE CBC W/AUTO DIFF WBC: CPT | Performed by: PHYSICIAN ASSISTANT

## 2025-04-20 PROCEDURE — 86803 HEPATITIS C AB TEST: CPT | Performed by: PHYSICIAN ASSISTANT

## 2025-04-20 PROCEDURE — 25000003 PHARM REV CODE 250: Performed by: PHYSICIAN ASSISTANT

## 2025-04-20 RX ORDER — ONDANSETRON HYDROCHLORIDE 2 MG/ML
4 INJECTION, SOLUTION INTRAVENOUS
Status: COMPLETED | OUTPATIENT
Start: 2025-04-20 | End: 2025-04-20

## 2025-04-20 RX ORDER — METOPROLOL SUCCINATE 50 MG/1
50 TABLET, EXTENDED RELEASE ORAL EVERY MORNING
COMMUNITY

## 2025-04-20 RX ORDER — ONDANSETRON 4 MG/1
4 TABLET, ORALLY DISINTEGRATING ORAL EVERY 8 HOURS PRN
Qty: 12 TABLET | Refills: 0 | Status: SHIPPED | OUTPATIENT
Start: 2025-04-20

## 2025-04-20 RX ORDER — FAMOTIDINE 10 MG/ML
20 INJECTION, SOLUTION INTRAVENOUS
Status: COMPLETED | OUTPATIENT
Start: 2025-04-20 | End: 2025-04-20

## 2025-04-20 RX ORDER — HYOSCYAMINE SULFATE 0.12 MG/1
0.12 TABLET SUBLINGUAL
Status: COMPLETED | OUTPATIENT
Start: 2025-04-20 | End: 2025-04-20

## 2025-04-20 RX ORDER — METOPROLOL SUCCINATE 50 MG/1
50 TABLET, EXTENDED RELEASE ORAL
Status: DISCONTINUED | OUTPATIENT
Start: 2025-04-20 | End: 2025-04-20

## 2025-04-20 RX ORDER — LOSARTAN POTASSIUM 25 MG/1
25 TABLET ORAL
Status: DISCONTINUED | OUTPATIENT
Start: 2025-04-20 | End: 2025-04-20

## 2025-04-20 RX ORDER — DICYCLOMINE HYDROCHLORIDE 20 MG/1
20 TABLET ORAL 2 TIMES DAILY PRN
Qty: 12 TABLET | Refills: 0 | Status: SHIPPED | OUTPATIENT
Start: 2025-04-20

## 2025-04-20 RX ADMIN — SODIUM CHLORIDE, POTASSIUM CHLORIDE, SODIUM LACTATE AND CALCIUM CHLORIDE 1000 ML: 600; 310; 30; 20 INJECTION, SOLUTION INTRAVENOUS at 07:04

## 2025-04-20 RX ADMIN — ONDANSETRON 4 MG: 2 INJECTION INTRAMUSCULAR; INTRAVENOUS at 07:04

## 2025-04-20 RX ADMIN — HYOSCYAMINE SULFATE 0.12 MG: 0.12 TABLET SUBLINGUAL at 07:04

## 2025-04-20 RX ADMIN — FAMOTIDINE 20 MG: 10 INJECTION, SOLUTION INTRAVENOUS at 08:04

## 2025-04-20 NOTE — Clinical Note
"Caitie DIANA "Caitiemarcie Wang was seen and treated in our emergency department on 4/20/2025.  She may return to work on 04/21/2025.       If you have any questions or concerns, please don't hesitate to call.      Renetta Leahy PA-C"

## 2025-04-20 NOTE — DISCHARGE INSTRUCTIONS
You likely have a viral stomach bug.  You should start with eating small amounts of bland food and drinking small amounts of fluid as you can tolerate.  Your blood work did not show anything serious.    Return to the ER immediately if you develop severe abdominal pain, fever greater than 100.4° uncontrolled vomiting, vomiting blood, dark black or bloody stool, severe weakness or any other worrisome symptoms.

## 2025-04-20 NOTE — Clinical Note
"Caitie Chavez" Felipe was seen and treated in our emergency department on 4/20/2025.  She may return to work on 04/20/2025.       If you have any questions or concerns, please don't hesitate to call.      Joselo Terrell RN    "

## 2025-04-20 NOTE — ED PROVIDER NOTES
Encounter Date: 4/20/2025       History     Chief Complaint   Patient presents with    Abdominal Pain     X 3 days.  Endorses nausea vomiting and diarrhea.      5-year-old female with multiple medical comorbidities below presents to the ED with complaints of abdominal pain, vomiting and diarrhea.  Patient started having nausea, vomiting, diarrhea 3 days ago.  She was exposed to a co-worker with similar symptoms.  Patient states that she was eventually able to tolerate very small amounts of fluid by mouth, but this causes severe nausea and upper abdominal cramping.  She also endorses some generalized weakness and lightheadedness.  Denies fever or any other acute symptoms.      Review of patient's allergies indicates:  No Known Allergies  Past Medical History:   Diagnosis Date    Anxiety     Asthma     Chronic pain     back; inflammatory    Depression     Diabetes mellitus, type 2     Endometriosis     Gastric ulcer     Hiatal hernia     Hyperlipidemia     Hypertension     MITZI (obstructive sleep apnea)     no cpap machine    Thyroid disease     Vitamin D deficiency      Past Surgical History:   Procedure Laterality Date    CARPAL TUNNEL RELEASE Right     CERVICAL SPINE SURGERY      endometriosis      ESOPHAGOGASTRODUODENOSCOPY N/A 6/30/2023    Procedure: EGD (ESOPHAGOGASTRODUODENOSCOPY);  Surgeon: Ce Vazquez MD;  Location: [facility];  Service: Endoscopy;  Laterality: N/A;    LAPAROSCOPIC CHOLECYSTECTOMY N/A 06/01/2023    Procedure: CHOLECYSTECTOMY, LAPAROSCOPIC;  Surgeon: Eleuterio Puri MD;  Location: [address];  Service: General;  Laterality: N/A;    THYROID SURGERY       Family History   Problem Relation Name Age of Onset    Hypertension Mother      Diabetes Maternal Grandmother      Hyperlipidemia Maternal Grandmother      Diabetes Maternal Grandfather      Hyperlipidemia Maternal Grandfather      Breast cancer Maternal Aunt       Social History[1]  Review of Systems    Physical Exam      Initial Vitals [04/20/25 0656]   BP Pulse Resp Temp SpO2   (!) 176/114 99 18 98.5 °F (36.9 °C) 97 %      MAP       --         Physical Exam    Nursing note and vitals reviewed.  Constitutional: She appears well-developed and well-nourished. She is not diaphoretic. She is Obese .  Non-toxic appearance. She does not appear ill. No distress.   HENT:   Head: Normocephalic and atraumatic.   Neck: Neck supple.   Cardiovascular:  Normal rate and regular rhythm.     Exam reveals no gallop and no friction rub.       No murmur heard.  Pulmonary/Chest: Effort normal and breath sounds normal. No accessory muscle usage. No tachypnea. No respiratory distress. She has no decreased breath sounds. She has no wheezes. She has no rhonchi. She has no rales.   Abdominal: Abdomen is soft. She exhibits no distension and no mass. There is abdominal tenderness in the epigastric area. There is no guarding.   Musculoskeletal:      Cervical back: Neck supple.     Neurological: She is alert.   Skin: No rash noted.   Psychiatric: She has a normal mood and affect. Her behavior is normal.         ED Course   Procedures  Labs Reviewed   COMPREHENSIVE METABOLIC PANEL - Abnormal       Result Value    Sodium 139      Potassium 4.2      Chloride 110      CO2 17 (*)     Glucose 94      BUN 9      Creatinine 0.8      Calcium 8.7      Protein Total 7.9      Albumin 3.9      Bilirubin Total 0.6      ALP 81      AST 24      ALT 18      Anion Gap 12      eGFR >60     CBC WITH DIFFERENTIAL - Abnormal    WBC 11.02      RBC 4.41      HGB 13.5      HCT 41.7      MCV 95      MCH 30.6      MCHC 32.4      RDW 13.0      Platelet Count 324      MPV 9.5      Nucleated RBC 0      Neut % 77.7 (*)     Lymph % 14.1 (*)     Mono % 6.3      Eos % 1.1      Basophil % 0.3      Imm Grans % 0.5      Neut # 8.58 (*)     Lymph # 1.55      Mono # 0.69      Eos # 0.12      Baso # 0.03      Imm Grans # 0.05 (*)    HEPATITIS C ANTIBODY - Normal    Hep C Ab Interp Non-Reactive      HIV 1 / 2 ANTIBODY - Normal    HIV 1/2 Ag/Ab Non-Reactive     LIPASE - Normal    Lipase Level 20     CBC W/ AUTO DIFFERENTIAL    Narrative:     The following orders were created for panel order CBC W/ AUTO DIFFERENTIAL.  Procedure                               Abnormality         Status                     ---------                               -----------         ------                     CBC with Differential[[id number]]       Abnormal            Final result                 Please view results for these tests on the individual orders.   HEP C VIRUS HOLD SPECIMEN          Imaging Results    None          Medications   ondansetron injection 4 mg (4 mg Intravenous Given 4/20/25 0757)   hyoscyamine SL tablet 0.125 mg (0.125 mg Sublingual Given 4/20/25 0758)   lactated ringers bolus 1,000 mL (0 mLs Intravenous Stopped 4/20/25 0856)   famotidine (PF) injection 20 mg (20 mg Intravenous Given 4/20/25 0800)     Medical Decision Making  45-year-old female presents to the ED with abdominal cramping, nausea, vomiting and diarrhea for the past couple of days.  She is hypertensive to 176/114 on initial evaluation.  Did not take BP meds this morning.  Vitals otherwise within normal limits.  BP did improve without intervention to 147/88 on recheck.   My differential diagnosis includes but is not limited to:  Viral gastroenteritis, pancreatitis, gastritis, dehydration, MARK, electrolyte abnormality    See ED course notes below.  Patient was ultimately able to pass p.o. challenge.  Feel that symptoms are viral in nature.  Feel the patient can be discharged in stable condition.  She was provided with prescriptions for antispasmodics and antiemetics.  Recommend close PCP follow-up if symptoms are not improving over the next few days.  ED return precautions given.     Amount and/or Complexity of Data Reviewed  Labs: ordered.    Risk  Prescription drug management.               ED Course as of 04/20/25 0951   Sun Apr 20, 2025    0900 Patient does report some improvement in her symptoms though she still has some abdominal cramping and nausea when trying to lie flat.  She is willing to attempt oral intake. [EH]   0900 Labs without clinically significant abnormalities. [EH]      ED Course User Index  [EH] Renetta Leahy PA-C                           Clinical Impression:  Final diagnoses:  [R10.9, R11.10, R19.7] Abdominal pain, vomiting, and diarrhea (Primary)  [A08.4] Viral gastroenteritis          ED Disposition Condition    Discharge Good          ED Prescriptions       Medication Sig Dispense Start Date End Date Auth. Provider    ondansetron (ZOFRAN-ODT) 4 MG TbDL Take 1 tablet (4 mg total) by mouth every 8 (eight) hours as needed (nausea or vomiting). 12 tablet 4/20/2025 -- Renetta Leahy PA-C    dicyclomine (BENTYL) 20 mg tablet Take 1 tablet (20 mg total) by mouth 2 (two) times daily as needed (abdominal pain, cramping). 12 tablet 4/20/2025 -- Renetta Leahy PA-C          Follow-up Information    None              [1]   Social History  Tobacco Use    Smoking status: Never    Smokeless tobacco: Never   Substance Use Topics    Alcohol use: Yes     Comment: special occasions    Drug use: No        Renetta Leahy PA-C  04/20/25 0958

## 2025-04-20 NOTE — ED NOTES
Abd pain x 3 days with n/v/d, rectum is sore. Small streaks blood when wiping. States a coworker said she had stomach virus. Able to eat small amount food yesterday and water. MATTHEW upper quadrant abd pain wrapping around to MATTHEW flank. Pain worse when lying down    Patient identifiers for Caitie Wang 45 y.o. female checked and correct.  Chief Complaint   Patient presents with    Abdominal Pain     X 3 days.  Endorses nausea vomiting and diarrhea.      Past Medical History:   Diagnosis Date    Anxiety     Asthma     Chronic pain     back; inflammatory    Depression     Diabetes mellitus, type 2     Endometriosis     Gastric ulcer     Hiatal hernia     Hyperlipidemia     Hypertension     MITZI (obstructive sleep apnea)     no cpap machine    Thyroid disease     Vitamin D deficiency      Allergies reported: Review of patient's allergies indicates:  No Known Allergies    Appearance: Pt awake, alert & oriented to person, place & time. Pt in no acute distress at present time. Pt is clean and well groomed with clothes appropriately fastened.   Skin: Skin warm, dry & intact. Color consistent with ethnicity. Mucous membranes moist. No breakdown or brusing noted.   Musculoskeletal: Patient moving all extremities well, no obvious swelling or deformities noted.   Respiratory: Respirations spontaneous, even, and non-labored. Visible chest rise noted. Airway is open and patent. No accessory muscle use noted.   Neurologic: Sensation is intact. Speech is clear and appropriate. Eyes open spontaneously, behavior appropriate to situation, follows commands, facial expression symmetrical, bilateral hand grasp equal and even, purposeful motor response noted.  Cardiac: All peripheral pulses present. No Bilateral lower extremity edema. Cap refill is <3 seconds.  Abdomen: Abdomen soft, non distended. Cramping pain  : Pt voids independently, denies dysuria, hematuria, frequency.

## 2025-08-21 ENCOUNTER — HOSPITAL ENCOUNTER (EMERGENCY)
Facility: HOSPITAL | Age: 45
Discharge: HOME | End: 2025-08-21
Attending: EMERGENCY MEDICINE
Payer: MEDICAID

## 2025-08-21 VITALS
HEART RATE: 80 BPM | OXYGEN SATURATION: 99 % | HEIGHT: 64 IN | DIASTOLIC BLOOD PRESSURE: 84 MMHG | RESPIRATION RATE: 16 BRPM | BODY MASS INDEX: 38.24 KG/M2 | WEIGHT: 224 LBS | SYSTOLIC BLOOD PRESSURE: 144 MMHG | TEMPERATURE: 99 F

## 2025-08-21 DIAGNOSIS — M54.6 CHRONIC RIGHT-SIDED THORACIC BACK PAIN: ICD-10-CM

## 2025-08-21 DIAGNOSIS — R10.9 RIGHT FLANK PAIN: Primary | ICD-10-CM

## 2025-08-21 DIAGNOSIS — G89.29 CHRONIC RIGHT-SIDED THORACIC BACK PAIN: ICD-10-CM

## 2025-08-21 LAB
ABSOLUTE EOSINOPHIL (OHS): 0.15 K/UL
ABSOLUTE MONOCYTE (OHS): 0.51 K/UL (ref 0.3–1)
ABSOLUTE NEUTROPHIL COUNT (OHS): 4.54 K/UL (ref 1.8–7.7)
ALBUMIN SERPL BCP-MCNC: 4.1 G/DL (ref 3.5–5.2)
ALP SERPL-CCNC: 83 UNIT/L (ref 40–150)
ALT SERPL W/O P-5'-P-CCNC: 17 UNIT/L (ref 0–55)
ANION GAP (OHS): 9 MMOL/L (ref 8–16)
AST SERPL-CCNC: 21 UNIT/L (ref 0–50)
B-HCG UR QL: NEGATIVE
BASOPHILS # BLD AUTO: 0.03 K/UL
BASOPHILS NFR BLD AUTO: 0.4 %
BILIRUB SERPL-MCNC: 0.4 MG/DL (ref 0.1–1)
BILIRUB UR QL STRIP.AUTO: NEGATIVE
BUN SERPL-MCNC: 4 MG/DL (ref 6–30)
BUN SERPL-MCNC: 6 MG/DL (ref 6–20)
CALCIUM SERPL-MCNC: 9.2 MG/DL (ref 8.7–10.5)
CHLORIDE SERPL-SCNC: 102 MMOL/L (ref 95–110)
CHLORIDE SERPL-SCNC: 106 MMOL/L (ref 95–110)
CLARITY UR: CLEAR
CO2 SERPL-SCNC: 25 MMOL/L (ref 23–29)
COLOR UR AUTO: YELLOW
CREAT SERPL-MCNC: 0.8 MG/DL (ref 0.5–1.4)
CREAT SERPL-MCNC: 0.9 MG/DL (ref 0.5–1.4)
CTP QC/QA: YES
ERYTHROCYTE [DISTWIDTH] IN BLOOD BY AUTOMATED COUNT: 13.6 % (ref 11.5–14.5)
GFR SERPLBLD CREATININE-BSD FMLA CKD-EPI: >60 ML/MIN/1.73/M2
GLUCOSE SERPL-MCNC: 82 MG/DL (ref 70–110)
GLUCOSE SERPL-MCNC: 87 MG/DL (ref 70–110)
GLUCOSE UR QL STRIP: NEGATIVE
HCT VFR BLD AUTO: 39.9 % (ref 37–48.5)
HCT VFR BLD CALC: 39 %PCV (ref 36–54)
HGB BLD-MCNC: 12.9 GM/DL (ref 12–16)
HGB UR QL STRIP: NEGATIVE
IMM GRANULOCYTES # BLD AUTO: 0.03 K/UL (ref 0–0.04)
IMM GRANULOCYTES NFR BLD AUTO: 0.4 % (ref 0–0.5)
KETONES UR QL STRIP: NEGATIVE
LEUKOCYTE ESTERASE UR QL STRIP: NEGATIVE
LIPASE SERPL-CCNC: 20 U/L (ref 4–60)
LYMPHOCYTES # BLD AUTO: 2.43 K/UL (ref 1–4.8)
MCH RBC QN AUTO: 30.9 PG (ref 27–31)
MCHC RBC AUTO-ENTMCNC: 32.3 G/DL (ref 32–36)
MCV RBC AUTO: 96 FL (ref 82–98)
NITRITE UR QL STRIP: NEGATIVE
NUCLEATED RBC (/100WBC) (OHS): 0 /100 WBC
PH UR STRIP: 7 [PH]
PLATELET # BLD AUTO: 325 K/UL (ref 150–450)
PMV BLD AUTO: 9.8 FL (ref 9.2–12.9)
POC IONIZED CALCIUM: 1.12 MMOL/L (ref 1.06–1.42)
POC TCO2 (MEASURED): 25 MMOL/L (ref 23–29)
POTASSIUM BLD-SCNC: 3.9 MMOL/L (ref 3.5–5.1)
POTASSIUM SERPL-SCNC: 3.8 MMOL/L (ref 3.5–5.1)
PROT SERPL-MCNC: 7.7 GM/DL (ref 6–8.4)
PROT UR QL STRIP: NEGATIVE
RBC # BLD AUTO: 4.18 M/UL (ref 4–5.4)
RELATIVE EOSINOPHIL (OHS): 2 %
RELATIVE LYMPHOCYTE (OHS): 31.6 % (ref 18–48)
RELATIVE MONOCYTE (OHS): 6.6 % (ref 4–15)
RELATIVE NEUTROPHIL (OHS): 59 % (ref 38–73)
SAMPLE: ABNORMAL
SODIUM BLD-SCNC: 140 MMOL/L (ref 136–145)
SODIUM SERPL-SCNC: 140 MMOL/L (ref 136–145)
SP GR UR STRIP: 1.02
UROBILINOGEN UR STRIP-ACNC: ABNORMAL EU/DL
WBC # BLD AUTO: 7.69 K/UL (ref 3.9–12.7)

## 2025-08-21 PROCEDURE — 81003 URINALYSIS AUTO W/O SCOPE: CPT | Performed by: EMERGENCY MEDICINE

## 2025-08-21 PROCEDURE — 63600175 PHARM REV CODE 636 W HCPCS: Mod: JZ,TB | Performed by: EMERGENCY MEDICINE

## 2025-08-21 PROCEDURE — 96374 THER/PROPH/DIAG INJ IV PUSH: CPT

## 2025-08-21 PROCEDURE — 81025 URINE PREGNANCY TEST: CPT | Performed by: EMERGENCY MEDICINE

## 2025-08-21 PROCEDURE — 80047 BASIC METABLC PNL IONIZED CA: CPT

## 2025-08-21 PROCEDURE — 99285 EMERGENCY DEPT VISIT HI MDM: CPT | Mod: 25

## 2025-08-21 PROCEDURE — 85025 COMPLETE CBC W/AUTO DIFF WBC: CPT | Performed by: EMERGENCY MEDICINE

## 2025-08-21 PROCEDURE — 83690 ASSAY OF LIPASE: CPT | Performed by: EMERGENCY MEDICINE

## 2025-08-21 PROCEDURE — 25000003 PHARM REV CODE 250: Performed by: EMERGENCY MEDICINE

## 2025-08-21 PROCEDURE — 80053 COMPREHEN METABOLIC PANEL: CPT | Performed by: EMERGENCY MEDICINE

## 2025-08-21 RX ORDER — METHOCARBAMOL 500 MG/1
500 TABLET, FILM COATED ORAL
Status: COMPLETED | OUTPATIENT
Start: 2025-08-21 | End: 2025-08-21

## 2025-08-21 RX ORDER — METHOCARBAMOL 500 MG/1
500 TABLET, FILM COATED ORAL 3 TIMES DAILY PRN
Qty: 15 TABLET | Refills: 0 | Status: SHIPPED | OUTPATIENT
Start: 2025-08-21 | End: 2025-08-26

## 2025-08-21 RX ORDER — KETOROLAC TROMETHAMINE 30 MG/ML
10 INJECTION, SOLUTION INTRAMUSCULAR; INTRAVENOUS
Status: COMPLETED | OUTPATIENT
Start: 2025-08-21 | End: 2025-08-21

## 2025-08-21 RX ADMIN — KETOROLAC TROMETHAMINE 10 MG: 30 INJECTION, SOLUTION INTRAMUSCULAR; INTRAVENOUS at 12:08

## 2025-08-21 RX ADMIN — METHOCARBAMOL 500 MG: 500 TABLET ORAL at 12:08

## 2025-08-22 LAB — HOLD SPECIMEN: NORMAL

## (undated) DEVICE — SUT 0 VICRYL / UR6 (J603)

## (undated) DEVICE — TOWEL OR DISP STRL BLUE 4/PK

## (undated) DEVICE — DRAPE CORETEMP FLD WRM 56X62IN

## (undated) DEVICE — SUT MCRYL PLUS 4-0 PS2 27IN

## (undated) DEVICE — BAG TISS RETRV MONARCH 10MM

## (undated) DEVICE — SOL NS 1000CC

## (undated) DEVICE — CLOSURE SKIN STERI STRIP 1/4X4

## (undated) DEVICE — TRAY MINOR GEN SURG OMC

## (undated) DEVICE — DRAPE STERI INSTRUMENT 1018

## (undated) DEVICE — TROCAR ENDOPATH XCEL 5MM 7.5CM

## (undated) DEVICE — APPLIER CLIP ENDO LIGAMAX 5MM

## (undated) DEVICE — ELECTRODE REM PLYHSV RETURN 9

## (undated) DEVICE — NDL HYPO REG 25G X 1 1/2

## (undated) DEVICE — DRAPE ABDOMINAL TIBURON 14X11

## (undated) DEVICE — TEGADERM IV

## (undated) DEVICE — ADHESIVE DERMABOND ADVANCED

## (undated) DEVICE — TROCAR ENDOPATH XCEL 5X75MM

## (undated) DEVICE — SCISSOR 5MMX35CM DIRECT DRIVE

## (undated) DEVICE — BLADE SURG CARBON STEEL SZ11

## (undated) DEVICE — SPONGE DERMA 8PLY 2X2

## (undated) DEVICE — TUBING HF INSUFFLATION W/ FLTR

## (undated) DEVICE — SUT ENDOLOOP PDSII 18 LIGA

## (undated) DEVICE — IRRIGATOR ENDOSCOPY DISP.

## (undated) DEVICE — TROCAR KII BLLN 12MM 10CM

## (undated) DEVICE — KIT ANTIFOG W/SPONG & FLUID